# Patient Record
Sex: MALE | Race: BLACK OR AFRICAN AMERICAN | NOT HISPANIC OR LATINO | Employment: OTHER | URBAN - METROPOLITAN AREA
[De-identification: names, ages, dates, MRNs, and addresses within clinical notes are randomized per-mention and may not be internally consistent; named-entity substitution may affect disease eponyms.]

---

## 2017-01-03 ENCOUNTER — ALLSCRIPTS OFFICE VISIT (OUTPATIENT)
Dept: OTHER | Facility: OTHER | Age: 62
End: 2017-01-03

## 2017-01-13 ENCOUNTER — ANESTHESIA EVENT (OUTPATIENT)
Dept: PERIOP | Facility: HOSPITAL | Age: 62
End: 2017-01-13
Payer: COMMERCIAL

## 2017-01-18 RX ORDER — LISINOPRIL 20 MG/1
20 TABLET ORAL DAILY
Status: ON HOLD | COMMUNITY
End: 2017-02-22

## 2017-01-18 RX ORDER — GABAPENTIN 300 MG/1
300 CAPSULE ORAL DAILY
COMMUNITY
End: 2020-07-30 | Stop reason: ALTCHOICE

## 2017-01-18 RX ORDER — AMLODIPINE BESYLATE 2.5 MG/1
TABLET ORAL DAILY
COMMUNITY
End: 2018-10-04 | Stop reason: SDUPTHER

## 2017-01-18 RX ORDER — ACETAMINOPHEN 325 MG/1
650 TABLET ORAL EVERY 6 HOURS PRN
COMMUNITY
End: 2019-07-29

## 2017-01-18 RX ORDER — HYDROCHLOROTHIAZIDE 25 MG/1
25 TABLET ORAL DAILY
COMMUNITY
End: 2017-02-22 | Stop reason: HOSPADM

## 2017-01-20 ENCOUNTER — GENERIC CONVERSION - ENCOUNTER (OUTPATIENT)
Dept: OTHER | Facility: OTHER | Age: 62
End: 2017-01-20

## 2017-01-23 ENCOUNTER — HOSPITAL ENCOUNTER (OUTPATIENT)
Facility: HOSPITAL | Age: 62
Setting detail: OUTPATIENT SURGERY
Discharge: HOME | End: 2017-01-23
Attending: NEUROLOGICAL SURGERY | Admitting: NEUROLOGICAL SURGERY
Payer: COMMERCIAL

## 2017-01-23 ENCOUNTER — ANESTHESIA (OUTPATIENT)
Dept: PERIOP | Facility: HOSPITAL | Age: 62
End: 2017-01-23
Payer: COMMERCIAL

## 2017-01-23 ENCOUNTER — APPOINTMENT (OUTPATIENT)
Dept: RADIOLOGY | Facility: HOSPITAL | Age: 62
End: 2017-01-23
Payer: COMMERCIAL

## 2017-01-23 VITALS
OXYGEN SATURATION: 99 % | DIASTOLIC BLOOD PRESSURE: 80 MMHG | HEART RATE: 64 BPM | SYSTOLIC BLOOD PRESSURE: 154 MMHG | HEIGHT: 66 IN | TEMPERATURE: 97.5 F | WEIGHT: 136 LBS | BODY MASS INDEX: 21.86 KG/M2 | RESPIRATION RATE: 16 BRPM

## 2017-01-23 PROBLEM — M48.062 LUMBAR STENOSIS WITH NEUROGENIC CLAUDICATION: Chronic | Status: ACTIVE | Noted: 2017-01-23

## 2017-01-23 LAB
ABO GROUP BLD: NORMAL
BLD GP AB SCN SERPL QL: NEGATIVE
RH BLD: POSITIVE

## 2017-01-23 PROCEDURE — 86901 BLOOD TYPING SEROLOGIC RH(D): CPT | Performed by: NEUROLOGICAL SURGERY

## 2017-01-23 PROCEDURE — 86850 RBC ANTIBODY SCREEN: CPT | Performed by: NEUROLOGICAL SURGERY

## 2017-01-23 PROCEDURE — 86900 BLOOD TYPING SEROLOGIC ABO: CPT | Performed by: NEUROLOGICAL SURGERY

## 2017-01-23 PROCEDURE — 72020 X-RAY EXAM OF SPINE 1 VIEW: CPT

## 2017-01-23 PROCEDURE — 51798 US URINE CAPACITY MEASURE: CPT | Performed by: NEUROLOGICAL SURGERY

## 2017-01-23 RX ORDER — SODIUM CHLORIDE 9 MG/ML
INJECTION, SOLUTION INTRAVENOUS CONTINUOUS PRN
Status: DISCONTINUED | OUTPATIENT
Start: 2017-01-23 | End: 2017-01-23 | Stop reason: SURG

## 2017-01-23 RX ORDER — SODIUM CHLORIDE 9 MG/ML
50 INJECTION, SOLUTION INTRAVENOUS CONTINUOUS
Status: DISCONTINUED | OUTPATIENT
Start: 2017-01-23 | End: 2017-01-23 | Stop reason: HOSPADM

## 2017-01-23 RX ORDER — ONDANSETRON 2 MG/ML
4 INJECTION INTRAMUSCULAR; INTRAVENOUS EVERY 6 HOURS PRN
Status: DISCONTINUED | OUTPATIENT
Start: 2017-01-23 | End: 2017-01-23 | Stop reason: HOSPADM

## 2017-01-23 RX ORDER — LABETALOL HYDROCHLORIDE 5 MG/ML
5 INJECTION, SOLUTION INTRAVENOUS
Status: DISCONTINUED | OUTPATIENT
Start: 2017-01-23 | End: 2017-01-23 | Stop reason: HOSPADM

## 2017-01-23 RX ORDER — OXYCODONE HYDROCHLORIDE 5 MG/1
5 TABLET ORAL EVERY 6 HOURS PRN
Qty: 21 TABLET | Refills: 0 | Status: SHIPPED | OUTPATIENT
Start: 2017-01-23 | End: 2017-01-30

## 2017-01-23 RX ORDER — GLYCOPYRROLATE 0.2 MG/ML
INJECTION INTRAMUSCULAR; INTRAVENOUS AS NEEDED
Status: DISCONTINUED | OUTPATIENT
Start: 2017-01-23 | End: 2017-01-23 | Stop reason: SURG

## 2017-01-23 RX ORDER — FENTANYL CITRATE 50 UG/ML
INJECTION, SOLUTION INTRAMUSCULAR; INTRAVENOUS AS NEEDED
Status: DISCONTINUED | OUTPATIENT
Start: 2017-01-23 | End: 2017-01-23 | Stop reason: SURG

## 2017-01-23 RX ORDER — MORPHINE SULFATE 2 MG/ML
1 INJECTION, SOLUTION INTRAMUSCULAR; INTRAVENOUS
Status: DISCONTINUED | OUTPATIENT
Start: 2017-01-23 | End: 2017-01-23 | Stop reason: HOSPADM

## 2017-01-23 RX ORDER — MIDAZOLAM HYDROCHLORIDE 1 MG/ML
INJECTION INTRAMUSCULAR; INTRAVENOUS AS NEEDED
Status: DISCONTINUED | OUTPATIENT
Start: 2017-01-23 | End: 2017-01-23 | Stop reason: SURG

## 2017-01-23 RX ORDER — ONDANSETRON 2 MG/ML
INJECTION INTRAMUSCULAR; INTRAVENOUS AS NEEDED
Status: DISCONTINUED | OUTPATIENT
Start: 2017-01-23 | End: 2017-01-23 | Stop reason: SURG

## 2017-01-23 RX ORDER — LABETALOL HYDROCHLORIDE 5 MG/ML
INJECTION, SOLUTION INTRAVENOUS AS NEEDED
Status: DISCONTINUED | OUTPATIENT
Start: 2017-01-23 | End: 2017-01-23 | Stop reason: SURG

## 2017-01-23 RX ORDER — ESMOLOL HYDROCHLORIDE 10 MG/ML
INJECTION INTRAVENOUS AS NEEDED
Status: DISCONTINUED | OUTPATIENT
Start: 2017-01-23 | End: 2017-01-23 | Stop reason: SURG

## 2017-01-23 RX ORDER — ROCURONIUM BROMIDE 10 MG/ML
INJECTION, SOLUTION INTRAVENOUS AS NEEDED
Status: DISCONTINUED | OUTPATIENT
Start: 2017-01-23 | End: 2017-01-23 | Stop reason: SURG

## 2017-01-23 RX ORDER — LIDOCAINE HYDROCHLORIDE 10 MG/ML
INJECTION, SOLUTION EPIDURAL; INFILTRATION; INTRACAUDAL; PERINEURAL AS NEEDED
Status: DISCONTINUED | OUTPATIENT
Start: 2017-01-23 | End: 2017-01-23 | Stop reason: SURG

## 2017-01-23 RX ORDER — SODIUM CHLORIDE 9 MG/ML
100 INJECTION, SOLUTION INTRAVENOUS CONTINUOUS
Status: DISCONTINUED | OUTPATIENT
Start: 2017-01-23 | End: 2017-01-23 | Stop reason: HOSPADM

## 2017-01-23 RX ORDER — BUPIVACAINE HYDROCHLORIDE AND EPINEPHRINE 5; 5 MG/ML; UG/ML
INJECTION, SOLUTION EPIDURAL; INTRACAUDAL; PERINEURAL AS NEEDED
Status: DISCONTINUED | OUTPATIENT
Start: 2017-01-23 | End: 2017-01-23 | Stop reason: HOSPADM

## 2017-01-23 RX ORDER — METHYLPREDNISOLONE ACETATE 40 MG/ML
INJECTION, SUSPENSION INTRA-ARTICULAR; INTRALESIONAL; INTRAMUSCULAR; SOFT TISSUE AS NEEDED
Status: DISCONTINUED | OUTPATIENT
Start: 2017-01-23 | End: 2017-01-23 | Stop reason: HOSPADM

## 2017-01-23 RX ORDER — LIDOCAINE HYDROCHLORIDE AND EPINEPHRINE 10; 10 MG/ML; UG/ML
INJECTION, SOLUTION INFILTRATION; PERINEURAL AS NEEDED
Status: DISCONTINUED | OUTPATIENT
Start: 2017-01-23 | End: 2017-01-23 | Stop reason: HOSPADM

## 2017-01-23 RX ORDER — FENTANYL CITRATE/PF 50 MCG/ML
25 SYRINGE (ML) INJECTION
Status: DISCONTINUED | OUTPATIENT
Start: 2017-01-23 | End: 2017-01-23 | Stop reason: HOSPADM

## 2017-01-23 RX ORDER — PROPOFOL 10 MG/ML
INJECTION, EMULSION INTRAVENOUS AS NEEDED
Status: DISCONTINUED | OUTPATIENT
Start: 2017-01-23 | End: 2017-01-23 | Stop reason: SURG

## 2017-01-23 RX ORDER — OXYCODONE HYDROCHLORIDE AND ACETAMINOPHEN 5; 325 MG/1; MG/1
1 TABLET ORAL EVERY 4 HOURS PRN
Status: DISCONTINUED | OUTPATIENT
Start: 2017-01-23 | End: 2017-01-23 | Stop reason: HOSPADM

## 2017-01-23 RX ADMIN — ESMOLOL HYDROCHLORIDE 20 MG: 100 INJECTION, SOLUTION INTRAVENOUS at 08:11

## 2017-01-23 RX ADMIN — LABETALOL HYDROCHLORIDE 10 MG: 5 INJECTION, SOLUTION INTRAVENOUS at 10:17

## 2017-01-23 RX ADMIN — DEXAMETHASONE SODIUM PHOSPHATE 10 MG: 10 INJECTION INTRAMUSCULAR; INTRAVENOUS at 08:17

## 2017-01-23 RX ADMIN — PROPOFOL 70 MG: 10 INJECTION, EMULSION INTRAVENOUS at 07:53

## 2017-01-23 RX ADMIN — LIDOCAINE HYDROCHLORIDE 70 MG: 10 INJECTION, SOLUTION EPIDURAL; INFILTRATION; INTRACAUDAL; PERINEURAL at 07:51

## 2017-01-23 RX ADMIN — CEFAZOLIN SODIUM 2000 MG: 2 SOLUTION INTRAVENOUS at 08:00

## 2017-01-23 RX ADMIN — GLYCOPYRROLATE 0.4 MG: 0.2 INJECTION INTRAMUSCULAR; INTRAVENOUS at 09:51

## 2017-01-23 RX ADMIN — ROCURONIUM BROMIDE 10 MG: 10 INJECTION, SOLUTION INTRAVENOUS at 08:15

## 2017-01-23 RX ADMIN — GLYCOPYRROLATE 0.1 MG: 0.2 INJECTION INTRAMUSCULAR; INTRAVENOUS at 08:40

## 2017-01-23 RX ADMIN — SODIUM CHLORIDE: 0.9 INJECTION, SOLUTION INTRAVENOUS at 07:15

## 2017-01-23 RX ADMIN — OXYCODONE HYDROCHLORIDE AND ACETAMINOPHEN 1 TABLET: 5; 325 TABLET ORAL at 12:25

## 2017-01-23 RX ADMIN — ONDANSETRON 4 MG: 2 INJECTION INTRAMUSCULAR; INTRAVENOUS at 08:17

## 2017-01-23 RX ADMIN — MIDAZOLAM HYDROCHLORIDE 2 MG: 1 INJECTION, SOLUTION INTRAMUSCULAR; INTRAVENOUS at 07:44

## 2017-01-23 RX ADMIN — LABETALOL HYDROCHLORIDE 10 MG: 5 INJECTION, SOLUTION INTRAVENOUS at 10:05

## 2017-01-23 RX ADMIN — FENTANYL CITRATE 50 MCG: 50 INJECTION, SOLUTION INTRAMUSCULAR; INTRAVENOUS at 07:52

## 2017-01-23 RX ADMIN — NEOSTIGMINE METHYLSULFATE 3 MG: 1 INJECTION INTRAMUSCULAR; INTRAVENOUS; SUBCUTANEOUS at 09:51

## 2017-01-23 RX ADMIN — PROPOFOL 50 MG: 10 INJECTION, EMULSION INTRAVENOUS at 08:10

## 2017-01-23 RX ADMIN — PROPOFOL 130 MG: 10 INJECTION, EMULSION INTRAVENOUS at 07:51

## 2017-01-23 RX ADMIN — FENTANYL CITRATE 50 MCG: 50 INJECTION, SOLUTION INTRAMUSCULAR; INTRAVENOUS at 08:15

## 2017-01-23 RX ADMIN — ROCURONIUM BROMIDE 40 MG: 10 INJECTION, SOLUTION INTRAVENOUS at 07:52

## 2017-01-23 RX ADMIN — ESMOLOL HYDROCHLORIDE 10 MG: 100 INJECTION, SOLUTION INTRAVENOUS at 07:55

## 2017-01-23 RX ADMIN — HYDROMORPHONE HYDROCHLORIDE 0.5 MG: 1 INJECTION, SOLUTION INTRAMUSCULAR; INTRAVENOUS; SUBCUTANEOUS at 08:13

## 2017-01-25 ENCOUNTER — GENERIC CONVERSION - ENCOUNTER (OUTPATIENT)
Dept: OTHER | Facility: OTHER | Age: 62
End: 2017-01-25

## 2017-01-27 ENCOUNTER — GENERIC CONVERSION - ENCOUNTER (OUTPATIENT)
Dept: OTHER | Facility: OTHER | Age: 62
End: 2017-01-27

## 2017-02-06 ENCOUNTER — ALLSCRIPTS OFFICE VISIT (OUTPATIENT)
Dept: OTHER | Facility: OTHER | Age: 62
End: 2017-02-06

## 2017-02-19 ENCOUNTER — APPOINTMENT (EMERGENCY)
Dept: RADIOLOGY | Facility: HOSPITAL | Age: 62
DRG: 463 | End: 2017-02-19
Payer: COMMERCIAL

## 2017-02-19 ENCOUNTER — APPOINTMENT (INPATIENT)
Dept: RADIOLOGY | Facility: HOSPITAL | Age: 62
DRG: 463 | End: 2017-02-19
Payer: COMMERCIAL

## 2017-02-19 ENCOUNTER — GENERIC CONVERSION - ENCOUNTER (OUTPATIENT)
Dept: OTHER | Facility: OTHER | Age: 62
End: 2017-02-19

## 2017-02-19 ENCOUNTER — HOSPITAL ENCOUNTER (INPATIENT)
Facility: HOSPITAL | Age: 62
LOS: 3 days | Discharge: HOME HEALTH | DRG: 463 | End: 2017-02-22
Attending: EMERGENCY MEDICINE | Admitting: INTERNAL MEDICINE
Payer: COMMERCIAL

## 2017-02-19 DIAGNOSIS — M48.062 LUMBAR STENOSIS WITH NEUROGENIC CLAUDICATION: Chronic | ICD-10-CM

## 2017-02-19 DIAGNOSIS — N39.0 UTI (URINARY TRACT INFECTION): Primary | ICD-10-CM

## 2017-02-19 DIAGNOSIS — R29.6 MULTIPLE FALLS: ICD-10-CM

## 2017-02-19 DIAGNOSIS — N31.9 NEUROGENIC BLADDER: ICD-10-CM

## 2017-02-19 DIAGNOSIS — N17.9 AKI (ACUTE KIDNEY INJURY) (HCC): ICD-10-CM

## 2017-02-19 DIAGNOSIS — R27.0 ATAXIA: ICD-10-CM

## 2017-02-19 PROBLEM — I10 HYPERTENSION: Status: ACTIVE | Noted: 2017-02-19

## 2017-02-19 LAB
ANION GAP SERPL CALCULATED.3IONS-SCNC: 12 MMOL/L (ref 4–13)
ATRIAL RATE: 82 BPM
BACTERIA UR QL AUTO: ABNORMAL /HPF
BASOPHILS # BLD AUTO: 0.03 THOUSANDS/ΜL (ref 0–0.1)
BASOPHILS NFR BLD AUTO: 0 % (ref 0–1)
BILIRUB UR QL STRIP: NEGATIVE
BUN SERPL-MCNC: 32 MG/DL (ref 5–25)
CALCIUM SERPL-MCNC: 9 MG/DL (ref 8.3–10.1)
CHLORIDE SERPL-SCNC: 104 MMOL/L (ref 100–108)
CLARITY UR: ABNORMAL
CO2 SERPL-SCNC: 20 MMOL/L (ref 21–32)
COLOR UR: YELLOW
COLOR, POC: YELLOW
CREAT SERPL-MCNC: 1.74 MG/DL (ref 0.6–1.3)
EOSINOPHIL # BLD AUTO: 0.11 THOUSAND/ΜL (ref 0–0.61)
EOSINOPHIL NFR BLD AUTO: 2 % (ref 0–6)
ERYTHROCYTE [DISTWIDTH] IN BLOOD BY AUTOMATED COUNT: 13.6 % (ref 11.6–15.1)
GFR SERPL CREATININE-BSD FRML MDRD: 48.3 ML/MIN/1.73SQ M
GLUCOSE SERPL-MCNC: 154 MG/DL (ref 65–140)
GLUCOSE UR STRIP-MCNC: NEGATIVE MG/DL
HCT VFR BLD AUTO: 35.3 % (ref 36.5–49.3)
HGB BLD-MCNC: 12.1 G/DL (ref 12–17)
HGB UR QL STRIP.AUTO: ABNORMAL
HYALINE CASTS #/AREA URNS LPF: ABNORMAL /LPF
KETONES UR STRIP-MCNC: NEGATIVE MG/DL
LEUKOCYTE ESTERASE UR QL STRIP: ABNORMAL
LYMPHOCYTES # BLD AUTO: 2.92 THOUSANDS/ΜL (ref 0.6–4.47)
LYMPHOCYTES NFR BLD AUTO: 40 % (ref 14–44)
MCH RBC QN AUTO: 32.7 PG (ref 26.8–34.3)
MCHC RBC AUTO-ENTMCNC: 34.3 G/DL (ref 31.4–37.4)
MCV RBC AUTO: 95 FL (ref 82–98)
MONOCYTES # BLD AUTO: 1.13 THOUSAND/ΜL (ref 0.17–1.22)
MONOCYTES NFR BLD AUTO: 15 % (ref 4–12)
NEUTROPHILS # BLD AUTO: 3.16 THOUSANDS/ΜL (ref 1.85–7.62)
NEUTS SEG NFR BLD AUTO: 43 % (ref 43–75)
NITRITE UR QL STRIP: POSITIVE
NON-SQ EPI CELLS URNS QL MICRO: ABNORMAL /HPF
NRBC BLD AUTO-RTO: 0 /100 WBCS
P AXIS: 67 DEGREES
PH UR STRIP.AUTO: 5.5 [PH] (ref 4.5–8)
PLATELET # BLD AUTO: 280 THOUSANDS/UL (ref 149–390)
PMV BLD AUTO: 9.5 FL (ref 8.9–12.7)
POTASSIUM SERPL-SCNC: 3.6 MMOL/L (ref 3.5–5.3)
PR INTERVAL: 130 MS
PROT UR STRIP-MCNC: ABNORMAL MG/DL
QRS AXIS: 51 DEGREES
QRSD INTERVAL: 78 MS
QT INTERVAL: 372 MS
QTC INTERVAL: 434 MS
RBC # BLD AUTO: 3.7 MILLION/UL (ref 3.88–5.62)
RBC #/AREA URNS AUTO: ABNORMAL /HPF
SODIUM SERPL-SCNC: 136 MMOL/L (ref 136–145)
SP GR UR STRIP.AUTO: 1.01 (ref 1–1.03)
T WAVE AXIS: 75 DEGREES
TROPONIN I SERPL-MCNC: <0.02 NG/ML
TSH SERPL DL<=0.05 MIU/L-ACNC: 0.69 UIU/ML (ref 0.36–3.74)
UROBILINOGEN UR QL STRIP.AUTO: 0.2 E.U./DL
VENTRICULAR RATE: 82 BPM
WBC # BLD AUTO: 7.36 THOUSAND/UL (ref 4.31–10.16)
WBC #/AREA URNS AUTO: ABNORMAL /HPF

## 2017-02-19 PROCEDURE — 87077 CULTURE AEROBIC IDENTIFY: CPT

## 2017-02-19 PROCEDURE — 70450 CT HEAD/BRAIN W/O DYE: CPT

## 2017-02-19 PROCEDURE — 84443 ASSAY THYROID STIM HORMONE: CPT | Performed by: INTERNAL MEDICINE

## 2017-02-19 PROCEDURE — 81001 URINALYSIS AUTO W/SCOPE: CPT

## 2017-02-19 PROCEDURE — 93005 ELECTROCARDIOGRAM TRACING: CPT

## 2017-02-19 PROCEDURE — 71020 HB CHEST X-RAY 2VW FRONTAL&LATL: CPT

## 2017-02-19 PROCEDURE — 85025 COMPLETE CBC W/AUTO DIFF WBC: CPT

## 2017-02-19 PROCEDURE — 87086 URINE CULTURE/COLONY COUNT: CPT

## 2017-02-19 PROCEDURE — 81002 URINALYSIS NONAUTO W/O SCOPE: CPT | Performed by: EMERGENCY MEDICINE

## 2017-02-19 PROCEDURE — 87186 SC STD MICRODIL/AGAR DIL: CPT

## 2017-02-19 PROCEDURE — 0T9B70Z DRAINAGE OF BLADDER WITH DRAINAGE DEVICE, VIA NATURAL OR ARTIFICIAL OPENING: ICD-10-PCS | Performed by: INTERNAL MEDICINE

## 2017-02-19 PROCEDURE — 36415 COLL VENOUS BLD VENIPUNCTURE: CPT

## 2017-02-19 PROCEDURE — 70551 MRI BRAIN STEM W/O DYE: CPT

## 2017-02-19 PROCEDURE — 84484 ASSAY OF TROPONIN QUANT: CPT

## 2017-02-19 PROCEDURE — 80048 BASIC METABOLIC PNL TOTAL CA: CPT

## 2017-02-19 PROCEDURE — 99285 EMERGENCY DEPT VISIT HI MDM: CPT

## 2017-02-19 RX ORDER — GABAPENTIN 300 MG/1
300 CAPSULE ORAL 2 TIMES DAILY
Status: DISCONTINUED | OUTPATIENT
Start: 2017-02-19 | End: 2017-02-22 | Stop reason: HOSPADM

## 2017-02-19 RX ORDER — AMLODIPINE BESYLATE 5 MG/1
5 TABLET ORAL DAILY
Status: DISCONTINUED | OUTPATIENT
Start: 2017-02-19 | End: 2017-02-22 | Stop reason: HOSPADM

## 2017-02-19 RX ORDER — ONDANSETRON 2 MG/ML
4 INJECTION INTRAMUSCULAR; INTRAVENOUS EVERY 6 HOURS PRN
Status: DISCONTINUED | OUTPATIENT
Start: 2017-02-19 | End: 2017-02-22 | Stop reason: HOSPADM

## 2017-02-19 RX ORDER — TRAMADOL HYDROCHLORIDE 50 MG/1
50 TABLET ORAL EVERY 4 HOURS PRN
Status: DISCONTINUED | OUTPATIENT
Start: 2017-02-19 | End: 2017-02-22 | Stop reason: HOSPADM

## 2017-02-19 RX ORDER — HEPARIN SODIUM 5000 [USP'U]/ML
5000 INJECTION, SOLUTION INTRAVENOUS; SUBCUTANEOUS EVERY 8 HOURS SCHEDULED
Status: DISCONTINUED | OUTPATIENT
Start: 2017-02-19 | End: 2017-02-22 | Stop reason: HOSPADM

## 2017-02-19 RX ORDER — TRAMADOL HYDROCHLORIDE 50 MG/1
TABLET ORAL
COMMUNITY
End: 2018-10-04

## 2017-02-19 RX ORDER — ACETAMINOPHEN 325 MG/1
650 TABLET ORAL EVERY 6 HOURS PRN
Status: DISCONTINUED | OUTPATIENT
Start: 2017-02-19 | End: 2017-02-22 | Stop reason: HOSPADM

## 2017-02-19 RX ADMIN — GABAPENTIN 300 MG: 300 CAPSULE ORAL at 18:18

## 2017-02-19 RX ADMIN — CEFAZOLIN SODIUM 1000 MG: 1 SOLUTION INTRAVENOUS at 23:47

## 2017-02-19 RX ADMIN — AMLODIPINE BESYLATE 5 MG: 5 TABLET ORAL at 16:21

## 2017-02-19 RX ADMIN — CEFEPIME 2000 MG: 2 INJECTION, POWDER, FOR SOLUTION INTRAMUSCULAR; INTRAVENOUS at 12:05

## 2017-02-20 LAB
ALBUMIN SERPL BCP-MCNC: 3.2 G/DL (ref 3.5–5)
ALP SERPL-CCNC: 68 U/L (ref 46–116)
ALT SERPL W P-5'-P-CCNC: 84 U/L (ref 12–78)
ANION GAP SERPL CALCULATED.3IONS-SCNC: 11 MMOL/L (ref 4–13)
AST SERPL W P-5'-P-CCNC: 68 U/L (ref 5–45)
BILIRUB SERPL-MCNC: 0.43 MG/DL (ref 0.2–1)
BUN SERPL-MCNC: 25 MG/DL (ref 5–25)
CALCIUM SERPL-MCNC: 9.2 MG/DL (ref 8.3–10.1)
CHLORIDE SERPL-SCNC: 104 MMOL/L (ref 100–108)
CO2 SERPL-SCNC: 23 MMOL/L (ref 21–32)
CREAT SERPL-MCNC: 1.22 MG/DL (ref 0.6–1.3)
ERYTHROCYTE [DISTWIDTH] IN BLOOD BY AUTOMATED COUNT: 13.7 % (ref 11.6–15.1)
GFR SERPL CREATININE-BSD FRML MDRD: >60 ML/MIN/1.73SQ M
GLUCOSE SERPL-MCNC: 105 MG/DL (ref 65–140)
HCT VFR BLD AUTO: 37.6 % (ref 36.5–49.3)
HGB BLD-MCNC: 12.7 G/DL (ref 12–17)
MCH RBC QN AUTO: 32.5 PG (ref 26.8–34.3)
MCHC RBC AUTO-ENTMCNC: 33.8 G/DL (ref 31.4–37.4)
MCV RBC AUTO: 96 FL (ref 82–98)
PLATELET # BLD AUTO: 315 THOUSANDS/UL (ref 149–390)
PMV BLD AUTO: 10 FL (ref 8.9–12.7)
POTASSIUM SERPL-SCNC: 3.9 MMOL/L (ref 3.5–5.3)
PROT SERPL-MCNC: 8.9 G/DL (ref 6.4–8.2)
RBC # BLD AUTO: 3.91 MILLION/UL (ref 3.88–5.62)
SODIUM SERPL-SCNC: 138 MMOL/L (ref 136–145)
WBC # BLD AUTO: 5.72 THOUSAND/UL (ref 4.31–10.16)

## 2017-02-20 PROCEDURE — 85027 COMPLETE CBC AUTOMATED: CPT | Performed by: INTERNAL MEDICINE

## 2017-02-20 PROCEDURE — 80053 COMPREHEN METABOLIC PANEL: CPT | Performed by: INTERNAL MEDICINE

## 2017-02-20 RX ADMIN — TRAMADOL HYDROCHLORIDE 50 MG: 50 TABLET, COATED ORAL at 00:02

## 2017-02-20 RX ADMIN — CEFAZOLIN SODIUM 1000 MG: 1 SOLUTION INTRAVENOUS at 23:23

## 2017-02-20 RX ADMIN — TRAMADOL HYDROCHLORIDE 50 MG: 50 TABLET, COATED ORAL at 23:23

## 2017-02-20 RX ADMIN — TRAMADOL HYDROCHLORIDE 50 MG: 50 TABLET, COATED ORAL at 11:29

## 2017-02-20 RX ADMIN — GABAPENTIN 300 MG: 300 CAPSULE ORAL at 18:05

## 2017-02-20 RX ADMIN — GABAPENTIN 300 MG: 300 CAPSULE ORAL at 08:53

## 2017-02-20 RX ADMIN — AMLODIPINE BESYLATE 5 MG: 5 TABLET ORAL at 08:53

## 2017-02-20 RX ADMIN — CEFAZOLIN SODIUM 1000 MG: 1 SOLUTION INTRAVENOUS at 12:43

## 2017-02-21 PROBLEM — E78.5 HLD (HYPERLIPIDEMIA): Status: ACTIVE | Noted: 2017-02-21

## 2017-02-21 PROBLEM — I95.1 ORTHOSTATIC HYPOTENSION: Status: ACTIVE | Noted: 2017-02-21

## 2017-02-21 PROBLEM — A49.8 PSEUDOMONAS AERUGINOSA INFECTION: Status: ACTIVE | Noted: 2017-02-19

## 2017-02-21 PROBLEM — R55 SYNCOPE AND COLLAPSE: Status: ACTIVE | Noted: 2017-02-21

## 2017-02-21 LAB
ANION GAP SERPL CALCULATED.3IONS-SCNC: 10 MMOL/L (ref 4–13)
BACTERIA UR CULT: NORMAL
BUN SERPL-MCNC: 24 MG/DL (ref 5–25)
CALCIUM SERPL-MCNC: 9.3 MG/DL (ref 8.3–10.1)
CHLORIDE SERPL-SCNC: 102 MMOL/L (ref 100–108)
CO2 SERPL-SCNC: 22 MMOL/L (ref 21–32)
CREAT SERPL-MCNC: 1.08 MG/DL (ref 0.6–1.3)
GFR SERPL CREATININE-BSD FRML MDRD: >60 ML/MIN/1.73SQ M
GLUCOSE SERPL-MCNC: 102 MG/DL (ref 65–140)
POTASSIUM SERPL-SCNC: 4.2 MMOL/L (ref 3.5–5.3)
SODIUM SERPL-SCNC: 134 MMOL/L (ref 136–145)

## 2017-02-21 PROCEDURE — 97163 PT EVAL HIGH COMPLEX 45 MIN: CPT

## 2017-02-21 PROCEDURE — 97166 OT EVAL MOD COMPLEX 45 MIN: CPT

## 2017-02-21 PROCEDURE — G8980 MOBILITY D/C STATUS: HCPCS

## 2017-02-21 PROCEDURE — G8979 MOBILITY GOAL STATUS: HCPCS

## 2017-02-21 PROCEDURE — G8987 SELF CARE CURRENT STATUS: HCPCS

## 2017-02-21 PROCEDURE — 80048 BASIC METABOLIC PNL TOTAL CA: CPT | Performed by: INTERNAL MEDICINE

## 2017-02-21 PROCEDURE — G8989 SELF CARE D/C STATUS: HCPCS

## 2017-02-21 PROCEDURE — G8988 SELF CARE GOAL STATUS: HCPCS

## 2017-02-21 PROCEDURE — G8978 MOBILITY CURRENT STATUS: HCPCS

## 2017-02-21 RX ORDER — CEPHALEXIN 500 MG/1
500 CAPSULE ORAL EVERY 12 HOURS SCHEDULED
Status: DISCONTINUED | OUTPATIENT
Start: 2017-02-21 | End: 2017-02-22 | Stop reason: HOSPADM

## 2017-02-21 RX ORDER — ATORVASTATIN CALCIUM 10 MG/1
10 TABLET, FILM COATED ORAL
Status: DISCONTINUED | OUTPATIENT
Start: 2017-02-21 | End: 2017-02-22 | Stop reason: HOSPADM

## 2017-02-21 RX ORDER — CEPHALEXIN 500 MG/1
CAPSULE ORAL
Status: COMPLETED
Start: 2017-02-21 | End: 2017-02-21

## 2017-02-21 RX ORDER — GINSENG 100 MG
1 CAPSULE ORAL 2 TIMES DAILY
Status: DISCONTINUED | OUTPATIENT
Start: 2017-02-21 | End: 2017-02-22 | Stop reason: HOSPADM

## 2017-02-21 RX ORDER — TRAMADOL HYDROCHLORIDE 50 MG/1
TABLET ORAL
Status: COMPLETED
Start: 2017-02-21 | End: 2017-02-21

## 2017-02-21 RX ORDER — SODIUM CHLORIDE 9 MG/ML
100 INJECTION, SOLUTION INTRAVENOUS CONTINUOUS
Status: DISCONTINUED | OUTPATIENT
Start: 2017-02-21 | End: 2017-02-22 | Stop reason: HOSPADM

## 2017-02-21 RX ADMIN — CEPHALEXIN 500 MG: 500 CAPSULE ORAL at 21:07

## 2017-02-21 RX ADMIN — GABAPENTIN 300 MG: 300 CAPSULE ORAL at 17:39

## 2017-02-21 RX ADMIN — TRAMADOL HYDROCHLORIDE 50 MG: 50 TABLET ORAL at 21:07

## 2017-02-21 RX ADMIN — TRAMADOL HYDROCHLORIDE 50 MG: 50 TABLET, COATED ORAL at 21:07

## 2017-02-21 RX ADMIN — ATORVASTATIN CALCIUM 10 MG: 10 TABLET, FILM COATED ORAL at 17:39

## 2017-02-21 RX ADMIN — GABAPENTIN 300 MG: 300 CAPSULE ORAL at 09:01

## 2017-02-21 RX ADMIN — AMLODIPINE BESYLATE 5 MG: 5 TABLET ORAL at 09:01

## 2017-02-21 RX ADMIN — SODIUM CHLORIDE 100 ML/HR: 0.9 INJECTION, SOLUTION INTRAVENOUS at 11:11

## 2017-02-21 RX ADMIN — CEPHALEXIN 500 MG: 500 CAPSULE ORAL at 11:42

## 2017-02-22 VITALS
WEIGHT: 142.86 LBS | DIASTOLIC BLOOD PRESSURE: 78 MMHG | TEMPERATURE: 98.4 F | SYSTOLIC BLOOD PRESSURE: 144 MMHG | RESPIRATION RATE: 20 BRPM | HEART RATE: 75 BPM | HEIGHT: 66 IN | BODY MASS INDEX: 22.96 KG/M2 | OXYGEN SATURATION: 100 %

## 2017-02-22 PROBLEM — I95.1 ORTHOSTATIC HYPOTENSION: Status: RESOLVED | Noted: 2017-02-21 | Resolved: 2017-02-22

## 2017-02-22 PROBLEM — R29.6 MULTIPLE FALLS: Status: RESOLVED | Noted: 2017-02-19 | Resolved: 2017-02-22

## 2017-02-22 LAB
ANION GAP SERPL CALCULATED.3IONS-SCNC: 6 MMOL/L (ref 4–13)
BUN SERPL-MCNC: 22 MG/DL (ref 5–25)
CALCIUM SERPL-MCNC: 9 MG/DL (ref 8.3–10.1)
CHLORIDE SERPL-SCNC: 105 MMOL/L (ref 100–108)
CO2 SERPL-SCNC: 25 MMOL/L (ref 21–32)
CREAT SERPL-MCNC: 1.2 MG/DL (ref 0.6–1.3)
ERYTHROCYTE [DISTWIDTH] IN BLOOD BY AUTOMATED COUNT: 13.4 % (ref 11.6–15.1)
GFR SERPL CREATININE-BSD FRML MDRD: >60 ML/MIN/1.73SQ M
GLUCOSE SERPL-MCNC: 106 MG/DL (ref 65–140)
HCT VFR BLD AUTO: 36.4 % (ref 36.5–49.3)
HGB BLD-MCNC: 12.1 G/DL (ref 12–17)
MAGNESIUM SERPL-MCNC: 1.9 MG/DL (ref 1.6–2.6)
MCH RBC QN AUTO: 32.2 PG (ref 26.8–34.3)
MCHC RBC AUTO-ENTMCNC: 33.2 G/DL (ref 31.4–37.4)
MCV RBC AUTO: 97 FL (ref 82–98)
PLATELET # BLD AUTO: 345 THOUSANDS/UL (ref 149–390)
PMV BLD AUTO: 9.6 FL (ref 8.9–12.7)
POTASSIUM SERPL-SCNC: 4.1 MMOL/L (ref 3.5–5.3)
RBC # BLD AUTO: 3.76 MILLION/UL (ref 3.88–5.62)
SODIUM SERPL-SCNC: 136 MMOL/L (ref 136–145)
WBC # BLD AUTO: 7.72 THOUSAND/UL (ref 4.31–10.16)

## 2017-02-22 PROCEDURE — 85027 COMPLETE CBC AUTOMATED: CPT | Performed by: PHYSICIAN ASSISTANT

## 2017-02-22 PROCEDURE — 83735 ASSAY OF MAGNESIUM: CPT | Performed by: PHYSICIAN ASSISTANT

## 2017-02-22 PROCEDURE — 80048 BASIC METABOLIC PNL TOTAL CA: CPT | Performed by: PHYSICIAN ASSISTANT

## 2017-02-22 RX ORDER — CEPHALEXIN 500 MG/1
500 CAPSULE ORAL 3 TIMES DAILY
Qty: 24 CAPSULE | Refills: 0 | Status: SHIPPED | OUTPATIENT
Start: 2017-02-22 | End: 2017-03-02

## 2017-02-22 RX ORDER — LISINOPRIL 20 MG/1
10 TABLET ORAL DAILY
Qty: 15 TABLET | Refills: 0 | Status: SHIPPED | OUTPATIENT
Start: 2017-02-22 | End: 2018-07-05

## 2017-02-22 RX ADMIN — SODIUM CHLORIDE 100 ML/HR: 0.9 INJECTION, SOLUTION INTRAVENOUS at 06:40

## 2017-02-22 RX ADMIN — BACITRACIN ZINC 1 SMALL APPLICATION: 500 OINTMENT TOPICAL at 03:55

## 2017-02-22 RX ADMIN — CEPHALEXIN 500 MG: 500 CAPSULE ORAL at 11:23

## 2017-02-22 RX ADMIN — BACITRACIN ZINC 1 SMALL APPLICATION: 500 OINTMENT TOPICAL at 11:24

## 2017-02-22 RX ADMIN — AMLODIPINE BESYLATE 5 MG: 5 TABLET ORAL at 11:24

## 2017-02-22 RX ADMIN — GABAPENTIN 300 MG: 300 CAPSULE ORAL at 11:24

## 2017-02-23 DIAGNOSIS — M48.061 SPINAL STENOSIS OF LUMBAR REGION: ICD-10-CM

## 2017-02-23 DIAGNOSIS — M54.50 LOW BACK PAIN: ICD-10-CM

## 2017-03-07 ENCOUNTER — ALLSCRIPTS OFFICE VISIT (OUTPATIENT)
Dept: OTHER | Facility: OTHER | Age: 62
End: 2017-03-07

## 2017-03-07 ENCOUNTER — TRANSCRIBE ORDERS (OUTPATIENT)
Dept: ADMINISTRATIVE | Facility: HOSPITAL | Age: 62
End: 2017-03-07

## 2017-03-07 DIAGNOSIS — I71.2 THORACIC ANEURYSM WITHOUT MENTION OF RUPTURE: Primary | ICD-10-CM

## 2017-03-08 ENCOUNTER — APPOINTMENT (OUTPATIENT)
Dept: LAB | Facility: HOSPITAL | Age: 62
End: 2017-03-08
Payer: COMMERCIAL

## 2017-03-08 ENCOUNTER — TRANSCRIBE ORDERS (OUTPATIENT)
Dept: LAB | Facility: HOSPITAL | Age: 62
End: 2017-03-08

## 2017-03-08 DIAGNOSIS — N31.9 NEUROGENIC DYSFUNCTION OF THE URINARY BLADDER: Primary | ICD-10-CM

## 2017-03-08 LAB
BACTERIA UR QL AUTO: ABNORMAL /HPF
BILIRUB UR QL STRIP: NEGATIVE
CLARITY UR: ABNORMAL
COLOR UR: YELLOW
GLUCOSE UR STRIP-MCNC: NEGATIVE MG/DL
HGB UR QL STRIP.AUTO: NEGATIVE
HYALINE CASTS #/AREA URNS LPF: ABNORMAL /LPF
KETONES UR STRIP-MCNC: NEGATIVE MG/DL
LEUKOCYTE ESTERASE UR QL STRIP: ABNORMAL
NITRITE UR QL STRIP: NEGATIVE
NON-SQ EPI CELLS URNS QL MICRO: ABNORMAL /HPF
PH UR STRIP.AUTO: 6 [PH] (ref 4.5–8)
PROT UR STRIP-MCNC: NEGATIVE MG/DL
RBC #/AREA URNS AUTO: ABNORMAL /HPF
SP GR UR STRIP.AUTO: 1.01 (ref 1–1.03)
UROBILINOGEN UR QL STRIP.AUTO: 0.2 E.U./DL
WBC #/AREA URNS AUTO: ABNORMAL /HPF

## 2017-03-08 PROCEDURE — 87186 SC STD MICRODIL/AGAR DIL: CPT | Performed by: NURSE PRACTITIONER

## 2017-03-08 PROCEDURE — 81001 URINALYSIS AUTO W/SCOPE: CPT | Performed by: NURSE PRACTITIONER

## 2017-03-08 PROCEDURE — 87077 CULTURE AEROBIC IDENTIFY: CPT | Performed by: NURSE PRACTITIONER

## 2017-03-08 PROCEDURE — 87086 URINE CULTURE/COLONY COUNT: CPT | Performed by: NURSE PRACTITIONER

## 2017-03-10 LAB — BACTERIA UR CULT: NORMAL

## 2017-03-18 ENCOUNTER — HOSPITAL ENCOUNTER (EMERGENCY)
Facility: HOSPITAL | Age: 62
Discharge: HOME | End: 2017-03-18
Attending: EMERGENCY MEDICINE
Payer: COMMERCIAL

## 2017-03-18 ENCOUNTER — APPOINTMENT (EMERGENCY)
Dept: RADIOLOGY | Facility: HOSPITAL | Age: 62
End: 2017-03-18
Payer: COMMERCIAL

## 2017-03-18 VITALS
DIASTOLIC BLOOD PRESSURE: 77 MMHG | OXYGEN SATURATION: 97 % | SYSTOLIC BLOOD PRESSURE: 154 MMHG | WEIGHT: 139 LBS | RESPIRATION RATE: 16 BRPM | BODY MASS INDEX: 22.44 KG/M2 | TEMPERATURE: 98.2 F | HEART RATE: 78 BPM
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
## 2017-03-18 DIAGNOSIS — S80.02XA CONTUSION OF LEFT KNEE, INITIAL ENCOUNTER: ICD-10-CM

## 2017-03-18 DIAGNOSIS — S39.012A LOW BACK STRAIN, INITIAL ENCOUNTER: Primary | ICD-10-CM

## 2017-03-18 PROCEDURE — 99283 EMERGENCY DEPT VISIT LOW MDM: CPT

## 2017-03-18 PROCEDURE — 72100 X-RAY EXAM L-S SPINE 2/3 VWS: CPT

## 2017-03-18 PROCEDURE — 73564 X-RAY EXAM KNEE 4 OR MORE: CPT

## 2017-03-18 RX ORDER — IBUPROFEN 600 MG/1
600 TABLET ORAL EVERY 6 HOURS PRN
Qty: 40 TABLET | Refills: 0 | Status: SHIPPED | OUTPATIENT
Start: 2017-03-18 | End: 2018-07-05

## 2017-03-18 RX ORDER — IBUPROFEN 600 MG/1
600 TABLET ORAL ONCE
Status: COMPLETED | OUTPATIENT
Start: 2017-03-18 | End: 2017-03-18

## 2017-03-18 RX ADMIN — IBUPROFEN 600 MG: 600 TABLET, FILM COATED ORAL at 11:56

## 2017-03-20 ENCOUNTER — GENERIC CONVERSION - ENCOUNTER (OUTPATIENT)
Dept: OTHER | Facility: OTHER | Age: 62
End: 2017-03-20

## 2017-03-24 ENCOUNTER — APPOINTMENT (OUTPATIENT)
Dept: PHYSICAL THERAPY | Facility: CLINIC | Age: 62
End: 2017-03-24
Payer: COMMERCIAL

## 2017-03-24 ENCOUNTER — GENERIC CONVERSION - ENCOUNTER (OUTPATIENT)
Dept: OTHER | Facility: OTHER | Age: 62
End: 2017-03-24

## 2017-03-24 DIAGNOSIS — M48.061 SPINAL STENOSIS OF LUMBAR REGION: ICD-10-CM

## 2017-03-24 DIAGNOSIS — M54.50 LOW BACK PAIN: ICD-10-CM

## 2017-03-24 PROCEDURE — 97162 PT EVAL MOD COMPLEX 30 MIN: CPT

## 2017-03-27 ENCOUNTER — APPOINTMENT (OUTPATIENT)
Dept: PHYSICAL THERAPY | Facility: CLINIC | Age: 62
End: 2017-03-27
Payer: COMMERCIAL

## 2017-03-27 PROCEDURE — 97010 HOT OR COLD PACKS THERAPY: CPT

## 2017-03-27 PROCEDURE — 97140 MANUAL THERAPY 1/> REGIONS: CPT

## 2017-03-27 PROCEDURE — 97110 THERAPEUTIC EXERCISES: CPT

## 2017-03-31 ENCOUNTER — APPOINTMENT (OUTPATIENT)
Dept: PHYSICAL THERAPY | Facility: CLINIC | Age: 62
End: 2017-03-31
Payer: COMMERCIAL

## 2017-03-31 PROCEDURE — 97140 MANUAL THERAPY 1/> REGIONS: CPT

## 2017-03-31 PROCEDURE — 97110 THERAPEUTIC EXERCISES: CPT

## 2017-03-31 PROCEDURE — 97010 HOT OR COLD PACKS THERAPY: CPT

## 2017-04-04 ENCOUNTER — APPOINTMENT (OUTPATIENT)
Dept: PHYSICAL THERAPY | Facility: CLINIC | Age: 62
End: 2017-04-04
Payer: COMMERCIAL

## 2017-04-04 ENCOUNTER — ALLSCRIPTS OFFICE VISIT (OUTPATIENT)
Dept: OTHER | Facility: OTHER | Age: 62
End: 2017-04-04

## 2017-04-04 PROCEDURE — 97110 THERAPEUTIC EXERCISES: CPT

## 2017-04-04 PROCEDURE — 97010 HOT OR COLD PACKS THERAPY: CPT

## 2017-04-06 ENCOUNTER — APPOINTMENT (OUTPATIENT)
Dept: PHYSICAL THERAPY | Facility: CLINIC | Age: 62
End: 2017-04-06
Payer: COMMERCIAL

## 2017-04-06 PROCEDURE — 97010 HOT OR COLD PACKS THERAPY: CPT

## 2017-04-06 PROCEDURE — 97110 THERAPEUTIC EXERCISES: CPT

## 2017-04-11 ENCOUNTER — APPOINTMENT (OUTPATIENT)
Dept: PHYSICAL THERAPY | Facility: CLINIC | Age: 62
End: 2017-04-11
Payer: COMMERCIAL

## 2017-04-11 PROCEDURE — 97010 HOT OR COLD PACKS THERAPY: CPT

## 2017-04-11 PROCEDURE — 97110 THERAPEUTIC EXERCISES: CPT

## 2017-04-13 ENCOUNTER — APPOINTMENT (OUTPATIENT)
Dept: PHYSICAL THERAPY | Facility: CLINIC | Age: 62
End: 2017-04-13
Payer: COMMERCIAL

## 2017-04-14 DIAGNOSIS — I12.9 HYPERTENSIVE CHRONIC KIDNEY DISEASE WITH STAGE 1 THROUGH STAGE 4 CHRONIC KIDNEY DISEASE, OR UNSPECIFIED CHRONIC KIDNEY DISEASE: ICD-10-CM

## 2017-04-17 ENCOUNTER — TRANSCRIBE ORDERS (OUTPATIENT)
Dept: LAB | Facility: HOSPITAL | Age: 62
End: 2017-04-17

## 2017-04-17 ENCOUNTER — APPOINTMENT (OUTPATIENT)
Dept: LAB | Facility: HOSPITAL | Age: 62
End: 2017-04-17
Attending: INTERNAL MEDICINE
Payer: COMMERCIAL

## 2017-04-17 DIAGNOSIS — I12.9 HYPERTENSIVE CHRONIC KIDNEY DISEASE WITH STAGE 1 THROUGH STAGE 4 CHRONIC KIDNEY DISEASE, OR UNSPECIFIED CHRONIC KIDNEY DISEASE: ICD-10-CM

## 2017-04-17 DIAGNOSIS — I12.0 PARENCHYMAL RENAL HYPERTENSION, STAGE 5 CHRONIC KIDNEY DISEASE OR END STAGE RENAL DISEASE (HCC): Primary | ICD-10-CM

## 2017-04-17 LAB
ALBUMIN SERPL BCP-MCNC: 3.5 G/DL (ref 3.5–5)
ANION GAP SERPL CALCULATED.3IONS-SCNC: 8 MMOL/L (ref 4–13)
BUN SERPL-MCNC: 26 MG/DL (ref 5–25)
CALCIUM SERPL-MCNC: 9.2 MG/DL (ref 8.3–10.1)
CHLORIDE SERPL-SCNC: 108 MMOL/L (ref 100–108)
CO2 SERPL-SCNC: 23 MMOL/L (ref 21–32)
CREAT SERPL-MCNC: 1.42 MG/DL (ref 0.6–1.3)
ERYTHROCYTE [DISTWIDTH] IN BLOOD BY AUTOMATED COUNT: 14 % (ref 11.6–15.1)
GFR SERPL CREATININE-BSD FRML MDRD: >60 ML/MIN/1.73SQ M
GLUCOSE P FAST SERPL-MCNC: 114 MG/DL (ref 65–99)
HCT VFR BLD AUTO: 36.2 % (ref 36.5–49.3)
HGB BLD-MCNC: 12.4 G/DL (ref 12–17)
MCH RBC QN AUTO: 32.3 PG (ref 26.8–34.3)
MCHC RBC AUTO-ENTMCNC: 34.3 G/DL (ref 31.4–37.4)
MCV RBC AUTO: 94 FL (ref 82–98)
PHOSPHATE SERPL-MCNC: 2.7 MG/DL (ref 2.3–4.1)
PLATELET # BLD AUTO: 304 THOUSANDS/UL (ref 149–390)
PMV BLD AUTO: 9.8 FL (ref 8.9–12.7)
POTASSIUM SERPL-SCNC: 3.6 MMOL/L (ref 3.5–5.3)
PTH-INTACT SERPL-MCNC: 20.3 PG/ML (ref 14–72)
RBC # BLD AUTO: 3.84 MILLION/UL (ref 3.88–5.62)
SODIUM SERPL-SCNC: 139 MMOL/L (ref 136–145)
WBC # BLD AUTO: 6.66 THOUSAND/UL (ref 4.31–10.16)

## 2017-04-17 PROCEDURE — 36415 COLL VENOUS BLD VENIPUNCTURE: CPT

## 2017-04-17 PROCEDURE — 85027 COMPLETE CBC AUTOMATED: CPT

## 2017-04-17 PROCEDURE — 80069 RENAL FUNCTION PANEL: CPT

## 2017-04-17 PROCEDURE — 83970 ASSAY OF PARATHORMONE: CPT

## 2017-04-18 ENCOUNTER — APPOINTMENT (OUTPATIENT)
Dept: PHYSICAL THERAPY | Facility: CLINIC | Age: 62
End: 2017-04-18
Payer: COMMERCIAL

## 2017-04-18 ENCOUNTER — APPOINTMENT (OUTPATIENT)
Dept: LAB | Facility: HOSPITAL | Age: 62
End: 2017-04-18
Attending: INTERNAL MEDICINE
Payer: COMMERCIAL

## 2017-04-18 LAB
CREAT UR-MCNC: 165 MG/DL
PROT UR-MCNC: 40 MG/DL
PROT/CREAT UR: 0.24 MG/G{CREAT} (ref 0–0.1)

## 2017-04-18 PROCEDURE — 97140 MANUAL THERAPY 1/> REGIONS: CPT

## 2017-04-18 PROCEDURE — 97110 THERAPEUTIC EXERCISES: CPT

## 2017-04-18 PROCEDURE — 82570 ASSAY OF URINE CREATININE: CPT | Performed by: INTERNAL MEDICINE

## 2017-04-18 PROCEDURE — 84156 ASSAY OF PROTEIN URINE: CPT | Performed by: INTERNAL MEDICINE

## 2017-04-20 ENCOUNTER — APPOINTMENT (OUTPATIENT)
Dept: PHYSICAL THERAPY | Facility: CLINIC | Age: 62
End: 2017-04-20
Payer: COMMERCIAL

## 2017-04-20 ENCOUNTER — ALLSCRIPTS OFFICE VISIT (OUTPATIENT)
Dept: OTHER | Facility: OTHER | Age: 62
End: 2017-04-20

## 2017-04-20 PROCEDURE — 97140 MANUAL THERAPY 1/> REGIONS: CPT

## 2017-04-20 PROCEDURE — 97010 HOT OR COLD PACKS THERAPY: CPT

## 2017-04-20 PROCEDURE — 97110 THERAPEUTIC EXERCISES: CPT

## 2017-04-25 ENCOUNTER — APPOINTMENT (OUTPATIENT)
Dept: PHYSICAL THERAPY | Facility: CLINIC | Age: 62
End: 2017-04-25
Payer: COMMERCIAL

## 2017-04-25 PROCEDURE — 97010 HOT OR COLD PACKS THERAPY: CPT

## 2017-04-25 PROCEDURE — 97110 THERAPEUTIC EXERCISES: CPT

## 2017-04-25 PROCEDURE — 97140 MANUAL THERAPY 1/> REGIONS: CPT

## 2017-04-27 ENCOUNTER — APPOINTMENT (OUTPATIENT)
Dept: PHYSICAL THERAPY | Facility: CLINIC | Age: 62
End: 2017-04-27
Payer: COMMERCIAL

## 2017-04-27 PROCEDURE — 97010 HOT OR COLD PACKS THERAPY: CPT

## 2017-04-27 PROCEDURE — 97110 THERAPEUTIC EXERCISES: CPT

## 2017-04-27 PROCEDURE — 97140 MANUAL THERAPY 1/> REGIONS: CPT

## 2017-05-02 ENCOUNTER — APPOINTMENT (OUTPATIENT)
Dept: PHYSICAL THERAPY | Facility: CLINIC | Age: 62
End: 2017-05-02
Payer: COMMERCIAL

## 2017-05-02 PROCEDURE — 97110 THERAPEUTIC EXERCISES: CPT

## 2017-05-02 PROCEDURE — 97010 HOT OR COLD PACKS THERAPY: CPT

## 2017-05-02 PROCEDURE — 97140 MANUAL THERAPY 1/> REGIONS: CPT

## 2017-05-04 ENCOUNTER — APPOINTMENT (OUTPATIENT)
Dept: PHYSICAL THERAPY | Facility: CLINIC | Age: 62
End: 2017-05-04
Payer: COMMERCIAL

## 2017-05-04 PROCEDURE — 97140 MANUAL THERAPY 1/> REGIONS: CPT

## 2017-05-04 PROCEDURE — 97110 THERAPEUTIC EXERCISES: CPT

## 2017-05-04 PROCEDURE — 97010 HOT OR COLD PACKS THERAPY: CPT

## 2017-05-09 ENCOUNTER — APPOINTMENT (OUTPATIENT)
Dept: PHYSICAL THERAPY | Facility: CLINIC | Age: 62
End: 2017-05-09
Payer: COMMERCIAL

## 2017-05-09 PROCEDURE — 97010 HOT OR COLD PACKS THERAPY: CPT

## 2017-05-09 PROCEDURE — 97110 THERAPEUTIC EXERCISES: CPT

## 2017-05-09 PROCEDURE — 97140 MANUAL THERAPY 1/> REGIONS: CPT

## 2017-05-11 ENCOUNTER — APPOINTMENT (OUTPATIENT)
Dept: PHYSICAL THERAPY | Facility: CLINIC | Age: 62
End: 2017-05-11
Payer: COMMERCIAL

## 2017-05-11 PROCEDURE — 97110 THERAPEUTIC EXERCISES: CPT

## 2017-05-11 PROCEDURE — 97010 HOT OR COLD PACKS THERAPY: CPT

## 2017-05-11 PROCEDURE — 97140 MANUAL THERAPY 1/> REGIONS: CPT

## 2017-05-16 ENCOUNTER — APPOINTMENT (OUTPATIENT)
Dept: PHYSICAL THERAPY | Facility: CLINIC | Age: 62
End: 2017-05-16
Payer: COMMERCIAL

## 2017-05-16 PROCEDURE — 97110 THERAPEUTIC EXERCISES: CPT

## 2017-05-16 PROCEDURE — 97140 MANUAL THERAPY 1/> REGIONS: CPT

## 2017-05-16 PROCEDURE — 97010 HOT OR COLD PACKS THERAPY: CPT

## 2017-05-18 ENCOUNTER — APPOINTMENT (OUTPATIENT)
Dept: PHYSICAL THERAPY | Facility: CLINIC | Age: 62
End: 2017-05-18
Payer: COMMERCIAL

## 2017-05-18 PROCEDURE — 97140 MANUAL THERAPY 1/> REGIONS: CPT

## 2017-05-18 PROCEDURE — 97010 HOT OR COLD PACKS THERAPY: CPT

## 2017-05-18 PROCEDURE — 97110 THERAPEUTIC EXERCISES: CPT

## 2017-05-23 ENCOUNTER — APPOINTMENT (OUTPATIENT)
Dept: PHYSICAL THERAPY | Facility: CLINIC | Age: 62
End: 2017-05-23
Payer: COMMERCIAL

## 2017-05-23 PROCEDURE — 97010 HOT OR COLD PACKS THERAPY: CPT

## 2017-05-23 PROCEDURE — 97140 MANUAL THERAPY 1/> REGIONS: CPT

## 2017-05-23 PROCEDURE — 97110 THERAPEUTIC EXERCISES: CPT

## 2017-05-25 ENCOUNTER — APPOINTMENT (OUTPATIENT)
Dept: PHYSICAL THERAPY | Facility: CLINIC | Age: 62
End: 2017-05-25
Payer: COMMERCIAL

## 2017-05-25 PROCEDURE — 97140 MANUAL THERAPY 1/> REGIONS: CPT

## 2017-05-25 PROCEDURE — 97110 THERAPEUTIC EXERCISES: CPT

## 2017-05-25 PROCEDURE — 97010 HOT OR COLD PACKS THERAPY: CPT

## 2017-05-30 ENCOUNTER — APPOINTMENT (OUTPATIENT)
Dept: PHYSICAL THERAPY | Facility: CLINIC | Age: 62
End: 2017-05-30
Payer: COMMERCIAL

## 2017-05-30 PROCEDURE — 97010 HOT OR COLD PACKS THERAPY: CPT

## 2017-05-30 PROCEDURE — 97140 MANUAL THERAPY 1/> REGIONS: CPT

## 2017-05-30 PROCEDURE — 97110 THERAPEUTIC EXERCISES: CPT

## 2017-06-01 ENCOUNTER — APPOINTMENT (OUTPATIENT)
Dept: PHYSICAL THERAPY | Facility: CLINIC | Age: 62
End: 2017-06-01
Payer: COMMERCIAL

## 2017-06-01 PROCEDURE — 97010 HOT OR COLD PACKS THERAPY: CPT

## 2017-06-01 PROCEDURE — 97110 THERAPEUTIC EXERCISES: CPT

## 2017-06-01 PROCEDURE — 97140 MANUAL THERAPY 1/> REGIONS: CPT

## 2017-06-06 ENCOUNTER — APPOINTMENT (OUTPATIENT)
Dept: PHYSICAL THERAPY | Facility: CLINIC | Age: 62
End: 2017-06-06
Payer: COMMERCIAL

## 2017-06-06 PROCEDURE — 97010 HOT OR COLD PACKS THERAPY: CPT

## 2017-06-06 PROCEDURE — 97110 THERAPEUTIC EXERCISES: CPT

## 2017-06-06 PROCEDURE — 97140 MANUAL THERAPY 1/> REGIONS: CPT

## 2017-06-08 ENCOUNTER — APPOINTMENT (OUTPATIENT)
Dept: PHYSICAL THERAPY | Facility: CLINIC | Age: 62
End: 2017-06-08
Payer: COMMERCIAL

## 2017-06-08 PROCEDURE — 97110 THERAPEUTIC EXERCISES: CPT

## 2017-06-08 PROCEDURE — 97140 MANUAL THERAPY 1/> REGIONS: CPT

## 2017-06-13 ENCOUNTER — APPOINTMENT (OUTPATIENT)
Dept: PHYSICAL THERAPY | Facility: CLINIC | Age: 62
End: 2017-06-13
Payer: COMMERCIAL

## 2017-06-13 PROCEDURE — 97110 THERAPEUTIC EXERCISES: CPT

## 2017-06-15 ENCOUNTER — APPOINTMENT (OUTPATIENT)
Dept: PHYSICAL THERAPY | Facility: CLINIC | Age: 62
End: 2017-06-15
Payer: COMMERCIAL

## 2017-06-15 PROCEDURE — 97110 THERAPEUTIC EXERCISES: CPT

## 2017-06-19 ENCOUNTER — APPOINTMENT (OUTPATIENT)
Dept: PHYSICAL THERAPY | Facility: CLINIC | Age: 62
End: 2017-06-19
Payer: COMMERCIAL

## 2017-06-19 PROCEDURE — 97010 HOT OR COLD PACKS THERAPY: CPT

## 2017-06-19 PROCEDURE — 97110 THERAPEUTIC EXERCISES: CPT

## 2017-06-22 ENCOUNTER — APPOINTMENT (OUTPATIENT)
Dept: PHYSICAL THERAPY | Facility: CLINIC | Age: 62
End: 2017-06-22
Payer: COMMERCIAL

## 2017-06-22 PROCEDURE — 97110 THERAPEUTIC EXERCISES: CPT

## 2017-06-22 PROCEDURE — 97010 HOT OR COLD PACKS THERAPY: CPT

## 2017-06-27 ENCOUNTER — APPOINTMENT (OUTPATIENT)
Dept: PHYSICAL THERAPY | Facility: CLINIC | Age: 62
End: 2017-06-27
Payer: COMMERCIAL

## 2017-06-27 PROCEDURE — 97110 THERAPEUTIC EXERCISES: CPT

## 2017-06-27 PROCEDURE — 97010 HOT OR COLD PACKS THERAPY: CPT

## 2017-06-29 ENCOUNTER — APPOINTMENT (OUTPATIENT)
Dept: PHYSICAL THERAPY | Facility: CLINIC | Age: 62
End: 2017-06-29
Payer: COMMERCIAL

## 2017-08-31 ENCOUNTER — APPOINTMENT (OUTPATIENT)
Dept: LAB | Facility: HOSPITAL | Age: 62
End: 2017-08-31
Payer: COMMERCIAL

## 2017-08-31 ENCOUNTER — TRANSCRIBE ORDERS (OUTPATIENT)
Dept: LAB | Facility: HOSPITAL | Age: 62
End: 2017-08-31

## 2017-08-31 DIAGNOSIS — Z13.1 SCREENING FOR DIABETES MELLITUS: ICD-10-CM

## 2017-08-31 DIAGNOSIS — N18.30 CHRONIC KIDNEY DISEASE, STAGE III (MODERATE) (HCC): ICD-10-CM

## 2017-08-31 DIAGNOSIS — N18.30 CHRONIC KIDNEY DISEASE, STAGE III (MODERATE) (HCC): Primary | ICD-10-CM

## 2017-08-31 DIAGNOSIS — I10 ESSENTIAL HYPERTENSION, BENIGN: ICD-10-CM

## 2017-08-31 LAB
ALBUMIN SERPL BCP-MCNC: 3.6 G/DL (ref 3.5–5)
ALP SERPL-CCNC: 96 U/L (ref 46–116)
ALT SERPL W P-5'-P-CCNC: 37 U/L (ref 12–78)
ANION GAP SERPL CALCULATED.3IONS-SCNC: 10 MMOL/L (ref 4–13)
AST SERPL W P-5'-P-CCNC: 37 U/L (ref 5–45)
BACTERIA UR QL AUTO: ABNORMAL /HPF
BASOPHILS # BLD AUTO: 0.05 THOUSANDS/ΜL (ref 0–0.1)
BASOPHILS NFR BLD AUTO: 1 % (ref 0–1)
BILIRUB SERPL-MCNC: 0.38 MG/DL (ref 0.2–1)
BILIRUB UR QL STRIP: ABNORMAL
BUN SERPL-MCNC: 20 MG/DL (ref 5–25)
CALCIUM SERPL-MCNC: 9.5 MG/DL (ref 8.3–10.1)
CHLORIDE SERPL-SCNC: 106 MMOL/L (ref 100–108)
CHOLEST SERPL-MCNC: 168 MG/DL (ref 50–200)
CLARITY UR: ABNORMAL
CO2 SERPL-SCNC: 21 MMOL/L (ref 21–32)
COLOR UR: ABNORMAL
CREAT SERPL-MCNC: 1.36 MG/DL (ref 0.6–1.3)
EOSINOPHIL # BLD AUTO: 0.08 THOUSAND/ΜL (ref 0–0.61)
EOSINOPHIL NFR BLD AUTO: 1 % (ref 0–6)
ERYTHROCYTE [DISTWIDTH] IN BLOOD BY AUTOMATED COUNT: 14.2 % (ref 11.6–15.1)
EST. AVERAGE GLUCOSE BLD GHB EST-MCNC: 140 MG/DL
GFR SERPL CREATININE-BSD FRML MDRD: 64 ML/MIN/1.73SQ M
GLUCOSE P FAST SERPL-MCNC: 72 MG/DL (ref 65–99)
GLUCOSE UR STRIP-MCNC: NEGATIVE MG/DL
HBA1C MFR BLD: 6.5 % (ref 4.2–6.3)
HCT VFR BLD AUTO: 39.5 % (ref 36.5–49.3)
HDLC SERPL-MCNC: 64 MG/DL (ref 40–60)
HGB BLD-MCNC: 13 G/DL (ref 12–17)
HGB UR QL STRIP.AUTO: NEGATIVE
HYALINE CASTS #/AREA URNS LPF: ABNORMAL /LPF
KETONES UR STRIP-MCNC: ABNORMAL MG/DL
LDLC SERPL CALC-MCNC: 95 MG/DL (ref 0–100)
LEUKOCYTE ESTERASE UR QL STRIP: ABNORMAL
LYMPHOCYTES # BLD AUTO: 1.77 THOUSANDS/ΜL (ref 0.6–4.47)
LYMPHOCYTES NFR BLD AUTO: 29 % (ref 14–44)
MCH RBC QN AUTO: 29.7 PG (ref 26.8–34.3)
MCHC RBC AUTO-ENTMCNC: 32.9 G/DL (ref 31.4–37.4)
MCV RBC AUTO: 90 FL (ref 82–98)
MONOCYTES # BLD AUTO: 0.84 THOUSAND/ΜL (ref 0.17–1.22)
MONOCYTES NFR BLD AUTO: 14 % (ref 4–12)
NEUTROPHILS # BLD AUTO: 3.35 THOUSANDS/ΜL (ref 1.85–7.62)
NEUTS SEG NFR BLD AUTO: 55 % (ref 43–75)
NITRITE UR QL STRIP: NEGATIVE
NON-SQ EPI CELLS URNS QL MICRO: ABNORMAL /HPF
NRBC BLD AUTO-RTO: 0 /100 WBCS
PH UR STRIP.AUTO: 5.5 [PH] (ref 4.5–8)
PLATELET # BLD AUTO: 289 THOUSANDS/UL (ref 149–390)
PMV BLD AUTO: 10.2 FL (ref 8.9–12.7)
POTASSIUM SERPL-SCNC: 3.3 MMOL/L (ref 3.5–5.3)
PROT SERPL-MCNC: 9.4 G/DL (ref 6.4–8.2)
PROT UR STRIP-MCNC: ABNORMAL MG/DL
RBC # BLD AUTO: 4.38 MILLION/UL (ref 3.88–5.62)
RBC #/AREA URNS AUTO: ABNORMAL /HPF
SODIUM SERPL-SCNC: 137 MMOL/L (ref 136–145)
SP GR UR STRIP.AUTO: 1.02 (ref 1–1.03)
TRIGL SERPL-MCNC: 45 MG/DL
UROBILINOGEN UR QL STRIP.AUTO: 0.2 E.U./DL
WBC # BLD AUTO: 6.11 THOUSAND/UL (ref 4.31–10.16)
WBC #/AREA URNS AUTO: ABNORMAL /HPF

## 2017-08-31 PROCEDURE — 80053 COMPREHEN METABOLIC PANEL: CPT

## 2017-08-31 PROCEDURE — 85025 COMPLETE CBC W/AUTO DIFF WBC: CPT

## 2017-08-31 PROCEDURE — 36415 COLL VENOUS BLD VENIPUNCTURE: CPT

## 2017-08-31 PROCEDURE — 80061 LIPID PANEL: CPT

## 2017-08-31 PROCEDURE — 81001 URINALYSIS AUTO W/SCOPE: CPT | Performed by: NURSE PRACTITIONER

## 2017-08-31 PROCEDURE — 83036 HEMOGLOBIN GLYCOSYLATED A1C: CPT

## 2018-01-10 NOTE — MISCELLANEOUS
Message  The patient was contacted and preop instructions were given  His allergies and medications were reviewed  He stated he had no questions or concerns about the surgery  He was encouraged to contact the office with any future questions or concerns  Active Problems    1  Arthritis (716 90) (M19 90)   2  Atypical chest pain (786 59) (R07 89)   3  Cellulitis of leg (682 6) (L03 119)   4  Chest pain (786 50) (R07 9)   5  Elevated ALT measurement (790 4) (R74 0)   6  Elevated AST (SGOT) (790 4) (R74 0)   7  Hypertension (401 9) (I10)   8  Hypertensive kidney disease with CKD stage III (403 90,585 3) (I12 9,N18 3)   9  Leg pain, lateral, left (729 5) (M79 605)   10  Lower back pain (724 2) (M54 5)   11  Microscopic hematuria (599 72) (R31 29)   12  Mixed hyperlipidemia (272 2) (E78 2)   13  Neurogenic claudication due to lumbar spinal stenosis (724 03) (M48 06)   14  Preoperative testing (V72 84) (Z01 818)   15  Thoracic aortic aneurysm without rupture (441 2) (I71 2)   16  Urinary retention (788 20) (R33 9)    Current Meds   1  Acetaminophen 500 MG Oral Tablet; TAKE 1 TABLET EVERY 4 TO 6 HOURS AS   NEEDED; Therapy: 32PDS9135 to Recorded   2  AmLODIPine Besylate 5 MG Oral Tablet; TAKE 1 TABLET DAILY; Therapy: (Recorded:30Jun2016) to Recorded   3  Gabapentin 300 MG Oral Capsule; TAKE 1 CAPSULE AT BEDTIME; Therapy: (Recorded:30Jun2016) to Recorded   4  HydroCHLOROthiazide 25 MG Oral Tablet; TAKE 1 TABLET DAILY; Therapy: (Recorded:30Jun2016) to Recorded   5  Lisinopril 20 MG Oral Tablet; Take 1 tablet daily; Therapy: (Recorded:30Jun2016) to Recorded   6  Meloxicam 15 MG Oral Tablet; TAKE 1 TABLET DAILY; Therapy: 52BDF9185 to Recorded   7  Tamsulosin HCl - 0 4 MG Oral Capsule; TAKE 1 CAPSULE Bedtime; Therapy: 21XCZ5077 to (Evaluate:82Qiv4209)  Requested for: 97HMZ4903; Last   Rx:03Jan2017 Ordered    Allergies    1   No Known Drug Allergies    Signatures   Electronically signed by : Efraín Barton RN; Jan 20 2017  9:07AM EST                       (Author)

## 2018-01-10 NOTE — PROGRESS NOTES
Preliminary Nursing Report                Patient Information    Initial Encounter Entry Date:   2016 4:26 PM EST (Automated Transmission Automated Transmission)       Last Modified:   {Wojciech Sutton}              Legal Name: Tashi Barbosa Number:        YOB: 1955        Age (years): 59        Gender: M        Body Mass Index (BMI): 22 kg/m2        Height: 67 in  Weight: 143 lbs (65 kgs)           Address:   [address] [place] [address]              Phone: [phone]   (consent to leave messages)        Email:        Ethnicity: Decline to State        Advent:        Marital Status:        Preferred Language: English        Race: Other Race                    Patient Insurance Information        Primary Insurance Information Carrier Name: {Primary  CarrierName}           Carrier Address:   {Primary  CarrierAddress}              Carrier Phone: {Primary  CarrierPhone}          Group Number: {Primary  GroupNumber}          Policy Number: {Primary  PolicyNumber}          Insured Name: {Primary  InsuredName}          Insured : {Primary  InsuredDOB}          Relationship to Insured: {Primary  RelationshiptoInsured}           Secondary Insurance Information Carrier Name: {Secondary  CarrierName}           Carrier Address:   {Secondary  CarrierAddress}              Carrier Phone: {Secondary  CarrierPhone}          Group Number: {Secondary  GroupNumber}          Policy Number: {Secondary  PolicyNumber}          Insured Name: {Secondary  InsuredName}          Insured : {Secondary  InsuredDOB}          Relationship to Insured: {Secondary  RelationshiptoInsured}                       Health Profile   Booking #:   Deandre Newton #: [id number]               DOS: 2017    Surgery : LOW BACK DISK SURGERY    Add'l Procedures/Notes:     Surgery Risk: Intermediate          Precautions     Atypical chest pain                   Allergies    No Known Drug Allergies             Medications    Acetaminophen 500 MG Oral Tablet       AmLODIPine Besylate 5 MG Oral Tablet       Atorvastatin Calcium 10 MG Oral Tablet       Gabapentin 300 MG Oral Capsule       HydroCHLOROthiazide 25 MG Oral Tablet       Lisinopril 20 MG Oral Tablet       Meloxicam 15 MG Oral Tablet               Conditions    Arthritis       Atypical chest pain       Cellulitis of leg       Elevated ALT measurement       Elevated AST (SGOT)       Hypertension       Hypertensive kidney disease with CKD stage III       Leg pain, lateral, left       Lower back pain       Microscopic hematuria       Mixed hyperlipidemia       Neurogenic claudication due to lumbar spinal stenosis       Urinary retention               Family History    None             Surgical History    None             Social History    Being A Social Drinker       Denies Drug Use       Never A Smoker       One child       Single       Uses Safety Equipment - Seatbelts                               Patient Instructions       ? NPO Instructions   The day before surgery it is recommended to have a light dinner at your usual time and you are allowed a light snack early in the evening  Do not eat anything heavy or eat a big meal after 7pm  Do not eat or drink anything after midnight prior to your surgery  If you are supposed to take any of your medications, do so with a sip of water  Failure to follow these instructions can lead to an increased risk of lung complications and may result in a delay or cancellation of your procedure  If you have any questions, contact your institution for further instructions  No candy, no gum, no mints, no chewing tobacco   Triggered by: Medical Procedure Risk         ? Dialysis   If undergoing dialysis, please perform 24 to 48 before surgery and avoid interfering with dialysis schedule  Triggered by: Hypertensive kidney disease with CKD stage III         ?  Calcium Blocker (Blood Pressure Medication) 3, 4, 6, 5, 2  Please continue to take this medication on your normal schedule  If this is an oral medication and you take in the morning, you may do so with a sip of water  Triggered by: AmLODIPine Besylate 5 MG Oral Tablet         ? Neurological Medication 8  Please continue to take this medication on your normal schedule  If this is an oral medication and you take in the morning, you may do so with a sip of water  Triggered by: Gabapentin 300 MG Oral Capsule               Testing Considerations       ? Chest X-ray (CXR) t  Consider a Chest X-Ray (CXR) if patient is having respiratory symptoms  Triggered by: Atypical chest pain         ? Complete Blood Count (CBC) t, client, client  If test was completed and normal within last six months, repeat test is not necessary  Triggered by: Atypical chest pain, Cellulitis of leg, Age or Facility Rec         ? Comprehensive Metabolic Panel (CMP) t  If test was completed and normal within last six months, repeat test is not necessary  Triggered by: Atypical chest pain         ? Electrocardiogram (ECG) t  Patient does not need new test if normal ECG is present within the last six months and no change in clinical condition  Triggered by: Atypical chest pain, Hypertension, Age or Facility Rec         ? Serum Potassium (K) on the morning of surgery t  Triggered by: Hypertensive kidney disease with CKD stage III         ? Type and Screen client  Type and Screen - Blood: If there is anticipated or possible large blood loss with this procedure, then a Type and Screen for Blood should be ordered  Triggered by: Age or Facility Rec         ? Urinalysis t  Urinalysis may be appropriate if recent urinary symptoms or implants are being placed in surgical procedure  Triggered by: Urinary retention               Consultations       ? Cardiac Consult (Major MI) c  If the patient has had a Myocardial Infarction within 30 days then a cardiac consult is indicated   Also, if the patient is having increasing frequency or intensity of chest pain then a cardiac consult is indicated  Otherwise, optimization of medical therapy is recommended under the direction of the PCP or cardiologist   Triggered by: Atypical chest pain         ? Primary Care Physician Evaluation   Primary care physician may need to evaluate patient prior to surgery  This is likely NOT necessary if the listed conditions are chronic and stable  Triggered by: Cellulitis of leg, Microscopic hematuria               Miscellaneous Questions         Question: Are you able to walk up a flight of stairs, walk up a hill or do heavy housework WITHOUT having chest pain or shortness of breath? Answer: YES                   Allergies/Conditions/Medications Not Found        The following were not recognized by our system when generating the recommendations  Please consider if this would impact any preoperative protocols  ? Being A Social Drinker       ? Denies Drug Use       ? Mixed hyperlipidemia       ? Neurogenic claudication due to lumbar spinal stenosis       ? Never A Smoker       ? One child       ? Single       ? Uses Safety Equipment - Seatbelts       ? HydroCHLOROthiazide 25 MG Oral Tablet       ? Lisinopril 20 MG Oral Tablet       ? Meloxicam 15 MG Oral Tablet                  Appointment Information         Date:    01/23/2017        Location:    [place]        Address:           Directions:                      Footnotes revision 14      ?? Denotes a free-text entry  Legal Disclaimer: Any and all recommendations and services provided herein are designed to assist in the preoperative care of the patient  Nothing contained herein is designed to replace, eliminate or alleviate the responsibility of the attending physician to supervise and determine the patient?s preoperative care and course of treatment   Failure to provide complete, accurate information may negatively impact the system?s ability to recommend the proper preoperative protocol  THE ATTENDING PHYSICIAN IS RESPONSIBLE TO REVIEW THE SUGGESTED PREOPERATIVE PROTOCOLS/COURSE OF TREATMENT AND PRESCRIBE THE FINAL COURSE OF PREOPERATIVE TREATMENT IN CONSULTATION WITH THE PATIENT  THE ePREOP SYSTEM AND ITS MATERIALS ARE PROVIDED ? AS IS? WITHOUT WARRANTY OF ANY KIND, EXPRESS OR IMPLIED, INCLUDING, BUT NOT LIMITED TO, WARRANTIES OF PERFORMANCE OR MERCHANTABILITY OR FITNESS FOR A PARTICULAR PURPOSE  PATIENT AND PHYSICIANS HEREBY AGREE THAT THEIR USE OF THE MATERIALS AND RESOURCES ACT AS A CONSENT TO RELEASE AND WAIVE ePREOP FROM ANY AND ALL CLAIMS OF WARRANTY, TORT OR CONTRACT LAW OF ANY KIND  Electronically signed by:Silvano CORRIGAN    Dec  6 2016 11:29AM EST

## 2018-01-10 NOTE — MISCELLANEOUS
Message  Patient called in saying that he is having a new pain sensation that feels like "pins and needles" down his bilateral buttocks and into his thighs  He said that he "needs to hold onto something when he sits down"  Advised patient, per EM, this can be a normal side effect after the type of surgery that he had and should improve in time  Patient also states that he cannot tolerate the 5mg Oxycodone tablets because they "make him too dizzy"  Script for Tramadol 50mg 1 Q6prn #50 was called into patient's pharmacy ([facility]), per EM, and patient was also advised to increase his gabapentin dose from 300mg to 600mg for the meantime  Advised patient of this and he verbalized understanding and was appreciative        Signatures   Electronically signed by : Maria Guadalupe Gloria RN; Jan 27 2017  1:23PM EST                       (Author)

## 2018-01-11 NOTE — MISCELLANEOUS
Message  S/w pt on telephone regarding surgery scheduled 1/23/17  Pt reports that he is doing well overall  He is experiencing some LBP, but it's not as bad as it was prior to the procedure  He said his current pain medication is relieving his pain symptoms  He denies any incisional issues or fevers  Advised him on Thurs to take dressing down and cleanse area with mild soap and water and pat dry, not to apply any creams, lotions or ointments and not to submerge in any water  Verified date/time/location of his upcoming POVs, and advised him to call our office with any questions or concerns or if any incisional issues or fevers arise  he verbalized understanding        Signatures   Electronically signed by : Trish Loomis RN; Jan 25 2017  2:11PM EST                       (Author)

## 2018-01-12 NOTE — MISCELLANEOUS
Message   Recorded as Task   Date: 11/17/2016 08:07 PM, Created By: Srikanth Worthington   Task Name: Miscellaneous   Assigned To: Sylvia Riddle   Regarding Patient: Reggie Mccartney, Status: Active   CommentKarthik Parry - 17 Nov 2016 8:07 PM     TASK CREATED  Labs reviewed - creatinine stable since January - 1 39 now  [place]ytes [place], noted minimal proteinuria and stable  Urinalysis with some microscopic hematuria and increase WBC  Patient did not have ay urinary symtoms during last office visit  Please call patient and ask him for his BP readings for last 2 weeks as instructed last office visit and let me know readings, based on his BP readings at home will decide if any changes are needed on his BP meds  Lets repeat a BMP with urinalysis, micro and culture in one month  Thanks,    GC       I spoke to the patient and he is aware of above  I mailed the lab slips to the patient for 1 month  Patient said he is not taking his BP at home because he can't find his BP monitor  He has no readings  AG      Active Problems    1  Arthritis (716 90) (M19 90)   2  Atypical chest pain (786 59) (R07 89)   3  Cellulitis of leg (682 6) (L03 119)   4  Chest pain (786 50) (R07 9)   5  Elevated ALT measurement (790 4) (R74 0)   6  Elevated AST (SGOT) (790 4) (R74 0)   7  Hypertension (401 9) (I10)   8  Hypertensive kidney disease with CKD stage III (403 90,585 3) (I12 9,N18 3)   9  Lower back pain (724 2) (M54 5)   10  Microscopic hematuria (599 72) (R31 29)   11  Mixed hyperlipidemia (272 2) (E78 2)   12  Urinary retention (788 20) (R33 9)    Current Meds   1  Acetaminophen 500 MG Oral Tablet; TAKE 1 TABLET EVERY 4 TO 6 HOURS AS   NEEDED; Therapy: 43QDT5841 to Recorded   2  AmLODIPine Besylate 5 MG Oral Tablet; TAKE 1 TABLET DAILY; Therapy: (Recorded:30Jun2016) to Recorded   3  Atorvastatin Calcium 10 MG Oral Tablet; TAKE 1 TABLET DAILY; Therapy: (Recorded:30Jun2016) to Recorded   4   Gabapentin 300 MG Oral Capsule; TAKE 1 CAPSULE AT BEDTIME; Therapy: (Recorded:30Jun2016) to Recorded   5  HydroCHLOROthiazide 25 MG Oral Tablet; TAKE 1 TABLET DAILY; Therapy: (Recorded:30Jun2016) to Recorded   6  Lisinopril 20 MG Oral Tablet; Take 1 tablet daily; Therapy: (Recorded:30Jun2016) to Recorded   7  Meloxicam 15 MG Oral Tablet; TAKE 1 TABLET DAILY; Therapy: 19OWN2293 to Recorded    Allergies    1   No Known Drug Allergies    Signatures   Electronically signed by : CRUZ Ramírez ; Nov 18 2016 11:52AM EST

## 2018-01-13 VITALS
BODY MASS INDEX: 27.7 KG/M2 | SYSTOLIC BLOOD PRESSURE: 140 MMHG | HEIGHT: 60 IN | HEART RATE: 76 BPM | WEIGHT: 141.09 LBS | DIASTOLIC BLOOD PRESSURE: 78 MMHG | TEMPERATURE: 98.3 F

## 2018-01-13 VITALS
HEART RATE: 80 BPM | BODY MASS INDEX: 18.86 KG/M2 | RESPIRATION RATE: 16 BRPM | HEIGHT: 72 IN | WEIGHT: 139.25 LBS | SYSTOLIC BLOOD PRESSURE: 142 MMHG | DIASTOLIC BLOOD PRESSURE: 78 MMHG | TEMPERATURE: 97.9 F

## 2018-01-13 VITALS
DIASTOLIC BLOOD PRESSURE: 89 MMHG | RESPIRATION RATE: 18 BRPM | BODY MASS INDEX: 18.99 KG/M2 | TEMPERATURE: 97.8 F | SYSTOLIC BLOOD PRESSURE: 160 MMHG | WEIGHT: 140.2 LBS | HEIGHT: 72 IN

## 2018-01-13 VITALS
HEART RATE: 89 BPM | WEIGHT: 142 LBS | BODY MASS INDEX: 19.23 KG/M2 | HEIGHT: 72 IN | SYSTOLIC BLOOD PRESSURE: 132 MMHG | DIASTOLIC BLOOD PRESSURE: 70 MMHG

## 2018-01-13 NOTE — MISCELLANEOUS
Message   Recorded as Task   Date: 03/20/2017 11:38 AM, Created By: Sheela Arana   Task Name: Call Back   Assigned To: Idalmis Silva   Regarding Patient: DARLENE ALLISON, Status: Active   Comment:    Idalmis Silva - 20 Mar 2017 11:38 AM     TASK CREATED  Pt reports he recently fell and presented to the ED  He was instructed to notify this office as well as PCP that he was in the ED and xrays were WNL  He denies any severe cymoptoms just "achey" which he would expect  He did request a refill of pain medication but was instructed to contact his PCP  He stated an understanding          Signatures   Electronically signed by : Chaya Yang, ; Mar 20 2017 11:38AM EST                       (Author)

## 2018-01-13 NOTE — PROGRESS NOTES
Chief Complaint  Patient presents post L2-3 and L3-4 MIS b/l laminotomy and left-sided microdiscectomy for decompression with HALIMA  History of Present Illness  HPI: Patient presents for 2 week POV for incision check  He reports that his leg pain has resolved completely, however, he has been having low back pain which radiated into his b/l buttocks  He reported the same symptoms on the telephone after surgery  Patient continues to take Tramadol and Gabapentin for pain, patient was unable to tolerate Oxycodone  Per patient, he is voiding on his own at times, but he is still straight cathing approximately 2x per day  He denies any incisional issues or fevers other than some swelling underneath the incision site  Active Problems    1  Arthritis (716 90) (M19 90)   2  Atypical chest pain (786 59) (R07 89)   3  Cellulitis of leg (682 6) (L03 119)   4  Chest pain (786 50) (R07 9)   5  Elevated ALT measurement (790 4) (R74 0)   6  Elevated AST (SGOT) (790 4) (R74 0)   7  Hypertension (401 9) (I10)   8  Hypertensive kidney disease with CKD stage III (403 90,585 3) (I12 9,N18 3)   9  Leg pain, lateral, left (729 5) (M79 605)   10  Lower back pain (724 2) (M54 5)   11  Microscopic hematuria (599 72) (R31 29)   12  Mixed hyperlipidemia (272 2) (E78 2)   13  Neurogenic claudication due to lumbar spinal stenosis (724 03) (M48 06)   14  Post-op pain (338 18) (G89 18)   15  Preoperative testing (V72 84) (Z01 818)   16  Thoracic aortic aneurysm without rupture (441 2) (I71 2)   17  Urinary retention (788 20) (R33 9)    Current Meds   1  Acetaminophen 500 MG Oral Tablet; TAKE 1 TABLET EVERY 4 TO 6 HOURS AS   NEEDED; Therapy: 77WFZ3440 to Recorded   2  AmLODIPine Besylate 5 MG Oral Tablet; TAKE 1 TABLET DAILY; Therapy: (Recorded:23Rwj9764) to Recorded   3  Gabapentin 300 MG Oral Capsule; TAKE 2 CAPSULE Twice daily; Therapy: (GOSTMYNX:84GXU6896) to Recorded   4   HydroCHLOROthiazide 25 MG Oral Tablet; TAKE 1 TABLET DAILY; Therapy: (Recorded:30Jun2016) to Recorded   5  Lisinopril 20 MG Oral Tablet; Take 1 tablet daily; Therapy: (Recorded:30Jun2016) to Recorded   6  Tamsulosin HCl - 0 4 MG Oral Capsule; TAKE 1 CAPSULE Bedtime; Therapy: 81CGX3193 to )  Requested for: 73EKV6859; Last   Rx:30Jan2017; Status: ACTIVE - Retrospective By Protocol Authorization Ordered   7  TraMADol HCl - 50 MG Oral Tablet; TAKE 1 TABLET Every 6 hours PRN pain; Therapy: (Recorded:27Jan2017) to Recorded    Allergies    1  No Known Drug Allergies    Vitals  Signs    Temperature: 97 8 F  Respiration: 18  Systolic: 676  Diastolic: 89  Height: 6 ft   Weight: 140 lb 3 2 oz  BMI Calculated: 19 01  BSA Calculated: 1 83    Procedure  The wound was located on the left back  Wound Exam: well healed with no sign of infection, Incision well approximated  No drainage or erythema noted  Seroma noted under incision  Procedure Note:   Patient Status:  the patient tolerated the procedure well  Complications: Incision ANTHONY   there were no complications  Discussion/Summary    Reviewed incision care with patient including daily observation for s/s infection including: increased erythema, edema, drainage, dehiscence of incision or fever >101  Advised to continue cleansing area with mild soap and water and pat dry, but not to apply any creams, lotions or ointments and not to submerge in any water  Patient to maintain activity restrictions until cleared by the surgeon  The patient has a very good support system at home  Patient to call our office with any further questions or concerns or if any of the s/s infection that we discussed today would arise  Recommended the application of light pressure to incision area to speed up the body's reabsorption of the fluid underneath the incision  Verified date/time/location of his upcoming POV        Future Appointments    Date/Time Provider Specialty Site   03/07/2017 09:00 AM CRUZ Damian  Cardiology [facility]   03/07/2017 10:00 AM CRUZ Cuello   [facility]surgery [facility]   04/04/2017 08:15 AM Specialty Clinic, Urology  [address]     Signatures   Electronically signed by : Kassie Montelongo RN; Feb 6 2017 10:31AM EST                       (Author)    Electronically signed by : CRUZ Rock ; Feb 6 2017 10:46AM EST

## 2018-01-14 VITALS
DIASTOLIC BLOOD PRESSURE: 78 MMHG | SYSTOLIC BLOOD PRESSURE: 126 MMHG | HEART RATE: 68 BPM | HEIGHT: 66 IN | WEIGHT: 134.5 LBS | BODY MASS INDEX: 21.62 KG/M2

## 2018-01-14 VITALS
TEMPERATURE: 98.4 F | WEIGHT: 134.48 LBS | SYSTOLIC BLOOD PRESSURE: 110 MMHG | BODY MASS INDEX: 21.61 KG/M2 | HEIGHT: 66 IN | DIASTOLIC BLOOD PRESSURE: 58 MMHG | HEART RATE: 66 BPM

## 2018-02-14 ENCOUNTER — TELEPHONE (OUTPATIENT)
Dept: NEPHROLOGY | Facility: CLINIC | Age: 63
End: 2018-02-14

## 2018-02-14 DIAGNOSIS — N18.30 CKD (CHRONIC KIDNEY DISEASE) STAGE 3, GFR 30-59 ML/MIN (HCC): Primary | ICD-10-CM

## 2018-03-05 ENCOUNTER — HOSPITAL ENCOUNTER (OUTPATIENT)
Dept: NON INVASIVE DIAGNOSTICS | Facility: CLINIC | Age: 63
Discharge: HOME | End: 2018-03-05
Payer: COMMERCIAL

## 2018-03-05 DIAGNOSIS — I71.2 ANEURYSM OF THORACIC AORTA (HCC): ICD-10-CM

## 2018-03-05 PROCEDURE — 93306 TTE W/DOPPLER COMPLETE: CPT

## 2018-03-05 PROCEDURE — 93306 TTE W/DOPPLER COMPLETE: CPT | Performed by: INTERNAL MEDICINE

## 2018-04-01 DIAGNOSIS — N18.30 CHRONIC KIDNEY DISEASE, STAGE III (MODERATE) (HCC): ICD-10-CM

## 2018-04-13 PROBLEM — I71.2 ASCENDING AORTIC ANEURYSM (HCC): Status: ACTIVE | Noted: 2018-04-13

## 2018-05-01 ENCOUNTER — APPOINTMENT (OUTPATIENT)
Dept: LAB | Facility: HOSPITAL | Age: 63
End: 2018-05-01
Payer: COMMERCIAL

## 2018-05-01 ENCOUNTER — TRANSCRIBE ORDERS (OUTPATIENT)
Dept: LAB | Facility: HOSPITAL | Age: 63
End: 2018-05-01

## 2018-05-01 DIAGNOSIS — N18.6 TYPE 2 DIABETES MELLITUS WITH ESRD (END-STAGE RENAL DISEASE) (HCC): ICD-10-CM

## 2018-05-01 DIAGNOSIS — E11.22 TYPE 2 DIABETES MELLITUS WITH ESRD (END-STAGE RENAL DISEASE) (HCC): ICD-10-CM

## 2018-05-01 DIAGNOSIS — E11.21 DIABETIC GLOMERULOPATHY (HCC): Primary | ICD-10-CM

## 2018-05-01 DIAGNOSIS — I10 ESSENTIAL HYPERTENSION, MALIGNANT: ICD-10-CM

## 2018-05-01 DIAGNOSIS — N31.9 NEUROGENIC DYSFUNCTION OF THE URINARY BLADDER: ICD-10-CM

## 2018-05-01 LAB
ALBUMIN SERPL BCP-MCNC: 3.5 G/DL (ref 3.5–5)
ALP SERPL-CCNC: 99 U/L (ref 46–116)
ALT SERPL W P-5'-P-CCNC: 101 U/L (ref 12–78)
ANION GAP SERPL CALCULATED.3IONS-SCNC: 10 MMOL/L (ref 4–13)
AST SERPL W P-5'-P-CCNC: 116 U/L (ref 5–45)
BACTERIA UR QL AUTO: ABNORMAL /HPF
BILIRUB SERPL-MCNC: 0.59 MG/DL (ref 0.2–1)
BILIRUB UR QL STRIP: NEGATIVE
BUN SERPL-MCNC: 16 MG/DL (ref 5–25)
CALCIUM SERPL-MCNC: 8.7 MG/DL (ref 8.3–10.1)
CHLORIDE SERPL-SCNC: 108 MMOL/L (ref 100–108)
CHOLEST SERPL-MCNC: 108 MG/DL (ref 50–200)
CLARITY UR: ABNORMAL
CO2 SERPL-SCNC: 19 MMOL/L (ref 21–32)
COLOR UR: YELLOW
CREAT SERPL-MCNC: 1.55 MG/DL (ref 0.6–1.3)
ERYTHROCYTE [DISTWIDTH] IN BLOOD BY AUTOMATED COUNT: 15.8 % (ref 11.6–15.1)
EST. AVERAGE GLUCOSE BLD GHB EST-MCNC: 128 MG/DL
GFR SERPL CREATININE-BSD FRML MDRD: 54 ML/MIN/1.73SQ M
GLUCOSE P FAST SERPL-MCNC: 148 MG/DL (ref 65–99)
GLUCOSE UR STRIP-MCNC: NEGATIVE MG/DL
HBA1C MFR BLD: 6.1 % (ref 4.2–6.3)
HCT VFR BLD AUTO: 37.7 % (ref 36.5–49.3)
HDLC SERPL-MCNC: 70 MG/DL (ref 40–60)
HGB BLD-MCNC: 12.2 G/DL (ref 12–17)
HGB UR QL STRIP.AUTO: ABNORMAL
HYALINE CASTS #/AREA URNS LPF: ABNORMAL /LPF
KETONES UR STRIP-MCNC: NEGATIVE MG/DL
LDLC SERPL CALC-MCNC: 31 MG/DL (ref 0–100)
LEUKOCYTE ESTERASE UR QL STRIP: ABNORMAL
MCH RBC QN AUTO: 29.5 PG (ref 26.8–34.3)
MCHC RBC AUTO-ENTMCNC: 32.4 G/DL (ref 31.4–37.4)
MCV RBC AUTO: 91 FL (ref 82–98)
NITRITE UR QL STRIP: NEGATIVE
NON-SQ EPI CELLS URNS QL MICRO: ABNORMAL /HPF
NONHDLC SERPL-MCNC: 38 MG/DL
PH UR STRIP.AUTO: 6.5 [PH] (ref 4.5–8)
PLATELET # BLD AUTO: 285 THOUSANDS/UL (ref 149–390)
PMV BLD AUTO: 10.2 FL (ref 8.9–12.7)
POTASSIUM SERPL-SCNC: 3.6 MMOL/L (ref 3.5–5.3)
PROT SERPL-MCNC: 9.1 G/DL (ref 6.4–8.2)
PROT UR STRIP-MCNC: ABNORMAL MG/DL
RBC # BLD AUTO: 4.13 MILLION/UL (ref 3.88–5.62)
RBC #/AREA URNS AUTO: ABNORMAL /HPF
SODIUM SERPL-SCNC: 137 MMOL/L (ref 136–145)
SP GR UR STRIP.AUTO: 1.01 (ref 1–1.03)
TRIGL SERPL-MCNC: 35 MG/DL
UROBILINOGEN UR QL STRIP.AUTO: 0.2 E.U./DL
WBC # BLD AUTO: 3.78 THOUSAND/UL (ref 4.31–10.16)
WBC #/AREA URNS AUTO: ABNORMAL /HPF

## 2018-05-01 PROCEDURE — 81001 URINALYSIS AUTO W/SCOPE: CPT

## 2018-05-01 PROCEDURE — 83036 HEMOGLOBIN GLYCOSYLATED A1C: CPT

## 2018-05-01 PROCEDURE — 80053 COMPREHEN METABOLIC PANEL: CPT

## 2018-05-01 PROCEDURE — 36415 COLL VENOUS BLD VENIPUNCTURE: CPT

## 2018-05-01 PROCEDURE — 80061 LIPID PANEL: CPT

## 2018-05-01 PROCEDURE — 85027 COMPLETE CBC AUTOMATED: CPT

## 2018-07-05 ENCOUNTER — APPOINTMENT (EMERGENCY)
Dept: RADIOLOGY | Facility: HOSPITAL | Age: 63
End: 2018-07-05
Payer: COMMERCIAL

## 2018-07-05 ENCOUNTER — HOSPITAL ENCOUNTER (EMERGENCY)
Facility: HOSPITAL | Age: 63
Discharge: HOME | End: 2018-07-05
Attending: EMERGENCY MEDICINE
Payer: COMMERCIAL

## 2018-07-05 VITALS
HEART RATE: 65 BPM | RESPIRATION RATE: 16 BRPM | DIASTOLIC BLOOD PRESSURE: 90 MMHG | TEMPERATURE: 98.7 F | OXYGEN SATURATION: 100 % | BODY MASS INDEX: 23.57 KG/M2 | WEIGHT: 146 LBS | SYSTOLIC BLOOD PRESSURE: 184 MMHG

## 2018-07-05 DIAGNOSIS — N18.9 CKD (CHRONIC KIDNEY DISEASE): ICD-10-CM

## 2018-07-05 DIAGNOSIS — I10 ESSENTIAL HYPERTENSION: ICD-10-CM

## 2018-07-05 DIAGNOSIS — R79.89 ELEVATED SERUM CREATININE: Primary | ICD-10-CM

## 2018-07-05 DIAGNOSIS — E78.5 HLD (HYPERLIPIDEMIA): ICD-10-CM

## 2018-07-05 LAB
ALBUMIN SERPL BCP-MCNC: 3.5 G/DL (ref 3.5–5)
ALP SERPL-CCNC: 78 U/L (ref 46–116)
ALT SERPL W P-5'-P-CCNC: 73 U/L (ref 12–78)
ANION GAP SERPL CALCULATED.3IONS-SCNC: 9 MMOL/L (ref 4–13)
AST SERPL W P-5'-P-CCNC: 68 U/L (ref 5–45)
ATRIAL RATE: 83 BPM
BASOPHILS # BLD AUTO: 0.05 THOUSANDS/ΜL (ref 0–0.1)
BASOPHILS NFR BLD AUTO: 1 % (ref 0–1)
BILIRUB SERPL-MCNC: 0.46 MG/DL (ref 0.2–1)
BUN SERPL-MCNC: 36 MG/DL (ref 5–25)
CALCIUM SERPL-MCNC: 9.6 MG/DL (ref 8.3–10.1)
CHLORIDE SERPL-SCNC: 103 MMOL/L (ref 100–108)
CO2 SERPL-SCNC: 18 MMOL/L (ref 21–32)
CREAT SERPL-MCNC: 2.21 MG/DL (ref 0.6–1.3)
EOSINOPHIL # BLD AUTO: 0.12 THOUSAND/ΜL (ref 0–0.61)
EOSINOPHIL NFR BLD AUTO: 2 % (ref 0–6)
ERYTHROCYTE [DISTWIDTH] IN BLOOD BY AUTOMATED COUNT: 14.6 % (ref 11.6–15.1)
GFR SERPL CREATININE-BSD FRML MDRD: 35 ML/MIN/1.73SQ M
GLUCOSE SERPL-MCNC: 148 MG/DL (ref 65–140)
HCT VFR BLD AUTO: 39 % (ref 36.5–49.3)
HGB BLD-MCNC: 12.1 G/DL (ref 12–17)
IMM GRANULOCYTES # BLD AUTO: 0.02 THOUSAND/UL (ref 0–0.2)
IMM GRANULOCYTES NFR BLD AUTO: 0 % (ref 0–2)
LYMPHOCYTES # BLD AUTO: 1.4 THOUSANDS/ΜL (ref 0.6–4.47)
LYMPHOCYTES NFR BLD AUTO: 19 % (ref 14–44)
MCH RBC QN AUTO: 28.5 PG (ref 26.8–34.3)
MCHC RBC AUTO-ENTMCNC: 31 G/DL (ref 31.4–37.4)
MCV RBC AUTO: 92 FL (ref 82–98)
MONOCYTES # BLD AUTO: 1.22 THOUSAND/ΜL (ref 0.17–1.22)
MONOCYTES NFR BLD AUTO: 17 % (ref 4–12)
NEUTROPHILS # BLD AUTO: 4.58 THOUSANDS/ΜL (ref 1.85–7.62)
NEUTS SEG NFR BLD AUTO: 61 % (ref 43–75)
NRBC BLD AUTO-RTO: 0 /100 WBCS
P AXIS: 55 DEGREES
PLATELET # BLD AUTO: 348 THOUSANDS/UL (ref 149–390)
PMV BLD AUTO: 9.4 FL (ref 8.9–12.7)
POTASSIUM SERPL-SCNC: 4.2 MMOL/L (ref 3.5–5.3)
PR INTERVAL: 126 MS
PROT SERPL-MCNC: 9.8 G/DL (ref 6.4–8.2)
QRS AXIS: 38 DEGREES
QRSD INTERVAL: 80 MS
QT INTERVAL: 382 MS
QTC INTERVAL: 448 MS
RBC # BLD AUTO: 4.25 MILLION/UL (ref 3.88–5.62)
SODIUM SERPL-SCNC: 130 MMOL/L (ref 136–145)
T WAVE AXIS: 84 DEGREES
TROPONIN I SERPL-MCNC: <0.02 NG/ML
VENTRICULAR RATE: 83 BPM
WBC # BLD AUTO: 7.39 THOUSAND/UL (ref 4.31–10.16)

## 2018-07-05 PROCEDURE — 93005 ELECTROCARDIOGRAM TRACING: CPT

## 2018-07-05 PROCEDURE — 85025 COMPLETE CBC W/AUTO DIFF WBC: CPT | Performed by: EMERGENCY MEDICINE

## 2018-07-05 PROCEDURE — 71046 X-RAY EXAM CHEST 2 VIEWS: CPT

## 2018-07-05 PROCEDURE — 96361 HYDRATE IV INFUSION ADD-ON: CPT

## 2018-07-05 PROCEDURE — 84484 ASSAY OF TROPONIN QUANT: CPT | Performed by: EMERGENCY MEDICINE

## 2018-07-05 PROCEDURE — 80053 COMPREHEN METABOLIC PANEL: CPT | Performed by: EMERGENCY MEDICINE

## 2018-07-05 PROCEDURE — 99284 EMERGENCY DEPT VISIT MOD MDM: CPT

## 2018-07-05 PROCEDURE — 36415 COLL VENOUS BLD VENIPUNCTURE: CPT

## 2018-07-05 PROCEDURE — 93010 ELECTROCARDIOGRAM REPORT: CPT | Performed by: INTERNAL MEDICINE

## 2018-07-05 PROCEDURE — 96360 HYDRATION IV INFUSION INIT: CPT

## 2018-07-05 RX ADMIN — SODIUM CHLORIDE 2000 ML: 0.9 INJECTION, SOLUTION INTRAVENOUS at 16:16

## 2018-07-05 NOTE — ED PROVIDER NOTES
History  Chief Complaint   Patient presents with    Dizziness     Pt states that he was working today and that he got lightheaded from the heat  pt states that his boss had him sit down and drink some water and then felt better  Pt states that the same thing happened last week and now his boss wants a note stating that he was checked and can return to work     HPI     69-year-old with past medical history of CKD stage 3 hypertension hyperlipidemia presents with chief complaint of feeling lightheaded  Patient went fishing yesterday and was out in the ED  Patient was at work as he works at a hot kitchen and felt lightheaded  Patient sat down and felt better  Patient drank water and this made his symptoms feel better  Patient states he has been feeling lightheaded at work as he works not kitchen for the last couple months  Patient denies syncope left LOC or trauma  Patient denies symptoms at this current time  Patient follows up with Nephrology as CKD stage 3  Patient does not offer complaints at this time  During this episode he denied palpitations shortness of breath cough pleurisy chest pain  Patient does not have a history of VTE  Patient denies fever chills rigors headache dizziness chest pain palpitations shortness of breath cough pleurisy abdominal pain nausea vomiting diarrhea constipation diarrhea urinary symptoms motor weakness numbness and tingling  Prior to Admission Medications   Prescriptions Last Dose Informant Patient Reported?  Taking?   acetaminophen (TYLENOL) 325 mg tablet 7/5/2018 at Unknown time  Yes Yes   Sig: Take 650 mg by mouth every 6 (six) hours as needed for mild pain   amLODIPine (NORVASC) 5 mg tablet 7/5/2018 at Unknown time  Yes Yes   Sig: Take 5 mg by mouth daily   gabapentin (NEURONTIN) 300 mg capsule 7/5/2018 at Unknown time  Yes Yes   Sig: Take 300 mg by mouth 2 (two) times a day   traMADol (ULTRAM) 50 mg tablet 7/4/2018 at Unknown time  Yes Yes   Sig: Take by mouth Facility-Administered Medications: None       Past Medical History:   Diagnosis Date    Aortic aneurysm (HCC)     Arthritis     Atypical chest pain     Back pain     Chronic kidney disease     stg 3    Elevated ALT measurement     Elevated AST (SGOT)     Hyperlipidemia     Hypertension     Leg pain     Neurogenic claudication     Urinary retention     Urinary retention        Past Surgical History:   Procedure Laterality Date    EPIDURAL BLOCK INJECTION N/A 1/23/2017    Procedure: BLOCK / INJECTION EPIDURAL STEROID LUMBAR;  Surgeon: Grace Sanchez MD;  Location: [facility];  Service:    [facility] LAMNOTMY INCL W/DCMPRSN NRV ROOT 1 INTRSPC LUMBR Bilateral 1/23/2017    Procedure: MIS L2-3 & L3-4 LAMINAL FORAMINOTOMIES AND MICRODISCECTOMY W LEFT SIDED APPROACH ;  Surgeon: Grace Sanchez MD;  Location: [facility];  Service: Neurosurgery       Family History   Problem Relation Age of Onset    Heart disease Sister      I have reviewed and agree with the history as documented  Social History   Substance Use Topics    Smoking status: Never Smoker    Smokeless tobacco: Never Used    Alcohol use Yes      Comment: social        Review of Systems   Constitutional: Negative for activity change, appetite change, chills, diaphoresis, fatigue, fever and unexpected weight change  HENT: Negative for congestion, ear discharge, ear pain, facial swelling, hearing loss, nosebleeds, postnasal drip, rhinorrhea, sinus pressure, sneezing, sore throat, tinnitus and trouble swallowing  Eyes: Negative for photophobia, pain, redness, itching and visual disturbance  Respiratory: Negative for cough, chest tightness, shortness of breath, wheezing and stridor  Cardiovascular: Negative for chest pain, palpitations and leg swelling  Gastrointestinal: Negative for abdominal distention, abdominal pain, anal bleeding, blood in stool, constipation, diarrhea, nausea and vomiting     Endocrine: Negative for cold intolerance, heat intolerance, polydipsia, polyphagia and polyuria  Genitourinary: Negative for decreased urine volume, difficulty urinating, dysuria, enuresis, flank pain, frequency, hematuria and urgency  Musculoskeletal: Negative for arthralgias, back pain, gait problem, joint swelling, myalgias, neck pain and neck stiffness  Skin: Negative for rash and wound  Allergic/Immunologic: Negative for environmental allergies, food allergies and immunocompromised state  Neurological: Positive for light-headedness  Negative for dizziness, tremors, seizures, syncope, facial asymmetry, speech difficulty, weakness, numbness and headaches  Hematological: Negative for adenopathy  Psychiatric/Behavioral: Negative for agitation, behavioral problems, confusion, decreased concentration, dysphoric mood, hallucinations, self-injury, sleep disturbance and suicidal ideas  The patient is not nervous/anxious and is not hyperactive  Physical Exam  ED Triage Vitals   Temperature Pulse Respirations Blood Pressure SpO2   07/05/18 1434 07/05/18 1434 07/05/18 1434 07/05/18 1434 07/05/18 1434   98 7 °F (37 1 °C) 105 20 (!) 88/62 99 %      Temp Source Heart Rate Source Patient Position - Orthostatic VS BP Location FiO2 (%)   07/05/18 1434 07/05/18 1434 07/05/18 1434 07/05/18 1434 --   Tympanic Monitor Sitting Right arm       Pain Score       07/05/18 1435       No Pain           Orthostatic Vital Signs  Vitals:    07/05/18 1600 07/05/18 1615 07/05/18 1700 07/05/18 1718   BP: 141/72 136/74 160/83 (!) 184/90   Pulse: 72 64 66 65   Patient Position - Orthostatic VS: Sitting Sitting Sitting Lying       Physical Exam   Constitutional: He is oriented to person, place, and time  He appears well-developed and well-nourished  No distress  HENT:   Head: Normocephalic and atraumatic  Right Ear: External ear normal    Left Ear: External ear normal    Nose: Nose normal    Mouth/Throat: Oropharynx is clear and moist  No oropharyngeal exudate     Eyes: Conjunctivae and EOM are normal  Pupils are equal, round, and reactive to light  Right eye exhibits no discharge  Left eye exhibits no discharge  No scleral icterus  Neck: Normal range of motion  Neck supple  No JVD present  No tracheal deviation present  No thyromegaly present  Cardiovascular: Normal rate, regular rhythm, S1 normal, S2 normal, normal heart sounds and intact distal pulses  No murmur heard  Pulses:       Carotid pulses are 2+ on the right side, and 2+ on the left side  Radial pulses are 2+ on the right side, and 2+ on the left side  Dorsalis pedis pulses are 2+ on the right side, and 2+ on the left side  Posterior tibial pulses are 2+ on the right side, and 2+ on the left side  Pulmonary/Chest: Effort normal and breath sounds normal  No stridor  No respiratory distress  He has no wheezes  He has no rales  Abdominal: Soft  Bowel sounds are normal  He exhibits no distension and no mass  There is no tenderness  There is no rebound and no guarding  Musculoskeletal: Normal range of motion  He exhibits no edema, tenderness or deformity  Lymphadenopathy:     He has no cervical adenopathy  Neurological: He is alert and oriented to person, place, and time  No cranial nerve deficit  He exhibits normal muscle tone  Coordination normal  GCS eye subscore is 4  GCS verbal subscore is 5  GCS motor subscore is 6  Reflex Scores:       Tricep reflexes are 2+ on the right side and 2+ on the left side  Bicep reflexes are 2+ on the right side and 2+ on the left side  Patellar reflexes are 2+ on the right side and 2+ on the left side  Achilles reflexes are 2+ on the right side and 2+ on the left side    5/5 strength in upper lower extremities, sensation intact throughout, cerebellar testing including finger-to-nose heel-to-shin rapid alternating movements are intact, gait is normal tandem gait intact, is cranial nerves 2-12 intact speech is normal articulate, visual fields intact  Skin: Skin is warm and dry  No rash noted  He is not diaphoretic  No erythema  Psychiatric: He has a normal mood and affect  His behavior is normal  Judgment and thought content normal    Nursing note and vitals reviewed  ED Medications  Medications   sodium chloride 0 9 % bolus 2,000 mL (0 mL Intravenous Stopped 7/5/18 1812)       Diagnostic Studies  Results Reviewed     Procedure Component Value Units Date/Time    Comprehensive metabolic panel [[id number]]  (Abnormal) Collected:  07/05/18 1449    Lab Status:  Final result Specimen:  Blood from Arm, Left Updated:  07/05/18 1517     Sodium 130 (L) mmol/L      Potassium 4 2 mmol/L      Chloride 103 mmol/L      CO2 18 (L) mmol/L      Anion Gap 9 mmol/L      BUN 36 (H) mg/dL      Creatinine 2 21 (H) mg/dL      Glucose 148 (H) mg/dL      Calcium 9 6 mg/dL      AST 68 (H) U/L      ALT 73 U/L      Alkaline Phosphatase 78 U/L      Total Protein 9 8 (H) g/dL      Albumin 3 5 g/dL      Total Bilirubin 0 46 mg/dL      eGFR 35 ml/min/1 73sq m     Narrative:         National Kidney Disease Education Program recommendations are as follows:  GFR calculation is accurate only with a steady state creatinine  Chronic Kidney disease less than 60 ml/min/1 73 sq  meters  Kidney failure less than 15 ml/min/1 73 sq  meters      Troponin I [[id number]]  (Normal) Collected:  07/05/18 1449    Lab Status:  Final result Specimen:  Blood from Arm, Left Updated:  07/05/18 1514     Troponin I <0 02 ng/mL     CBC and differential [[id number]]  (Abnormal) Collected:  07/05/18 1449    Lab Status:  Final result Specimen:  Blood from Arm, Left Updated:  07/05/18 1457     WBC 7 39 Thousand/uL      RBC 4 25 Million/uL      Hemoglobin 12 1 g/dL      Hematocrit 39 0 %      MCV 92 fL      MCH 28 5 pg      MCHC 31 0 (L) g/dL      RDW 14 6 %      MPV 9 4 fL      Platelets 857 Thousands/uL      nRBC 0 /100 WBCs      Neutrophils Relative 61 %      Immat GRANS % 0 %      Lymphocytes Relative 19 %      Monocytes Relative 17 (H) %      Eosinophils Relative 2 %      Basophils Relative 1 %      Neutrophils Absolute 4 58 Thousands/µL      Immature Grans Absolute 0 02 Thousand/uL      Lymphocytes Absolute 1 40 Thousands/µL      Monocytes Absolute 1 22 Thousand/µL      Eosinophils Absolute 0 12 Thousand/µL      Basophils Absolute 0 05 Thousands/µL                  X-ray chest 2 views   Final Result by Ashley Cervantes MD (07/05 1651)      No acute cardiopulmonary disease  Workstation performed: [id number]               Procedures  ECG 12 Lead Documentation  Date/Time: 7/5/2018 4:58 PM  Performed by: Gardenia Berry  Authorized by: Gardenia Berry     Indications / Diagnosis:  Lightheadedness  Comments:       Ref Range & Units 7/5/18 1447  Ventricular Rate BPM 83   Atrial Rate BPM 83   IL Interval ms 126   QRSD Interval ms 80   QT Interval ms 382   QTC Interval ms 448   P Axis degrees 55   QRS Axis degrees 38   T Wave Axis degrees 84   Narrative       Normal sinus rhythm  Normal ECG  When compared with ECG of 19-FEB-2017 08:41,  No significant change was found                Phone Consults  ED Phone Contact    ED Course  ED Course as of Jul 06 0459   Thu Jul 05, 2018   1616 Total Protein: (!) 9 8   1617 AST: (!) 68   1617 Creatinine: (!) 2 21   1617 BUN: (!) 36   1617 CO2: (!) 18   1617 Fluids given Sodium: (!) 130   1622 Sodium: (!) 130   1622 CO2: (!) 18   1622 BUN: (!) 36   1622 Creatinine: (!) 2 21   1622 Patient chronically has chronic low bicarb, patient creatinine slightly elevated, will give fluids and treat accordingly  1809 Patient ambulating without difficulty                                MDM  Number of Diagnoses or Management Options  CKD (chronic kidney disease):   Elevated serum creatinine:   Essential hypertension:   HLD (hyperlipidemia):   Diagnosis management comments: 58-year-old male presenting with presyncope lightheaded  Patient states he feels like he is dehydrated    History supports this  On exam vital signs are normal  Patient has slightly dry oral mucosa  Laboratory evaluation showed creatinine trending up, baseline is elevated as patient has CKD stage 3  Patient bicarb chronically low  Patient given 2 L normal saline  Patient felt better  Patient agrees to follow-up strictly with Nephrology  Patient requesting discharge  Patient has chronic electrolyte abnormalities and creatinine is trending up from baseline, 2 L normal saline given for this  Patient discharged in care of self  Patient agrees to follow-up with Nephrology  Patient will call Nephrology tomorrow in follow-up in the next 2-3 days  ED return precautions discussed  CritCare Time    Disposition  Final diagnoses:   Elevated serum creatinine   CKD (chronic kidney disease)   HLD (hyperlipidemia)   Essential hypertension     Time reflects when diagnosis was documented in both MDM as applicable and the Disposition within this note     Time User Action Codes Description Comment    7/5/2018  6:11 PM Mary Cowart [R79 89] Elevated serum creatinine     7/5/2018  6:11 PM Kolton Ros Add [N18 9] CKD (chronic kidney disease)     7/5/2018  6:11 PM Thuy Paz Add [E78 5] HLD (hyperlipidemia)     7/5/2018  6:11 PM Thuy Paz Add [I10] Essential hypertension       ED Disposition     ED Disposition Condition Comment    Discharge  Onnie Anger discharge to home/self care  Condition at discharge: Good    Return precautions were discussed with patient  Patient understands when to return to  Emergency department  Patient agrees to discharge plan and follow up care             Follow-up Information     Follow up With Specialties Details Why Contact Info Additional Information    Maryln Simmonds, MD Nephrology Call in 1 day  [address] [facility] [address] [place] [address]       [address] [place] [address] Emergency Department Emergency Medicine  As needed, If symptoms worsen [address] [facility] [address]  [phone]  ED, [address] [facility] [address], [place], [place], [address]          Discharge Medication List as of 7/5/2018  6:12 PM      CONTINUE these medications which have NOT CHANGED    Details   acetaminophen (TYLENOL) 325 mg tablet Take 650 mg by mouth every 6 (six) hours as needed for mild pain, Until Discontinued, Historical Med      amLODIPine (NORVASC) 5 mg tablet Take 5 mg by mouth daily, Until Discontinued, Historical Med      gabapentin (NEURONTIN) 300 mg capsule Take 300 mg by mouth 2 (two) times a day, Until Discontinued, Historical Med      traMADol (ULTRAM) 50 mg tablet Take by mouth, Until Discontinued, Historical Med           No discharge procedures on file  ED Provider  Attending physically available and evaluated Dwayne Youssef I managed the patient along with the ED Attending      Electronically Signed by         Nohemi Anderson DO  07/06/18 7756

## 2018-07-05 NOTE — DISCHARGE INSTRUCTIONS
Chronic Hypertension, Ambulatory Care   GENERAL INFORMATION:   Chronic hypertension  is a long-term condition in which your blood pressure (BP) is higher than normal  Your BP is the force of your blood moving against the walls of your arteries  Hypertension is a BP of 140/90 or higher  Common symptoms include the following:   · Headache     · Blurred vision    · Chest pain     · Dizziness or weakness     · Trouble breathing     · Nosebleeds  Seek immediate care for the following symptoms:   · Severe headache or vision loss    · Weakness in an arm or leg    · Confusion or difficulty speaking    · Discomfort in your chest that feels like squeezing, pressure, fullness, or pain    · Suddenly feeling lightheaded or trouble breathing    · Pain or discomfort in your back, neck, jaw, stomach, or arm  Treatment for chronic hypertension  may include medicine to lower your BP  You may also need to make lifestyle changes  Take your medicine exactly as directed  Manage chronic hypertension:   · Take your BP at home  Sit and rest for 5 minutes before you take your BP  Extend your arm and support it on a flat surface  Your arm should be at the same level as your heart  Follow the directions that came with your BP monitor  If possible, take at least 2 BP readings each time  Take your BP at least twice a day at the same times each day, such as morning and evening  Keep a log of your BP readings and bring it to your follow-up visits  · Eat less sodium (salt)  Do not add sodium to your food  Limit foods that are high in sodium, such as canned foods, potato chips, and cold cuts  Your healthcare provider may suggest that you follow the [address] [place] [address] Street  The plan is low in sodium, unhealthy fats, and total fat  It is high in potassium, calcium, and fiber  · Exercise regularly  Exercise at least 30 minutes per day, on most days of the week  This will help decrease your BP   Ask your healthcare provider about the best exercise plan for you  · Limit alcohol  Women should limit alcohol to 1 drink a day  Men should limit alcohol to 2 drinks a day  A drink of alcohol is 12 ounces of beer, 5 ounces of wine, or 1½ ounces of liquor  · Do not smoke  If you smoke, it is never too late to quit  Smoking can increase your BP  Smoking also worsens other health conditions you may have that can increase your risk for hypertension  Ask your healthcare provider for information if you need help quitting  Follow up with your healthcare provider as directed: You will need to return to have your BP checked and to have other lab tests done  Write down your questions so you remember to ask them during your visits  CARE AGREEMENT:   You have the right to help plan your care  Learn about your health condition and how it may be treated  Discuss treatment options with your caregivers to decide what care you want to receive  You always have the right to refuse treatment  The above information is an  only  It is not intended as medical advice for individual conditions or treatments  Talk to your doctor, nurse or pharmacist before following any medical regimen to see if it is safe and effective for you  © 2014 [address] is for End User's use only and may not be sold, redistributed or otherwise used for commercial purposes  All illustrations and images included in CareNotes® are the copyrighted property of A D A M , Inc  or Ignacio Arnold

## 2018-07-07 NOTE — ED ATTENDING ATTESTATION
Kj Abbasi DO, saw and evaluated the patient  I have discussed the patient with the resident/non-physician practitioner and agree with the resident's/non-physician practitioner's findings, Plan of Care, and MDM as documented in the resident's/non-physician practitioner's note, except where noted  All available labs and Radiology studies were reviewed  At this point I agree with the current assessment done in the Emergency Department  I have conducted an independent evaluation of this patient a history and physical is as follows:    Patient is a 27-year-old male with past medical history of chronic kidney disease stage 3, hypertension, hyperlipidemia complaining of feeling lightheaded and had a near syncopal event while at work  Patient states that secondary to the hot weather and 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,  Patient felt lightheaded and had a near syncopal event  This happened multiple times in the past   Denies any complaints at this time  Vital signs within normal range  Patient is afebrile  Otherwise negative review of systems  No change in medications  Patient is not tachycardic, tachypneic nor hypoxic  Labs reviewed and has slightly elevated creatinine compared to his baseline  Will administer 2 L of normal saline solution and reassess  Patient does not wish to be admitted to the hospital   Patient feels better after IV fluids  Recommend follow up with his primary care physician for repeat renal function and return if worsens        Critical Care Time  CritCare Time    Procedures

## 2018-07-11 ENCOUNTER — HOSPITAL ENCOUNTER (EMERGENCY)
Facility: HOSPITAL | Age: 63
Discharge: HOME | End: 2018-07-11
Attending: EMERGENCY MEDICINE
Payer: COMMERCIAL

## 2018-07-11 ENCOUNTER — APPOINTMENT (EMERGENCY)
Dept: RADIOLOGY | Facility: HOSPITAL | Age: 63
End: 2018-07-11
Payer: COMMERCIAL

## 2018-07-11 VITALS
TEMPERATURE: 99.4 F | BODY MASS INDEX: 23.56 KG/M2 | WEIGHT: 145.94 LBS | DIASTOLIC BLOOD PRESSURE: 83 MMHG | RESPIRATION RATE: 18 BRPM | HEART RATE: 82 BPM | OXYGEN SATURATION: 99 % | SYSTOLIC BLOOD PRESSURE: 154 MMHG

## 2018-07-11 DIAGNOSIS — R55 NEAR SYNCOPE: Primary | ICD-10-CM

## 2018-07-11 DIAGNOSIS — R42 LIGHT HEADED: ICD-10-CM

## 2018-07-11 LAB
ALBUMIN SERPL BCP-MCNC: 3.2 G/DL (ref 3.5–5)
ALP SERPL-CCNC: 74 U/L (ref 46–116)
ALT SERPL W P-5'-P-CCNC: 63 U/L (ref 12–78)
ANION GAP SERPL CALCULATED.3IONS-SCNC: 8 MMOL/L (ref 4–13)
AST SERPL W P-5'-P-CCNC: 49 U/L (ref 5–45)
ATRIAL RATE: 81 BPM
BASOPHILS # BLD AUTO: 0.02 THOUSANDS/ΜL (ref 0–0.1)
BASOPHILS NFR BLD AUTO: 0 % (ref 0–1)
BILIRUB SERPL-MCNC: 0.41 MG/DL (ref 0.2–1)
BUN SERPL-MCNC: 27 MG/DL (ref 5–25)
CALCIUM SERPL-MCNC: 9.3 MG/DL (ref 8.3–10.1)
CHLORIDE SERPL-SCNC: 100 MMOL/L (ref 100–108)
CO2 SERPL-SCNC: 23 MMOL/L (ref 21–32)
CREAT SERPL-MCNC: 1.85 MG/DL (ref 0.6–1.3)
EOSINOPHIL # BLD AUTO: 0.12 THOUSAND/ΜL (ref 0–0.61)
EOSINOPHIL NFR BLD AUTO: 1 % (ref 0–6)
ERYTHROCYTE [DISTWIDTH] IN BLOOD BY AUTOMATED COUNT: 14.7 % (ref 11.6–15.1)
GFR SERPL CREATININE-BSD FRML MDRD: 44 ML/MIN/1.73SQ M
GLUCOSE SERPL-MCNC: 117 MG/DL (ref 65–140)
HCT VFR BLD AUTO: 35.2 % (ref 36.5–49.3)
HGB BLD-MCNC: 11.2 G/DL (ref 12–17)
IMM GRANULOCYTES # BLD AUTO: 0.05 THOUSAND/UL (ref 0–0.2)
IMM GRANULOCYTES NFR BLD AUTO: 1 % (ref 0–2)
LYMPHOCYTES # BLD AUTO: 1.32 THOUSANDS/ΜL (ref 0.6–4.47)
LYMPHOCYTES NFR BLD AUTO: 14 % (ref 14–44)
MCH RBC QN AUTO: 29 PG (ref 26.8–34.3)
MCHC RBC AUTO-ENTMCNC: 31.8 G/DL (ref 31.4–37.4)
MCV RBC AUTO: 91 FL (ref 82–98)
MONOCYTES # BLD AUTO: 1.15 THOUSAND/ΜL (ref 0.17–1.22)
MONOCYTES NFR BLD AUTO: 13 % (ref 4–12)
NEUTROPHILS # BLD AUTO: 6.53 THOUSANDS/ΜL (ref 1.85–7.62)
NEUTS SEG NFR BLD AUTO: 71 % (ref 43–75)
NRBC BLD AUTO-RTO: 0 /100 WBCS
P AXIS: 58 DEGREES
PLATELET # BLD AUTO: 331 THOUSANDS/UL (ref 149–390)
PMV BLD AUTO: 9.5 FL (ref 8.9–12.7)
POTASSIUM SERPL-SCNC: 5.2 MMOL/L (ref 3.5–5.3)
PR INTERVAL: 122 MS
PROT SERPL-MCNC: 8.8 G/DL (ref 6.4–8.2)
QRS AXIS: 48 DEGREES
QRSD INTERVAL: 74 MS
QT INTERVAL: 356 MS
QTC INTERVAL: 413 MS
RBC # BLD AUTO: 3.86 MILLION/UL (ref 3.88–5.62)
SODIUM SERPL-SCNC: 131 MMOL/L (ref 136–145)
T WAVE AXIS: 90 DEGREES
TROPONIN I SERPL-MCNC: <0.02 NG/ML
VENTRICULAR RATE: 81 BPM
WBC # BLD AUTO: 9.19 THOUSAND/UL (ref 4.31–10.16)

## 2018-07-11 PROCEDURE — 93010 ELECTROCARDIOGRAM REPORT: CPT | Performed by: INTERNAL MEDICINE

## 2018-07-11 PROCEDURE — 85025 COMPLETE CBC W/AUTO DIFF WBC: CPT | Performed by: EMERGENCY MEDICINE

## 2018-07-11 PROCEDURE — 36415 COLL VENOUS BLD VENIPUNCTURE: CPT

## 2018-07-11 PROCEDURE — 84484 ASSAY OF TROPONIN QUANT: CPT | Performed by: EMERGENCY MEDICINE

## 2018-07-11 PROCEDURE — 80053 COMPREHEN METABOLIC PANEL: CPT | Performed by: EMERGENCY MEDICINE

## 2018-07-11 PROCEDURE — 71046 X-RAY EXAM CHEST 2 VIEWS: CPT

## 2018-07-11 PROCEDURE — 99284 EMERGENCY DEPT VISIT MOD MDM: CPT

## 2018-07-11 PROCEDURE — 93005 ELECTROCARDIOGRAM TRACING: CPT

## 2018-07-11 NOTE — ED ATTENDING ATTESTATION
Gage Cantu MD, saw and evaluated the patient  I have discussed the patient with the resident/non-physician practitioner and agree with the resident's/non-physician practitioner's findings, Plan of Care, and MDM as documented in the resident's/non-physician practitioner's note, except where noted  All available labs and Radiology studies were reviewed  At this point I agree with the current assessment done in the Emergency Department  I have conducted an independent evaluation of this patient a history and physical is as follows:      Critical Care Time  CritCare Time    Procedures     56 yo male with multiple episodes of lightheadedness and near syncope over the last month  Pt has been seen in ed multiple times for same and has had echo done last month  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

## 2018-07-11 NOTE — DISCHARGE INSTRUCTIONS
Dizziness, Ambulatory Care   GENERAL INFORMATION:   Dizziness  is a term used to describe a feeling of being off balance or unsteady  Common causes of dizziness are an inner ear fluid imbalance or a lack of oxygen in your blood  Your dizziness may be acute (lasts 3 days or less) or chronic (lasts longer than 3 days)  You may have dizzy spells that last from seconds to a few hours  Common symptoms related to dizziness include the following:   · A feeling that your surroundings are moving even though you are standing still    · Ringing in your ears or hearing loss     · Feeling faint or lightheaded     · Weakness or unsteadiness     · Double vision or eye movements you cannot control    · Nausea or vomiting     · Confusion  Seek immediate care for the following symptoms:   · You have a headache that does not go away with medicine  · You have shaking chills and a fever  · You vomit over and over with no relief  · Your vomit or bowel movements are red or black  · You have pain in your chest, back, or abdomen  · You have numbness, especially in your face, arms, or legs  · You have trouble moving your arms or legs  Treatment for dizziness  depends on the cause of your dizziness  You may need medicines to decrease your dizziness or nausea  You may need to be admitted to the hospital for treatment  Manage your symptoms:  Get up slowly from sitting or lying down  Do not drive or operate machinery when you are dizzy  Follow up with your healthcare provider as directed:  Write down your questions so you remember to ask them during your visits  CARE AGREEMENT:   You have the right to help plan your care  Learn about your health condition and how it may be treated  Discuss treatment options with your caregivers to decide what care you want to receive  You always have the right to refuse treatment  The above information is an  only   It is not intended as medical advice for individual conditions or treatments  Talk to your doctor, nurse or pharmacist before following any medical regimen to see if it is safe and effective for you  © 2014 [address] is for End User's use only and may not be sold, redistributed or otherwise used for commercial purposes  All illustrations and images included in CareNotes® are the copyrighted property of [employer]  or Ignacio Arnold  Dizziness   AMBULATORY CARE:   Dizziness  is a feeling of being off balance or unsteady  Common causes of dizziness are an inner ear fluid imbalance or a lack of oxygen in your blood  Dizziness may be acute (lasts 3 days or less) or chronic (lasts longer than 3 days)  You may have dizzy spells that last from seconds to a few hours  Common symptoms that may happen with dizziness:   · A feeling that your surroundings are moving even though you are standing still    · Ringing in your ears or hearing loss     · Feeling faint or lightheaded     · Weakness or unsteadiness     · Double vision or eye movements you cannot control    · Nausea or vomiting     · Confusion  Seek care immediately if:   · You have a headache and a stiff neck  · You have shaking chills and a fever  · You vomit over and over with no relief  · Your vomit or bowel movements are red or black  · You have pain in your chest, back, or abdomen  · You have numbness, especially in your face, arms, or legs  · You have trouble moving your arms or legs  · You are confused  Contact your healthcare provider if:   · You have a fever  · Your symptoms do not get better with treatment  · You have questions or concerns about your condition or care  Treatment for dizziness  depends on the cause  Your healthcare provider may give you oxygen or medicines to decrease your dizziness and nausea  He may also refer you to a specialist  Sushant Em may need to be admitted to the hospital for treatment    Manage your symptoms:   · Do not drive  or operate heavy machinery when you are dizzy  · Get up slowly  from sitting or lying down  · Drink plenty of liquids  Liquids help prevent dehydration  Ask how much liquid to drink each day and which liquids are best for you  Follow up with your healthcare provider as directed:  Write down your questions so you remember to ask them during your visits  © 2017 [address] Information is for End User's use only and may not be sold, redistributed or otherwise used for commercial purposes  All illustrations and images included in CareNotes® are the copyrighted property of [employer]  or Ignacio Arnold  The above information is an  only  It is not intended as medical advice for individual conditions or treatments  Talk to your doctor, nurse or pharmacist before following any medical regimen to see if it is safe and effective for you

## 2018-07-11 NOTE — ED PROVIDER NOTES
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  He denies chest pain; shortness of breath; palpitations  1st time he had one of these episodes he had an echocardiogram done EF of 60%; no major wall abnormalities  He has seen cardiology has a history of orthostatic hypotension  He insists he has never had these episodes at home or while outside only in the kitchen  He states he has been trying to stay hydrated  Does have CKD at baseline  He straight caths secondary to neurogenic bladder from prior lumbar back disease  Vital signs unremarkable  GCS 15  Denies any numbness; tingling weakness  He states he feels lightheaded; his vision started to change then sits himself down on his bottom  He denies any loss of consciousness; or any true syncope  He does take antihypertensive medications they were just just changed he states a few days ago  Denies any head trauma at any point in time  No blood thinners  Prior to Admission Medications   Prescriptions Last Dose Informant Patient Reported?  Taking?   acetaminophen (TYLENOL) 325 mg tablet 7/11/2018 at Unknown time  Yes Yes   Sig: Take 650 mg by mouth every 6 (six) hours as needed for mild pain   amLODIPine (NORVASC) 5 mg tablet 7/11/2018 at Unknown time  Yes Yes   Sig: Take 5 mg by mouth daily   gabapentin (NEURONTIN) 300 mg capsule 7/11/2018 at Unknown time  Yes Yes   Sig: Take 300 mg by mouth 2 (two) times a day   traMADol (ULTRAM) 50 mg tablet 7/11/2018 at Unknown time  Yes Yes   Sig: Take by mouth Facility-Administered Medications: None       Past Medical History:   Diagnosis Date    Aortic aneurysm (HCC)     Arthritis     Atypical chest pain     Back pain     Chronic kidney disease     stg 3    Elevated ALT measurement     Elevated AST (SGOT)     Hyperlipidemia     Hypertension     Leg pain     Neurogenic claudication     Urinary retention     Urinary retention        Past Surgical History:   Procedure Laterality Date    EPIDURAL BLOCK INJECTION N/A 1/23/2017    Procedure: BLOCK / INJECTION EPIDURAL STEROID LUMBAR;  Surgeon: Colleen Davis MD;  Location: [facility];  Service:    Kennieth Pack LAMNOTMY INCL W/DCMPRSN NRV ROOT 1 INTRSPC LUMBR Bilateral 1/23/2017    Procedure: MIS L2-3 & L3-4 LAMINAL FORAMINOTOMIES AND MICRODISCECTOMY W LEFT SIDED APPROACH ;  Surgeon: Colleen Davis MD;  Location: [facility];  Service: Neurosurgery       Family History   Problem Relation Age of Onset    Heart disease Sister      I have reviewed and agree with the history as documented  Social History   Substance Use Topics    Smoking status: Never Smoker    Smokeless tobacco: Never Used    Alcohol use Yes      Comment: social        Review of Systems   Constitutional: Positive for activity change  Negative for appetite change, chills and fever  HENT: Negative for congestion, rhinorrhea and sore throat  Eyes: Negative for photophobia and pain  Respiratory: Negative for cough, chest tightness and wheezing  Cardiovascular: Negative for chest pain, palpitations and leg swelling  Gastrointestinal: Negative for abdominal distention, abdominal pain, constipation, diarrhea and nausea  Genitourinary: Negative for dysuria, flank pain, frequency and hematuria  Musculoskeletal: Negative for arthralgias, back pain, myalgias, neck pain and neck stiffness  Skin: Negative for color change and rash  Neurological: Positive for light-headedness   Negative for dizziness, seizures, syncope, speech difficulty, weakness and headaches  Hematological: Negative for adenopathy  Psychiatric/Behavioral: Negative for agitation, confusion, hallucinations and suicidal ideas  Physical Exam  ED Triage Vitals [07/11/18 1542]   Temperature Pulse Respirations Blood Pressure SpO2   99 4 °F (37 4 °C) 86 18 105/59 97 %      Temp Source Heart Rate Source Patient Position - Orthostatic VS BP Location FiO2 (%)   Tympanic Monitor Sitting Left arm --      Pain Score       No Pain           Orthostatic Vital Signs  Vitals:    07/11/18 1645 07/11/18 1700 07/11/18 1730 07/11/18 1830   BP: 161/94 156/83 165/85 154/83   Pulse: 93 82 83 82   Patient Position - Orthostatic VS: Sitting Lying Sitting Sitting       Physical Exam   Constitutional: He is oriented to person, place, and time  He appears well-developed and well-nourished  No distress  Appears well rested comfortable   HENT:   Head: Normocephalic and atraumatic  Mouth/Throat: Oropharynx is clear and moist  No oropharyngeal exudate  Moist oral mucosa   Eyes: EOM are normal  Pupils are equal, round, and reactive to light  Right eye exhibits no discharge  Left eye exhibits no discharge  Neck: Normal range of motion  Neck supple  No JVD present  No tracheal deviation present  Cardiovascular: Normal rate, normal heart sounds and intact distal pulses  No murmur heard  Pulmonary/Chest: Effort normal and breath sounds normal  No respiratory distress  He has no wheezes  He has no rales  No increased work of breathing   Abdominal: Soft  Bowel sounds are normal  He exhibits no distension  There is no tenderness  There is no rebound and no guarding  Nontender x4   Musculoskeletal: Normal range of motion  He exhibits no edema, tenderness or deformity  No swelling in the lower extremities   Lymphadenopathy:     He has no cervical adenopathy  Neurological: He is alert and oriented to person, place, and time  No cranial nerve deficit  He exhibits normal muscle tone  GCS 15    Normal ambulation  No dysarthria although that was documented the note I do not appreciate this  Motor 5/5 and symmetrical all extremities  Cranial 2 through 12 intact  Skin: Skin is warm and dry  No erythema  No pallor  Psychiatric: He has a normal mood and affect  His behavior is normal  Thought content normal        ED Medications  Medications - No data to display    Diagnostic Studies  Results Reviewed     Procedure Component Value Units Date/Time    Troponin I [[id number]]  (Normal) Collected:  07/11/18 1602    Lab Status:  Final result Specimen:  Blood from Arm, Right Updated:  07/11/18 1641     Troponin I <0 02 ng/mL     Comprehensive metabolic panel [[id number]]  (Abnormal) Collected:  07/11/18 1602    Lab Status:  Final result Specimen:  Blood from Arm, Right Updated:  07/11/18 1637     Sodium 131 (L) mmol/L      Potassium 5 2 mmol/L      Chloride 100 mmol/L      CO2 23 mmol/L      Anion Gap 8 mmol/L      BUN 27 (H) mg/dL      Creatinine 1 85 (H) mg/dL      Glucose 117 mg/dL      Calcium 9 3 mg/dL      AST 49 (H) U/L      ALT 63 U/L      Alkaline Phosphatase 74 U/L      Total Protein 8 8 (H) g/dL      Albumin 3 2 (L) g/dL      Total Bilirubin 0 41 mg/dL      eGFR 44 ml/min/1 73sq m     Narrative:         National Kidney Disease Education Program recommendations are as follows:  GFR calculation is accurate only with a steady state creatinine  Chronic Kidney disease less than 60 ml/min/1 73 sq  meters  Kidney failure less than 15 ml/min/1 73 sq  meters      CBC and differential [[id number]]  (Abnormal) Collected:  07/11/18 1602    Lab Status:  Final result Specimen:  Blood from Arm, Right Updated:  07/11/18 1617     WBC 9 19 Thousand/uL      RBC 3 86 (L) Million/uL      Hemoglobin 11 2 (L) g/dL      Hematocrit 35 2 (L) %      MCV 91 fL      MCH 29 0 pg      MCHC 31 8 g/dL      RDW 14 7 %      MPV 9 5 fL      Platelets 863 Thousands/uL      nRBC 0 /100 WBCs      Neutrophils Relative 71 %      Immat GRANS % 1 % Lymphocytes Relative 14 %      Monocytes Relative 13 (H) %      Eosinophils Relative 1 %      Basophils Relative 0 %      Neutrophils Absolute 6 53 Thousands/µL      Immature Grans Absolute 0 05 Thousand/uL      Lymphocytes Absolute 1 32 Thousands/µL      Monocytes Absolute 1 15 Thousand/µL      Eosinophils Absolute 0 12 Thousand/µL      Basophils Absolute 0 02 Thousands/µL                  X-ray chest 2 views   ED Interpretation by Basilia North DO (07/11 1800)   Wet read no acute findings      Final Result by Mustapha Araya MD (07/11 1807)      No acute cardiopulmonary disease  Workstation performed: [id number]               Procedures  Procedures      Phone Consults  ED Phone Contact    ED Course  ED Course as of Jul 13 0209 Wed Jul 11, 2018   1652 Most recent 2 21 Creatinine: (!) 1 85   1833  Witnessed ambulation without difficulty                                MDM  Number of Diagnoses or Management Options  Light headed:   Near syncope:   Diagnosis management comments: Lightheadedness with near syncope  Never actually has syncopized per patient  Has been seen by Cardiology for this and had echocardiogram   He claims have been diagnosed with orthostatic hypotension  Episodes only occur at work not outside of work he states  Vital signs unremarkable  He notes prodrome with these episodes and sits himself down  There is no palpitations or any neurologic deficits  No seizure-like activity  Will follow up with PCP/Cardiology  No acute findings on lab work of anemia or any cardiac ischemia/troponin leak  Discussed return precautions      CritCare Time    Disposition  Final diagnoses:   Near syncope   Light headed     Time reflects when diagnosis was documented in both MDM as applicable and the Disposition within this note     Time User Action Codes Description Comment    7/11/2018  6:28 PM Gaby Rucker Add [R55] Near syncope     7/11/2018  6:28 PM Gaby Rucker Add [R42] Light headed       ED Disposition     ED Disposition Condition Comment    Discharge  Merced Mauricio discharge to home/self care  Condition at discharge: Good        Follow-up Information     Follow up With Specialties Details Why Contact Info Additional [address] Nurse Practitioner Schedule an appointment as soon as possible for a visit in 2 days  follow-up Kenneth Snow [facility] Emergency Department Emergency Medicine Go in 1 day  any episodes of passing out or any new symptoms [id number] ED, [address], [address]          Discharge Medication List as of 7/11/2018  6:29 PM      CONTINUE these medications which have NOT CHANGED    Details   acetaminophen (TYLENOL) 325 mg tablet Take 650 mg by mouth every 6 (six) hours as needed for mild pain, Until Discontinued, Historical Med      amLODIPine (NORVASC) 5 mg tablet Take 5 mg by mouth daily, Until Discontinued, Historical Med      gabapentin (NEURONTIN) 300 mg capsule Take 300 mg by mouth 2 (two) times a day, Until Discontinued, Historical Med      traMADol (ULTRAM) 50 mg tablet Take by mouth, Until Discontinued, Historical Med           No discharge procedures on file  ED Provider  Attending physically available and evaluated Merced Mauricio I managed the patient along with the ED Attending      Electronically Signed by         Javy Higgins DO  07/13/18 8137

## 2018-07-11 NOTE — ED NOTES
Patient ambulated without difficulty at this time  MD made aware        Melanie Hennessy RN  07/11/18 8345

## 2018-07-31 ENCOUNTER — OFFICE VISIT (OUTPATIENT)
Dept: CARDIOLOGY CLINIC | Facility: CLINIC | Age: 63
End: 2018-07-31
Payer: COMMERCIAL

## 2018-07-31 VITALS
HEART RATE: 80 BPM | WEIGHT: 142 LBS | DIASTOLIC BLOOD PRESSURE: 86 MMHG | BODY MASS INDEX: 22.82 KG/M2 | SYSTOLIC BLOOD PRESSURE: 142 MMHG | HEIGHT: 66 IN

## 2018-07-31 DIAGNOSIS — I95.1 ORTHOSTATIC HYPOTENSION: Primary | ICD-10-CM

## 2018-07-31 DIAGNOSIS — I10 ESSENTIAL HYPERTENSION: ICD-10-CM

## 2018-07-31 DIAGNOSIS — I71.2 ASCENDING AORTIC ANEURYSM (HCC): ICD-10-CM

## 2018-07-31 PROCEDURE — 99214 OFFICE O/P EST MOD 30 MIN: CPT | Performed by: INTERNAL MEDICINE

## 2018-07-31 RX ORDER — TAMSULOSIN HYDROCHLORIDE 0.4 MG/1
0.4 CAPSULE ORAL
COMMUNITY
End: 2018-08-03

## 2018-07-31 RX ORDER — ATORVASTATIN CALCIUM 10 MG/1
10 TABLET, FILM COATED ORAL DAILY
COMMUNITY
End: 2019-06-29

## 2018-07-31 RX ORDER — LISINOPRIL 20 MG/1
20 TABLET ORAL DAILY
COMMUNITY
End: 2020-07-30 | Stop reason: ALTCHOICE

## 2018-07-31 NOTE — PROGRESS NOTES
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, he was having orthostatic hypotension  In response to this, his antihypertensives were held and his symptoms have improved  He does not have the symptoms outside of work     Echocardiogram recently done without any significant abnormalities other than a stable ascending aortic aneurysm  Chronic kidney disease stage 3 with a creatinine most recently at 1 8  Follows with our nephrologists  If he is to continue working in the [employer], I agree with holding his antihypertensives  He keeps a track of blood pressure at home  If this increases, he may need to resume 1 of the medications  Tells me that his blood pressure has been well controlled as he checks it at home  He has not had any symptoms recently since being off of work  From a cardiac standpoint, there are no contraindications for him returning to work with holding his blood pressure medication as noted  Follow up 1 year  History of Present Illness:     78-year-old man  History of chronic kidney disease stage 3, essential hypertension  I have followed him previously for a thoracic aortic aneurysm for which he had an echocardiogram performed in March  This showed 44 mm  Previous echo in 2016 showed about 42 mm, so essentially unchanged  We have not gotten CT scan because of his kidney disease        He returns to the office today at the request of his primary care physician  He  Has not been seen in greater than 1 year  He has had several episodes of passing out  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  I note a few emergency room visits  He has been advised to stay adequately hydrated, which he was doing  However, he was still on his antihypertensive medications  I reviewed a in office visit note scanned into the system  This indicates that his antihypertensives including lisinopril and amlodipine were stopped  He has not been back to work in a few weeks since the middle of the month  Since then, he has had absolutely no episodes  Recent echo showed preserved LV function  He denies any palpitations  No chest pain or dizziness or lightheadedness  He keeps his blood pressure regularly with a cuff that he has at home  He is due to see his nephrologist later in the month as well  Currently on workman's comp  Problem List     Lumbar stenosis with neurogenic claudication (Chronic)    Pseudomonas aeruginosa infection    Ataxia    Essential hypertension    Syncope and collapse    HLD (hyperlipidemia)    Ascending aortic aneurysm (HCC)        Past Medical History:   Diagnosis Date    Aortic aneurysm (HCC)     Arthritis     Atypical chest pain     Back pain     Chronic kidney disease     stg 3    Elevated ALT measurement     Elevated AST (SGOT)     Hyperlipidemia     Hypertension     Leg pain     Neurogenic claudication     Urinary retention     Urinary retention      Social History     Social History    Marital status: Single     Spouse name: N/A    Number of children: N/A    Years of education: N/A     Occupational History    Not on file       Social History Main Topics    Smoking status: Never Smoker    Smokeless tobacco: Never Used    Alcohol use Yes      Comment: social    Drug use: No    Sexual activity: Not on file     Other Topics Concern    Not on file     Social History Narrative    No narrative on file Family History   Problem Relation Age of Onset    Heart disease Sister      Past Surgical History:   Procedure Laterality Date    EPIDURAL BLOCK INJECTION N/A 1/23/2017    Procedure: BLOCK / INJECTION EPIDURAL STEROID LUMBAR;  Surgeon: Dorys Cobian MD;  [facility];  Service:    Gagan POLANCOFANY INCL W/DCMPRSN NRV ROOT 1 INTRSPC LUMBR Bilateral 1/23/2017    Procedure: MIS L2-3 & L3-4 LAMINAL FORAMINOTOMIES AND MICRODISCECTOMY W LEFT SIDED APPROACH ;  Surgeon: Dorys Cobian MD;  [facility];  Service: Neurosurgery       Current Outpatient Prescriptions:     acetaminophen (TYLENOL) 325 mg tablet, Take 650 mg by mouth every 6 (six) hours as needed for mild pain, Disp: , Rfl:     atorvastatin (LIPITOR) 10 mg tablet, Take 10 mg by mouth daily, Disp: , Rfl:     gabapentin (NEURONTIN) 300 mg capsule, Take 300 mg by mouth 2 (two) times a day, Disp: , Rfl:     tamsulosin (FLOMAX) 0 4 mg, Take 0 4 mg by mouth daily with dinner, Disp: , Rfl:     amLODIPine (NORVASC) 5 mg tablet, Take 5 mg by mouth daily, Disp: , Rfl:     lisinopril (ZESTRIL) 20 mg tablet, Take 20 mg by mouth, Disp: , Rfl:     traMADol (ULTRAM) 50 mg tablet, Take by mouth, Disp: , Rfl:   No Known Allergies    Vitals:    07/31/18 1023   BP: 142/86   BP Location: Right arm   Patient Position: Sitting   Cuff Size: Standard   Pulse: 80   Weight: 64 4 kg (142 lb)   Height: 5' 6" (1 676 m)     Vitals:    07/31/18 1023   Weight: 64 4 kg (142 lb)      Height: 5' 6" (167 6 cm)   Body mass index is 22 92 kg/m²      Physical Exam:  GENERAL: Alert, well appearing, and in no distress  HEENT:  PERRL, EOMI, no scleral icterus, no conjunctival pallor  NECK:  Supple, No elevated JVP, no thyromegaly, no carotid bruits  HEART:  Regular rate and rhythm, normal S1/S2, no S3/S4, no murmur or rub  LUNGS:  Clear to auscultation bilaterally  ABDOMEN:  Soft, non-tender, positive bowel sounds, no rebound or guarding  EXTREMITIES:  No edema  VASCULAR:  Normal pedal pulses   NEURO: No focal deficits,  SKIN: Normal without suspicious lesions on exposed skin      ROS:  Straight cath 4 times a day  Except as noted in HPI, is otherwise reviewed in detail and a 12 point review of systems is negative  Labs:  Lab Results   Component Value Date     (L) 07/11/2018    K 5 2 07/11/2018     07/11/2018    CREATININE 1 85 (H) 07/11/2018    BUN 27 (H) 07/11/2018    CO2 23 07/11/2018    ALT 63 07/11/2018    AST 49 (H) 07/11/2018    INR 1 03 12/15/2016    GLUF 148 (H) 05/01/2018    HGBA1C 6 1 05/01/2018    WBC 9 19 07/11/2018    HGB 11 2 (L) 07/11/2018    HCT 35 2 (L) 07/11/2018     07/11/2018       Lab Results   Component Value Date    CHOL 108 05/01/2018    CHOL 168 08/31/2017    CHOL 213 (H) 01/11/2016     Lab Results   Component Value Date    LDLCALC 31 05/01/2018    [facility] 95 08/31/2017    LDLCALC 109 (H) 01/11/2016     Lab Results   Component Value Date    HDL 70 (H) 05/01/2018    HDL 64 (H) 08/31/2017    HDL 93 (H) 01/11/2016     Lab Results   Component Value Date    TRIG 35 05/01/2018    TRIG 45 08/31/2017    TRIG 54 01/11/2016       Testing:  Echo 3/5/18:  LEFT VENTRICLE:  Size was normal   Systolic function was normal  Ejection fraction was estimated to be 60 %  There were no regional wall motion abnormalities  Wall thickness was normal      RIGHT VENTRICLE:  The size was normal   Systolic function was normal      MITRAL VALVE:  There was mild regurgitation      AORTIC VALVE:  There was mild regurgitation      TRICUSPID VALVE:  There was trace regurgitation      AORTA:  The root exhibited mild dilatation  There was mild dilatation of the ascending aorta  The aortic root diameter was 38 mm  The ascending aortic AP dimension was 44 mm    Stress 7/29/16:  MYOCARDIAL PERFUSION IMAGING:  The image quality was excellent   Left ventricular size was normal  The TID  ratio was 1      PERFUSION DEFECTS:  -  There were no perfusion defects      GATED SPECT:  The calculated left ventricular ejection fraction was 61 %  Left ventricular  ejection fraction was within normal limits by visual estimate  There was no  left ventricular regional abnormality      SUMMARY:  -  Stress results: Target heart rate was not achieved  There was no chest pain  during stress  -  ECG conclusions: The stress ECG was normal   -  Perfusion imaging: There were no perfusion defects   -  Gated SPECT: The calculated left ventricular ejection fraction was 61 %  Left ventricular ejection fraction was within normal limits by visual estimate  There was no left ventricular regional abnormality      IMPRESSIONS: Normal study after vasodilation and low level exercise without  reproduction of symptoms  Myocardial perfusion imaging was normal at rest and  with stress  Left ventricular systolic function was normal  Diagnostic  sensitivity was limited by submaximal stress      EKG:

## 2018-07-31 NOTE — LETTER
July 31, 2018     MD Cordell Lemus [address] [facility]    Patient: Derrell Graham   YOB: 1955   Date of Visit: 7/31/2018       Dear Dr Marielle Diaz: Thank you for referring Derrell Graham to me for evaluation  Below are my notes for this consultation  If you have questions, please do not hesitate to call me  I look forward to following your patient along with you  Sincerely,        Reinaldo Poe MD        CC: No Recipients  Reinaldo Poe MD  7/31/2018 10:44 AM  Sign at close encounter                                             Cardiology Follow Up    Derrell Graham  1955  [id number]  [address]  [address] [place] [address]  [phone]  [phone]    1  Orthostatic hypotension     2  Essential hypertension     3  Ascending aortic aneurysm [facility])         Discussion/Summary:      For 77-year-old man  I follow him for ascending aortic aneurysm which has been stable  He has essential hypertension, but 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, he was having orthostatic hypotension  In response to this, his antihypertensives were held and his symptoms have improved  He does not have the symptoms outside of work     Echocardiogram recently done without any significant abnormalities other than a stable ascending aortic aneurysm  Chronic kidney disease stage 3 with a creatinine most recently at 1 8  Follows with our nephrologists  If he is to continue working in the [employer], I agree with holding his antihypertensives  He keeps a track of blood pressure at home  If this increases, he may need to resume 1 of the medications  Tells me that his blood pressure has been well controlled as he checks it at home  He has not had any symptoms recently since being off of work      From a cardiac standpoint, there are no contraindications for him returning to work with holding his blood pressure medication as noted  Follow up 1 year  History of Present Illness:     42-year-old man  History of chronic kidney disease stage 3, essential hypertension  I have followed him previously for a thoracic aortic aneurysm for which he had an echocardiogram performed in March  This showed 44 mm  Previous echo in 2016 showed about 42 mm, so essentially unchanged  We have not gotten CT scan because of his kidney disease  He returns to the office today at the request of his primary care physician  He  Has not been seen in greater than 1 year  He has had several episodes of passing out  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  I note a few emergency room visits  He has been advised to stay adequately hydrated, which he was doing  However, he was still on his antihypertensive medications  I reviewed a in office visit note scanned into the system  This indicates that his antihypertensives including lisinopril and amlodipine were stopped  He has not been back to work in a few weeks since the middle of the month  Since then, he has had absolutely no episodes  Recent echo showed preserved LV function  He denies any palpitations  No chest pain or dizziness or lightheadedness  He keeps his blood pressure regularly with a cuff that he has at home  He is due to see his nephrologist later in the month as well  Currently on workman's comp      Problem List     Lumbar stenosis with neurogenic claudication (Chronic)    Pseudomonas aeruginosa infection    Ataxia    Essential hypertension    Syncope and collapse    HLD (hyperlipidemia)    Ascending aortic aneurysm [facility])        Past Medical History:   Diagnosis Date    Aortic aneurysm (HCC)     Arthritis     Atypical chest pain     Back pain     Chronic kidney disease     stg 3    Elevated ALT measurement     Elevated AST (SGOT)     Hyperlipidemia     Hypertension     Leg pain     Neurogenic claudication     Urinary retention     Urinary retention      Social History     Social History    Marital status: Single     Spouse name: N/A    Number of children: N/A    Years of education: N/A     Occupational History    Not on file       Social History Main Topics    Smoking status: Never Smoker    Smokeless tobacco: Never Used    Alcohol use Yes      Comment: social    Drug use: No    Sexual activity: Not on file     Other Topics Concern    Not on file     Social History Narrative    No narrative on file      Family History   Problem Relation Age of Onset    Heart disease Sister      Past Surgical History:   Procedure Laterality Date    EPIDURAL BLOCK INJECTION N/A 1/23/2017    Procedure: BLOCK / INJECTION EPIDURAL STEROID LUMBAR;  Surgeon: Kika Dorantes MD;  [facility];  Service:    Merle Butler LAMNOTMY INCL W/DCMPRSN NRV ROOT 1 INTRSPC LUMBR Bilateral 1/23/2017    Procedure: MIS L2-3 & L3-4 LAMINAL FORAMINOTOMIES AND MICRODISCECTOMY W LEFT SIDED APPROACH ;  Surgeon: Kika Dorantes MD;  [facility];  Service: Neurosurgery       Current Outpatient Prescriptions:     acetaminophen (TYLENOL) 325 mg tablet, Take 650 mg by mouth every 6 (six) hours as needed for mild pain, Disp: , Rfl:     atorvastatin (LIPITOR) 10 mg tablet, Take 10 mg by mouth daily, Disp: , Rfl:     gabapentin (NEURONTIN) 300 mg capsule, Take 300 mg by mouth 2 (two) times a day, Disp: , Rfl:     tamsulosin (FLOMAX) 0 4 mg, Take 0 4 mg by mouth daily with dinner, Disp: , Rfl:     amLODIPine (NORVASC) 5 mg tablet, Take 5 mg by mouth daily, Disp: , Rfl:     lisinopril (ZESTRIL) 20 mg tablet, Take 20 mg by mouth, Disp: , Rfl:     traMADol (ULTRAM) 50 mg tablet, Take by mouth, Disp: , Rfl:   No Known Allergies    Vitals:    07/31/18 1023   BP: 142/86   BP Location: Right arm   Patient Position: Sitting   Cuff Size: Standard   Pulse: 80   Weight: 64 4 kg (142 lb)   Height: 5' 6" (1 676 m)     Vitals:    07/31/18 1023 Weight: 64 4 kg (142 lb)      Height: 5' 6" (167 6 cm)   Body mass index is 22 92 kg/m²  Physical Exam:  GENERAL: Alert, well appearing, and in no distress  HEENT:  PERRL, EOMI, no scleral icterus, no conjunctival pallor  NECK:  Supple, No elevated JVP, no thyromegaly, no carotid bruits  HEART:  Regular rate and rhythm, normal S1/S2, no S3/S4, no murmur or rub  LUNGS:  Clear to auscultation bilaterally  ABDOMEN:  Soft, non-tender, positive bowel sounds, no rebound or guarding  EXTREMITIES:  No edema  VASCULAR:  Normal pedal pulses   NEURO: No focal deficits,  SKIN: Normal without suspicious lesions on exposed skin      ROS:  Straight cath 4 times a day  Except as noted in HPI, is otherwise reviewed in detail and a 12 point review of systems is negative  Labs:  Lab Results   Component Value Date     (L) 07/11/2018    K 5 2 07/11/2018     07/11/2018    CREATININE 1 85 (H) 07/11/2018    BUN 27 (H) 07/11/2018    CO2 23 07/11/2018    ALT 63 07/11/2018    AST 49 (H) 07/11/2018    INR 1 03 12/15/2016    GLUF 148 (H) 05/01/2018    HGBA1C 6 1 05/01/2018    WBC 9 19 07/11/2018    HGB 11 2 (L) 07/11/2018    HCT 35 2 (L) 07/11/2018     07/11/2018       Lab Results   Component Value Date    CHOL 108 05/01/2018    CHOL 168 08/31/2017    CHOL 213 (H) 01/11/2016     Lab Results   Component Value Date    LDLCALC 31 05/01/2018    [facility] 95 08/31/2017    LDLCALC 109 (H) 01/11/2016     Lab Results   Component Value Date    HDL 70 (H) 05/01/2018    HDL 64 (H) 08/31/2017    HDL 93 (H) 01/11/2016     Lab Results   Component Value Date    TRIG 35 05/01/2018    TRIG 45 08/31/2017    TRIG 54 01/11/2016       Testing:  Echo 3/5/18:  LEFT VENTRICLE:  Size was normal   Systolic function was normal  Ejection fraction was estimated to be 60 %  There were no regional wall motion abnormalities    Wall thickness was normal      RIGHT VENTRICLE:  The size was normal   Systolic function was normal      MITRAL VALVE:  There was mild regurgitation      AORTIC VALVE:  There was mild regurgitation      TRICUSPID VALVE:  There was trace regurgitation      AORTA:  The root exhibited mild dilatation  There was mild dilatation of the ascending aorta  The aortic root diameter was 38 mm  The ascending aortic AP dimension was 44 mm    Stress 7/29/16:  MYOCARDIAL PERFUSION IMAGING:  The image quality was excellent  Left ventricular size was normal  The TID  ratio was 1      PERFUSION DEFECTS:  -  There were no perfusion defects      GATED SPECT:  The calculated left ventricular ejection fraction was 61 %  Left ventricular  ejection fraction was within normal limits by visual estimate  There was no  left ventricular regional abnormality      SUMMARY:  -  Stress results: Target heart rate was not achieved  There was no chest pain  during stress  -  ECG conclusions: The stress ECG was normal   -  Perfusion imaging: There were no perfusion defects   -  Gated SPECT: The calculated left ventricular ejection fraction was 61 %  Left ventricular ejection fraction was within normal limits by visual estimate  There was no left ventricular regional abnormality      IMPRESSIONS: Normal study after vasodilation and low level exercise without  reproduction of symptoms  Myocardial perfusion imaging was normal at rest and  with stress  Left ventricular systolic function was normal  Diagnostic  sensitivity was limited by submaximal stress      EKG:

## 2018-08-03 ENCOUNTER — OFFICE VISIT (OUTPATIENT)
Dept: UROLOGY | Facility: CLINIC | Age: 63
End: 2018-08-03
Payer: COMMERCIAL

## 2018-08-03 ENCOUNTER — TELEPHONE (OUTPATIENT)
Dept: UROLOGY | Facility: CLINIC | Age: 63
End: 2018-08-03

## 2018-08-03 DIAGNOSIS — R33.9 URINARY RETENTION: Primary | ICD-10-CM

## 2018-08-03 PROCEDURE — 99214 OFFICE O/P EST MOD 30 MIN: CPT | Performed by: UROLOGY

## 2018-08-03 NOTE — TELEPHONE ENCOUNTER
Could you please help to schedule cysto/UDS review with Dr Donte Mujica at [facility] 2-4 weeks after 8/14/18? I am not able to find appointment  Thank you!

## 2018-08-03 NOTE — PROGRESS NOTES
Referring Physician: JOHNSON Ybarra  A copy of this note was sent to the referring physician  Diagnoses and all orders for this visit:    Urinary retention  -     Urodynamics; Future  -     Cystoscopy; Future            Assessment and plan:     Mr  fields a 58-year-old male with urinary retention he has been catheter dependent for the past 18 months  He is cathing 4 times a day and draining 600-900 cc in his bladder  He is not able to void volitionally whatsoever  At this point I have recommended further evaluation with urodynamics as well as cystoscopy  I recommended flexible cystoscopy for further evaluation  Discussed the risks benefits and alternatives to this diagnostic procedure  Risks include but are not limited to hematuria, pain, urinary tract infection, stricture formation, possible need for hospitalization  Patient verbalized understanding and has elected to proceed  Cystoscopy will be scheduled in the near future  In addition we will discontinue the tamsulosin due to his frequent falls  It is possible that this is contributing to orthostatic hypotension and is not making any clinical benefit at this time  Amanda Altman MD      Chief Complaint     Urinary retention      History of Present Illness     Ervin Allen is a 57 y o  male with a history of urinary retention, etiology unclear  He was seen previously in the Urology Clinic  At that time he was initiated on self catheterization 4 times a day and is doing well with this  He did have a history of lumbosacral disc disease and underwent a decompression in January of 2017  He has remained in urinary retention  He drained 600-900 cc from his bladder and is unable to void volitionally  He has been maintained on tamsulosin as well    He has no hematuria or UTIs or pain    Detailed Urologic History     - please refer to HPI    Review of Systems     Review of Systems   Constitutional: Negative for activity change and fatigue  HENT: Negative for congestion  Eyes: Negative for visual disturbance  Respiratory: Negative for shortness of breath and wheezing  Cardiovascular: Negative for chest pain and leg swelling  Gastrointestinal: Negative for abdominal pain  Endocrine: Negative for polyuria  Genitourinary: Negative for dysuria, flank pain, hematuria and urgency  Urinary retention   Musculoskeletal: Negative for back pain  Allergic/Immunologic: Negative for immunocompromised state  Neurological: Negative for dizziness and numbness  Psychiatric/Behavioral: Negative for dysphoric mood  All other systems reviewed and are negative  Allergies     No Known Allergies    Physical Exam     Physical Exam   Constitutional: He is oriented to person, place, and time  He appears well-developed and well-nourished  No distress  HENT:   Head: Normocephalic and atraumatic  Eyes: EOM are normal    Neck: Normal range of motion  Cardiovascular:   Negative lower extremity edema   Pulmonary/Chest: Effort normal and breath sounds normal    Abdominal: Soft  Genitourinary:   Genitourinary Comments: Negative suprapubic tenderness   Musculoskeletal: Normal range of motion  Neurological: He is alert and oriented to person, place, and time  Skin: Skin is warm  Psychiatric: He has a normal mood and affect  His behavior is normal            Vital Signs  There were no vitals filed for this visit        Current Medications       Current Outpatient Prescriptions:     acetaminophen (TYLENOL) 325 mg tablet, Take 650 mg by mouth every 6 (six) hours as needed for mild pain, Disp: , Rfl:     amLODIPine (NORVASC) 5 mg tablet, Take 5 mg by mouth daily, Disp: , Rfl:     atorvastatin (LIPITOR) 10 mg tablet, Take 10 mg by mouth daily, Disp: , Rfl:     gabapentin (NEURONTIN) 300 mg capsule, Take 300 mg by mouth 2 (two) times a day, Disp: , Rfl:     lisinopril (ZESTRIL) 20 mg tablet, Take 20 mg by mouth, Disp: , Rfl:     traMADol (ULTRAM) 50 mg tablet, Take by mouth, Disp: , Rfl:       Active Problems     Patient Active Problem List   Diagnosis    Lumbar stenosis with neurogenic claudication    Pseudomonas aeruginosa infection    Ataxia    Essential hypertension    Syncope and collapse    HLD (hyperlipidemia)    Ascending aortic aneurysm (HCC)    Urinary retention         Past Medical History     Past Medical History:   Diagnosis Date    Aortic aneurysm (HCC)     Arthritis     Atypical chest pain     Back pain     Chronic kidney disease     stg 3    Elevated ALT measurement     Elevated AST (SGOT)     Hyperlipidemia     Hypertension     Leg pain     Neurogenic claudication     Urinary retention     Urinary retention          Surgical History     Past Surgical History:   Procedure Laterality Date    EPIDURAL BLOCK INJECTION N/A 1/23/2017    Procedure: BLOCK / INJECTION EPIDURAL STEROID LUMBAR;  Surgeon: Ruthie Reyes MD;  [facility];  Service:    Kelsey Simon LAMNOTMY INCL W/DCMPRSN NRV ROOT 1 INTRSPC LUMBR Bilateral 1/23/2017    Procedure: MIS L2-3 & L3-4 LAMINAL FORAMINOTOMIES AND MICRODISCECTOMY W LEFT SIDED APPROACH ;  Surgeon: Ruthie Reyes MD;  [facility];  Service: Neurosurgery         Family History     Family History   Problem Relation Age of Onset    Heart disease Sister          Social History     Social History     History   Smoking Status    Never Smoker   Smokeless Tobacco    Never Used         Pertinent Lab Values     Lab Results   Component Value Date    CREATININE 1 85 (H) 07/11/2018       No results found for: PSA    @RESULTRCNT(1H])@      Pertinent Imaging      - n/a    Portions of the record may have been created with voice recognition software   Occasional wrong word or "sound a like" substitutions may have occurred due to the inherent limitations of voice recognition software   Read the chart carefully and recognize, using context, where substitutions have occurred

## 2018-08-03 NOTE — TELEPHONE ENCOUNTER
Spoke to patient, reviewed testing  Scheduled 8/14 in the [place] office  Address provided to the patient  Justin is closer since he lives in [address] [place] [address] Way  Please schedule follow for UDS and cystoscopy with Dr Donte Mujica should be 2-4 weeks after

## 2018-08-05 PROBLEM — N18.30 CKD (CHRONIC KIDNEY DISEASE), STAGE III (HCC): Status: ACTIVE | Noted: 2018-08-05

## 2018-08-05 PROBLEM — E87.1 HYPONATREMIA: Status: ACTIVE | Noted: 2018-08-05

## 2018-08-06 ENCOUNTER — OFFICE VISIT (OUTPATIENT)
Dept: NEPHROLOGY | Facility: CLINIC | Age: 63
End: 2018-08-06
Payer: COMMERCIAL

## 2018-08-06 VITALS
SYSTOLIC BLOOD PRESSURE: 188 MMHG | BODY MASS INDEX: 23.01 KG/M2 | WEIGHT: 143.2 LBS | DIASTOLIC BLOOD PRESSURE: 88 MMHG | HEIGHT: 66 IN

## 2018-08-06 DIAGNOSIS — I95.1 ORTHOSTATIC HYPOTENSION: ICD-10-CM

## 2018-08-06 DIAGNOSIS — R33.9 URINARY RETENTION: ICD-10-CM

## 2018-08-06 DIAGNOSIS — N18.30 CKD (CHRONIC KIDNEY DISEASE), STAGE III (HCC): Primary | ICD-10-CM

## 2018-08-06 DIAGNOSIS — I10 ESSENTIAL HYPERTENSION: ICD-10-CM

## 2018-08-06 DIAGNOSIS — I71.2 ASCENDING AORTIC ANEURYSM (HCC): ICD-10-CM

## 2018-08-06 DIAGNOSIS — E87.1 HYPONATREMIA: ICD-10-CM

## 2018-08-06 PROCEDURE — 99213 OFFICE O/P EST LOW 20 MIN: CPT | Performed by: NURSE PRACTITIONER

## 2018-08-06 NOTE — PROGRESS NOTES
OFFICE FOLLOW UP - Nephrology   Dang Rocha 57 y o  male MRN: [id number]       ASSESSMENT and PLAN:  Fallon Persaud was seen today for follow-up, hypertension and chronic kidney disease  Diagnoses and all orders for this visit:    CKD (chronic kidney disease), stage III  -Likely secondary to urinary retention and hypertension  - renal function appears to have progressed since February of 2017  - baseline creatinine 1 5-1 8  - will get repeat now to evaluate renal function along with electrolytes  - once reviewed further recommendations will be forthcoming  -     Basic metabolic panel; Future  -     Magnesium; Future  -     Phosphorus; Future  -     PTH, intact; Future  -     Protein / creatinine ratio, urine; Future    Essential hypertension  - BP elevated at 188/88  - patient reports restarting his blood pressure medications today to include lisinopril 20 mg b i d  And amlodipine 2 5 mg daily  - will have patient return to office in two weeks a blood pressure check  - patient will bring his home cuff in for correlation    Urinary retention  - patient continues to self-catheterize 4 times a day  - follows urology  - for further evaluation next Tuesday for further evaluation    Orthostatic hypotension  - no signs and symptoms today  - concern previous fall 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 and low sodium reading    Ascending aortic aneurysm ([address] )    Hyponatremia  - suspect volume depletion  - last sodium level reported at 131 on 7/11/2018  - patient has not worked since the 07/11/2018,  No further falls or passing out  - will get BMP now to evaluate sodium level  - once reviewed further recommendations will be forthcoming  -     Basic metabolic panel; Future        Patient Instructions   All questions asked and answered  The patient has been instructed to call office at [phone] with any questions or concerns    The patient has been instructed to obtain prescribed blood work and urine studies prior to next appointment    Return in 2 weeks for BP check  Bring in home BP cuff to compare with office cuff  Get Blood work as requested  Education material " Kidney Disease: Eating Less Sodium" given to patient today        HPI: Alexandr Mcbride is a 57 y o  male who is here for Follow-up; Hypertension; and Chronic Kidney Disease  The patient returns to office for CKD and HTN follow up  He was last seen on 4/20/2017 and was sable from renal standpoint  Since last visit, he has had multiple falls concerning for orthostatic hypotension especially working in a hot kitchen  Per Cardiology note, he is now off HTN medications and per Urology note, Flomax has been discontinued  Most recent BMP on 7/11/2018  has been reviewed and reveals creatinine of 1 85 (2 21), however sodium noted to be decreased at 131  On discussion,  He is feeling okay  No further falls or passing out since he stopped working on 07/11/2018  He has for further urological workup with his known urinary retention  Denies chest pain, shortness of breath, dizziness, lightheadedness, falls, fevers, or chills  He restarted his home blood pressure medications to include lisinopril 20 mg b i d  And amlodipine 2 5 mg daily starting today after taking his blood pressure at home and realized it was rather high  ROS:   All the systems were reviewed and were negative except as documented on the HPI  Allergies: Patient has no known allergies      Medications:   Current Outpatient Prescriptions:     acetaminophen (TYLENOL) 325 mg tablet, Take 650 mg by mouth every 6 (six) hours as needed for mild pain, Disp: , Rfl:     amLODIPine (NORVASC) 5 mg tablet, Take 2 5 mg by mouth daily  , Disp: , Rfl:     atorvastatin (LIPITOR) 10 mg tablet, Take 10 mg by mouth daily, Disp: , Rfl:     gabapentin (NEURONTIN) 300 mg capsule, Take 300 mg by mouth 2 (two) times a day, Disp: , Rfl:     lisinopril (ZESTRIL) 20 mg tablet, Take 20 mg by mouth 2 (two) times a day , Disp: , Rfl:     traMADol (ULTRAM) 50 mg tablet, Take by mouth, Disp: , Rfl:     Past Medical History:   Diagnosis Date    Aortic aneurysm (HCC)     Arthritis     Atypical chest pain     Back pain     Chronic kidney disease     stg 3    Elevated ALT measurement     Elevated AST (SGOT)     Hyperlipidemia     Hypertension     Leg pain     Neurogenic claudication     Urinary retention     Urinary retention      Past Surgical History:   Procedure Laterality Date    EPIDURAL BLOCK INJECTION N/A 1/23/2017    Procedure: BLOCK / INJECTION EPIDURAL STEROID LUMBAR;  Surgeon: Elisha Valadez MD;  [facility];  Service:    David Riley LAMNOTMY INCL W/DCMPRSN NRV ROOT 1 INTRSPC LUMBR Bilateral 1/23/2017    Procedure: MIS L2-3 & L3-4 LAMINAL FORAMINOTOMIES AND MICRODISCECTOMY W LEFT SIDED APPROACH ;  Surgeon: Elisha Valadez MD;  [facility];  Service: Neurosurgery     Family History   Problem Relation Age of Onset    Heart disease Sister       reports that he has never smoked  He has never used smokeless tobacco  He reports that he drinks alcohol  He reports that he does not use drugs  Physical Exam:   Vitals:    08/06/18 1622 08/06/18 1646   BP:  (!) 188/88   BP Location:  Left arm   Patient Position:  Sitting   Cuff Size:  Standard   Weight: 65 kg (143 lb 3 2 oz)    Height: 5' 6" (1 676 m)      Body mass index is 23 11 kg/m²      General: cooperative, in not acute distress  Eyes: conjunctivae pink, anicteric sclerae  ENT: lips and mucous membranes moist  Neck: supple, no JVD  Chest: clear breath sounds bilateral, no crackles, ronchus or wheezings  CVS:  normal rate, regular rhythm  Abdomen: soft, non-tender, non-distended, normoactive bowel sounds  Extremities: no edema of both legs  Skin: no rash  Neuro: awake, alert, oriented      Lab Results:  Results for orders placed or performed during the hospital encounter of 07/11/18   Comprehensive metabolic panel   Result Value Ref Range Sodium 131 (L) 136 - 145 mmol/L    Potassium 5 2 3 5 - 5 3 mmol/L    Chloride 100 100 - 108 mmol/L    CO2 23 21 - 32 mmol/L    Anion Gap 8 4 - 13 mmol/L    BUN 27 (H) 5 - 25 mg/dL    Creatinine 1 85 (H) 0 60 - 1 30 mg/dL    Glucose 117 65 - 140 mg/dL    Calcium 9 3 8 3 - 10 1 mg/dL    AST 49 (H) 5 - 45 U/L    ALT 63 12 - 78 U/L    Alkaline Phosphatase 74 46 - 116 U/L    Total Protein 8 8 (H) 6 4 - 8 2 g/dL    Albumin 3 2 (L) 3 5 - 5 0 g/dL    Total Bilirubin 0 41 0 20 - 1 00 mg/dL    eGFR 44 ml/min/1 73sq m   CBC and differential   Result Value Ref Range    WBC 9 19 4 31 - 10 16 Thousand/uL    RBC 3 86 (L) 3 88 - 5 62 Million/uL    Hemoglobin 11 2 (L) 12 0 - 17 0 g/dL    Hematocrit 35 2 (L) 36 5 - 49 3 %    MCV 91 82 - 98 fL    MCH 29 0 26 8 - 34 3 pg    MCHC 31 8 31 4 - 37 4 g/dL    RDW 14 7 11 6 - 15 1 %    MPV 9 5 8 9 - 12 7 fL    Platelets 614 227 - 803 Thousands/uL    nRBC 0 /100 WBCs    Neutrophils Relative 71 43 - 75 %    Immat GRANS % 1 0 - 2 %    Lymphocytes Relative 14 14 - 44 %    Monocytes Relative 13 (H) 4 - 12 %    Eosinophils Relative 1 0 - 6 %    Basophils Relative 0 0 - 1 %    Neutrophils Absolute 6 53 1 85 - 7 62 Thousands/µL    Immature Grans Absolute 0 05 0 00 - 0 20 Thousand/uL    Lymphocytes Absolute 1 32 0 60 - 4 47 Thousands/µL    Monocytes Absolute 1 15 0 17 - 1 22 Thousand/µL    Eosinophils Absolute 0 12 0 00 - 0 61 Thousand/µL    Basophils Absolute 0 02 0 00 - 0 10 Thousands/µL   Troponin I   Result Value Ref Range    Troponin I <0 02 <=0 04 ng/mL   ECG 12 lead   Result Value Ref Range    Ventricular Rate 81 BPM    Atrial Rate 81 BPM    MD Interval 122 ms    QRSD Interval 74 ms    QT Interval 356 ms    QTC Interval 413 ms    P Axis 58 degrees    QRS Axis 48 degrees    T Wave Axis 90 degrees               Portions of the record may have been created with voice recognition software   Occasional wrong word or "sound a like" substitutions may have occurred due to the inherent limitations of voice recognition software  Read the chart carefully and recognize, using context, where substitutions have occurred  If you have any questions, please contact the dictating provider

## 2018-08-06 NOTE — PATIENT INSTRUCTIONS
All questions asked and answered  The patient has been instructed to call office at [phone] with any questions or concerns    The patient has been instructed to obtain prescribed blood work and urine studies prior to next appointment    Return in 2 weeks for BP check  Bring in home BP cuff to compare with office cuff  Get Blood work as requested  Education material " Kidney Disease: Eating Less Sodium" given to patient today

## 2018-08-08 ENCOUNTER — APPOINTMENT (OUTPATIENT)
Dept: LAB | Facility: HOSPITAL | Age: 63
End: 2018-08-08
Attending: INTERNAL MEDICINE
Payer: COMMERCIAL

## 2018-08-08 DIAGNOSIS — N18.30 CKD (CHRONIC KIDNEY DISEASE), STAGE III (HCC): ICD-10-CM

## 2018-08-08 DIAGNOSIS — N18.30 CHRONIC KIDNEY DISEASE, STAGE III (MODERATE) (HCC): ICD-10-CM

## 2018-08-08 DIAGNOSIS — N18.30 CKD (CHRONIC KIDNEY DISEASE) STAGE 3, GFR 30-59 ML/MIN (HCC): ICD-10-CM

## 2018-08-08 DIAGNOSIS — E87.1 HYPONATREMIA: ICD-10-CM

## 2018-08-08 LAB
ALBUMIN SERPL BCP-MCNC: 3.2 G/DL (ref 3.5–5)
ANION GAP SERPL CALCULATED.3IONS-SCNC: 12 MMOL/L (ref 4–13)
BASOPHILS # BLD AUTO: 0.07 THOUSANDS/ΜL (ref 0–0.1)
BASOPHILS NFR BLD AUTO: 1 % (ref 0–1)
BUN SERPL-MCNC: 27 MG/DL (ref 5–25)
CALCIUM SERPL-MCNC: 9 MG/DL (ref 8.3–10.1)
CHLORIDE SERPL-SCNC: 106 MMOL/L (ref 100–108)
CO2 SERPL-SCNC: 19 MMOL/L (ref 21–32)
CREAT SERPL-MCNC: 1.48 MG/DL (ref 0.6–1.3)
CREAT UR-MCNC: 149 MG/DL
EOSINOPHIL # BLD AUTO: 0.14 THOUSAND/ΜL (ref 0–0.61)
EOSINOPHIL NFR BLD AUTO: 3 % (ref 0–6)
ERYTHROCYTE [DISTWIDTH] IN BLOOD BY AUTOMATED COUNT: 14.9 % (ref 11.6–15.1)
GFR SERPL CREATININE-BSD FRML MDRD: 57 ML/MIN/1.73SQ M
GLUCOSE SERPL-MCNC: 139 MG/DL (ref 65–140)
HCT VFR BLD AUTO: 36.3 % (ref 36.5–49.3)
HGB BLD-MCNC: 11.3 G/DL (ref 12–17)
IMM GRANULOCYTES # BLD AUTO: 0.01 THOUSAND/UL (ref 0–0.2)
IMM GRANULOCYTES NFR BLD AUTO: 0 % (ref 0–2)
LYMPHOCYTES # BLD AUTO: 2.72 THOUSANDS/ΜL (ref 0.6–4.47)
LYMPHOCYTES NFR BLD AUTO: 47 % (ref 14–44)
MAGNESIUM SERPL-MCNC: 2.1 MG/DL (ref 1.6–2.6)
MCH RBC QN AUTO: 28.3 PG (ref 26.8–34.3)
MCHC RBC AUTO-ENTMCNC: 31.1 G/DL (ref 31.4–37.4)
MCV RBC AUTO: 91 FL (ref 82–98)
MONOCYTES # BLD AUTO: 0.77 THOUSAND/ΜL (ref 0.17–1.22)
MONOCYTES NFR BLD AUTO: 14 % (ref 4–12)
NEUTROPHILS # BLD AUTO: 1.96 THOUSANDS/ΜL (ref 1.85–7.62)
NEUTS SEG NFR BLD AUTO: 35 % (ref 43–75)
NRBC BLD AUTO-RTO: 0 /100 WBCS
PHOSPHATE SERPL-MCNC: 3.4 MG/DL (ref 2.3–4.1)
PLATELET # BLD AUTO: 312 THOUSANDS/UL (ref 149–390)
PMV BLD AUTO: 9.9 FL (ref 8.9–12.7)
POTASSIUM SERPL-SCNC: 3.7 MMOL/L (ref 3.5–5.3)
PROT UR-MCNC: 47 MG/DL
PROT/CREAT UR: 0.32 MG/G{CREAT} (ref 0–0.1)
PTH-INTACT SERPL-MCNC: 30.5 PG/ML (ref 18.4–80.1)
RBC # BLD AUTO: 3.99 MILLION/UL (ref 3.88–5.62)
SODIUM SERPL-SCNC: 137 MMOL/L (ref 136–145)
WBC # BLD AUTO: 5.67 THOUSAND/UL (ref 4.31–10.16)

## 2018-08-08 PROCEDURE — 83735 ASSAY OF MAGNESIUM: CPT

## 2018-08-08 PROCEDURE — 80069 RENAL FUNCTION PANEL: CPT

## 2018-08-08 PROCEDURE — 36415 COLL VENOUS BLD VENIPUNCTURE: CPT

## 2018-08-08 PROCEDURE — 84156 ASSAY OF PROTEIN URINE: CPT

## 2018-08-08 PROCEDURE — 85025 COMPLETE CBC W/AUTO DIFF WBC: CPT

## 2018-08-08 PROCEDURE — 82570 ASSAY OF URINE CREATININE: CPT

## 2018-08-08 PROCEDURE — 83970 ASSAY OF PARATHORMONE: CPT

## 2018-08-14 ENCOUNTER — PROCEDURE VISIT (OUTPATIENT)
Dept: UROLOGY | Facility: AMBULATORY SURGERY CENTER | Age: 63
End: 2018-08-14
Payer: COMMERCIAL

## 2018-08-14 VITALS
HEART RATE: 72 BPM | BODY MASS INDEX: 23.64 KG/M2 | HEIGHT: 67 IN | DIASTOLIC BLOOD PRESSURE: 88 MMHG | SYSTOLIC BLOOD PRESSURE: 160 MMHG | WEIGHT: 150.6 LBS

## 2018-08-14 DIAGNOSIS — R33.9 URINARY RETENTION: Primary | ICD-10-CM

## 2018-08-14 LAB
SL AMB  POCT GLUCOSE, UA: NORMAL
SL AMB LEUKOCYTE ESTERASE,UA: NORMAL
SL AMB POCT BLOOD,UA: NORMAL
SL AMB POCT CLARITY,UA: CLEAR
SL AMB POCT COLOR,UA: NORMAL
SL AMB POCT KETONES,UA: NORMAL
SL AMB POCT NITRITE,UA: NORMAL
SL AMB POCT PH,UA: 6.5
SL AMB POCT SPECIFIC GRAVITY,UA: 1
SL AMB POCT URINE PROTEIN: NORMAL

## 2018-08-14 PROCEDURE — 51784 ANAL/URINARY MUSCLE STUDY: CPT | Performed by: UROLOGY

## 2018-08-14 PROCEDURE — 81002 URINALYSIS NONAUTO W/O SCOPE: CPT | Performed by: UROLOGY

## 2018-08-14 PROCEDURE — 51797 INTRAABDOMINAL PRESSURE TEST: CPT

## 2018-08-14 PROCEDURE — 51728 CYSTOMETROGRAM W/VP: CPT

## 2018-08-14 NOTE — PATIENT INSTRUCTIONS
You had urodynamic testing completed today  · Drink plenty of fluids for the next 24 hours  · Mild burning with urination and small amount of urinary bleeding is normal  · Call the office with any fever/chills  · Follow up as scheduled to review the results of today's testing

## 2018-08-14 NOTE — PROGRESS NOTES
8/14/2018  Samia Chu is a 57 y o  male  [id number]    Diagnosis:  Chief Complaint     Urinary Retention        Patient presents for urodynamic testing h/o urinary retention and CIC managed By Dr Raisa Wolff  Follow up as scheduled to review the results of today's testing  Assessment:  Vitals:    08/14/18 0926   BP: 160/88   Pulse: 72   Weight: 68 3 kg (150 lb 9 6 oz)   Height: 5' 7" (1 702 m)     Recent Results (from the past 1 hour(s))   POCT urine dip    Collection Time: 08/14/18  9:50 AM   Result Value Ref Range    LEUKOCYTE ESTERASE,UA -      NITRITE,UA -     SL AMB POCT URINE PROTEIN -      PH,UA 6 5      BLOOD,UA -      SPECIFIC GRAVITY,UA 1 005      KETONES,UA -     GLUCOSE, UA -      COLOR,UA yelow      CLARITY,UA clear      Urinary Incontinence Screening      Most Recent Value   Urinary Incontinence   Urinary Incontinence? No   Incomplete emptying? Yes   Urinary frequency? No   Urinary urgency? No   Urinary hesitancy? No   Dysuria (painful difficult urination)? No   Nocturia (waking up to use the bathroom)? No   Straining (having to push to go)? No   Weak stream?  Yes   Intermittent stream?  No   Post void dribbling? No        Patient reports he self catheterizes usually 4 x daily  Almost always he is able to void volitionally first, and residuals tend to be between 300 and 600 mL when he catheterizes  He uses 16 fr pre-lubricated catheters  Patient rarely has an urge to void he normally voids/caths by the clock  Procedure:  Patient unfortunately unable to void for initial uroflow today, despite "feeling full"      Bladder catheterization  Date/Time: 8/14/2018 10:05 AM  Performed by: Joy Couch  Authorized by: Sylvan Mcardle     Consent:     Consent obtained:  Verbal    Consent given by:  Patient  Universal protocol:     Patient identity confirmed:  Verbally with patient  Pre-procedure details:     Procedure purpose:  Diagnostic    Preparation: Patient was prepped and draped in usual sterile fashion    Procedure details:     Catheter insertion:  Non-indwelling    Approach: natural orifice      Catheter type:  Coude and latex    Catheter size:  16 Fr    Number of attempts:  1    Successful placement: yes      Urine characteristics:  Clear and yellow  Post-procedure details:     Patient tolerance of procedure: Tolerated well, no immediate complications  Comments:      Residual: 1000 mL    Urodynamics  Date/Time: 8/14/2018 10:46 AM  Performed by: Joy Couch  Authorized by: Joy Couch   Procedure - Urodynamics:  Procedure details: CMG      Voiding Pressure Study: Yes    Intra-abdominal Voiding Pressure Study: Yes    Post-procedure:     Patient tolerance: Patient tolerated procedure well with no immediate complications          Procedure:  Patient prepped and draped in a sterile fashion  Air charged Microtransducer vesical filling catheter placed through patient's urethra into his bladder  A second air charged Microtransducer abdominal sensor catheter placed rectally  Two active Electrodes were then placed at the 11 o'clock and 1 o'clock positions perianally, grounding electrode placed on patient's right iliac crest  All catheters were zeroed and charged  Patient's bladder was filled with room temperature water at a rate of 50 ml/minute which was increased throughout testing to 70mL/min  Findings:  · Patient had no sensation at all throughout testing  · Stress test completed at 241 mL with cough and Valsalva  No leak produced  · Stress test completed at 501 mL with cough and Valsalva  No leak produced  · Pump shut off at 750 mL for safety, Per Dr Eulogio Black no need to fill further  · Stress test at 750mL with cough and Valsalva produced no leak  · Max filling detrusor pressure: 9 cmH2o (during ambulation to [facility])     Pt was then helped over to the [facility] with catheters in place and given permission to void        Voided volume: 83 mL  Uroflow peak pressure: 90 cmH2o  Peak flow: 2 mL/s    Patient unable to void further, consistent with his history  All pressure catheter and electrodes were removed  He was then allowed to catheterize himself for a total of 800 mL (filled with 750 mL, voided 83 mL)  Patient tolerated testing well      EULOGIO Stinson BSN

## 2018-08-24 ENCOUNTER — TRANSCRIBE ORDERS (OUTPATIENT)
Dept: ADMINISTRATIVE | Facility: HOSPITAL | Age: 63
End: 2018-08-24

## 2018-08-24 DIAGNOSIS — N31.9 NEUROGENIC DYSFUNCTION OF THE URINARY BLADDER: Primary | ICD-10-CM

## 2018-08-24 DIAGNOSIS — M54.40 LOW BACK PAIN WITH SCIATICA, SCIATICA LATERALITY UNSPECIFIED, UNSPECIFIED BACK PAIN LATERALITY, UNSPECIFIED CHRONICITY: ICD-10-CM

## 2018-08-29 ENCOUNTER — PROCEDURE VISIT (OUTPATIENT)
Dept: UROLOGY | Facility: CLINIC | Age: 63
End: 2018-08-29
Payer: COMMERCIAL

## 2018-08-29 VITALS
SYSTOLIC BLOOD PRESSURE: 140 MMHG | HEART RATE: 90 BPM | DIASTOLIC BLOOD PRESSURE: 62 MMHG | HEIGHT: 67 IN | BODY MASS INDEX: 22.76 KG/M2 | WEIGHT: 145 LBS

## 2018-08-29 DIAGNOSIS — R33.9 RETENTION, URINE: Primary | ICD-10-CM

## 2018-08-29 LAB
SL AMB  POCT GLUCOSE, UA: NORMAL
SL AMB LEUKOCYTE ESTERASE,UA: NORMAL
SL AMB POCT BLOOD,UA: NORMAL
SL AMB POCT CLARITY,UA: CLEAR
SL AMB POCT COLOR,UA: YELLOW
SL AMB POCT KETONES,UA: NORMAL
SL AMB POCT NITRITE,UA: NORMAL
SL AMB POCT PH,UA: 6.5
SL AMB POCT SPECIFIC GRAVITY,UA: 1
SL AMB POCT URINE PROTEIN: NORMAL

## 2018-08-29 PROCEDURE — 99214 OFFICE O/P EST MOD 30 MIN: CPT | Performed by: UROLOGY

## 2018-08-29 PROCEDURE — 52000 CYSTOURETHROSCOPY: CPT | Performed by: UROLOGY

## 2018-08-29 PROCEDURE — 81002 URINALYSIS NONAUTO W/O SCOPE: CPT | Performed by: UROLOGY

## 2018-08-29 NOTE — PROGRESS NOTES
Referring Physician: JOHNSON Leary  A copy of this note was sent to the referring physician  {[facility]:[id number]}        Assessment and plan:       1   -  -    2   -  -     3   -  -     Karla Brantley MD      Chief Complaint     ***      History of Present Illness     Lillie Troncoso is a 57 y o       Detailed Urologic History     - please refer to HPI    Review of Systems     Review of Systems          Allergies     No Known Allergies    Physical Exam     Physical Exam        Vital Signs  Vitals:    08/29/18 1257   BP: 140/62   Pulse: 90   Weight: 65 8 kg (145 lb)   Height: 5' 7" (1 702 m)         Current Medications       Current Outpatient Prescriptions:     acetaminophen (TYLENOL) 325 mg tablet, Take 650 mg by mouth every 6 (six) hours as needed for mild pain, Disp: , Rfl:     amLODIPine (NORVASC) 5 mg tablet, Take 2 5 mg by mouth daily  , Disp: , Rfl:     atorvastatin (LIPITOR) 10 mg tablet, Take 10 mg by mouth daily, Disp: , Rfl:     gabapentin (NEURONTIN) 300 mg capsule, Take 300 mg by mouth 2 (two) times a day, Disp: , Rfl:     lisinopril (ZESTRIL) 20 mg tablet, Take 20 mg by mouth 2 (two) times a day , Disp: , Rfl:     traMADol (ULTRAM) 50 mg tablet, Take by mouth, Disp: , Rfl:       Active Problems     Patient Active Problem List   Diagnosis    Lumbar stenosis with neurogenic claudication    Pseudomonas aeruginosa infection    Ataxia    Essential hypertension    Syncope and collapse    Orthostatic hypotension    HLD (hyperlipidemia)    Ascending aortic aneurysm (HCC)    Urinary retention    CKD (chronic kidney disease), stage III    Hyponatremia         Past Medical History     Past Medical History:   Diagnosis Date    Aortic aneurysm (HCC)     Arthritis     Atypical chest pain     Back pain     Chronic kidney disease     stg 3    Elevated ALT measurement     Elevated AST (SGOT)     Hyperlipidemia     Hypertension     Leg pain     Neurogenic claudication     Urinary retention     Urinary retention          Surgical History     Past Surgical History:   Procedure Laterality Date    EPIDURAL BLOCK INJECTION N/A 1/23/2017    Procedure: BLOCK / INJECTION EPIDURAL STEROID LUMBAR;  Surgeon: Arizona Dubin, MD;  [facility];  Service:    Nahomi Alicia LAMNOTMY INCL W/DCMPRSN NRV ROOT 1 INTRSPC LUMBR Bilateral 1/23/2017    Procedure: MIS L2-3 & L3-4 LAMINAL FORAMINOTOMIES AND MICRODISCECTOMY W LEFT SIDED APPROACH ;  Surgeon: Arizona Dubin, MD;  [facility];  Service: Neurosurgery         Family History     Family History   Problem Relation Age of Onset    Heart disease Sister          Social History     Social History     History   Smoking Status    Never Smoker   Smokeless Tobacco    Never Used         Pertinent Lab Values     Lab Results   Component Value Date    CREATININE 1 48 (H) 08/08/2018       No results found for: PSA    @RESULTRCNT(1H])@      Pertinent Imaging      - n/a    Portions of the record may have been created with voice recognition software   Occasional wrong word or "sound a like" substitutions may have occurred due to the inherent limitations of voice recognition software   Read the chart carefully and recognize, using context, where substitutions have occurred

## 2018-08-29 NOTE — PROGRESS NOTES
Referring Physician: JOHNSON Ramos  A copy of this note was sent to the referring physician  Diagnoses and all orders for this visit:    Retention, urine  -     POCT urine dip            Assessment and plan:     1  Urinary retention  1  CIC dependent times 18 months  Urodynamics demonstrates preserved detrusor function with AP did of greater than 80  Cystoscopy reveals moderate bilobar hyperplasia, enlarged bladder capacity     Given the patient's ongoing urinary retention having failed medical management with preserved detrusor function and cystoscopic evidence of bladder outlet obstruction discuss surgical intervention in the form of a TURP or possibly a Uro lift  Risks benefits alternatives to each modality were discussed  At the present time Helene Dc is happy with his lower urinary tract symptom management with self catheterization 4 times a day  He does not wish to pursue any surgical intervention at this time  This is certainly reasonable since he is doing well with his CIC regimen  He will follow up in 1 year for routine re-evaluation  Zacarias Hernandez MD      Chief Complaint     Urinary retention      History of Present Illness     Quin Rainey is a 57 y o  male with a history of urinary retention  He presents for further evaluation having recently completed urodynamic assessment that did reveal preserved detrusor function with AP day at Q max of 80  He continues to be catheter dependent 4 times daily  He does void volitionally the very small volumes  He presents for cystoscopic evaluation and further assessment  He has had no interval UTIs or hematuria or reporting difficulty self catheterization  The      Detailed Urologic History     - please refer to HPI    Review of Systems     Review of Systems   Constitutional: Negative for activity change and fatigue  HENT: Negative for congestion  Eyes: Negative for visual disturbance     Respiratory: Negative for shortness of breath and wheezing  Cardiovascular: Negative for chest pain and leg swelling  Gastrointestinal: Negative for abdominal pain  Endocrine: Negative for polyuria  Genitourinary: Negative for dysuria, flank pain, hematuria and urgency  Urinary retention   Musculoskeletal: Negative for back pain  Allergic/Immunologic: Negative for immunocompromised state  Neurological: Negative for dizziness and numbness  Psychiatric/Behavioral: Negative for dysphoric mood  All other systems reviewed and are negative  Allergies     No Known Allergies    Physical Exam     Physical Exam   Constitutional: He is oriented to person, place, and time  He appears well-developed and well-nourished  No distress  HENT:   Head: Normocephalic and atraumatic  Eyes: EOM are normal    Neck: Normal range of motion  Cardiovascular:   Negative lower extremity edema   Pulmonary/Chest: Effort normal and breath sounds normal    Abdominal: Soft  Genitourinary:   Genitourinary Comments: Negative suprapubic tenderness   Musculoskeletal: Normal range of motion  Neurological: He is alert and oriented to person, place, and time  Skin: Skin is warm  Psychiatric: He has a normal mood and affect   His behavior is normal            Vital Signs  Vitals:    08/29/18 1257   BP: 140/62   Pulse: 90   Weight: 65 8 kg (145 lb)   Height: 5' 7" (1 702 m)         Current Medications       Current Outpatient Prescriptions:     acetaminophen (TYLENOL) 325 mg tablet, Take 650 mg by mouth every 6 (six) hours as needed for mild pain, Disp: , Rfl:     amLODIPine (NORVASC) 5 mg tablet, Take 2 5 mg by mouth daily  , Disp: , Rfl:     atorvastatin (LIPITOR) 10 mg tablet, Take 10 mg by mouth daily, Disp: , Rfl:     gabapentin (NEURONTIN) 300 mg capsule, Take 300 mg by mouth 2 (two) times a day, Disp: , Rfl:     lisinopril (ZESTRIL) 20 mg tablet, Take 20 mg by mouth 2 (two) times a day , Disp: , Rfl:     traMADol (ULTRAM) 50 mg tablet, Take by mouth, Disp: , Rfl:       Active Problems     Patient Active Problem List   Diagnosis    Lumbar stenosis with neurogenic claudication    Pseudomonas aeruginosa infection    Ataxia    Essential hypertension    Syncope and collapse    Orthostatic hypotension    HLD (hyperlipidemia)    Ascending aortic aneurysm (HCC)    Urinary retention    CKD (chronic kidney disease), stage III    Hyponatremia         Past Medical History     Past Medical History:   Diagnosis Date    Aortic aneurysm (HCC)     Arthritis     Atypical chest pain     Back pain     Chronic kidney disease     stg 3    Elevated ALT measurement     Elevated AST (SGOT)     Hyperlipidemia     Hypertension     Leg pain     Neurogenic claudication     Urinary retention     Urinary retention          Surgical History     Past Surgical History:   Procedure Laterality Date    EPIDURAL BLOCK INJECTION N/A 1/23/2017    Procedure: BLOCK / INJECTION EPIDURAL STEROID LUMBAR;  Surgeon: Colleen Davis MD;  [facility];  Service:    Todd Carolina LAMNOTMY INCL W/DCMPRSN NRV ROOT 1 INTRSPC LUMBR Bilateral 1/23/2017    Procedure: MIS L2-3 & L3-4 LAMINAL FORAMINOTOMIES AND MICRODISCECTOMY W LEFT SIDED APPROACH ;  Surgeon: Colleen Davis MD;  [facility];  Service: Neurosurgery         Family History     Family History   Problem Relation Age of Onset    Heart disease Sister          Social History     Social History     History   Smoking Status    Never Smoker   Smokeless Tobacco    Never Used         Pertinent Lab Values     Lab Results   Component Value Date    CREATININE 1 48 (H) 08/08/2018       No results found for: PSA    @RESULTRCNT(1H])@      Pertinent Imaging      - n/a    Portions of the record may have been created with voice recognition software   Occasional wrong word or "sound a like" substitutions may have occurred due to the inherent limitations of voice recognition software   Read the chart carefully and recognize, using context, where substitutions have occurred

## 2018-08-29 NOTE — LETTER
August 29, 2018     Vicky [address] [facility]    Patient: Tio Banks   YOB: 1955   Date of Visit: 8/29/2018       Dear Dr Lomeli Must: Thank you for referring Tio Banks to me for evaluation  Below are my notes for this consultation  If you have questions, please do not hesitate to call me  I look forward to following your patient along with you  Sincerely,        Ghislaine Cruz MD        CC: MD Ghislaine Gilbert MD  8/29/2018  1:43 PM  Sign at close encounter  Referring Physician: JOHNSON David  A copy of this note was sent to the referring physician  Diagnoses and all orders for this visit:    Retention, urine  -     POCT urine dip            Assessment and plan:     1  Urinary retention  1  CIC dependent times 18 months  Urodynamics demonstrates preserved detrusor function with AP did of greater than 80  Cystoscopy reveals moderate bilobar hyperplasia, enlarged bladder capacity     Given the patient's ongoing urinary retention having failed medical management with preserved detrusor function and cystoscopic evidence of bladder outlet obstruction discuss surgical intervention in the form of a TURP or possibly a Uro lift  Risks benefits alternatives to each modality were discussed  At the present time Carlos Ham is happy with his lower urinary tract symptom management with self catheterization 4 times a day  He does not wish to pursue any surgical intervention at this time  This is certainly reasonable since he is doing well with his CIC regimen  He will follow up in 1 year for routine re-evaluation  Ghislaine Cruz MD      Chief Complaint     Urinary retention      History of Present Illness     Tio Banks is a 57 y o  male with a history of urinary retention    He presents for further evaluation having recently completed urodynamic assessment that did reveal preserved detrusor function with AP day at Q max of 80  He continues to be catheter dependent 4 times daily  He does void volitionally the very small volumes  He presents for cystoscopic evaluation and further assessment  He has had no interval UTIs or hematuria or reporting difficulty self catheterization  The      Detailed Urologic History     - please refer to HPI    Review of Systems     Review of Systems   Constitutional: Negative for activity change and fatigue  HENT: Negative for congestion  Eyes: Negative for visual disturbance  Respiratory: Negative for shortness of breath and wheezing  Cardiovascular: Negative for chest pain and leg swelling  Gastrointestinal: Negative for abdominal pain  Endocrine: Negative for polyuria  Genitourinary: Negative for dysuria, flank pain, hematuria and urgency  Urinary retention   Musculoskeletal: Negative for back pain  Allergic/Immunologic: Negative for immunocompromised state  Neurological: Negative for dizziness and numbness  Psychiatric/Behavioral: Negative for dysphoric mood  All other systems reviewed and are negative  Allergies     No Known Allergies    Physical Exam     Physical Exam   Constitutional: He is oriented to person, place, and time  He appears well-developed and well-nourished  No distress  HENT:   Head: Normocephalic and atraumatic  Eyes: EOM are normal    Neck: Normal range of motion  Cardiovascular:   Negative lower extremity edema   Pulmonary/Chest: Effort normal and breath sounds normal    Abdominal: Soft  Genitourinary:   Genitourinary Comments: Negative suprapubic tenderness   Musculoskeletal: Normal range of motion  Neurological: He is alert and oriented to person, place, and time  Skin: Skin is warm  Psychiatric: He has a normal mood and affect   His behavior is normal            Vital Signs  Vitals:    08/29/18 1257   BP: 140/62   Pulse: 90   Weight: 65 8 kg (145 lb)   Height: 5' 7" (1 702 m)         Current Medications       Current Outpatient Prescriptions:     acetaminophen (TYLENOL) 325 mg tablet, Take 650 mg by mouth every 6 (six) hours as needed for mild pain, Disp: , Rfl:     amLODIPine (NORVASC) 5 mg tablet, Take 2 5 mg by mouth daily  , Disp: , Rfl:     atorvastatin (LIPITOR) 10 mg tablet, Take 10 mg by mouth daily, Disp: , Rfl:     gabapentin (NEURONTIN) 300 mg capsule, Take 300 mg by mouth 2 (two) times a day, Disp: , Rfl:     lisinopril (ZESTRIL) 20 mg tablet, Take 20 mg by mouth 2 (two) times a day , Disp: , Rfl:     traMADol (ULTRAM) 50 mg tablet, Take by mouth, Disp: , Rfl:       Active Problems     Patient Active Problem List   Diagnosis    Lumbar stenosis with neurogenic claudication    Pseudomonas aeruginosa infection    Ataxia    Essential hypertension    Syncope and collapse    Orthostatic hypotension    HLD (hyperlipidemia)    Ascending aortic aneurysm (HCC)    Urinary retention    CKD (chronic kidney disease), stage III    Hyponatremia         Past Medical History     Past Medical History:   Diagnosis Date    Aortic aneurysm (HCC)     Arthritis     Atypical chest pain     Back pain     Chronic kidney disease     stg 3    Elevated ALT measurement     Elevated AST (SGOT)     Hyperlipidemia     Hypertension     Leg pain     Neurogenic claudication     Urinary retention     Urinary retention          Surgical History     Past Surgical History:   Procedure Laterality Date    EPIDURAL BLOCK INJECTION N/A 1/23/2017    Procedure: BLOCK / INJECTION EPIDURAL STEROID LUMBAR;  Surgeon: Dede Martin MD;  [facility];  Service:    Caryn SMITH INCL W/DCMPRSN NRV ROOT 1 INTRSPC LUMBR Bilateral 1/23/2017    Procedure: MIS L2-3 & L3-4 LAMINAL FORAMINOTOMIES AND MICRODISCECTOMY W LEFT SIDED APPROACH ;  Surgeon: Dede Martin MD;  [facility];  Service: Neurosurgery         Family History     Family History   Problem Relation Age of Onset    Heart disease Sister Social History     Social History     History   Smoking Status    Never Smoker   Smokeless Tobacco    Never Used         Pertinent Lab Values     Lab Results   Component Value Date    CREATININE 1 48 (H) 08/08/2018       No results found for: PSA    @RESULTRCNT(1H])@      Pertinent Imaging      - n/a    Portions of the record may have been created with voice recognition software   Occasional wrong word or "sound a like" substitutions may have occurred due to the inherent limitations of voice recognition software   Read the chart carefully and recognize, using context, where substitutions have occurred  Shagufta Ness MD  8/29/2018  1:24 PM  Sign at close encounter  Office Cystoscopy Procedure Note    Indication:    Urinary retention    Informed consent   The risks, benefits, complications, treatment options, and expected outcomes were discussed with the patient  The patient concurred with the proposed plan and provided informed consent  Anesthesia  Lidocaine jelly 2%    Antibiotic prophylaxis   None    Procedure  The patient was placed in the supineposition, was prepped and draped in the usual manner using sterile technique, and 2% lidocaine jelly instilled into the urethra  A 17 F flexible cystoscope was then inserted into the urethra and the urethra and bladder carefully examined  The following findings were noted:    Findings:  Urethra:  Mild 18 Jordanian bulbar urethral strictures noted thin, nonobstructing  Prostate: Moderate lateral lobe hyperplasia with kissing of the lateral lobes  The prostatic fossa is short  The bladder neck is not high  There is no obstructing median lobe  Bladder:  Very large capacity  Ureteral orifices:  Normal  Other findings:  None     Specimens: None                 Complications:    None; patient tolerated the procedure well           Disposition: To home after 30 minute observation             Condition: Stable

## 2018-08-29 NOTE — PROGRESS NOTES
Office Cystoscopy Procedure Note    Indication:    Urinary retention    Informed consent   The risks, benefits, complications, treatment options, and expected outcomes were discussed with the patient  The patient concurred with the proposed plan and provided informed consent  Anesthesia  Lidocaine jelly 2%    Antibiotic prophylaxis   None    Procedure  The patient was placed in the supineposition, was prepped and draped in the usual manner using sterile technique, and 2% lidocaine jelly instilled into the urethra  A 17 F flexible cystoscope was then inserted into the urethra and the urethra and bladder carefully examined  The following findings were noted:    Findings:  Urethra:  Mild 18 Mexican bulbar urethral strictures noted thin, nonobstructing  Prostate: Moderate lateral lobe hyperplasia with kissing of the lateral lobes  The prostatic fossa is short  The bladder neck is not high  There is no obstructing median lobe  Bladder:  Very large capacity  Ureteral orifices:  Normal  Other findings:  None     Specimens: None                 Complications:    None; patient tolerated the procedure well           Disposition: To home after 30 minute observation             Condition: Stable

## 2018-09-02 ENCOUNTER — HOSPITAL ENCOUNTER (OUTPATIENT)
Dept: RADIOLOGY | Facility: HOSPITAL | Age: 63
Discharge: HOME | End: 2018-09-02
Attending: INTERNAL MEDICINE
Payer: MEDICARE

## 2018-09-02 DIAGNOSIS — N31.9 NEUROGENIC DYSFUNCTION OF THE URINARY BLADDER: ICD-10-CM

## 2018-09-02 DIAGNOSIS — M54.40 LOW BACK PAIN WITH SCIATICA, SCIATICA LATERALITY UNSPECIFIED, UNSPECIFIED BACK PAIN LATERALITY, UNSPECIFIED CHRONICITY: ICD-10-CM

## 2018-09-02 PROCEDURE — 72148 MRI LUMBAR SPINE W/O DYE: CPT

## 2018-09-17 NOTE — PROGRESS NOTES
OFFICE FOLLOW UP - Nephrology   Mei Haresh 57 y o  male MRN: [id number]       ASSESSMENT and PLAN:  Jhonathan Alvarez was seen today for follow-up and hypertension  Diagnoses and all orders for this visit:    Essential hypertension  -BP slightly above goal at today's office visit  -home wrist cuff is running higher than office manual cuff  -patient not interested in obtaining a new arm cuff and wants to keep using wrist cuff  -instructed on proper technique of taking blood pressures at home with wrist cuff education and information given  -patient to take home blood pressure sitting and standing every morning and every evening and bring in documentation with next office visit in 2-4 weeks  -once home blood pressure cuff readings reviewed, further recommendations for blood pressure medication management will be discussed  Will consider increasing Norvasc to 5 mg daily at next office visit  -encourage low-sodium diet and information given-concern for noncompliance in our discussion for the patient does eat frozen meals since he lives on his own      CKD (chronic kidney disease), stage III  -creatinine improved  -etiology likely secondary urinary retention and hypertension  -baseline creatinine 1 5-1 8  -     Basic metabolic panel; Future  -     CBC and differential; Future  -     Magnesium; Future  -     Phosphorus; Future  -     Protein / creatinine ratio, urine; Future  -     Ambulatory referral to Vascular Surgery;  Future    Hyponatremia  -resolved    Orthostatic hypotension  -no orthostasis noted in today's office appointment  -no symptoms of dizziness or lightheadedness noted  -patient denying any symptoms at home when standing    Urinary retention  -patient continues to self-catheterize 4 times a day without any issues  -follows Urology as an outpatient    Pain in both lower extremities  -patient complaining of leg pain with walking  -will refer to vascular for further evaluation-patient in agreement  - Ambulatory referral to Vascular Surgery; Future      Patient Instructions   All questions asked and answered  The patient has been instructed to call office at [phone] with any questions or concerns  The patient has been instructed to obtain prescribed blood work and urine studies prior to next appointmentAvoid NSAID products to include Motrin, Ibuprofen, Aleve, Naprosyn, or naproxen   Blood pressure log given to patient today to document home BP  Education material " Kidney Disease: Eating Less Sodium" given to patient today         HPI: Merced Mauricio is a 57 y o  male who is here for Follow-up and Hypertension  The patient returns to office for hypertension management  He was last seen on 8/6/2018 and at that time, BP was above goal   He reported being off BP medication and just restarted  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  Most recent blood work on 8/8/18revealed improved sodium and creatinine; back to baseline  Since our last visit, there has been no ER visits or hospitilizations  On discussion the patient is complaining of leg pain with walking  Denies chest pain, shortness of Breath, dizziness, lightheadedness, nausea, vomiting,, fevers or chills  He continues to follow Urology and self caths without any issues  He reports blood pressure lower when standing at home without dizziness or lightheadedness  He uses a wrist cuff  He reports eating frozen foods and canned vegetables for easier for him since he lives alone  ROS:   All the systems were reviewed and were negative except as documented on the HPI  Allergies: Patient has no known allergies      Medications:   Current Outpatient Prescriptions:     acetaminophen (TYLENOL) 325 mg tablet, Take 650 mg by mouth every 6 (six) hours as needed for mild pain, Disp: , Rfl:     amLODIPine (NORVASC) 2 5 mg tablet, Take by mouth daily  , Disp: , Rfl:     atorvastatin (LIPITOR) 10 mg tablet, Take 10 mg by mouth daily, Disp: , Rfl:     gabapentin (NEURONTIN) 300 mg capsule, Take 300 mg by mouth 2 (two) times a day, Disp: , Rfl:     lisinopril (ZESTRIL) 20 mg tablet, Take 20 mg by mouth 2 (two) times a day , Disp: , Rfl:     traMADol (ULTRAM) 50 mg tablet, Take by mouth, Disp: , Rfl:     Past Medical History:   Diagnosis Date    Aortic aneurysm (HCC)     Arthritis     Atypical chest pain     Back pain     Chronic kidney disease     stg 3    Elevated ALT measurement     Elevated AST (SGOT)     Hyperlipidemia     Hypertension     Leg pain     Neurogenic claudication     Urinary retention     Urinary retention      Past Surgical History:   Procedure Laterality Date    EPIDURAL BLOCK INJECTION N/A 1/23/2017    Procedure: BLOCK / INJECTION EPIDURAL STEROID LUMBAR;  Surgeon: Eufemia Beth MD;  [facility];  Service:    Durga SMITH INCL W/DCMPRSN NRV ROOT 1 INTRSPC LUMBR Bilateral 1/23/2017    Procedure: MIS L2-3 & L3-4 LAMINAL FORAMINOTOMIES AND MICRODISCECTOMY W LEFT SIDED APPROACH ;  Surgeon: Eufemia Beth MD;  [facility];  Service: Neurosurgery     Family History   Problem Relation Age of Onset    Heart disease Sister       reports that he has never smoked  He has never used smokeless tobacco  He reports that he drinks alcohol  He reports that he does not use drugs  Physical Exam:   Vitals:    09/18/18 0940 09/18/18 0951 09/18/18 0952 09/18/18 0955   BP:  152/82 162/84 168/88   Weight: 66 7 kg (147 lb)      Height: 5' 6" (1 676 m)        Body mass index is 23 73 kg/m²      General: cooperative, in not acute distress  Eyes: conjunctivae pink, anicteric sclerae  ENT: lips and mucous membranes moist  Neck: supple, no JVD  Chest: clear breath sounds bilateral, no crackles, ronchus or wheezings  CVS:  normal rate, regular rhythm  Abdomen: soft, non-tender, non-distended, normoactive bowel sounds  Extremities: no edema of both legs, support stockings in place  Skin: no rash  Neuro: awake, alert, oriented      Lab Results:              Portions of the record may have been created with voice recognition software  Occasional wrong word or "sound a like" substitutions may have occurred due to the inherent limitations of voice recognition software  Read the chart carefully and recognize, using context, where substitutions have occurred  If you have any questions, please contact the dictating provider

## 2018-09-18 ENCOUNTER — OFFICE VISIT (OUTPATIENT)
Dept: NEPHROLOGY | Facility: CLINIC | Age: 63
End: 2018-09-18
Payer: COMMERCIAL

## 2018-09-18 VITALS
HEIGHT: 66 IN | SYSTOLIC BLOOD PRESSURE: 168 MMHG | WEIGHT: 147 LBS | DIASTOLIC BLOOD PRESSURE: 88 MMHG | BODY MASS INDEX: 23.63 KG/M2

## 2018-09-18 DIAGNOSIS — M79.605 PAIN IN BOTH LOWER EXTREMITIES: ICD-10-CM

## 2018-09-18 DIAGNOSIS — I95.1 ORTHOSTATIC HYPOTENSION: ICD-10-CM

## 2018-09-18 DIAGNOSIS — N18.30 CKD (CHRONIC KIDNEY DISEASE), STAGE III (HCC): ICD-10-CM

## 2018-09-18 DIAGNOSIS — E87.1 HYPONATREMIA: ICD-10-CM

## 2018-09-18 DIAGNOSIS — M79.604 PAIN IN BOTH LOWER EXTREMITIES: ICD-10-CM

## 2018-09-18 DIAGNOSIS — I10 ESSENTIAL HYPERTENSION: Primary | ICD-10-CM

## 2018-09-18 PROCEDURE — 99213 OFFICE O/P EST LOW 20 MIN: CPT | Performed by: NURSE PRACTITIONER

## 2018-09-18 NOTE — PATIENT INSTRUCTIONS
All questions asked and answered  The patient has been instructed to call office at [phone] with any questions or concerns      The patient has been instructed to obtain prescribed blood work and urine studies prior to next appointmentAvoid NSAID products to include Motrin, Ibuprofen, Aleve, Naprosyn, or naproxen   Blood pressure log given to patient today to document home BP  Education material " Kidney Disease: Eating Less Sodium" given to patient today   Take BP AM and PM sitting and standing for 2 weeks and bring to next appointment

## 2018-10-03 NOTE — PROGRESS NOTES
OFFICE FOLLOW UP - Nephrology   Sara Mcallister 57 y o  male MRN: [id number]       ASSESSMENT and PLAN:  Betty Lopez was seen today for follow-up and hypertension  Diagnoses and all orders for this visit:    Essential hypertension  -BP in office still above goal at 162/86 sitting and 162/84 standing  -he is without orthostatic hypotension  -will increase amlodipine to 5 mg daily  -he will continue to monitor his blood pressure at home and will call if blood pressure is persistently greater than 160 or becomes symptomatic with standing  -again encouraged low-sodium diet, I suspect there is some noncompliance with meals at home secondary to eating frozen meals  -     amLODIPine (NORVASC) 2 5 mg tablet; Take 2 tablets (5 mg total) by mouth daily    Orthostatic hypotension  -no orthostatic symptoms at today's visit    CKD (chronic kidney disease), stage III (HCC)  - baseline creatinine 1 5-1 8  -etiology likely secondary urinary retention and hypertension  -he is for repeat blood work in the beginning of January with return visit with Dr Abbi Ivan    Pain in both lower extremities  -has vascular appointment end of October for evaluation          Patient Instructions   All questions asked and answered  The patient has been instructed to call office at [phone] with any questions or concerns  The patient has been instructed to obtain prescribed blood work and urine studies prior to next appointment  Continue with low sodium diet  Stay hydrated  Return january with Dr Abbi Ivan with updated lab work as previously instructed  Call office if BP stays above 160         HPI: Sara Mcallister is a 57 y o  male who is here for Follow-up and Hypertension  The patient returns to office for hypertension management with 2 weeks of BP readings  He was last seen on 9/18/2018 and at that time, BP was slightly above goal   Also, his wrist cuff read higher than office cuff and he was not interested in obtaining a new arm cuff   Also, he was referred to vascular surgery secondary to pain in both legs with walking  Since our last visit, there has been no ER visits or hospitilizations  Patient currently has no complaints at this time and is feeling well  Patient denies any chest pain, shortness of breath and swelling  He reports he has an appointment at the end of October with vascular surgery  He still complains of bilateral leg pain with walking left greater than right  Blood pressure logs reviewed and he only wrote down top number which varies anywhere between 120s to 160s sitting, and one teens standing, however he denies any orthostatic symptoms of dizziness or lightheadedness  Currently he denies chest pain, shortness of Breath, dizziness, lightheadedness, nausea, vomiting, or new urinary issues, he does follow Urology and we had a recent appointment for his known urinary retention and continues to straight catheterize himself 4 times a day without issues  ROS:   All the systems were reviewed and were negative except as documented on the HPI  Allergies: Patient has no known allergies      Medications:   Current Outpatient Prescriptions:     acetaminophen (TYLENOL) 325 mg tablet, Take 650 mg by mouth every 6 (six) hours as needed for mild pain, Disp: , Rfl:     amLODIPine (NORVASC) 2 5 mg tablet, Take 2 tablets (5 mg total) by mouth daily, Disp: , Rfl: 0    atorvastatin (LIPITOR) 10 mg tablet, Take 10 mg by mouth daily, Disp: , Rfl:     gabapentin (NEURONTIN) 300 mg capsule, Take 300 mg by mouth 2 (two) times a day, Disp: , Rfl:     lisinopril (ZESTRIL) 20 mg tablet, Take 20 mg by mouth 2 (two) times a day , Disp: , Rfl:     Past Medical History:   Diagnosis Date    Aortic aneurysm (HCC)     Arthritis     Atypical chest pain     Back pain     Chronic kidney disease     stg 3    Elevated ALT measurement     Elevated AST (SGOT)     Hyperlipidemia     Hypertension     Leg pain     Neurogenic claudication     Urinary retention     Urinary retention      Past Surgical History:   Procedure Laterality Date    EPIDURAL BLOCK INJECTION N/A 1/23/2017    Procedure: BLOCK / INJECTION EPIDURAL STEROID LUMBAR;  Surgeon: Agapito Raman MD;  [facility];  Service:    Edouard SMITH INCL W/DCMPRSN NRV ROOT 1 INTRSPC LUMBR Bilateral 1/23/2017    Procedure: MIS L2-3 & L3-4 LAMINAL FORAMINOTOMIES AND MICRODISCECTOMY W LEFT SIDED APPROACH ;  Surgeon: Agapito Raman MD;  [facility];  Service: Neurosurgery     Family History   Problem Relation Age of Onset    Heart disease Sister       reports that he has never smoked  He has never used smokeless tobacco  He reports that he drinks alcohol  He reports that he does not use drugs  Physical Exam:   Vitals:    10/04/18 0734 10/04/18 0741 10/04/18 0742   BP:  162/86 162/84   Weight: 68 9 kg (152 lb)     Height: 5' 6" (1 676 m)       Body mass index is 24 53 kg/m²  General: cooperative, in not acute distress  ENT: lips and mucous membranes moist  Neck: supple, no JVD  Chest: clear breath sounds bilateral, no crackles, ronchus or wheezings  CVS: distinct S1 & S2, normal rate, regular rhythm  Abdomen: soft, non-tender, non-distended, normoactive bowel sounds  Extremities: no edema of both legs  Skin: no rash  Neuro: awake, alert, oriented      Lab Results:  Results for orders placed or performed in visit on 08/29/18   POCT urine dip   Result Value Ref Range    LEUKOCYTE ESTERASE,UA -     NITRITE,UA +     SL AMB POCT URINE PROTEIN -      PH,UA 6 5      BLOOD,UA -     SPECIFIC GRAVITY,UA 1 005     KETONES,UA -     GLUCOSE, UA -      COLOR,UA yellow      CLARITY,UA clear                Portions of the record may have been created with voice recognition software  Occasional wrong word or "sound a like" substitutions may have occurred due to the inherent limitations of voice recognition software   Read the chart carefully and recognize, using context, where substitutions have occurred  If you have any questions, please contact the dictating provider

## 2018-10-04 ENCOUNTER — OFFICE VISIT (OUTPATIENT)
Dept: NEPHROLOGY | Facility: CLINIC | Age: 63
End: 2018-10-04
Payer: COMMERCIAL

## 2018-10-04 VITALS
HEIGHT: 66 IN | BODY MASS INDEX: 24.43 KG/M2 | DIASTOLIC BLOOD PRESSURE: 84 MMHG | WEIGHT: 152 LBS | SYSTOLIC BLOOD PRESSURE: 162 MMHG

## 2018-10-04 DIAGNOSIS — M79.604 PAIN IN BOTH LOWER EXTREMITIES: ICD-10-CM

## 2018-10-04 DIAGNOSIS — I95.1 ORTHOSTATIC HYPOTENSION: ICD-10-CM

## 2018-10-04 DIAGNOSIS — M79.605 PAIN IN BOTH LOWER EXTREMITIES: ICD-10-CM

## 2018-10-04 DIAGNOSIS — I10 ESSENTIAL HYPERTENSION: Primary | ICD-10-CM

## 2018-10-04 DIAGNOSIS — N18.30 CKD (CHRONIC KIDNEY DISEASE), STAGE III (HCC): ICD-10-CM

## 2018-10-04 PROCEDURE — 99213 OFFICE O/P EST LOW 20 MIN: CPT | Performed by: NURSE PRACTITIONER

## 2018-10-04 RX ORDER — AMLODIPINE BESYLATE 2.5 MG/1
5 TABLET ORAL DAILY
Refills: 0
Start: 2018-10-04 | End: 2019-02-05 | Stop reason: ALTCHOICE

## 2018-10-04 NOTE — PATIENT INSTRUCTIONS
All questions asked and answered  The patient has been instructed to call office at [phone] with any questions or concerns      The patient has been instructed to obtain prescribed blood work and urine studies prior to next appointment  Continue with low sodium diet  Stay hydrated  Return january with Dr Luis Miller with updated lab work as previously instructed  Call office if BP stays above 160

## 2018-10-19 RX ORDER — CIPROFLOXACIN 500 MG/1
TABLET, FILM COATED ORAL
Refills: 0 | COMMUNITY
Start: 2018-07-17 | End: 2018-12-18 | Stop reason: ALTCHOICE

## 2018-10-19 NOTE — PROGRESS NOTES
Assessment/Plan:    Pt is a 58 yo M w/ HTN, HLD< ascending aortic aneurysm, CKD, back pain w/ neuropathy s/p L hemilaminotomy L3-4, spinal stenosis, presents to discuss LLE pain    PAD (peripheral artery disease) ([facility])  -     VAS lower limb arterial duplex, complete bilateral; Future  -evidence of PAD on exam; exam is symmetric but symptoms in back and LLE only  -very unlikely that his PAD is the cause of his symptoms  -will get LEADs for baseline exam but would not pursue intervention given CKD unless he developed wounds; will call if severe/unexpected disease, otherwise will send letter with results and followup schedule    Essential hypertension  -     Ambulatory referral to Vascular Surgery  -HTN, controlled on 2 antihypertensive medications  -this is being monitored by nephrology    CKD (chronic kidney disease), stage III ([facility] [address] )  -     Ambulatory referral to Vascular Surgery  -last Cr: 1 48; being monitored by nephrology  -please re-consult when/if HD access is needed    Diffuse pain in left lower extremity  Lumbar stenosis with neurogenic claudication  -spinal stenosis with recurrent symptoms 1 yr after surgery; this is the most likely cuase of his back and L leg pain and needs to be evaluated by his spine surgeon  -encouraged patient to make an appt to see them  Patient ID: Pao Park is a 57 y o  male  Chief complaint: Pt is new to our practice referred by Nephrology SHIRIN Talbot  Pt is here for left leg pain when walking and when resting  Pt states he can only walk 50 feet before stopping  Pt denies numbness and tingling to feet  No open wounds or sores  Pt is wearing compression stockings daily  Pt is on statin  HPI:    Patient presents to discuss leg pain  Patient has hx of severe back pain which used to limit his walking  He underwent lumbar spine surgery about 1 year ago and reports that he never followed up since then, even for initial postop "because no one called me"    He says this was a [facility] surgeon but I see no op notes or office notes in our system  He doesn't remember surgeons name  He initially had good relief of symptoms, until several months ago, when he again began having pain in his back radiating down his left leg  He had a recent MRI, appears to be ordered by Magaly cummings MD but I don't see any related office notes and he is not aware of any planned followup  Patient complains of pain in the entire left leg and back  This is worst when he wakes up in the morning  Wearing compression relieves his pain  He is taking neurontin which he doesn't think is helping  He also has pain with walking that limits him but not at a certain distance  Denies RLE symptoms  Denies wounds  Never smoker  The following portions of the patient's history were reviewed and updated as appropriate: allergies, current medications, past family history, past medical history, past social history, past surgical history and problem list     Review of Systems   Constitutional: Positive for activity change (activity limited by back/leg pain)  HENT: Negative  Eyes: Negative  Respiratory: Negative  Negative for shortness of breath  Cardiovascular: Negative  Negative for chest pain and leg swelling  Gastrointestinal: Negative  Endocrine: Negative  Genitourinary: Negative  Musculoskeletal: Positive for back pain  Left leg pain   Skin: Negative  Negative for wound  Allergic/Immunologic: Negative  Neurological: Negative  Hematological: Bruises/bleeds easily  Psychiatric/Behavioral: Negative  Objective:      /78 (BP Location: Right arm, Patient Position: Sitting, Cuff Size: Adult)   Temp 97 9 °F (36 6 °C) (Tympanic)   Ht 5' 6" (1 676 m)   Wt 66 2 kg (146 lb) Comment: per pt  BMI 23 57 kg/m²          Physical Exam   Constitutional: He is oriented to person, place, and time  He appears well-developed and well-nourished     HENT:   Head: Normocephalic and atraumatic  Eyes: Conjunctivae are normal    Neck: Normal range of motion  Neck supple  Cardiovascular: Normal rate, regular rhythm and normal heart sounds  No murmur heard  Pulses:       Radial pulses are 2+ on the right side, and 2+ on the left side  Femoral pulses are 2+ on the right side, and 2+ on the left side  Popliteal pulses are 0 on the right side, and 0 on the left side  Dorsalis pedis pulses are 0 on the right side, and 0 on the left side  Posterior tibial pulses are 0 on the right side, and 0 on the left side  No carotid bruits B   Pulmonary/Chest: Effort normal and breath sounds normal  No respiratory distress  He has no wheezes  Abdominal: Soft  He exhibits no distension  There is no tenderness  There is no rebound  Musculoskeletal: Normal range of motion  He exhibits no edema  Neurological: He is alert and oriented to person, place, and time  Skin: Skin is warm and dry  Psychiatric: He has a normal mood and affect  His behavior is normal    Nursing note and vitals reviewed          Vitals:    10/22/18 1315   BP: 142/78   BP Location: Right arm   Patient Position: Sitting   Cuff Size: Adult   Temp: 97 9 °F (36 6 °C)   TempSrc: Tympanic   Weight: 66 2 kg (146 lb)   Height: 5' 6" (1 676 m)       Patient Active Problem List   Diagnosis    Lumbar stenosis with neurogenic claudication    Pseudomonas aeruginosa infection    Ataxia    Essential hypertension    Syncope and collapse    Orthostatic hypotension    HLD (hyperlipidemia)    Ascending aortic aneurysm (HCC)    Urinary retention    CKD (chronic kidney disease), stage III (HCC)    Hyponatremia    Diffuse pain in left lower extremity    [facility]per[facility] disease) ([facility])       Past Surgical History:   Procedure Laterality Date    EPIDURAL BLOCK INJECTION N/A 1/23/2017    Procedure: BLOCK / INJECTION EPIDURAL STEROID LUMBAR;  Surgeon: Cira Pagan MD;  [facility];  Service:    [facility] W/DCMPRSN NRV ROOT 1 INTRSPC LUMBR Bilateral 1/23/2017    Procedure: MIS L2-3 & L3-4 LAMINAL FORAMINOTOMIES AND MICRODISCECTOMY W LEFT SIDED APPROACH ;  Surgeon: Ileana Bella MD;  [facility];  Service: Neurosurgery       Family History   Problem Relation Age of Onset    Heart disease Sister        Social History     Social History    Marital status: Single     Spouse name: N/A    Number of children: N/A    Years of education: N/A     Occupational History    Not on file       Social History Main Topics    Smoking status: Never Smoker    Smokeless tobacco: Never Used    Alcohol use Yes      Comment: social    Drug use: No    Sexual activity: Not on file     Other Topics Concern    Not on file     Social History Narrative    No narrative on file       No Known Allergies      Current Outpatient Prescriptions:     acetaminophen (TYLENOL) 325 mg tablet, Take 650 mg by mouth every 6 (six) hours as needed for mild pain, Disp: , Rfl:     amLODIPine (NORVASC) 2 5 mg tablet, Take 2 tablets (5 mg total) by mouth daily, Disp: , Rfl: 0    atorvastatin (LIPITOR) 10 mg tablet, Take 10 mg by mouth daily, Disp: , Rfl:     ciprofloxacin (CIPRO) 500 mg tablet, TAKE 1 TABLET BY MOUTH EVERY 12 HOURS FOR 7 DAYS, Disp: , Rfl: 0    gabapentin (NEURONTIN) 300 mg capsule, Take 300 mg by mouth 2 (two) times a day, Disp: , Rfl:     lisinopril (ZESTRIL) 20 mg tablet, Take 20 mg by mouth 2 (two) times a day , Disp: , Rfl:     tamsulosin (FLOMAX) 0 4 mg, , Disp: , Rfl:

## 2018-10-22 ENCOUNTER — OFFICE VISIT (OUTPATIENT)
Dept: VASCULAR SURGERY | Facility: CLINIC | Age: 63
End: 2018-10-22
Payer: COMMERCIAL

## 2018-10-22 VITALS
DIASTOLIC BLOOD PRESSURE: 78 MMHG | HEIGHT: 66 IN | TEMPERATURE: 97.9 F | WEIGHT: 146 LBS | SYSTOLIC BLOOD PRESSURE: 142 MMHG | BODY MASS INDEX: 23.46 KG/M2

## 2018-10-22 DIAGNOSIS — N18.30 CKD (CHRONIC KIDNEY DISEASE), STAGE III (HCC): ICD-10-CM

## 2018-10-22 DIAGNOSIS — M79.605 DIFFUSE PAIN IN LEFT LOWER EXTREMITY: ICD-10-CM

## 2018-10-22 DIAGNOSIS — I73.9 PAD (PERIPHERAL ARTERY DISEASE) (HCC): Primary | ICD-10-CM

## 2018-10-22 DIAGNOSIS — I10 ESSENTIAL HYPERTENSION: ICD-10-CM

## 2018-10-22 DIAGNOSIS — M48.062 LUMBAR STENOSIS WITH NEUROGENIC CLAUDICATION: Chronic | ICD-10-CM

## 2018-10-22 PROCEDURE — 99205 OFFICE O/P NEW HI 60 MIN: CPT | Performed by: SURGERY

## 2018-10-22 RX ORDER — TAMSULOSIN HYDROCHLORIDE 0.4 MG/1
0.4 CAPSULE ORAL
COMMUNITY
Start: 2018-10-16

## 2018-10-22 NOTE — PATIENT INSTRUCTIONS
1) PAD  -based on my exam, I suspect you have blockages in the arteries in the legs  -we will get an ultrasound to evaluate this  -I do not think these blockages are the cause of your pain    2) chronic back pain and left leg pain  -please call your spine doctor to make an appointment to discuss recurrence of your symptoms    3) CKD/HTN  -continue to followup with your kidney doctor for your kidney disease and high blood pressure

## 2018-11-01 ENCOUNTER — HOSPITAL ENCOUNTER (OUTPATIENT)
Dept: NON INVASIVE DIAGNOSTICS | Facility: CLINIC | Age: 63
Discharge: HOME | End: 2018-11-01
Payer: COMMERCIAL

## 2018-11-01 DIAGNOSIS — I73.9 PAD (PERIPHERAL ARTERY DISEASE) (HCC): ICD-10-CM

## 2018-11-01 PROCEDURE — 93922 UPR/L XTREMITY ART 2 LEVELS: CPT | Performed by: SURGERY

## 2018-11-01 PROCEDURE — 93925 LOWER EXTREMITY STUDY: CPT | Performed by: SURGERY

## 2018-11-01 PROCEDURE — 93923 UPR/LXTR ART STDY 3+ LVLS: CPT

## 2018-11-01 PROCEDURE — 93925 LOWER EXTREMITY STUDY: CPT

## 2018-11-19 ENCOUNTER — TELEPHONE (OUTPATIENT)
Dept: NEPHROLOGY | Facility: CLINIC | Age: 63
End: 2018-11-19

## 2018-11-20 ENCOUNTER — TELEPHONE (OUTPATIENT)
Dept: NEUROSURGERY | Facility: CLINIC | Age: 63
End: 2018-11-20

## 2018-11-20 ENCOUNTER — TELEPHONE (OUTPATIENT)
Dept: NEPHROLOGY | Facility: CLINIC | Age: 63
End: 2018-11-20

## 2018-11-20 NOTE — TELEPHONE ENCOUNTER
Patient s/p surgery by Dr Brodie Fletcher in Jan 2017, called stating that he has been experiencing some pain in his low back which radiates into his bilateral legs  States that this started a little while ago  He takes Gabapentin which does provide him with a little relief  Patient had recent venous duplex and L-spine MRI completed  Scheduled patient an appt with Dr Brodie Fletcher (ok per Dr Brodie Fletchre) on 12/18/18  He was appreciative

## 2018-11-20 NOTE — TELEPHONE ENCOUNTER
I spoke to the patient, he was referring to the doppler on his legs  I let him know he has to contact Vascular to discuss the results  I gave him the phone number

## 2018-11-20 NOTE — TELEPHONE ENCOUNTER
----- Message from [address] sent at 11/19/2018  7:17 PM EST -----  Hi! He has not had any recent renal U/S to review  He has had recent dopplers on his lower extremities ordered by vascular  He would need to call Dr Anand Fox Doctor for review    Thanks,  Zenia Mercer

## 2018-11-26 PROBLEM — Z12.11 SCREENING FOR COLON CANCER: Status: ACTIVE | Noted: 2018-11-26

## 2018-12-18 ENCOUNTER — OFFICE VISIT (OUTPATIENT)
Dept: NEUROSURGERY | Facility: CLINIC | Age: 63
End: 2018-12-18
Payer: COMMERCIAL

## 2018-12-18 VITALS
HEIGHT: 66 IN | HEART RATE: 90 BPM | BODY MASS INDEX: 24.33 KG/M2 | WEIGHT: 151.4 LBS | RESPIRATION RATE: 14 BRPM | DIASTOLIC BLOOD PRESSURE: 77 MMHG | TEMPERATURE: 98.3 F | SYSTOLIC BLOOD PRESSURE: 127 MMHG

## 2018-12-18 DIAGNOSIS — Z98.890 STATUS POST LUMBAR DISCECTOMY: ICD-10-CM

## 2018-12-18 DIAGNOSIS — M54.16 LUMBAR RADICULOPATHY: Primary | ICD-10-CM

## 2018-12-18 PROCEDURE — 99213 OFFICE O/P EST LOW 20 MIN: CPT | Performed by: NEUROLOGICAL SURGERY

## 2018-12-18 NOTE — PROGRESS NOTES
Office Note - Neurosurgery   Kitty Chavis 70 y o  male MRN: [id number]      Assessment:    Patient is gradually improving  59-year-old gentleman nearly 2 years post minimally invasive lumbar decompression  He has left lumbar radiculopathy which seems to be improving now that he is more active  He is not interested any further surgical intervention  He will follow up through this office on a p r n  basis  History, physical examination and diagnostic tests were reviewed and questions answered  Diagnosis, care plan and treatment options were discussed  The patient understand instructions and will follow up as directed  Plan:    Follow-up: prn    Problem List Items Addressed This Visit        Nervous and Auditory    Lumbar radiculopathy - Primary       Other    Status post lumbar discectomy          Subjective/Objective     Chief Complaint    Left leg pain  HPI    Pleasant 59-year-old gentleman who underwent minimally invasive left L2-3 and L3-4 decompression approximately 2 years ago  He has sustained improvement in his standing and walking tolerance and pain in his legs  Over the summer, he had a number of syncopal episodes at work likely secondary to the heat  As such he stopped working and then began to notice left-sided leg pain mostly in the morning  This is improved over the past 2-3 weeks with his new job which is much lighter duty  He is quite comfortable if he takes gabapentin regularly in addition to Tylenol  He denies any pain, weakness or numbness in his right leg  He denies any weakness or numbness in the left leg or difficulties with bowel bladder function or changing perineal sensation  The pain really occurs from his left knee into his left anterior shin  He is pleased with his progress and not interested in any surgical intervention  ROS  ROS personally reviewed  Review of Systems   Constitutional: Negative  HENT: Negative      Eyes: Positive for visual disturbance (right eye )  Respiratory: Negative  Cardiovascular: Negative  Gastrointestinal: Negative  Endocrine: Negative  Genitourinary: Negative  Musculoskeletal: Positive for back pain (left side lower back radiates into left buttock, left hip, and down left leg )  Skin: Negative  Allergic/Immunologic: Negative  Neurological: Negative  Hematological: Negative  Psychiatric/Behavioral: Negative  Family History    Family History   Problem Relation Age of Onset    Heart disease Sister        Social History    Social History     Social History    Marital status: Single     Spouse name: N/A    Number of children: N/A    Years of education: N/A     Occupational History    Not on file       Social History Main Topics    Smoking status: Never Smoker    Smokeless tobacco: Never Used    Alcohol use Yes      Comment: social    Drug use: No    Sexual activity: Not on file     Other Topics Concern    Not on file     Social History Narrative    No narrative on file       Past Medical History    Past Medical History:   Diagnosis Date    Aortic aneurysm ([address] )     Arthritis     Atypical chest pain     Back pain     Chronic kidney disease     stg 3    Elevated ALT measurement     Elevated AST (SGOT)     Hyperlipidemia     Hypertension     Leg pain     Neurogenic claudication     Urinary retention     Urinary retention        Surgical History    Past Surgical History:   Procedure Laterality Date    EPIDURAL BLOCK INJECTION N/A 1/23/2017    Procedure: BLOCK / INJECTION EPIDURAL STEROID LUMBAR;  Surgeon: Ileana Bella MD;  [facility];  Service:    [facility] SMITH INCL W/DCMPRSN NRV ROOT 1 INTRSPC LUMBR Bilateral 1/23/2017    Procedure: MIS L2-3 & L3-4 LAMINAL FORAMINOTOMIES AND MICRODISCECTOMY W LEFT SIDED APPROACH ;  Surgeon: Ileana Bella MD;  [facility];  Service: Neurosurgery       Medications      Current Outpatient Prescriptions:    acetaminophen (TYLENOL) 325 mg tablet, Take 650 mg by mouth every 6 (six) hours as needed for mild pain, Disp: , Rfl:     amLODIPine (NORVASC) 2 5 mg tablet, Take 2 tablets (5 mg total) by mouth daily, Disp: , Rfl: 0    atorvastatin (LIPITOR) 10 mg tablet, Take 10 mg by mouth daily, Disp: , Rfl:     gabapentin (NEURONTIN) 300 mg capsule, Take 300 mg by mouth 2 (two) times a day, Disp: , Rfl:     lisinopril (ZESTRIL) 20 mg tablet, Take 20 mg by mouth 2 (two) times a day , Disp: , Rfl:     tamsulosin (FLOMAX) 0 4 mg, Take 0 4 mg by mouth daily with dinner  , Disp: , Rfl:     Allergies    No Known Allergies    The following portions of the patient's history were reviewed and updated as appropriate: allergies, current medications, past family history, past medical history, past social history, past surgical history and problem list     Investigations    I personally reviewed the MRI results with the patient:    MRI of the lumbar spine without contrast dated September 2nd, 2018  Normal lumbar lordosis  Multiple levels of degenerative disc disease and facet arthropathy  Previous left-sided laminotomy at L2-3 and L3-4  Moderate central canal stenosis at L2-3 and L3-4 more so on the right than on the left  Bilateral lateral recess stenosis  Milder stenosis at L4-5  Intrathecal contents unremarkable  Physical Exam    Vitals:  Blood pressure 127/77, pulse 90, temperature 98 3 °F (36 8 °C), resp  rate 14, height 5' 6" (1 676 m), weight 68 7 kg (151 lb 6 4 oz)  ,Body mass index is 24 44 kg/m²  Physical Exam   Constitutional: He appears well-developed and well-nourished  No distress  Musculoskeletal:   Full range of motion on flexion-extension lumbar spine without pain  Neurological:   Full power in lower extremities  Walks with normal gait  Reports normal light touch sensation lower extremities  Straight leg raise negative bilaterally  Skin: Skin is warm and dry     Psychiatric: He has a normal mood and affect  His behavior is normal    Vitals reviewed      Neurologic Exam

## 2018-12-18 NOTE — LETTER
December 18, 2018     Dannie Melgar [address] [facility]    Patient: Otf Marcus   YOB: 1955   Date of Visit: 12/18/2018       Dear Dr Castro Webster: Thank you for referring Otf Marcus to me for evaluation  Below are my notes for this consultation  If you have questions, please do not hesitate to call me  I look forward to following your patient along with you  Sincerely,        Mihir Haywood MD        CC: No Recipients  Mihir Haywood MD  12/18/2018  2:15 PM  Sign at close encounter  Office Note - Neurosurgery   Otf Marcus 70 y o  male MRN: [id number]      Assessment:    Patient is gradually improving  69-year-old gentleman nearly 2 years post minimally invasive lumbar decompression  He has left lumbar radiculopathy which seems to be improving now that he is more active  He is not interested any further surgical intervention  He will follow up through this office on a p r n  basis  History, physical examination and diagnostic tests were reviewed and questions answered  Diagnosis, care plan and treatment options were discussed  The patient understand instructions and will follow up as directed  Plan:    Follow-up: prn    Problem List Items Addressed This Visit        Nervous and Auditory    Lumbar radiculopathy - Primary       Other    Status post lumbar discectomy          Subjective/Objective     Chief Complaint    Left leg pain  HPI    Pleasant 69-year-old gentleman who underwent minimally invasive left L2-3 and L3-4 decompression approximately 2 years ago  He has sustained improvement in his standing and walking tolerance and pain in his legs  Over the summer, he had a number of syncopal episodes at work likely secondary to the heat  As such he stopped working and then began to notice left-sided leg pain mostly in the morning  This is improved over the past 2-3 weeks with his new job which is much lighter duty    He is quite comfortable if he takes gabapentin regularly in addition to Tylenol  He denies any pain, weakness or numbness in his right leg  He denies any weakness or numbness in the left leg or difficulties with bowel bladder function or changing perineal sensation  The pain really occurs from his left knee into his left anterior shin  He is pleased with his progress and not interested in any surgical intervention  DOLORES BERNAL personally reviewed  Review of Systems   Constitutional: Negative  HENT: Negative  Eyes: Positive for visual disturbance (right eye )  Respiratory: Negative  Cardiovascular: Negative  Gastrointestinal: Negative  Endocrine: Negative  Genitourinary: Negative  Musculoskeletal: Positive for back pain (left side lower back radiates into left buttock, left hip, and down left leg )  Skin: Negative  Allergic/Immunologic: Negative  Neurological: Negative  Hematological: Negative  Psychiatric/Behavioral: Negative  Family History    Family History   Problem Relation Age of Onset    Heart disease Sister        Social History    Social History     Social History    Marital status: Single     Spouse name: N/A    Number of children: N/A    Years of education: N/A     Occupational History    Not on file       Social History Main Topics    Smoking status: Never Smoker    Smokeless tobacco: Never Used    Alcohol use Yes      Comment: social    Drug use: No    Sexual activity: Not on file     Other Topics Concern    Not on file     Social History Narrative    No narrative on file       Past Medical History    Past Medical History:   Diagnosis Date    Aortic aneurysm ([address] )     Arthritis     Atypical chest pain     Back pain     Chronic kidney disease     stg 3    Elevated ALT measurement     Elevated AST (SGOT)     Hyperlipidemia     Hypertension     Leg pain     Neurogenic claudication     Urinary retention     Urinary retention Surgical History    Past Surgical History:   Procedure Laterality Date    EPIDURAL BLOCK INJECTION N/A 1/23/2017    Procedure: BLOCK / INJECTION EPIDURAL STEROID LUMBAR;  Surgeon: Camilo Duane, MD;  [facility];  Service:    [facility]per SMITH INCL W/DCMPRSN NRV ROOT 1 INTRSPC LUMBR Bilateral 1/23/2017    Procedure: MIS L2-3 & L3-4 LAMINAL FORAMINOTOMIES AND MICRODISCECTOMY W LEFT SIDED APPROACH ;  Surgeon: Camilo Duane, MD;  [facility];  Service: Neurosurgery       Medications      Current Outpatient Prescriptions:     acetaminophen (TYLENOL) 325 mg tablet, Take 650 mg by mouth every 6 (six) hours as needed for mild pain, Disp: , Rfl:     amLODIPine (NORVASC) 2 5 mg tablet, Take 2 tablets (5 mg total) by mouth daily, Disp: , Rfl: 0    atorvastatin (LIPITOR) 10 mg tablet, Take 10 mg by mouth daily, Disp: , Rfl:     gabapentin (NEURONTIN) 300 mg capsule, Take 300 mg by mouth 2 (two) times a day, Disp: , Rfl:     lisinopril (ZESTRIL) 20 mg tablet, Take 20 mg by mouth 2 (two) times a day , Disp: , Rfl:     tamsulosin (FLOMAX) 0 4 mg, Take 0 4 mg by mouth daily with dinner  , Disp: , Rfl:     Allergies    No Known Allergies    The following portions of the patient's history were reviewed and updated as appropriate: allergies, current medications, past family history, past medical history, past social history, past surgical history and problem list     Investigations    I personally reviewed the MRI results with the patient:    MRI of the lumbar spine without contrast dated September 2nd, 2018  Normal lumbar lordosis  Multiple levels of degenerative disc disease and facet arthropathy  Previous left-sided laminotomy at L2-3 and L3-4  Moderate central canal stenosis at L2-3 and L3-4 more so on the right than on the left  Bilateral lateral recess stenosis  Milder stenosis at L4-5  Intrathecal contents unremarkable      Physical Exam    Vitals:  Blood pressure 127/77, pulse 90, temperature 98 3 °F (36 8 °C), resp  rate 14, height 5' 6" (1 676 m), weight 68 7 kg (151 lb 6 4 oz)  ,Body mass index is 24 44 kg/m²  Physical Exam   Constitutional: He appears well-developed and well-nourished  No distress  Musculoskeletal:   Full range of motion on flexion-extension lumbar spine without pain  Neurological:   Full power in lower extremities  Walks with normal gait  Reports normal light touch sensation lower extremities  Straight leg raise negative bilaterally  Skin: Skin is warm and dry  Psychiatric: He has a normal mood and affect  His behavior is normal    Vitals reviewed      Neurologic Exam

## 2018-12-20 ENCOUNTER — APPOINTMENT (EMERGENCY)
Dept: RADIOLOGY | Facility: HOSPITAL | Age: 63
End: 2018-12-20
Payer: COMMERCIAL

## 2018-12-20 ENCOUNTER — HOSPITAL ENCOUNTER (EMERGENCY)
Facility: HOSPITAL | Age: 63
Discharge: HOME | End: 2018-12-20
Attending: EMERGENCY MEDICINE | Admitting: EMERGENCY MEDICINE
Payer: COMMERCIAL

## 2018-12-20 VITALS
HEART RATE: 87 BPM | OXYGEN SATURATION: 98 % | WEIGHT: 146 LBS | SYSTOLIC BLOOD PRESSURE: 117 MMHG | HEIGHT: 66 IN | DIASTOLIC BLOOD PRESSURE: 65 MMHG | BODY MASS INDEX: 23.46 KG/M2 | TEMPERATURE: 98.7 F | RESPIRATION RATE: 20 BRPM

## 2018-12-20 DIAGNOSIS — R55 SYNCOPE: Primary | ICD-10-CM

## 2018-12-20 DIAGNOSIS — I95.1 ORTHOSTATIC HYPOTENSION: ICD-10-CM

## 2018-12-20 LAB
ANION GAP SERPL CALCULATED.3IONS-SCNC: 11 MMOL/L (ref 4–13)
ATRIAL RATE: 76 BPM
BASOPHILS # BLD AUTO: 0.05 THOUSANDS/ΜL (ref 0–0.1)
BASOPHILS NFR BLD AUTO: 1 % (ref 0–1)
BUN SERPL-MCNC: 16 MG/DL (ref 5–25)
CALCIUM SERPL-MCNC: 9 MG/DL (ref 8.3–10.1)
CHLORIDE SERPL-SCNC: 109 MMOL/L (ref 100–108)
CO2 SERPL-SCNC: 20 MMOL/L (ref 21–32)
CREAT SERPL-MCNC: 1.74 MG/DL (ref 0.6–1.3)
EOSINOPHIL # BLD AUTO: 0.16 THOUSAND/ΜL (ref 0–0.61)
EOSINOPHIL NFR BLD AUTO: 4 % (ref 0–6)
ERYTHROCYTE [DISTWIDTH] IN BLOOD BY AUTOMATED COUNT: 15.3 % (ref 11.6–15.1)
GFR SERPL CREATININE-BSD FRML MDRD: 47 ML/MIN/1.73SQ M
GLUCOSE SERPL-MCNC: 120 MG/DL (ref 65–140)
HCT VFR BLD AUTO: 36.6 % (ref 36.5–49.3)
HGB BLD-MCNC: 11.1 G/DL (ref 12–17)
IMM GRANULOCYTES # BLD AUTO: 0 THOUSAND/UL (ref 0–0.2)
IMM GRANULOCYTES NFR BLD AUTO: 0 % (ref 0–2)
LYMPHOCYTES # BLD AUTO: 1.93 THOUSANDS/ΜL (ref 0.6–4.47)
LYMPHOCYTES NFR BLD AUTO: 46 % (ref 14–44)
MCH RBC QN AUTO: 27.2 PG (ref 26.8–34.3)
MCHC RBC AUTO-ENTMCNC: 30.3 G/DL (ref 31.4–37.4)
MCV RBC AUTO: 90 FL (ref 82–98)
MONOCYTES # BLD AUTO: 0.64 THOUSAND/ΜL (ref 0.17–1.22)
MONOCYTES NFR BLD AUTO: 15 % (ref 4–12)
NEUTROPHILS # BLD AUTO: 1.4 THOUSANDS/ΜL (ref 1.85–7.62)
NEUTS SEG NFR BLD AUTO: 34 % (ref 43–75)
NRBC BLD AUTO-RTO: 0 /100 WBCS
P AXIS: 60 DEGREES
PLATELET # BLD AUTO: 307 THOUSANDS/UL (ref 149–390)
PMV BLD AUTO: 10.5 FL (ref 8.9–12.7)
POTASSIUM SERPL-SCNC: 3.5 MMOL/L (ref 3.5–5.3)
PR INTERVAL: 122 MS
QRS AXIS: 48 DEGREES
QRSD INTERVAL: 76 MS
QT INTERVAL: 370 MS
QTC INTERVAL: 416 MS
RBC # BLD AUTO: 4.08 MILLION/UL (ref 3.88–5.62)
SODIUM SERPL-SCNC: 140 MMOL/L (ref 136–145)
T WAVE AXIS: 80 DEGREES
TROPONIN I SERPL-MCNC: <0.02 NG/ML
VENTRICULAR RATE: 76 BPM
WBC # BLD AUTO: 4.18 THOUSAND/UL (ref 4.31–10.16)

## 2018-12-20 PROCEDURE — 36415 COLL VENOUS BLD VENIPUNCTURE: CPT | Performed by: EMERGENCY MEDICINE

## 2018-12-20 PROCEDURE — 93005 ELECTROCARDIOGRAM TRACING: CPT

## 2018-12-20 PROCEDURE — 93010 ELECTROCARDIOGRAM REPORT: CPT | Performed by: INTERNAL MEDICINE

## 2018-12-20 PROCEDURE — 84484 ASSAY OF TROPONIN QUANT: CPT | Performed by: EMERGENCY MEDICINE

## 2018-12-20 PROCEDURE — 96360 HYDRATION IV INFUSION INIT: CPT

## 2018-12-20 PROCEDURE — 85025 COMPLETE CBC W/AUTO DIFF WBC: CPT | Performed by: EMERGENCY MEDICINE

## 2018-12-20 PROCEDURE — 71046 X-RAY EXAM CHEST 2 VIEWS: CPT

## 2018-12-20 PROCEDURE — 80048 BASIC METABOLIC PNL TOTAL CA: CPT | Performed by: EMERGENCY MEDICINE

## 2018-12-20 PROCEDURE — 99284 EMERGENCY DEPT VISIT MOD MDM: CPT

## 2018-12-20 RX ORDER — 0.9 % SODIUM CHLORIDE 0.9 %
3 VIAL (ML) INJECTION AS NEEDED
Status: DISCONTINUED | OUTPATIENT
Start: 2018-12-20 | End: 2018-12-20 | Stop reason: HOSPADM

## 2018-12-20 RX ADMIN — SODIUM CHLORIDE 1000 ML: 0.9 INJECTION, SOLUTION INTRAVENOUS at 15:03

## 2018-12-20 NOTE — ED ATTENDING ATTESTATION
I, David Jarvis DO, saw and evaluated the patient  I have discussed the patient with the resident/non-physician practitioner and agree with the resident's/non-physician practitioner's findings, Plan of Care, and MDM as documented in the resident's/non-physician practitioner's note, except where noted  All available labs and Radiology studies were reviewed  At this point I agree with the current assessment done in the Emergency Department  I have conducted an independent evaluation of this patient a history and physical is as follows:      Critical Care Time  CritCare Time    Procedures     70 yr male to the ED for syncope at work  Pt with syncope hx, fainting 6-7 times and worked up  Conclusion that sx from orthostatic hypotension  Couple of months ago HTN meds stopped by cardiology, and recently restarted by nephrology  Did not eat this am, went to party at work and was standing and eating, became [address] and passed out  Prior episodes at work, in very hot conditions in the kitchen and after working for a while  Exm: pressure good  Heart: rrr  Lungs: cta  EKG: no acute changes  Ox good  Suspect orthostatic sync from lisinopril  Will do lab screen and dw nephro to change lisinopril

## 2018-12-20 NOTE — ED PROVIDER NOTES
History  Chief Complaint   Patient presents with    Syncope     Per EMS pt had an episode of syncope  Pt was at work when this happened  Pt reports negative thinners and negative head strike  Pt initially didn't want to come in  After syncope pt had one episode of vomiting  Pt was alert and oriented for EMS and at this time  69 y/o male, hx of HTN, HLD, CKD, and urinary retention- needs to straight cath 4x/day, presents to the ED for syncope  Patient states that he has a hx of syncope, 6-7x in the past  Has had extensive workup and states that it was from orthostatic hypotension  A few months ago HTN meds were stopped by cardiology but then restarted by nephrology recently  He states that he did not eat this morning, did errands, and went to work  He states that he was standing and eating but then became lightheaded and passed out  He denies any cp, sob, headache, abd pain, or n/t/w of his extremities  Patient states that the episodes in the past were all at work, in hot conditions, after standing for a while  History provided by:  Patient  Syncope   Episode history:  Single  Most recent episode: Today  Progression:  Resolved  Chronicity:  Recurrent  Context: standing up    Witnessed: yes    Ineffective treatments:  None tried  Associated symptoms: no chest pain, no confusion, no fever, no headaches, no nausea, no palpitations, no shortness of breath, no vomiting and no weakness          Prior to Admission Medications   Prescriptions Last Dose Informant Patient Reported?  Taking?   acetaminophen (TYLENOL) 325 mg tablet  Self Yes Yes   Sig: Take 650 mg by mouth every 6 (six) hours as needed for mild pain   amLODIPine (NORVASC) 2 5 mg tablet   No Yes   Sig: Take 2 tablets (5 mg total) by mouth daily   atorvastatin (LIPITOR) 10 mg tablet  Self Yes Yes   Sig: Take 10 mg by mouth daily   gabapentin (NEURONTIN) 300 mg capsule  Self Yes Yes   Sig: Take 300 mg by mouth 2 (two) times a day   lisinopril (ZESTRIL) 20 mg tablet  Self Yes Yes   Sig: Take 20 mg by mouth 2 (two) times a day    tamsulosin (FLOMAX) 0 4 mg   Yes Yes   Sig: Take 0 4 mg by mouth daily with dinner        Facility-Administered Medications: None       Past Medical History:   Diagnosis Date    Aortic aneurysm (HCC)     Arthritis     Atypical chest pain     Back pain     Chronic kidney disease     stg 3    Elevated ALT measurement     Elevated AST (SGOT)     Hyperlipidemia     Hypertension     Leg pain     Neurogenic claudication     Urinary retention     Urinary retention        Past Surgical History:   Procedure Laterality Date    EPIDURAL BLOCK INJECTION N/A 1/23/2017    Procedure: BLOCK / INJECTION EPIDURAL STEROID LUMBAR;  Surgeon: Honey Rice MD;  [facility];  Service:    Dorla Halo LAMNOTMY INCL W/DCMPRSN NRV ROOT 1 INTRSPC LUMBR Bilateral 1/23/2017    Procedure: MIS L2-3 & L3-4 LAMINAL FORAMINOTOMIES AND MICRODISCECTOMY W LEFT SIDED APPROACH ;  Surgeon: Honey Rice MD;  [facility];  Service: Neurosurgery       Family History   Problem Relation Age of Onset    Heart disease Sister      I have reviewed and agree with the history as documented  Social History   Substance Use Topics    Smoking status: Never Smoker    Smokeless tobacco: Never Used    Alcohol use Yes      Comment: social        Review of Systems   Constitutional: Negative for chills and fever  HENT: Negative for congestion, ear pain and sore throat  Eyes: Negative for pain and visual disturbance  Respiratory: Negative for cough, shortness of breath and wheezing  Cardiovascular: Positive for syncope  Negative for chest pain, palpitations and leg swelling  Gastrointestinal: Negative for abdominal pain, diarrhea, nausea and vomiting  Genitourinary: Negative for dysuria, frequency, hematuria and urgency  Musculoskeletal: Negative for neck pain and neck stiffness  Skin: Negative for rash and wound     Neurological: Positive for syncope  Negative for weakness, numbness and headaches  Psychiatric/Behavioral: Negative for agitation and confusion  All other systems reviewed and are negative  Physical Exam  ED Triage Vitals   Temperature Pulse Respirations Blood Pressure SpO2   12/20/18 1321 12/20/18 1316 12/20/18 1316 12/20/18 1319 12/20/18 1316   98 7 °F (37 1 °C) 80 18 131/67 100 %      Temp Source Heart Rate Source Patient Position - Orthostatic VS BP Location FiO2 (%)   12/20/18 1321 12/20/18 1430 12/20/18 1504 12/20/18 1504 --   Oral Monitor Lying - Orthostatic VS Right arm       Pain Score       12/20/18 1316       No Pain           Orthostatic Vital Signs  Vitals:    12/20/18 1430 12/20/18 1504 12/20/18 1506 12/20/18 1508   BP: 145/70 153/77 140/76 117/65   Pulse: 63 65 66 87   Patient Position - Orthostatic VS:  Lying - Orthostatic VS Sitting - Orthostatic VS Standing - Orthostatic VS       Physical Exam   Constitutional: He is oriented to person, place, and time  He appears well-developed and well-nourished  HENT:   Head: Normocephalic and atraumatic  Mouth/Throat: Oropharynx is clear and moist    Eyes: Pupils are equal, round, and reactive to light  EOM are normal    Neck: Normal range of motion  Neck supple  Cardiovascular: Normal rate and regular rhythm  Pulmonary/Chest: Effort normal and breath sounds normal  No respiratory distress  He has no wheezes  He has no rales  Abdominal: Soft  Bowel sounds are normal  He exhibits no distension  There is no tenderness  Musculoskeletal: Normal range of motion  He exhibits no edema  Neurological: He is alert and oriented to person, place, and time  No focal deficits   Skin: Skin is warm and dry  Nursing note and vitals reviewed        ED Medications  Medications   sodium chloride (PF) 0 9 % injection 3 mL (not administered)   sodium chloride 0 9 % bolus 1,000 mL (0 mL Intravenous Stopped 12/20/18 1549)       Diagnostic Studies  Results Reviewed     Procedure Component Value Units Date/Time    [place] draw [[id number]] Collected:  12/20/18 1350    Lab Status:  Final result Specimen:  Blood Updated:  12/20/18 3207    Narrative: The following orders were created for panel order [place] draw  Procedure                               Abnormality         Status                     ---------                               -----------         ------                     Margoth Boom Top on KUAP[[id number]]                           Final result               Gold top on DTLW[[id number]]                                 Final result               Lavender Top 7ml on OXPC[[id number]]                         Final result                 Please view results for these tests on the individual orders  Troponin I [[id number]]  (Normal) Collected:  12/20/18 1350    Lab Status:  Final result Specimen:  Blood from Hand, Left Updated:  12/20/18 1420     Troponin I <0 02 ng/mL     Basic metabolic panel [[id number]]  (Abnormal) Collected:  12/20/18 1350    Lab Status:  Final result Specimen:  Blood from Hand, Left Updated:  12/20/18 1418     Sodium 140 mmol/L      Potassium 3 5 mmol/L      Chloride 109 (H) mmol/L      CO2 20 (L) mmol/L      ANION GAP 11 mmol/L      BUN 16 mg/dL      Creatinine 1 74 (H) mg/dL      Glucose 120 mg/dL      Calcium 9 0 mg/dL      eGFR 47 ml/min/1 73sq m     Narrative:         National Kidney Disease Education Program recommendations are as follows:  GFR calculation is accurate only with a steady state creatinine  Chronic Kidney disease less than 60 ml/min/1 73 sq  meters  Kidney failure less than 15 ml/min/1 73 sq  meters      CBC and differential [[id number]]  (Abnormal) Collected:  12/20/18 1350    Lab Status:  Final result Specimen:  Blood from Hand, Left Updated:  12/20/18 1400     WBC 4 18 (L) Thousand/uL      RBC 4 08 Million/uL      Hemoglobin 11 1 (L) g/dL      Hematocrit 36 6 %      MCV 90 fL      MCH 27 2 pg      MCHC 30 3 (L) g/dL      RDW 15 3 (H) % MPV 10 5 fL      Platelets 271 Thousands/uL      nRBC 0 /100 WBCs      Neutrophils Relative 34 (L) %      Immat GRANS % 0 %      Lymphocytes Relative 46 (H) %      Monocytes Relative 15 (H) %      Eosinophils Relative 4 %      Basophils Relative 1 %      Neutrophils Absolute 1 40 (L) Thousands/µL      Immature Grans Absolute 0 00 Thousand/uL      Lymphocytes Absolute 1 93 Thousands/µL      Monocytes Absolute 0 64 Thousand/µL      Eosinophils Absolute 0 16 Thousand/µL      Basophils Absolute 0 05 Thousands/µL                  XR chest 2 views   ED Interpretation by DO Faby (12/20 1541)   NAP            Procedures  ECG 12 Lead Documentation  Date/Time: 12/20/2018 3:35 PM  Performed by: Jaydon Hernandez by: Rayray Miller     Indications / Diagnosis:  Syncope   Patient location:  ED  Previous ECG:     Previous ECG:  Compared to current    Comparison ECG info:  7/11/18    Similarity:  No change  Rate:     ECG rate:  76    ECG rate assessment: normal    Rhythm:     Rhythm: sinus rhythm    Ectopy:     Ectopy: none    QRS:     QRS axis:  Normal    QRS intervals:  Normal  ST segments:     ST segments:  Normal  T waves:     T waves: normal            Phone Consults  ED Phone Contact    ED Course  ED Course as of Dec 20 1610   Thu Dec 20, 2018   1418 Spoke with patient's nephrologist- recommends stopping lisinopril and taking amlodipine 5mg if BP is >140/90             Identification of Seniors at Risk      Most Recent Value   (ISAR) Identification of Seniors at Risk   Before the illness or injury that brought you to the Emergency, did you need someone to help you on a regular basis? 0 Filed at: 12/20/2018 1318   In the last 24 hours, have you needed more help than usual?  0 Filed at: 12/20/2018 1318   Have you been hospitalized for one or more nights during the past 6 months?   0 Filed at: 12/20/2018 1318   In general, do you see well?  0 Filed at: 12/20/2018 1318   In general, do you have serious problems with your memory? 0 Filed at: 12/20/2018 8007   Do you take more than three different medications every day? 1 Filed at: 12/20/2018 1317   ISAR Score  1 Filed at: 12/20/2018 1318                          [facility]  Number of Diagnoses or Management Options  Orthostatic hypotension: new and requires workup  Syncope: new and requires workup  Diagnosis management comments: Patient here after syncopal episode- will do cardiac workup, ekg, trop, orthostatics, and talk to patient's nephrology about medications  Patient reevaluated and feels improved  Patient updated on results of tests  Discharge instructions given including medication instructions, follow-up, and return precautions  Patient demonstrates verbal understanding and agrees with plan  Amount and/or Complexity of Data Reviewed  Clinical lab tests: ordered and reviewed  Tests in the radiology section of CPT®: ordered and reviewed  Tests in the medicine section of CPT®: ordered and reviewed  Discussion of test results with the performing providers: yes  Decide to obtain previous medical records or to obtain history from someone other than the patient: yes  Obtain history from someone other than the patient: yes  Review and summarize past medical records: yes  Discuss the patient with other providers: yes  Independent visualization of images, tracings, or specimens: yes    Patient Progress  Patient progress: improved    CritCare Time    Disposition  Final diagnoses:   Syncope   Orthostatic hypotension     Time reflects when diagnosis was documented in both MDM as applicable and the Disposition within this note     Time User Action Codes Description Comment    12/20/2018  3:41 PM [facility] A Add [R55] Syncope     12/20/2018  3:41 PM [facility] A Add [I95 1] Orthostatic hypotension       ED Disposition     ED Disposition Condition Comment    Discharge  Zev 57 discharge to home/self care      Condition at discharge: Good        Follow-up Information     Follow up With Specialties Details Why Contact Info Additional [address], [address] Nurse Practitioner Call in 1 day for follow up within 2-3 days  Kenneth Snow [facility] Jeannine       Penny Garciaa, [address] Nephrology, Nurse Practitioner Call in 1 day for follow up within 1 week  [address]  [place] [address] Emergency Department Emergency Medicine Go to immediately for any new or worsening symptoms  [address]  [phone]  ED, [address], [place], [address]          Discharge Medication List as of 12/20/2018  3:55 PM      CONTINUE these medications which have NOT CHANGED    Details   acetaminophen (TYLENOL) 325 mg tablet Take 650 mg by mouth every 6 (six) hours as needed for mild pain, Historical Med      amLODIPine (NORVASC) 2 5 mg tablet Take 2 tablets (5 mg total) by mouth daily, Starting Thu 10/4/2018, No Print      atorvastatin (LIPITOR) 10 mg tablet Take 10 mg by mouth daily, Historical Med      gabapentin (NEURONTIN) 300 mg capsule Take 300 mg by mouth 2 (two) times a day, Historical Med      lisinopril (ZESTRIL) 20 mg tablet Take 20 mg by mouth 2 (two) times a day , Historical Med      tamsulosin (FLOMAX) 0 4 mg Take 0 4 mg by mouth daily with dinner  , Starting Tue 10/16/2018, Historical Med           No discharge procedures on file  ED Provider  Attending physically available and evaluated Sara Mcallister I managed the patient along with the ED Attending      Electronically Signed by         Hola Medrano DO  12/20/18 6864

## 2019-01-02 ENCOUNTER — APPOINTMENT (OUTPATIENT)
Dept: LAB | Facility: HOSPITAL | Age: 64
End: 2019-01-02
Payer: COMMERCIAL

## 2019-01-02 DIAGNOSIS — N18.30 CKD (CHRONIC KIDNEY DISEASE), STAGE III (HCC): ICD-10-CM

## 2019-01-02 LAB
ANION GAP SERPL CALCULATED.3IONS-SCNC: 9 MMOL/L (ref 4–13)
BASOPHILS # BLD AUTO: 0.05 THOUSANDS/ΜL (ref 0–0.1)
BASOPHILS NFR BLD AUTO: 1 % (ref 0–1)
BUN SERPL-MCNC: 24 MG/DL (ref 5–25)
CALCIUM SERPL-MCNC: 9.1 MG/DL (ref 8.3–10.1)
CHLORIDE SERPL-SCNC: 111 MMOL/L (ref 100–108)
CO2 SERPL-SCNC: 20 MMOL/L (ref 21–32)
CREAT SERPL-MCNC: 1.49 MG/DL (ref 0.6–1.3)
CREAT UR-MCNC: 262 MG/DL
EOSINOPHIL # BLD AUTO: 0.08 THOUSAND/ΜL (ref 0–0.61)
EOSINOPHIL NFR BLD AUTO: 2 % (ref 0–6)
ERYTHROCYTE [DISTWIDTH] IN BLOOD BY AUTOMATED COUNT: 14.8 % (ref 11.6–15.1)
GFR SERPL CREATININE-BSD FRML MDRD: 56 ML/MIN/1.73SQ M
GLUCOSE P FAST SERPL-MCNC: 86 MG/DL (ref 65–99)
HCT VFR BLD AUTO: 31.3 % (ref 36.5–49.3)
HGB BLD-MCNC: 9.5 G/DL (ref 12–17)
IMM GRANULOCYTES # BLD AUTO: 0.01 THOUSAND/UL (ref 0–0.2)
IMM GRANULOCYTES NFR BLD AUTO: 0 % (ref 0–2)
LYMPHOCYTES # BLD AUTO: 1.73 THOUSANDS/ΜL (ref 0.6–4.47)
LYMPHOCYTES NFR BLD AUTO: 50 % (ref 14–44)
MAGNESIUM SERPL-MCNC: 2.1 MG/DL (ref 1.6–2.6)
MCH RBC QN AUTO: 27.2 PG (ref 26.8–34.3)
MCHC RBC AUTO-ENTMCNC: 30.4 G/DL (ref 31.4–37.4)
MCV RBC AUTO: 90 FL (ref 82–98)
MONOCYTES # BLD AUTO: 0.61 THOUSAND/ΜL (ref 0.17–1.22)
MONOCYTES NFR BLD AUTO: 18 % (ref 4–12)
NEUTROPHILS # BLD AUTO: 1.01 THOUSANDS/ΜL (ref 1.85–7.62)
NEUTS SEG NFR BLD AUTO: 29 % (ref 43–75)
NRBC BLD AUTO-RTO: 0 /100 WBCS
PHOSPHATE SERPL-MCNC: 3.5 MG/DL (ref 2.3–4.1)
PLATELET # BLD AUTO: 285 THOUSANDS/UL (ref 149–390)
PMV BLD AUTO: 10.4 FL (ref 8.9–12.7)
POTASSIUM SERPL-SCNC: 4 MMOL/L (ref 3.5–5.3)
PROT UR-MCNC: 37 MG/DL
PROT/CREAT UR: 0.14 MG/G{CREAT} (ref 0–0.1)
RBC # BLD AUTO: 3.49 MILLION/UL (ref 3.88–5.62)
SODIUM SERPL-SCNC: 140 MMOL/L (ref 136–145)
WBC # BLD AUTO: 3.49 THOUSAND/UL (ref 4.31–10.16)

## 2019-01-02 PROCEDURE — 84100 ASSAY OF PHOSPHORUS: CPT

## 2019-01-02 PROCEDURE — 82570 ASSAY OF URINE CREATININE: CPT

## 2019-01-02 PROCEDURE — 36415 COLL VENOUS BLD VENIPUNCTURE: CPT

## 2019-01-02 PROCEDURE — 83735 ASSAY OF MAGNESIUM: CPT

## 2019-01-02 PROCEDURE — 80048 BASIC METABOLIC PNL TOTAL CA: CPT

## 2019-01-02 PROCEDURE — 84156 ASSAY OF PROTEIN URINE: CPT

## 2019-01-02 PROCEDURE — 85025 COMPLETE CBC W/AUTO DIFF WBC: CPT

## 2019-01-03 ENCOUNTER — TELEPHONE (OUTPATIENT)
Dept: NEPHROLOGY | Facility: HOSPITAL | Age: 64
End: 2019-01-03

## 2019-01-03 NOTE — TELEPHONE ENCOUNTER
Tried calling patient, there was no answer and I was unable to leave a message  Will try again later

## 2019-01-03 NOTE — TELEPHONE ENCOUNTER
----- Message from Jah Duarte, [address] sent at 1/2/2019  4:01 PM EST -----  Please repeat cbc with platelets to ensure stability    For HGB decreased on recent lab work  Thanks  Joe Lane

## 2019-01-04 DIAGNOSIS — D64.9 LOW HEMOGLOBIN: Primary | ICD-10-CM

## 2019-01-07 ENCOUNTER — APPOINTMENT (OUTPATIENT)
Dept: LAB | Facility: HOSPITAL | Age: 64
End: 2019-01-07
Payer: COMMERCIAL

## 2019-01-07 DIAGNOSIS — D64.9 LOW HEMOGLOBIN: ICD-10-CM

## 2019-01-07 LAB
BASOPHILS # BLD AUTO: 0.04 THOUSANDS/ΜL (ref 0–0.1)
BASOPHILS NFR BLD AUTO: 1 % (ref 0–1)
EOSINOPHIL # BLD AUTO: 0.12 THOUSAND/ΜL (ref 0–0.61)
EOSINOPHIL NFR BLD AUTO: 4 % (ref 0–6)
ERYTHROCYTE [DISTWIDTH] IN BLOOD BY AUTOMATED COUNT: 14.6 % (ref 11.6–15.1)
HCT VFR BLD AUTO: 32.3 % (ref 36.5–49.3)
HGB BLD-MCNC: 9.8 G/DL (ref 12–17)
IMM GRANULOCYTES # BLD AUTO: 0 THOUSAND/UL (ref 0–0.2)
IMM GRANULOCYTES NFR BLD AUTO: 0 % (ref 0–2)
LYMPHOCYTES # BLD AUTO: 1.64 THOUSANDS/ΜL (ref 0.6–4.47)
LYMPHOCYTES NFR BLD AUTO: 47 % (ref 14–44)
MCH RBC QN AUTO: 26.9 PG (ref 26.8–34.3)
MCHC RBC AUTO-ENTMCNC: 30.3 G/DL (ref 31.4–37.4)
MCV RBC AUTO: 89 FL (ref 82–98)
MONOCYTES # BLD AUTO: 0.58 THOUSAND/ΜL (ref 0.17–1.22)
MONOCYTES NFR BLD AUTO: 17 % (ref 4–12)
NEUTROPHILS # BLD AUTO: 1.07 THOUSANDS/ΜL (ref 1.85–7.62)
NEUTS SEG NFR BLD AUTO: 31 % (ref 43–75)
NRBC BLD AUTO-RTO: 0 /100 WBCS
PLATELET # BLD AUTO: 289 THOUSANDS/UL (ref 149–390)
PMV BLD AUTO: 10.8 FL (ref 8.9–12.7)
RBC # BLD AUTO: 3.64 MILLION/UL (ref 3.88–5.62)
WBC # BLD AUTO: 3.45 THOUSAND/UL (ref 4.31–10.16)

## 2019-01-07 PROCEDURE — 36415 COLL VENOUS BLD VENIPUNCTURE: CPT

## 2019-01-07 PROCEDURE — 85025 COMPLETE CBC W/AUTO DIFF WBC: CPT

## 2019-01-08 ENCOUNTER — HOSPITAL ENCOUNTER (OUTPATIENT)
Facility: HOSPITAL | Age: 64
Setting detail: OUTPATIENT SURGERY
Discharge: HOME | End: 2019-01-08
Attending: COLON & RECTAL SURGERY | Admitting: COLON & RECTAL SURGERY
Payer: COMMERCIAL

## 2019-01-08 ENCOUNTER — ANESTHESIA (OUTPATIENT)
Dept: GASTROENTEROLOGY | Facility: HOSPITAL | Age: 64
End: 2019-01-08
Payer: COMMERCIAL

## 2019-01-08 ENCOUNTER — ANESTHESIA EVENT (OUTPATIENT)
Dept: GASTROENTEROLOGY | Facility: HOSPITAL | Age: 64
End: 2019-01-08
Payer: COMMERCIAL

## 2019-01-08 VITALS
TEMPERATURE: 98 F | DIASTOLIC BLOOD PRESSURE: 65 MMHG | HEART RATE: 90 BPM | SYSTOLIC BLOOD PRESSURE: 115 MMHG | OXYGEN SATURATION: 100 % | RESPIRATION RATE: 16 BRPM

## 2019-01-08 DIAGNOSIS — Z12.11 SCREENING FOR COLON CANCER: ICD-10-CM

## 2019-01-08 PROCEDURE — 45385 COLONOSCOPY W/LESION REMOVAL: CPT | Performed by: COLON & RECTAL SURGERY

## 2019-01-08 PROCEDURE — 88305 TISSUE EXAM BY PATHOLOGIST: CPT | Performed by: PATHOLOGY

## 2019-01-08 PROCEDURE — 45380 COLONOSCOPY AND BIOPSY: CPT | Performed by: COLON & RECTAL SURGERY

## 2019-01-08 PROCEDURE — 99203 OFFICE O/P NEW LOW 30 MIN: CPT | Performed by: COLON & RECTAL SURGERY

## 2019-01-08 RX ORDER — HYDRALAZINE HYDROCHLORIDE 20 MG/ML
10 INJECTION INTRAMUSCULAR; INTRAVENOUS ONCE
Status: DISCONTINUED | OUTPATIENT
Start: 2019-01-08 | End: 2019-01-08

## 2019-01-08 RX ORDER — SODIUM CHLORIDE 9 MG/ML
50 INJECTION, SOLUTION INTRAVENOUS CONTINUOUS
Status: DISCONTINUED | OUTPATIENT
Start: 2019-01-08 | End: 2019-01-08 | Stop reason: HOSPADM

## 2019-01-08 RX ORDER — LIDOCAINE HYDROCHLORIDE 10 MG/ML
INJECTION, SOLUTION INFILTRATION; PERINEURAL AS NEEDED
Status: DISCONTINUED | OUTPATIENT
Start: 2019-01-08 | End: 2019-01-08 | Stop reason: SURG

## 2019-01-08 RX ORDER — SODIUM CHLORIDE 9 MG/ML
125 INJECTION, SOLUTION INTRAVENOUS CONTINUOUS
Status: DISCONTINUED | OUTPATIENT
Start: 2019-01-08 | End: 2019-01-08 | Stop reason: HOSPADM

## 2019-01-08 RX ORDER — HYDRALAZINE HYDROCHLORIDE 20 MG/ML
20 INJECTION INTRAMUSCULAR; INTRAVENOUS ONCE
Status: COMPLETED | OUTPATIENT
Start: 2019-01-08 | End: 2019-01-08

## 2019-01-08 RX ORDER — PROPOFOL 10 MG/ML
INJECTION, EMULSION INTRAVENOUS CONTINUOUS PRN
Status: DISCONTINUED | OUTPATIENT
Start: 2019-01-08 | End: 2019-01-08 | Stop reason: SURG

## 2019-01-08 RX ORDER — PROPOFOL 10 MG/ML
INJECTION, EMULSION INTRAVENOUS AS NEEDED
Status: DISCONTINUED | OUTPATIENT
Start: 2019-01-08 | End: 2019-01-08 | Stop reason: SURG

## 2019-01-08 RX ADMIN — LIDOCAINE HYDROCHLORIDE 50 MG: 10 INJECTION, SOLUTION INFILTRATION; PERINEURAL at 08:34

## 2019-01-08 RX ADMIN — PROPOFOL 120 MG: 10 INJECTION, EMULSION INTRAVENOUS at 08:34

## 2019-01-08 RX ADMIN — SODIUM CHLORIDE 50 ML/HR: 0.9 INJECTION, SOLUTION INTRAVENOUS at 08:19

## 2019-01-08 RX ADMIN — HYDRALAZINE HYDROCHLORIDE 20 MG: 20 INJECTION INTRAMUSCULAR; INTRAVENOUS at 08:19

## 2019-01-08 RX ADMIN — SODIUM CHLORIDE: 0.9 INJECTION, SOLUTION INTRAVENOUS at 08:30

## 2019-01-08 RX ADMIN — PROPOFOL 120 MCG/KG/MIN: 10 INJECTION, EMULSION INTRAVENOUS at 08:34

## 2019-01-08 NOTE — ANESTHESIA PREPROCEDURE EVALUATION
Review of Systems/Medical History  Patient summary reviewed  Chart reviewed  No history of anesthetic complications     Cardiovascular  Hyperlipidemia, Hypertension , Aortic disease (aortic aneurysm), PVD,    Pulmonary       GI/Hepatic       Chronic kidney disease stage 3,   Comment: Urinary retention and self catheterization  Endo/Other     GYN       Hematology   Musculoskeletal  Back pain , lumbar pain,   Arthritis     Neurology      Comment: History of syncope   Psychology           Physical Exam    Airway    Mallampati score: II  TM Distance: >3 FB  Neck ROM: full     Dental   upper dentures and lower dentures,     Cardiovascular  Cardiovascular exam normal    Pulmonary  Pulmonary exam normal Breath sounds clear to auscultation,     Other Findings        Anesthesia Plan  ASA Score- 3     Anesthesia Type- IV sedation with anesthesia with ASA Monitors  Additional Monitors:   Airway Plan:         Plan Factors- Patient instructed to abstain from smoking on day of procedure       Induction- intravenous  Postoperative Plan-     Informed Consent- Anesthetic plan and risks discussed with patient  I personally reviewed this patient with the CRNA  Discussed and agreed on the Anesthesia Plan with the CRNA             Lab Results   Component Value Date    WBC 3 45 (L) 01/07/2019    HGB 9 8 (L) 01/07/2019    HCT 32 3 (L) 01/07/2019    MCV 89 01/07/2019     01/07/2019     Lab Results   Component Value Date    CALCIUM 9 1 01/02/2019    K 4 0 01/02/2019    CO2 20 (L) 01/02/2019     (H) 01/02/2019    BUN 24 01/02/2019    CREATININE 1 49 (H) 01/02/2019     Lab Results   Component Value Date    INR 1 03 12/15/2016    PROTIME 13 6 12/15/2016     Lab Results   Component Value Date    PTT 27 12/15/2016     Type and Screen:  A        I, Dr Mikayla Tristan, the attending physician, have personally seen and evaluated the patient prior to anesthetic care   I have reviewed the pre-anesthetic record, and other medical records if appropriate to the anesthetic care  If a CRNA is involved in the case, I have reviewed the CRNA assessment, if present, and agree  The patient is in a suitable condition to proceed with my formulated anesthetic plan

## 2019-01-08 NOTE — OP NOTE
Kelly Conteh 70 y o  male MRN: [id number]  QTX:[birth year]  Unit/Bed#: ENDO [facility] Encounter: [id number]  01/08/19   @NOW    PCP: JOHNSON Ortiz    COLONOSCOPY    PROCEDURE: Colonoscopy/ Polypectomy (Cold Snare)    INDICATIONS: Screening for Colon Cancer    POST-OP DIAGNOSIS: See the impression below    SEDATION: Monitored anesthesia care, check anesthesia records    PHYSICAL EXAM:    BP (!) 185/96   Pulse 88   Temp 98 °F (36 7 °C) (Tympanic)   Resp 14   SpO2 100%   There is no height or weight on file to calculate BMI  General: NAD  Heart: S1 & S2 normal, RRR  Lungs: CTA, No rales or rhonchi  Abdomen: Soft, nontender, nondistended, good bowel sounds    CONSENT:  Informed consent was obtained for the procedure, including sedation after explaining the risks and benefits of the procedure  Risks including but not limited to bleeding, perforation, infection, aspiration were discussed in detail  Also explained about less than 100%$ sensitivity with the exam and other alternatives  PREPARATION:   EKG tracing, pulse oximetry, blood pressure were monitored throughout the procedure  Patient was identified by myself both verbally and by visual inspection of ID band  DESCRIPTION:   Patient was placed in the left lateral decubitus position and was sedated with the above medication  Digital rectal examination was performed  The colonoscope was introduced in to the anal canal and advanced up to cecum, which was identified by the appendiceal orifice and IC valve  A careful inspection was made as the colonoscope was withdrawn, including a retroflexed view of the rectum; findings and interventions are described below  Appropriate photodocumentation was obtained  The quality of the colonic preparation was fair  FINDINGS:    Small polyp on the ileocecal valve  Removed by cold snare technique  Placed in jar 1  Two small polyps hepatic flexure  Removed by cold snare technique    Placed in jar 2   1 cm polyp in descending colon  Removed by cold technique  Placed in jar 3  IMPRESSIONS:      Multiple small polyps  RECOMMENDATIONS:    This to home  High-fiber diet  Follow up pathology in 1-2 weeks  Recall for colonoscopy in 5 years    COMPLICATIONS:  None; patient tolerated the procedure well      DISPOSITION: PACU           CONDITION: Stable

## 2019-01-08 NOTE — ANESTHESIA POSTPROCEDURE EVALUATION
Post-Op Assessment Note      CV Status:  Stable    Mental Status:  Alert and awake    Hydration Status:  Euvolemic    PONV Controlled:  Controlled    Airway Patency:  Patent    Post Op Vitals Reviewed: Yes          Staff: CRNA           BP  10/56   Temp      Pulse  70   Resp   18   SpO2   99

## 2019-01-08 NOTE — DISCHARGE INSTRUCTIONS
Kaushik Miguel 70 y o  male MRN: [id number]  BLJ:[birth year]  Unit/Bed#: ENDO [place] Encounter: [id number]  01/08/19   @NOW    PCP: JOHNSON Harper    COLONOSCOPY    PROCEDURE: Colonoscopy/ Polypectomy (Cold Snare)    INDICATIONS: Screening for Colon Cancer    POST-OP DIAGNOSIS: See the impression below    SEDATION: Monitored anesthesia care, check anesthesia records    PHYSICAL EXAM:    BP (!) 185/96   Pulse 88   Temp 98 °F (36 7 °C) (Tympanic)   Resp 14   SpO2 100%   There is no height or weight on file to calculate BMI  General: NAD  Heart: S1 & S2 normal, RRR  Lungs: CTA, No rales or rhonchi  Abdomen: Soft, nontender, nondistended, good bowel sounds    CONSENT:  Informed consent was obtained for the procedure, including sedation after explaining the risks and benefits of the procedure  Risks including but not limited to bleeding, perforation, infection, aspiration were discussed in detail  Also explained about less than 100%$ sensitivity with the exam and other alternatives  PREPARATION:   EKG tracing, pulse oximetry, blood pressure were monitored throughout the procedure  Patient was identified by myself both verbally and by visual inspection of ID band  DESCRIPTION:   Patient was placed in the left lateral decubitus position and was sedated with the above medication  Digital rectal examination was performed  The colonoscope was introduced in to the anal canal and advanced up to cecum, which was identified by the appendiceal orifice and IC valve  A careful inspection was made as the colonoscope was withdrawn, including a retroflexed view of the rectum; findings and interventions are described below  Appropriate photodocumentation was obtained  The quality of the colonic preparation was fair  FINDINGS:    Small polyp on the ileocecal valve  Removed by cold snare technique  Placed in jar 1  Two small polyps hepatic flexure  Removed by cold snare technique    Placed in jar 2   1 cm polyp in descending colon  Removed by cold technique  Placed in jar 3  IMPRESSIONS:      Multiple small polyps  RECOMMENDATIONS:    This to home  High-fiber diet  Follow up pathology in 1-2 weeks  Recall for colonoscopy in 5 years    COMPLICATIONS:  None; patient tolerated the procedure well      DISPOSITION: PACU           CONDITION: Stable

## 2019-01-08 NOTE — H&P
History and Physical   Colon and Rectal Surgery   Carissa Medina 70 y o  male MRN: [id number]  Unit/Bed#: [facility] Encounter: [id number]  01/08/19   @NOW    No chief complaint on file  History of Present Illness   HPI:  Carissa Medina is a 70 y o  male who presents for colorectal cancer screening  He denies any symptoms at present  The denies no prior exams  He denies family history        Historical Information   Past Medical History:   Diagnosis Date    Aortic aneurysm (HCC)     Arthritis     Atypical chest pain     Back pain     Chronic kidney disease     stg 3    Elevated ALT measurement     Elevated AST (SGOT)     Hyperlipidemia     Hypertension     Leg pain     Neurogenic claudication     Urinary retention     Urinary retention      Past Surgical History:   Procedure Laterality Date    EPIDURAL BLOCK INJECTION N/A 1/23/2017    Procedure: BLOCK / INJECTION EPIDURAL STEROID LUMBAR;  Surgeon: Wojciech Patel MD;  [facility];  Service:    Jose Mutton LAMNOTMY INCL W/DCMPRSN NRV ROOT 1 INTRSPC LUMBR Bilateral 1/23/2017    Procedure: MIS L2-3 & L3-4 LAMINAL FORAMINOTOMIES AND MICRODISCECTOMY W LEFT SIDED APPROACH ;  Surgeon: Wojciech Patel MD;  [facility];  Service: Neurosurgery       Meds/Allergies     Prescriptions Prior to Admission   Medication    acetaminophen (TYLENOL) 325 mg tablet    amLODIPine (NORVASC) 2 5 mg tablet    atorvastatin (LIPITOR) 10 mg tablet    gabapentin (NEURONTIN) 300 mg capsule    lisinopril (ZESTRIL) 20 mg tablet    tamsulosin (FLOMAX) 0 4 mg         Current Facility-Administered Medications:     sodium chloride 0 9 % infusion, 125 mL/hr, Intravenous, Continuous, Osmani Posada MD    No Known Allergies      Social History   History   Alcohol Use    Yes     Comment: social     History   Drug Use No     History   Smoking Status    Never Smoker   Smokeless Tobacco    Never Used         Family History:   Family History   Problem Relation Age of Onset  Heart disease Sister          Objective     Current Vitals:      No intake or output data in the 24 hours ending 01/08/19 0754    Physical Exam:  General:  Resting comfortably in bed   Eyes:Sclera anicteric  ENT: Trachea midline  Pulm:  Symmetric chest raise  No respiratory Distress  CV:  Regular on monitor  Abdomen:  Soft NT ND  Extremities:  No clubbing/ cyanosis/ edema    Lab Results: I have personally reviewed pertinent lab results  Imaging: I have personally reviewed pertinent reports  ASSESSMENT:  Mariam Sullivan is a 70 y o  male who presents for outpatient colonoscopy  PLAN:  For colonoscopy    Risks/ Benefits reviewed to include but not limited to anesthesia, bleeding, missed lesions, and colonoscopic perforation requiring surgery

## 2019-01-14 ENCOUNTER — APPOINTMENT (OUTPATIENT)
Dept: LAB | Facility: HOSPITAL | Age: 64
End: 2019-01-14
Attending: INTERNAL MEDICINE
Payer: COMMERCIAL

## 2019-01-14 ENCOUNTER — TELEPHONE (OUTPATIENT)
Dept: CARDIOLOGY CLINIC | Facility: CLINIC | Age: 64
End: 2019-01-14

## 2019-01-14 ENCOUNTER — TELEPHONE (OUTPATIENT)
Dept: NEPHROLOGY | Facility: CLINIC | Age: 64
End: 2019-01-14

## 2019-01-14 ENCOUNTER — TRANSCRIBE ORDERS (OUTPATIENT)
Dept: LAB | Facility: HOSPITAL | Age: 64
End: 2019-01-14

## 2019-01-14 DIAGNOSIS — D72.819 LEUKOPENIA, UNSPECIFIED TYPE: ICD-10-CM

## 2019-01-14 DIAGNOSIS — D64.9 ANEMIA, UNSPECIFIED TYPE: Primary | ICD-10-CM

## 2019-01-14 LAB
ACANTHOCYTES BLD QL SMEAR: PRESENT
ANISOCYTOSIS BLD QL SMEAR: PRESENT
BASOPHILS # BLD MANUAL: 0.04 THOUSAND/UL (ref 0–0.1)
BASOPHILS NFR MAR MANUAL: 1 % (ref 0–1)
EOSINOPHIL # BLD MANUAL: 0.17 THOUSAND/UL (ref 0–0.4)
EOSINOPHIL NFR BLD MANUAL: 4 % (ref 0–6)
ERYTHROCYTE [DISTWIDTH] IN BLOOD BY AUTOMATED COUNT: 15 % (ref 11.6–15.1)
FERRITIN SERPL-MCNC: 113 NG/ML (ref 8–388)
HCT VFR BLD AUTO: 34.7 % (ref 36.5–49.3)
HGB BLD-MCNC: 10.6 G/DL (ref 12–17)
LYMPHOCYTES # BLD AUTO: 2.33 THOUSAND/UL (ref 0.6–4.47)
LYMPHOCYTES # BLD AUTO: 55 % (ref 14–44)
MACROCYTES BLD QL AUTO: PRESENT
MCH RBC QN AUTO: 27 PG (ref 26.8–34.3)
MCHC RBC AUTO-ENTMCNC: 30.5 G/DL (ref 31.4–37.4)
MCV RBC AUTO: 89 FL (ref 82–98)
MONOCYTES # BLD AUTO: 0.34 THOUSAND/UL (ref 0–1.22)
MONOCYTES NFR BLD: 8 % (ref 4–12)
NEUTROPHILS # BLD MANUAL: 1.02 THOUSAND/UL (ref 1.85–7.62)
NEUTS SEG NFR BLD AUTO: 24 % (ref 43–75)
NRBC BLD AUTO-RTO: 0 /100 WBCS
OVALOCYTES BLD QL SMEAR: PRESENT
PLATELET # BLD AUTO: 299 THOUSANDS/UL (ref 149–390)
PLATELET BLD QL SMEAR: ADEQUATE
PMV BLD AUTO: 10.6 FL (ref 8.9–12.7)
POIKILOCYTOSIS BLD QL SMEAR: PRESENT
POLYCHROMASIA BLD QL SMEAR: PRESENT
RBC # BLD AUTO: 3.92 MILLION/UL (ref 3.88–5.62)
RBC MORPH BLD: PRESENT
RETICS # AUTO: NORMAL 10*3/UL (ref 14356–105094)
RETICS # CALC: 0.81 % (ref 0.37–1.87)
SCHISTOCYTES BLD QL SMEAR: PRESENT
TARGETS BLD QL SMEAR: PRESENT
TIBC SERPL-MCNC: 440 UG/DL (ref 250–450)
VARIANT LYMPHS # BLD AUTO: 8 %
WBC # BLD AUTO: 4.23 THOUSAND/UL (ref 4.31–10.16)

## 2019-01-14 PROCEDURE — 85045 AUTOMATED RETICULOCYTE COUNT: CPT

## 2019-01-14 PROCEDURE — 84166 PROTEIN E-PHORESIS/URINE/CSF: CPT

## 2019-01-14 PROCEDURE — 84165 PROTEIN E-PHORESIS SERUM: CPT | Performed by: PATHOLOGY

## 2019-01-14 PROCEDURE — 85007 BL SMEAR W/DIFF WBC COUNT: CPT

## 2019-01-14 PROCEDURE — 82728 ASSAY OF FERRITIN: CPT

## 2019-01-14 PROCEDURE — 84165 PROTEIN E-PHORESIS SERUM: CPT

## 2019-01-14 PROCEDURE — 84166 PROTEIN E-PHORESIS/URINE/CSF: CPT | Performed by: PATHOLOGY

## 2019-01-14 PROCEDURE — 85027 COMPLETE CBC AUTOMATED: CPT

## 2019-01-14 PROCEDURE — 83550 IRON BINDING TEST: CPT

## 2019-01-14 PROCEDURE — 36415 COLL VENOUS BLD VENIPUNCTURE: CPT

## 2019-01-14 NOTE — TELEPHONE ENCOUNTER
D/w Dr Dixon Stains  On and off symptoms  Often correlate w work as before  Recently w syncope at home  Continue lisinopril, hold amlodipine  Check Holter monitor  Follow up w me  Will defer hypertension mangement to PCP and Nephrology to avoid too many doctors making changes to same medications - we seem to be see-sawing

## 2019-01-14 NOTE — TELEPHONE ENCOUNTER
Dr Efren Ernandez called would like to discuss this pt with you in regards to HTN/ Orstatic hypo      Cell #[phone]    Office # [phone]      Thank you

## 2019-01-14 NOTE — TELEPHONE ENCOUNTER
Patients PCP gave the office a call today and spoke with [facility] VASILE Bhakta stated patient has orthostatic hypotension when on BP medication but when medication isnt taken he has Supine hypertension  Dr Krystina Bhakta said that all Blood Pressure Medication will go through her to avoid making to many changes to patients medication  She called Cardiology and also informed them   She will be holding amlodipine and continuing lisinopril if Blood Pressure is 140/90 or higher

## 2019-01-15 ENCOUNTER — TRANSCRIBE ORDERS (OUTPATIENT)
Dept: ADMINISTRATIVE | Facility: HOSPITAL | Age: 64
End: 2019-01-15

## 2019-01-15 DIAGNOSIS — R55 SYNCOPE AND COLLAPSE: Primary | ICD-10-CM

## 2019-01-15 DIAGNOSIS — I95.1 ORTHOSTATIC HYPOTENSION: ICD-10-CM

## 2019-01-17 ENCOUNTER — HOSPITAL ENCOUNTER (OUTPATIENT)
Dept: NON INVASIVE DIAGNOSTICS | Facility: HOSPITAL | Age: 64
Discharge: HOME | End: 2019-01-17
Attending: INTERNAL MEDICINE
Payer: COMMERCIAL

## 2019-01-17 DIAGNOSIS — R55 SYNCOPE AND COLLAPSE: ICD-10-CM

## 2019-01-17 DIAGNOSIS — I95.1 ORTHOSTATIC HYPOTENSION: ICD-10-CM

## 2019-01-17 LAB
ALBUMIN SERPL ELPH-MCNC: 3.96 G/DL (ref 3.5–5)
ALBUMIN SERPL ELPH-MCNC: 46.1 % (ref 52–65)
ALBUMIN UR ELPH-MCNC: 38.3 %
ALPHA1 GLOB MFR UR ELPH: 10.3 %
ALPHA1 GLOB SERPL ELPH-MCNC: 0.31 G/DL (ref 0.1–0.4)
ALPHA1 GLOB SERPL ELPH-MCNC: 3.6 % (ref 2.5–5)
ALPHA2 GLOB MFR UR ELPH: 6.9 %
ALPHA2 GLOB SERPL ELPH-MCNC: 0.71 G/DL (ref 0.4–1.2)
ALPHA2 GLOB SERPL ELPH-MCNC: 8.2 % (ref 7–13)
B-GLOBULIN MFR UR ELPH: 11.8 %
BETA GLOB ABNORMAL SERPL ELPH-MCNC: 0.66 G/DL (ref 0.4–0.8)
BETA1 GLOB SERPL ELPH-MCNC: 7.7 % (ref 5–13)
BETA2 GLOB SERPL ELPH-MCNC: 7.1 % (ref 2–8)
BETA2+GAMMA GLOB SERPL ELPH-MCNC: 0.61 G/DL (ref 0.2–0.5)
GAMMA GLOB ABNORMAL SERPL ELPH-MCNC: 2.35 G/DL (ref 0.5–1.6)
GAMMA GLOB MFR UR ELPH: 32.7 %
GAMMA GLOB SERPL ELPH-MCNC: 27.3 % (ref 12–22)
IGG/ALB SER: 0.86 {RATIO} (ref 1.1–1.8)
PROT PATTERN UR ELPH-IMP: ABNORMAL
PROT SERPL-MCNC: 8.6 G/DL (ref 6.4–8.2)
PROT UR-MCNC: 79 MG/DL

## 2019-01-17 PROCEDURE — 93225 XTRNL ECG REC<48 HRS REC: CPT

## 2019-01-17 PROCEDURE — 93226 XTRNL ECG REC<48 HR SCAN A/R: CPT

## 2019-01-21 PROCEDURE — 93227 XTRNL ECG REC<48 HR R&I: CPT | Performed by: INTERNAL MEDICINE

## 2019-01-23 ENCOUNTER — TELEPHONE (OUTPATIENT)
Dept: NEPHROLOGY | Facility: CLINIC | Age: 64
End: 2019-01-23

## 2019-01-23 NOTE — TELEPHONE ENCOUNTER
Called and spoke with Vernette Nyhan in regards to reminding patient of his follow up appointment for tomorrow January 24th at 8am with Dr Estuardo Saucedo  Patient is aware of our location

## 2019-01-24 ENCOUNTER — OFFICE VISIT (OUTPATIENT)
Dept: NEPHROLOGY | Facility: CLINIC | Age: 64
End: 2019-01-24
Payer: COMMERCIAL

## 2019-01-24 VITALS
HEIGHT: 66 IN | WEIGHT: 146 LBS | BODY MASS INDEX: 23.46 KG/M2 | HEART RATE: 78 BPM | SYSTOLIC BLOOD PRESSURE: 138 MMHG | DIASTOLIC BLOOD PRESSURE: 78 MMHG

## 2019-01-24 DIAGNOSIS — E78.5 HYPERLIPIDEMIA, UNSPECIFIED HYPERLIPIDEMIA TYPE: ICD-10-CM

## 2019-01-24 DIAGNOSIS — N18.30 CKD (CHRONIC KIDNEY DISEASE), STAGE III (HCC): Primary | ICD-10-CM

## 2019-01-24 DIAGNOSIS — I10 ESSENTIAL HYPERTENSION: ICD-10-CM

## 2019-01-24 DIAGNOSIS — I95.1 ORTHOSTATIC HYPOTENSION: ICD-10-CM

## 2019-01-24 DIAGNOSIS — R55 SYNCOPE AND COLLAPSE: ICD-10-CM

## 2019-01-24 DIAGNOSIS — R33.9 URINARY RETENTION: ICD-10-CM

## 2019-01-24 PROCEDURE — 99214 OFFICE O/P EST MOD 30 MIN: CPT | Performed by: INTERNAL MEDICINE

## 2019-01-24 RX ORDER — IBUPROFEN 400 MG/1
400 TABLET ORAL EVERY 4 HOURS PRN
COMMUNITY
End: 2019-02-05 | Stop reason: ALTCHOICE

## 2019-01-24 NOTE — PROGRESS NOTES
OFFICE FOLLOW UP - Nephrology   Nuria Olivera 70 y o  male MRN: [id number]       ASSESSMENT and PLAN:  Maurisio Cardona was seen today for chronic kidney disease, hypertension and follow-up  Diagnoses and all orders for this visit:    [facility]chronic [facility]), [facility] ([facility] )  -     CBC; Future  -     Protein / creatinine ratio, urine; Future  -     Renal function panel; Future  -     PTH, intact; Future    Essential hypertension    Syncope and collapse    Orthostatic hypotension    Hyperlipidemia, unspecified hyperlipidemia type    Urinary retention        This is a 49-year-old gentleman with known history of chronic kidney disease stage 3, hypertension, orthostatic hypotension, multiple syncopal episodes, who returns to the office for follow-up  1  Chronic kidney disease stage 3, previous creatinine around 1 3-1 4 back since 2016, though last year his creatinine was as high as 1 5-1 8  Most recent creatinine improved at 1 49  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, no nephrotic range proteinuria  Discussed importance of avoiding NSAIDs  Okay to take Tylenol or Gabapentin for pain  2  Hypertension with orthostatic hypotension  Pressure currently well controlled  Currently taking lisinopril 20 mg 1 tablet if systolic blood pressure is over 140s as per PCP instruction  Amlodipine was discontinued due to recent episode of hypostatic hypotension and syncope  Orthostatic hypotension suspected to be multifactorial, patient works in a hot environment, last episode happened after colonoscopy so probably he was intravascularly depleted after colon prep  Advised to stay well-hydrated specially at work  Advised to use always compression stocking  Noted that patient is also on Flomax for urinary retention that can exacerbate orthostatic hypotension  Had a recent Holter monitor in by Cardiology, follow results      3  Urinary retention, patient using a straight catheterization 4 times a day   Patient is also on Flomax  As above noted that Flomax can exacerbate orthostatic hypotension  Continue follow-up with Urology  4  Anemia, noted patient has persistent elevated serum protein with low serum albumin  Had a urine electrophoresis that did not show any monoclonal bands  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  Patient was referred to Hematology, has an appointment on February 5th  HPI: Guerda Martini is a 70 y o  male who is here for Chronic Kidney Disease; Hypertension; and Follow-up    Patient with known history of hypertension, chronic kidney disease stage 3, orthostatic hypotension, multiple episodes of syncope episodes at work who returns to our office for follow-up  Last time seen in our office was with our nurse practitioner done on you on October 2018, that time his blood pressure was elevated and she increased her amlodipine dose  Patient had an episode of syncopal on December 2018 and a 2nd syncopal episode on January 9 day after he had a colonoscopy  He works in [employer] in a hot environment  Patient currently has no complaints at this time and is feeling well  Patient denies any chest pain, shortness of breath and swelling  The last blood work was done on 01/02/2019, which we have reviewed together  Most recent serum creatinine 1 49 with an estimated GFR of 56  He also has problems of urinary retention he does self catheterization 4 times a day, he takes Flomax 1 tablet daily  He also had bilateral leg pain for what he takes Neurontin and Tylenol  Denies NSAID use the Motrin is listed on his medication list   Currently only taking lisinopril 20 mg 1 tablet daily if his blood pressure is over 140/90  Amlodipine was discontinued    This morning his blood pressure at home his systolic blood pressure was 160 and he took lisinopril 20 mg, blood pressure today in the office is 138/70      ROS:   All the systems were reviewed and were negative except as documented on the HPI  Allergies: Patient has no known allergies  Medications:   Current Outpatient Prescriptions:     acetaminophen (TYLENOL) 325 mg tablet, Take 650 mg by mouth every 6 (six) hours as needed for mild pain, Disp: , Rfl:     atorvastatin (LIPITOR) 10 mg tablet, Take 10 mg by mouth daily, Disp: , Rfl:     gabapentin (NEURONTIN) 300 mg capsule, Take 300 mg by mouth 2 (two) times a day, Disp: , Rfl:     lisinopril (ZESTRIL) 20 mg tablet, Take 20 mg by mouth 2 (two) times a day , Disp: , Rfl:     tamsulosin (FLOMAX) 0 4 mg, Take 0 4 mg by mouth daily with dinner  , Disp: , Rfl:     amLODIPine (NORVASC) 2 5 mg tablet, Take 2 tablets (5 mg total) by mouth daily (Patient not taking: Reported on 1/24/2019 ), Disp: , Rfl: 0    ibuprofen (MOTRIN) 400 mg tablet, Take 400 mg by mouth every 4 (four) hours as needed, Disp: , Rfl:     Past Medical History:   Diagnosis Date    Aortic aneurysm (HCC)     Arthritis     Atypical chest pain     Back pain     Chronic kidney disease     stg 3    Elevated ALT measurement     Elevated AST (SGOT)     Hyperlipidemia     Hypertension     Leg pain     Neurogenic claudication     Urinary retention     catherize self 4x/day    Urinary retention      Past Surgical History:   Procedure Laterality Date    EPIDURAL BLOCK INJECTION N/A 1/23/2017    Procedure: BLOCK / INJECTION EPIDURAL STEROID LUMBAR;  Surgeon: Annamarie Melara MD;  [facility];  Service:     DE COLONOSCOPY FLX DX W/COLLJ SPEC WHEN PFRMD N/A 1/8/2019    Procedure: COLONOSCOPY;  Surgeon: Enrrique Lopez MD;  [facility];   Service: Colorectal    DE LAMNOTMY INCL W/DCMPRSN NRV ROOT 1 INTRSPC LUMBR Bilateral 1/23/2017    Procedure: MIS L2-3 & L3-4 LAMINAL FORAMINOTOMIES AND MICRODISCECTOMY W LEFT SIDED APPROACH ;  Surgeon: Annamarie Melara MD;  [facility];  Service: Neurosurgery     Family History   Problem Relation Age of Onset    Heart disease Sister     Stroke Sister     Stroke Brother     Cancer Sister         Pancreatic cancer      reports that he has never smoked  He has never used smokeless tobacco  He reports that he does not drink alcohol or use drugs  Physical Exam:   Vitals:    01/24/19 0753   BP: 138/78   BP Location: Left arm   Patient Position: Sitting   Pulse: 78   Weight: 66 2 kg (146 lb)   Height: 5' 6" (1 676 m)     Body mass index is 23 57 kg/m²      General: cooperative, in not acute distress  Eyes: conjunctivae pink, anicteric sclerae  ENT: lips and mucous membranes moist  Neck: supple, no JVD  Chest: clear breath sounds bilateral, no crackles, ronchus or wheezings  CVS: distinct S1 & S2, normal rate, regular rhythm  Abdomen: soft, non-tender, non-distended, normoactive bowel sounds  Back: no CVA tenderness  Extremities: no edema of both legs  Skin: no rash  Neuro: awake, alert, oriented      Lab Results:  Results for orders placed or performed in visit on 01/14/19   CBC and differential   Result Value Ref Range    WBC 4 23 (L) 4 31 - 10 16 Thousand/uL    RBC 3 92 3 88 - 5 62 Million/uL    Hemoglobin 10 6 (L) 12 0 - 17 0 g/dL    Hematocrit 34 7 (L) 36 5 - 49 3 %    MCV 89 82 - 98 fL    MCH 27 0 26 8 - 34 3 pg    MCHC 30 5 (L) 31 4 - 37 4 g/dL    RDW 15 0 11 6 - 15 1 %    MPV 10 6 8 9 - 12 7 fL    Platelets 645 189 - 308 Thousands/uL    nRBC 0 /100 WBCs   Manual Differential(PHLEBS Do Not Order)   Result Value Ref Range    Segmented % 24 (L) 43 - 75 %    Lymphocytes % 55 (H) 14 - 44 %    Monocytes % 8 4 - 12 %    Eosinophils, % 4 0 - 6 %    Basophils % 1 0 - 1 %    Atypical Lymphocytes % 8 (H) <=0 %    Absolute Neutrophils 1 02 (L) 1 85 - 7 62 Thousand/uL    Lymphocytes Absolute 2 33 0 60 - 4 47 Thousand/uL    Monocytes Absolute 0 34 0 00 - 1 22 Thousand/uL    Eosinophils Absolute 0 17 0 00 - 0 40 Thousand/uL    Basophils Absolute 0 04 0 00 - 0 10 Thousand/uL    Total Counted      RBC Morphology Present Acanthocytes Present     Anisocytosis Present     Macrocytes Present     Ovalocytes Present     Poikilocytes Present     Polychromasia Present     Schistocytes Present     Target Cells Present     Platelet Estimate Adequate Adequate         Portions of the record may have been created with voice recognition software  Occasional wrong word or "sound a like" substitutions may have occurred due to the inherent limitations of voice recognition software  Read the chart carefully and recognize, using context, where substitutions have occurred  If you have any questions, please contact the dictating provider

## 2019-01-24 NOTE — LETTER
January 24, 2019     Ambika Crisostomo MD  [facility] [address] [facility]    Patient: Kelly Conteh   YOB: 1955   Date of Visit: 1/24/2019       Dear Dr Aviva Cerna: Thank you for referring Kelly Conteh to me for evaluation  Below are my notes for this consultation  If you have questions, please do not hesitate to call me  I look forward to following your patient along with you  Sincerely,        Tatum Love MD        CC: JOHNSON Ortiz MD  1/24/2019  8:26 AM  Sign at close encounter  OFFICE FOLLOW UP - Nephrology   Kelly Conteh 70 y o  male MRN: [id number]       ASSESSMENT and PLAN:  Dylon Caldera was seen today for chronic kidney disease, hypertension and follow-up  Diagnoses and all orders for this visit:    CKD (chronic kidney disease), stage III ([facility] )  -     CBC; Future  -     Protein / creatinine ratio, urine; Future  -     Renal function panel; Future  -     PTH, intact; Future    Essential hypertension    Syncope and collapse    Orthostatic hypotension    Hyperlipidemia, unspecified hyperlipidemia type    Urinary retention        This is a 53-year-old gentleman with known history of chronic kidney disease stage 3, hypertension, orthostatic hypotension, multiple syncopal episodes, who returns to the office for follow-up  1  Chronic kidney disease stage 3, previous creatinine around 1 3-1 4 back since 2016, though last year his creatinine was as high as 1 5-1 8  Most recent creatinine improved at 1 49  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, no nephrotic range proteinuria  Discussed importance of avoiding NSAIDs  Okay to take Tylenol or Gabapentin for pain  2  Hypertension with orthostatic hypotension  Pressure currently well controlled  Currently taking lisinopril 20 mg 1 tablet if systolic blood pressure is over 140s as per PCP instruction    Amlodipine was discontinued due to recent episode of hypostatic hypotension and syncope  Orthostatic hypotension suspected to be multifactorial, patient works in a hot environment, last episode happened after colonoscopy so probably he was intravascularly depleted after colon prep  Advised to stay well-hydrated specially at work  Advised to use always compression stocking  Noted that patient is also on Flomax for urinary retention that can exacerbate orthostatic hypotension  Had a recent Holter monitor in by Cardiology, follow results  3  Urinary retention, patient using a straight catheterization 4 times a day  Patient is also on Flomax  As above noted that Flomax can exacerbate orthostatic hypotension  Continue follow-up with Urology  4  Anemia, noted patient has persistent elevated serum protein with low serum albumin  Had a urine electrophoresis that did not show any monoclonal bands  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  Patient was referred to Hematology, has an appointment on February 5th  HPI: Kelly Conteh is a 70 y o  male who is here for Chronic Kidney Disease; Hypertension; and Follow-up    Patient with known history of hypertension, chronic kidney disease stage 3, orthostatic hypotension, multiple episodes of syncope episodes at work who returns to our office for follow-up  Last time seen in our office was with our nurse practitioner done on you on October 2018, that time his blood pressure was elevated and she increased her amlodipine dose  Patient had an episode of syncopal on December 2018 and a 2nd syncopal episode on January 9 day after he had a colonoscopy  He works in [employer] in a hot environment  Patient currently has no complaints at this time and is feeling well  Patient denies any chest pain, shortness of breath and swelling  The last blood work was done on 01/02/2019, which we have reviewed together  Most recent serum creatinine 1 49 with an estimated GFR of 56    He also has problems of urinary retention he does self catheterization 4 times a day, he takes Flomax 1 tablet daily  He also had bilateral leg pain for what he takes Neurontin and Tylenol  Denies NSAID use the Motrin is listed on his medication list   Currently only taking lisinopril 20 mg 1 tablet daily if his blood pressure is over 140/90  Amlodipine was discontinued  This morning his blood pressure at home his systolic blood pressure was 160 and he took lisinopril 20 mg, blood pressure today in the office is 138/70      ROS:   All the systems were reviewed and were negative except as documented on the HPI  Allergies: Patient has no known allergies      Medications:   Current Outpatient Prescriptions:     acetaminophen (TYLENOL) 325 mg tablet, Take 650 mg by mouth every 6 (six) hours as needed for mild pain, Disp: , Rfl:     atorvastatin (LIPITOR) 10 mg tablet, Take 10 mg by mouth daily, Disp: , Rfl:     gabapentin (NEURONTIN) 300 mg capsule, Take 300 mg by mouth 2 (two) times a day, Disp: , Rfl:     lisinopril (ZESTRIL) 20 mg tablet, Take 20 mg by mouth 2 (two) times a day , Disp: , Rfl:     tamsulosin (FLOMAX) 0 4 mg, Take 0 4 mg by mouth daily with dinner  , Disp: , Rfl:     amLODIPine (NORVASC) 2 5 mg tablet, Take 2 tablets (5 mg total) by mouth daily (Patient not taking: Reported on 1/24/2019 ), Disp: , Rfl: 0    ibuprofen (MOTRIN) 400 mg tablet, Take 400 mg by mouth every 4 (four) hours as needed, Disp: , Rfl:     Past Medical History:   Diagnosis Date    Aortic aneurysm (HCC)     Arthritis     Atypical chest pain     Back pain     Chronic kidney disease     stg 3    Elevated ALT measurement     Elevated AST (SGOT)     Hyperlipidemia     Hypertension     Leg pain     Neurogenic claudication     Urinary retention     catherize self 4x/day    Urinary retention      Past Surgical History:   Procedure Laterality Date    EPIDURAL BLOCK INJECTION N/A 1/23/2017    Procedure: BLOCK / INJECTION EPIDURAL STEROID LUMBAR;  Surgeon: Maya Gaitan MD;  [facility];  Service:     MN COLONOSCOPY FLX DX W/COLLJ SPEC WHEN PFRMD N/A 1/8/2019    Procedure: COLONOSCOPY;  Surgeon: Elsa Awad MD;  [facility]; Service: Colorectal    MN LAMNOTMY INCL W/DCMPRSN NRV ROOT 1 INTRSPC LUMBR Bilateral 1/23/2017    Procedure: MIS L2-3 & L3-4 LAMINAL FORAMINOTOMIES AND MICRODISCECTOMY W LEFT SIDED APPROACH ;  Surgeon: Maya Gaitan MD;  [facility];  Service: Neurosurgery     Family History   Problem Relation Age of Onset    Heart disease Sister     Stroke Sister     Stroke Brother     Cancer Sister         Pancreatic cancer      reports that he has never smoked  He has never used smokeless tobacco  He reports that he does not drink alcohol or use drugs  Physical Exam:   Vitals:    01/24/19 0753   BP: 138/78   BP Location: Left arm   Patient Position: Sitting   Pulse: 78   Weight: 66 2 kg (146 lb)   Height: 5' 6" (1 676 m)     Body mass index is 23 57 kg/m²      General: cooperative, in not acute distress  Eyes: conjunctivae pink, anicteric sclerae  ENT: lips and mucous membranes moist  Neck: supple, no JVD  Chest: clear breath sounds bilateral, no crackles, ronchus or wheezings  CVS: distinct S1 & S2, normal rate, regular rhythm  Abdomen: soft, non-tender, non-distended, normoactive bowel sounds  Back: no CVA tenderness  Extremities: no edema of both legs  Skin: no rash  Neuro: awake, alert, oriented      Lab Results:  Results for orders placed or performed in visit on 01/14/19   CBC and differential   Result Value Ref Range    WBC 4 23 (L) 4 31 - 10 16 Thousand/uL    RBC 3 92 3 88 - 5 62 Million/uL    Hemoglobin 10 6 (L) 12 0 - 17 0 g/dL    Hematocrit 34 7 (L) 36 5 - 49 3 %    MCV 89 82 - 98 fL    MCH 27 0 26 8 - 34 3 pg    MCHC 30 5 (L) 31 4 - 37 4 g/dL    RDW 15 0 11 6 - 15 1 %    MPV 10 6 8 9 - 12 7 fL    Platelets 511 539 - 489 Thousands/uL    nRBC 0 /100 WBCs   Manual Differential(PHLEBS Do Not Order)   Result Value Ref Range    Segmented % 24 (L) 43 - 75 %    Lymphocytes % 55 (H) 14 - 44 %    Monocytes % 8 4 - 12 %    Eosinophils, % 4 0 - 6 %    Basophils % 1 0 - 1 %    Atypical Lymphocytes % 8 (H) <=0 %    Absolute Neutrophils 1 02 (L) 1 85 - 7 62 Thousand/uL    Lymphocytes Absolute 2 33 0 60 - 4 47 Thousand/uL    Monocytes Absolute 0 34 0 00 - 1 22 Thousand/uL    Eosinophils Absolute 0 17 0 00 - 0 40 Thousand/uL    Basophils Absolute 0 04 0 00 - 0 10 Thousand/uL    Total Counted      RBC Morphology Present     Acanthocytes Present     Anisocytosis Present     Macrocytes Present     Ovalocytes Present     Poikilocytes Present     Polychromasia Present     Schistocytes Present     Target Cells Present     Platelet Estimate Adequate Adequate         Portions of the record may have been created with voice recognition software  Occasional wrong word or "sound a like" substitutions may have occurred due to the inherent limitations of voice recognition software  Read the chart carefully and recognize, using context, where substitutions have occurred  If you have any questions, please contact the dictating provider

## 2019-01-24 NOTE — PATIENT INSTRUCTIONS
Avoid NSAIDs (ibuprofen, Motrin, Advil, Aleve, Naprosyn)  Okay to take a pending for pain  Okay to take Tylenol or paracetamol or acetaminophen as needed for pain  Continue taking your blood pressure medication as instructed by your family doctor, lisinopril if systolic blood pressure is over 140  Continue follow-up with your urologist for urinary retention, continue with straight catheterization 4 times a day  Continue follow-up with cardiology  Do not forget to see the blood doctor hematologist on February 5th  Continue wearing her compression stockings all the time  Stay well-hydrated especially at work or you are in hot environment  I would like to see you back in the office in 6 months with repeat labs

## 2019-02-05 ENCOUNTER — CONSULT (OUTPATIENT)
Dept: HEMATOLOGY ONCOLOGY | Facility: CLINIC | Age: 64
End: 2019-02-05
Payer: COMMERCIAL

## 2019-02-05 VITALS
DIASTOLIC BLOOD PRESSURE: 74 MMHG | HEIGHT: 66 IN | TEMPERATURE: 98.9 F | SYSTOLIC BLOOD PRESSURE: 128 MMHG | WEIGHT: 145.8 LBS | HEART RATE: 105 BPM | RESPIRATION RATE: 17 BRPM | OXYGEN SATURATION: 98 % | BODY MASS INDEX: 23.43 KG/M2

## 2019-02-05 DIAGNOSIS — D64.9 ANEMIA, UNSPECIFIED TYPE: Primary | ICD-10-CM

## 2019-02-05 PROBLEM — Z12.11 SCREENING FOR COLON CANCER: Status: RESOLVED | Noted: 2018-11-26 | Resolved: 2019-02-05

## 2019-02-05 PROCEDURE — 99204 OFFICE O/P NEW MOD 45 MIN: CPT | Performed by: INTERNAL MEDICINE

## 2019-02-05 NOTE — PROGRESS NOTES
[facility]  1955  [address] [facility]  [address] [place] [id number] [phone]    Chief Complaint   Patient presents with    Consult          No history exists  History of Present Illness:  Patient had several syncopal episodes in mid and late 2018  Blood work at that time was essentially normal   From July 2018 through January 2019 hemoglobin has decreased from normal to 9 8  Normal MCV  WBC is 3 45, platelets 659   16% neutrophils, 47% lymphocytes, 17% monocytes  Relative monocytosis has been persistent over the past year or so  Retic count 0 8%, ferritin 113,     Review of Systems   Constitutional: Negative for chills and fever  HENT: Negative for nosebleeds  Eyes: Negative for discharge  Respiratory: Negative for cough and shortness of breath  Cardiovascular: Negative for chest pain  Gastrointestinal: Negative for abdominal pain, constipation and diarrhea  Endocrine: Negative for polydipsia  Genitourinary: Negative for hematuria  Musculoskeletal: Negative for arthralgias  Skin: Negative for color change  Allergic/Immunologic: Negative for immunocompromised state  Neurological: Negative for dizziness and headaches  Hematological: Negative for adenopathy  Psychiatric/Behavioral: Negative for agitation         Patient Active Problem List   Diagnosis    Lumbar stenosis with neurogenic claudication    Pseudomonas aeruginosa infection    Ataxia    Essential hypertension    Syncope and collapse    Orthostatic hypotension    HLD (hyperlipidemia)    Ascending aortic aneurysm (HCC)    Urinary retention    CKD (chronic kidney disease), stage III (HCC)    Hyponatremia    Diffuse pain in left lower extremity    PAD (peripheral artery disease) ([facility] [address] )    Screening for colon cancer    Lumbar radiculopathy    Status post lumbar discectomy     Past Medical History:   Diagnosis Date    Aortic aneurysm (HCC)     Arthritis     Atypical chest pain     Back pain     Chronic kidney disease     stg 3    Elevated ALT measurement     Elevated AST (SGOT)     Hyperlipidemia     Hypertension     Leg pain     Neurogenic claudication     Urinary retention     catherize self 4x/day    Urinary retention      Past Surgical History:   Procedure Laterality Date    EPIDURAL BLOCK INJECTION N/A 1/23/2017    Procedure: BLOCK / INJECTION EPIDURAL STEROID LUMBAR;  Surgeon: Ama Gregory MD;  [facility];  Service:     IN COLONOSCOPY FLX DX W/COLLJ SPEC WHEN PFRMD N/A 1/8/2019    Procedure: COLONOSCOPY;  Surgeon: Dave Stone MD;  [facility]; Service: Colorectal    IN LAMNOTMY INCL W/DCMPRSN NRV ROOT 1 INTRSPC LUMBR Bilateral 1/23/2017    Procedure: MIS L2-3 & L3-4 LAMINAL FORAMINOTOMIES AND MICRODISCECTOMY W LEFT SIDED APPROACH ;  Surgeon: Ama Gregory MD;  [facility];  Service: Neurosurgery     Family History   Problem Relation Age of Onset    Heart disease Sister     Stroke Sister     Stroke Brother     Cancer Sister         Pancreatic cancer     Social History     Social History    Marital status: Single     Spouse name: N/A    Number of children: N/A    Years of education: N/A     Occupational History    Not on file       Social History Main Topics    Smoking status: Never Smoker    Smokeless tobacco: Never Used    Alcohol use No    Drug use: No    Sexual activity: Not on file     Other Topics Concern    Not on file     Social History Narrative    No narrative on file       Current Outpatient Prescriptions:     acetaminophen (TYLENOL) 325 mg tablet, Take 650 mg by mouth every 6 (six) hours as needed for mild pain, Disp: , Rfl:     atorvastatin (LIPITOR) 10 mg tablet, Take 10 mg by mouth daily, Disp: , Rfl:     gabapentin (NEURONTIN) 300 mg capsule, Take 300 mg by mouth 2 (two) times a day, Disp: , Rfl:     lisinopril (ZESTRIL) 20 mg tablet, Take 20 mg by mouth 2 (two) times a day , Disp: , Rfl:     tamsulosin (FLOMAX) 0 4 mg, Take 0 4 mg by mouth daily with dinner  , Disp: , Rfl:   No Known Allergies  Vitals:    02/05/19 1301   BP: 128/74   Pulse: 105   Resp: 17   Temp: 98 9 °F (37 2 °C)   SpO2: 98%       Physical Exam   Constitutional: He is oriented to person, place, and time  He appears well-developed  HENT:   Head: Normocephalic  Eyes: Pupils are equal, round, and reactive to light  Neck: Neck supple  Cardiovascular: Normal rate and regular rhythm  No murmur heard  Pulmonary/Chest: Breath sounds normal  He has no wheezes  He has no rales  Abdominal: Soft  There is no tenderness  Musculoskeletal: Normal range of motion  He exhibits no edema or tenderness  Lymphadenopathy:     He has no cervical adenopathy  Neurological: He is alert and oriented to person, place, and time  He has normal reflexes  No cranial nerve deficit  Skin: No rash noted  No erythema  Psychiatric: He has a normal mood and affect  His behavior is normal          Labs:  CBC, Coags, BMP, Mg, Phos  Imaging  No results found  I reviewed the above laboratory and imaging data  Discussion/Summary:  In summary, this is a 75-year-old man who has evolved leukopenia and anemia over the past 6 months or so  He has relative monocytosis  This suggest the possibility of autoimmune/inflammatory process or primary bone marrow disorder such as myelodysplasia  We made arrangements for blood work to investigate some of these processes  If these are diagnostic treatment would follow accordingly  If not, bone marrow biopsy would be indicated  I reviewed the nature of the bone marrow biopsy procedure with the patient  I asked him to call us 2-3 days after has blood work done so that we could review the results  If nondiagnostic, bone marrow biopsy would be arranged  The patient voiced understanding agreement with the above plan

## 2019-02-07 ENCOUNTER — HOSPITAL ENCOUNTER (EMERGENCY)
Facility: HOSPITAL | Age: 64
Discharge: HOME | End: 2019-02-07
Attending: EMERGENCY MEDICINE
Payer: COMMERCIAL

## 2019-02-07 VITALS
SYSTOLIC BLOOD PRESSURE: 138 MMHG | RESPIRATION RATE: 18 BRPM | HEART RATE: 81 BPM | TEMPERATURE: 97.8 F | WEIGHT: 146 LBS | HEIGHT: 66 IN | OXYGEN SATURATION: 100 % | DIASTOLIC BLOOD PRESSURE: 70 MMHG | BODY MASS INDEX: 23.46 KG/M2

## 2019-02-07 DIAGNOSIS — G89.29 CHRONIC MIDLINE LOW BACK PAIN WITHOUT SCIATICA: Primary | ICD-10-CM

## 2019-02-07 DIAGNOSIS — M54.50 CHRONIC MIDLINE LOW BACK PAIN WITHOUT SCIATICA: Primary | ICD-10-CM

## 2019-02-07 PROCEDURE — 99283 EMERGENCY DEPT VISIT LOW MDM: CPT

## 2019-02-07 RX ORDER — LIDOCAINE 50 MG/G
1 PATCH TOPICAL ONCE
Status: DISCONTINUED | OUTPATIENT
Start: 2019-02-07 | End: 2019-02-07 | Stop reason: HOSPADM

## 2019-02-07 RX ADMIN — LIDOCAINE 1 PATCH: 50 PATCH CUTANEOUS at 13:10

## 2019-02-07 NOTE — ED NOTES
Post void residual, >466mL  Pt states he does wait until urine flow stops prior to removing straight cath       Mari Sood RN  02/07/19 1755

## 2019-02-07 NOTE — ED PROVIDER NOTES
History  Chief Complaint   Patient presents with    Back Pain     Paini in back since tuesday     70year-old male with history of chronic low back pain, anemia, chronic kidney disease stage III, urinary retention, hypertension, hyperlipidemia, presents for evaluation of low back pain for the past 2 days  Patient reports sharp, stabbing pain that radiates from the low back over his buttocks and his anterior thighs with movement  Patient reports that he has pain with movement from a lying to sitting and sitting to standing position  Patient also reports that he has pain with walking and has been using extra support to help him walk  Patient reports he does have a cane at home however does not use that  Patient reports that he takes Tylenol and gabapentin for his pain  States that he did not take anything today  States that he has been wearing a back brace for support  Reports that he had surgery done in January 2017 and was recently seen by Dr Solomon Acevedo with Neurosurgery in December and was advised to call to be seen as an as needed basis  Patient reports that he normally has urinary retention is able to void on his own a little, but catheterizes himself 4 times a day relieving about 300-600 mL each void  Patient denies saddle anesthesia, loss of bowel control, radiation of pain down towards his toes, fever, chills, nausea vomiting or abdominal pain  Prior to Admission Medications   Prescriptions Last Dose Informant Patient Reported?  Taking?   acetaminophen (TYLENOL) 325 mg tablet  Self Yes No   Sig: Take 650 mg by mouth every 6 (six) hours as needed for mild pain   atorvastatin (LIPITOR) 10 mg tablet  Self Yes No   Sig: Take 10 mg by mouth daily   gabapentin (NEURONTIN) 300 mg capsule  Self Yes No   Sig: Take 300 mg by mouth 2 (two) times a day   lisinopril (ZESTRIL) 20 mg tablet  Self Yes No   Sig: Take 20 mg by mouth 2 (two) times a day    tamsulosin (FLOMAX) 0 4 mg  Self Yes No   Sig: Take 0 4 mg by mouth daily with dinner        Facility-Administered Medications: None       Past Medical History:   Diagnosis Date    Aortic aneurysm (HCC)     Arthritis     Atypical chest pain     Back pain     Chronic kidney disease     stg 3    Elevated ALT measurement     Elevated AST (SGOT)     Hyperlipidemia     Hypertension     Leg pain     Neurogenic claudication     Urinary retention     catherize self 4x/day    Urinary retention        Past Surgical History:   Procedure Laterality Date    EPIDURAL BLOCK INJECTION N/A 1/23/2017    Procedure: BLOCK / INJECTION EPIDURAL STEROID LUMBAR;  Surgeon: Carleen Yao MD;  [facility];  Service:     MS COLONOSCOPY FLX DX W/COLLJ SPEC WHEN PFRMD N/A 1/8/2019    Procedure: COLONOSCOPY;  Surgeon: Pepito Fernandez MD;  [facility]; Service: Colorectal    MS LAMNOTMY INCL W/DCMPRSN NRV ROOT 1 INTRSPC LUMBR Bilateral 1/23/2017    Procedure: MIS L2-3 & L3-4 LAMINAL FORAMINOTOMIES AND MICRODISCECTOMY W LEFT SIDED APPROACH ;  Surgeon: Carleen Yao MD;  [facility];  Service: Neurosurgery       Family History   Problem Relation Age of Onset    Heart disease Sister     Stroke Sister     Stroke Brother     Cancer Sister         Pancreatic cancer     I have reviewed and agree with the history as documented  Social History     Tobacco Use    Smoking status: Never Smoker    Smokeless tobacco: Never Used   Substance Use Topics    Alcohol use: No    Drug use: No        Review of Systems   Constitutional: Negative for chills and fever  Respiratory: Negative for chest tightness and shortness of breath  Cardiovascular: Negative for chest pain  Gastrointestinal: Negative for nausea and vomiting  Genitourinary: Negative for decreased urine volume, difficulty urinating, dysuria, flank pain, frequency, hematuria and urgency  Musculoskeletal: Positive for back pain and myalgias   Negative for arthralgias, gait problem, joint swelling, neck pain and neck stiffness  Skin: Negative for color change and wound  Neurological: Negative for weakness and numbness  Physical Exam  Physical Exam   Constitutional: He is oriented to person, place, and time  Vital signs are normal  He appears well-developed and well-nourished  No distress  HENT:   Head: Normocephalic and atraumatic  Cardiovascular: Normal rate and normal heart sounds  Pulmonary/Chest: Effort normal and breath sounds normal  No respiratory distress  He has no wheezes  He exhibits no tenderness  Abdominal: Soft  Bowel sounds are normal  He exhibits no mass  There is no tenderness  Musculoskeletal: He exhibits tenderness  He exhibits no edema or deformity  Lumbar back: He exhibits decreased range of motion and tenderness  He exhibits no bony tenderness, no swelling, no edema, no deformity and no laceration  Full AROM and 5/5 strength in LE b/l  NVI  No rashes, swelling, bruising, step-offs, midline tenderness noted  Neurological: He is alert and oriented to person, place, and time  Skin: Skin is warm  Capillary refill takes less than 2 seconds  No rash noted  He is not diaphoretic  No erythema  Psychiatric: He has a normal mood and affect  Vitals reviewed        Vital Signs  ED Triage Vitals [02/07/19 0838]   Temperature Pulse Respirations Blood Pressure SpO2   97 8 °F (36 6 °C) 81 18 138/70 100 %      Temp Source Heart Rate Source Patient Position - Orthostatic VS BP Location FiO2 (%)   Oral Monitor Sitting Left arm --      Pain Score       Worst Possible Pain           Vitals:    02/07/19 0838   BP: 138/70   Pulse: 81   Patient Position - Orthostatic VS: Sitting       Visual Acuity      ED Medications  Medications - No data to display    Diagnostic Studies  Results Reviewed     None                 No orders to display              Procedures  Procedures       Phone Contacts  ED Phone Contact    ED Course  ED Course as of Feb 12 1005   Thu Feb 07, 2019 1000 Discussed case with the touch the, neurosurgeon PA, states that she will talk with Dr Elder Johnson  Will return call for plan  1045 Discussed with Heidi  Will get post void residual - waiting for Dr Elder Johnson response for further evaluation  1254 Pt reports he would like to leave at this time  I advised him to continue his current medications  Pt denies red flag symptoms  Bladder scan > 466 post voiding  Reports he has no urinary complaints and he is at baseline  Will advise him to call and follow-up  Identification of Seniors at Risk      Most Recent Value   (ISAR) Identification of Seniors at Risk   Before the illness or injury that brought you to the Emergency, did you need someone to help you on a regular basis? 0 Filed at: 02/07/2019 0840   In the last 24 hours, have you needed more help than usual?  0 Filed at: 02/07/2019 0840   Have you been hospitalized for one or more nights during the past 6 months? 0 Filed at: 02/07/2019 0840   In general, do you see well?  0 Filed at: 02/07/2019 0840   In general, do you have serious problems with your memory? 0 Filed at: 02/07/2019 0840   Do you take more than three different medications every day? 1 Filed at: 02/07/2019 0840   ISAR Score  1 Filed at: 02/07/2019 0840                          MDM    Disposition  Final diagnoses:   Chronic midline low back pain without sciatica     Time reflects when diagnosis was documented in both MDM as applicable and the Disposition within this note     Time User Action Codes Description Comment    2/7/2019 12:58 PM Brielle Meyers Add [M54 5,  G89 29] Chronic midline low back pain without sciatica       ED Disposition     ED Disposition Condition Date/Time Comment    Discharge  Thu Feb 7, 2019 12:59 PM Chang Vuong discharge to home/self care      Condition at discharge: Stable        Follow-up Information     Follow up With Specialties Details Why Kartik Hancock MD Neurosurgery Call today Follow up for further evaluation, Follow up for re-check of symptoms Lobo Tejeda  [phone]            Discharge Medication List as of 2/7/2019  1:00 PM      CONTINUE these medications which have NOT CHANGED    Details   acetaminophen (TYLENOL) 325 mg tablet Take 650 mg by mouth every 6 (six) hours as needed for mild pain, Historical Med      atorvastatin (LIPITOR) 10 mg tablet Take 10 mg by mouth daily, Historical Med      gabapentin (NEURONTIN) 300 mg capsule Take 300 mg by mouth 2 (two) times a day, Historical Med      lisinopril (ZESTRIL) 20 mg tablet Take 20 mg by mouth 2 (two) times a day , Historical Med      tamsulosin (FLOMAX) 0 4 mg Take 0 4 mg by mouth daily with dinner  , Starting Tue 10/16/2018, Historical Med           No discharge procedures on file      ED Provider  Electronically Signed by           Manjit Monroy PA-C  02/12/19 6024

## 2019-02-07 NOTE — DISCHARGE INSTRUCTIONS
- Continue medications as prescribed  Acute Low Back Pain   WHAT YOU NEED TO KNOW:   Acute low back pain is sudden discomfort in your lower back area that lasts for up to 6 weeks  The discomfort makes it difficult to tolerate activity  DISCHARGE INSTRUCTIONS:   Return to the emergency department if:   · You have severe pain  · You have sudden stiffness and heaviness on both buttocks down to both legs  · You have numbness or weakness in one leg, or pain in both legs  · You have numbness in your genital area or across your lower back  · You cannot control your urine or bowel movements  Contact your healthcare provider if:   · You have a fever  · You have pain at night or when you rest     · Your pain does not get better with treatment  · You have pain that worsens when you cough or sneeze  · You suddenly feel something pop or snap in your back  · You have questions or concerns about your condition or care  Medicines: The following medicines may be ordered by your healthcare provider:  · Acetaminophen  decreases pain  It is available without a doctor's order  Ask how much to take and how often to take it  Follow directions  Acetaminophen can cause liver damage if not taken correctly  · NSAIDs  help decrease swelling and pain  This medicine is available with or without a doctor's order  NSAIDs can cause stomach bleeding or kidney problems in certain people  If you take blood thinner medicine, always ask your healthcare provider if NSAIDs are safe for you  Always read the medicine label and follow directions  · Prescription pain medicine  may be given  Ask your healthcare provider how to take this medicine safely  · Muscle relaxers  decrease pain by relaxing the muscles in your lower spine  · Take your medicine as directed  Contact your healthcare provider if you think your medicine is not helping or if you have side effects   Tell him of her if you are allergic to any medicine  Keep a list of the medicines, vitamins, and herbs you take  Include the amounts, and when and why you take them  Bring the list or the pill bottles to follow-up visits  Carry your medicine list with you in case of an emergency  Self-care:   · Stay active  as much as you can without causing more pain  Bed rest could make your back pain worse  Start with some light exercises such as walking  Avoid heavy lifting until your pain is gone  Ask for more information about the activities or exercises that are right for you  · Ice  helps decrease swelling, pain, and muscle spams  Put crushed ice in a plastic bag  Cover it with a towel  Place it on your lower back for 20 to 30 minutes every 2 hours  Do this for about 2 to 3 days after your pain starts, or as directed  · Heat  helps decrease pain and muscle spasms  Start to use heat after treatment with ice has stopped  Use a small towel dampened with warm water or a heating pad, or sit in a warm bath  Apply heat on the area for 20 to 30 minutes every 2 hours for as many days as directed  Alternate heat and ice  Prevent acute low back pain:   · Use proper body mechanics  ¨ Bend at the hips and knees when you  objects  Do not bend from the waist  Use your leg muscles as you lift the load  Do not use your back  Keep the object close to your chest as you lift it  Try not to twist or lift anything above your waist     ¨ Change your position often when you stand for long periods of time  Rest one foot on a small box or footrest, and then switch to the other foot often  ¨ Try not to sit for long periods of time  When you do, sit in a straight-backed chair with your feet flat on the floor  Never reach, pull, or push while you are sitting  · Do exercises that strengthen your back muscles  Warm up before you exercise  Ask your healthcare provider the best exercises for you  · Maintain a healthy weight    Ask your healthcare provider how much you should weigh  Ask him to help you create a weight loss plan if you are overweight  Follow up with your healthcare provider as directed:  Return for a follow-up visit if you still have pain after 1 to 3 weeks of treatment  You may need to visit an orthopedist if your back pain lasts more than 12 weeks  Write down your questions so you remember to ask them during your visits  © 2017 [employer] Information is for End User's use only and may not be sold, redistributed or otherwise used for commercial purposes  All illustrations and images included in CareNotes® are the copyrighted property of [employer]  or [employer]  The above information is an  only  It is not intended as medical advice for individual conditions or treatments  Talk to your doctor, nurse or pharmacist before following any medical regimen to see if it is safe and effective for you

## 2019-02-11 NOTE — ED ATTENDING ATTESTATION
Jayme Contreras MD, saw and evaluated the patient  I have discussed the patient with the resident/non-physician practitioner and agree with the resident's/non-physician practitioner's findings, Plan of Care, and MDM as documented in the resident's/non-physician practitioner's note, except where noted  All available labs and Radiology studies were reviewed  At this point I agree with the current assessment done in the Emergency Department  I have conducted an independent evaluation of this patient a history and physical is as follows:  Chronic back pain, prior surgery   Agree with pa note    Critical Care Time  Procedures

## 2019-02-15 ENCOUNTER — TRANSCRIBE ORDERS (OUTPATIENT)
Dept: RADIOLOGY | Facility: HOSPITAL | Age: 64
End: 2019-02-15

## 2019-02-15 ENCOUNTER — HOSPITAL ENCOUNTER (OUTPATIENT)
Dept: RADIOLOGY | Facility: HOSPITAL | Age: 64
Discharge: HOME | End: 2019-02-15
Payer: COMMERCIAL

## 2019-02-15 DIAGNOSIS — M54.40 LOW BACK PAIN WITH SCIATICA, SCIATICA LATERALITY UNSPECIFIED, UNSPECIFIED BACK PAIN LATERALITY, UNSPECIFIED CHRONICITY: ICD-10-CM

## 2019-02-15 DIAGNOSIS — M54.40 LOW BACK PAIN WITH SCIATICA, SCIATICA LATERALITY UNSPECIFIED, UNSPECIFIED BACK PAIN LATERALITY, UNSPECIFIED CHRONICITY: Primary | ICD-10-CM

## 2019-02-15 PROCEDURE — 72110 X-RAY EXAM L-2 SPINE 4/>VWS: CPT

## 2019-02-25 ENCOUNTER — APPOINTMENT (OUTPATIENT)
Dept: LAB | Facility: HOSPITAL | Age: 64
End: 2019-02-25
Attending: INTERNAL MEDICINE
Payer: COMMERCIAL

## 2019-02-25 DIAGNOSIS — D64.9 ANEMIA, UNSPECIFIED TYPE: ICD-10-CM

## 2019-02-25 LAB
CRP SERPL QL: <3 MG/L
ERYTHROCYTE [SEDIMENTATION RATE] IN BLOOD: 44 MM/HOUR (ref 0–10)

## 2019-02-25 PROCEDURE — 86140 C-REACTIVE PROTEIN: CPT

## 2019-02-25 PROCEDURE — 86038 ANTINUCLEAR ANTIBODIES: CPT

## 2019-02-25 PROCEDURE — 36415 COLL VENOUS BLD VENIPUNCTURE: CPT

## 2019-02-25 PROCEDURE — 85652 RBC SED RATE AUTOMATED: CPT

## 2019-02-25 PROCEDURE — 86430 RHEUMATOID FACTOR TEST QUAL: CPT

## 2019-02-26 LAB — RHEUMATOID FACT SER QL LA: NEGATIVE

## 2019-02-27 ENCOUNTER — TELEPHONE (OUTPATIENT)
Dept: HEMATOLOGY ONCOLOGY | Facility: CLINIC | Age: 64
End: 2019-02-27

## 2019-02-27 LAB — RYE IGE QN: NEGATIVE

## 2019-02-27 NOTE — TELEPHONE ENCOUNTER
Called patient on 2/27/19 stated that there were some labs that needed to be collected before his upcoming appointment tomorrow  Patient stated he would go later today to collect the labs

## 2019-03-01 ENCOUNTER — APPOINTMENT (OUTPATIENT)
Dept: LAB | Facility: HOSPITAL | Age: 64
End: 2019-03-01
Attending: INTERNAL MEDICINE
Payer: COMMERCIAL

## 2019-03-01 ENCOUNTER — TELEPHONE (OUTPATIENT)
Dept: HEMATOLOGY ONCOLOGY | Facility: CLINIC | Age: 64
End: 2019-03-01

## 2019-03-01 DIAGNOSIS — D64.9 ANEMIA, UNSPECIFIED TYPE: ICD-10-CM

## 2019-03-01 LAB
FERRITIN SERPL-MCNC: 150 NG/ML (ref 8–388)
FOLATE SERPL-MCNC: >20 NG/ML (ref 3.1–17.5)
IRON SATN MFR SERPL: 10 %
IRON SERPL-MCNC: 48 UG/DL (ref 65–175)
T4 FREE SERPL-MCNC: 1.14 NG/DL (ref 0.76–1.46)
TIBC SERPL-MCNC: 476 UG/DL (ref 250–450)
TSH SERPL DL<=0.05 MIU/L-ACNC: 4.49 UIU/ML (ref 0.36–3.74)
VIT B12 SERPL-MCNC: 534 PG/ML (ref 100–900)

## 2019-03-01 PROCEDURE — 84443 ASSAY THYROID STIM HORMONE: CPT

## 2019-03-01 PROCEDURE — 82746 ASSAY OF FOLIC ACID SERUM: CPT

## 2019-03-01 PROCEDURE — 82728 ASSAY OF FERRITIN: CPT

## 2019-03-01 PROCEDURE — 83540 ASSAY OF IRON: CPT

## 2019-03-01 PROCEDURE — 84439 ASSAY OF FREE THYROXINE: CPT

## 2019-03-01 PROCEDURE — 83550 IRON BINDING TEST: CPT

## 2019-03-01 PROCEDURE — 36415 COLL VENOUS BLD VENIPUNCTURE: CPT

## 2019-03-01 PROCEDURE — 82668 ASSAY OF ERYTHROPOIETIN: CPT

## 2019-03-01 PROCEDURE — 82607 VITAMIN B-12: CPT

## 2019-03-01 NOTE — TELEPHONE ENCOUNTER
Spoke with patient and instructed him to have his labs completed prior to his appt on 03/04/19  He stated he is going on 03/02/19

## 2019-03-02 LAB — EPO SERPL-ACNC: 19.4 MIU/ML (ref 2.6–18.5)

## 2019-03-04 ENCOUNTER — OFFICE VISIT (OUTPATIENT)
Dept: HEMATOLOGY ONCOLOGY | Facility: CLINIC | Age: 64
End: 2019-03-04
Payer: COMMERCIAL

## 2019-03-04 VITALS
RESPIRATION RATE: 17 BRPM | BODY MASS INDEX: 23.75 KG/M2 | HEART RATE: 88 BPM | WEIGHT: 147.8 LBS | DIASTOLIC BLOOD PRESSURE: 74 MMHG | SYSTOLIC BLOOD PRESSURE: 132 MMHG | HEIGHT: 66 IN | TEMPERATURE: 98.1 F | OXYGEN SATURATION: 98 %

## 2019-03-04 DIAGNOSIS — D70.9 NEUTROPENIA, UNSPECIFIED TYPE (HCC): ICD-10-CM

## 2019-03-04 DIAGNOSIS — D50.8 OTHER IRON DEFICIENCY ANEMIA: Primary | ICD-10-CM

## 2019-03-04 PROCEDURE — 99214 OFFICE O/P EST MOD 30 MIN: CPT | Performed by: PHYSICIAN ASSISTANT

## 2019-03-04 NOTE — PROGRESS NOTES
[id number] [facility]  [facility] [address] [address] [phone]  Hematology Ambulatory Follow-Up  Stuart Graff, 1955, [id number]  3/4/2019    Assessment/Plan:  1  Other iron deficiency anemia  This is a 77-year-old male who was recently found to be iron deficient  Patient had a colonoscopy completed in January 2019 that was negative for cause of anemia  Patient denies additional blood loss from colon and from urine  Patient does have poor dietary iron intake not eating a lot of red meat  At this time, and I have recommended the patient undergo IV iron supplementation  This will allow us to see the corrected hemoglobin  We discussed side effects that include but are note limited to infusion site reaction, hypotension, headache and anaphylaxis  Regimen:  Feraheme 510 mg IV weekly x 2 doses    Patient will return in 2 months with CBC at that time  - CBC and differential; Future  - UA (URINE) with reflex to Microscopic; Future  - CBC and differential; Future    2  Neutropenia, unspecified type ([address] )  It is unlikely that the patient's neutropenic from iron deficiency  However it looks like the patient may have an underlying inflammatory condition that might be resulting in the neutropenia  I suggested to the patient that we reverse the cause of iron deficiency anemia  If at that time, the patient's counts do not reverse then bone marrow biopsy will be used  Mr Beau Redd  has not required any antibiotic for infection, does not have progressive fatigue and night sweats  Patient is asymptomatic of his neutropenia  Observation is advised at this time  We will follow up in 2 months  - CBC and differential; Future  - CBC and differential; Future    The patient is scheduled for follow-up in approximately 2 months  Patient voiced agreement and understanding to the above   Patient knows to call the Hematology/Oncology office with any questions and concerns regarding the above  Carefully review your medication list in verify the list is accurate and up-to-date  Please call the hematologic/oncology office if there medications missing from the less, medications on the list that your not currently taking or if there is a dosage or instruction that is different from higher taking medication  I have spent 30 minutes with Patient  today in which greater than 50% of this time was spent in counseling/coordination of care regarding Diagnostic results, Risks and benefits of tx options, Intructions for management and Impressions  Barrier(s) to care: None  The patient is able to self care   ------------------------------------------------------------------------------------------------------    Chief Complaint   Patient presents with    Follow-up     Leukopenia and Anemia     History of present illness: This is a 25-year-old male who was initially evaluated by Hematology in February 2019  The patient was noted to have normal blood work prior to July in 2018  However from the time  July 2018 through January 2019 hemoglobin decreased from normal to 9 8 within normal MCV, platelet count was within normal limits however total white blood cell count also dropped with an increase in monocytes and lymphocytes relatively compared to a total decrease in absolute neutrophil count  Patient was recommended to undergo blood work that demonstrated an iron saturation of 10% with a high TIBC a ferritin of 150, erythropoietin level slightly elevated at 19  B12 and folate levels acceptable  Patient now returns for follow-up  Of note, patient had colonoscopy completed in January 2019  There is no etiology for anemia found during that exam     Interval history:  Patient overall he is feeling okay  Patient is still having episodes of intermittent syncope  Patient notes that these happened at random  Typically they Pap well and standing    After patient falls down he is able to regain consciousness within 1 min  Patient is following up with his family  Review of Systems   Constitutional: Negative for activity change, appetite change, fatigue and fever  HENT: Negative for nosebleeds  Respiratory: Negative for cough, choking and shortness of breath  Cardiovascular: Negative for chest pain, palpitations and leg swelling  Gastrointestinal: Negative for abdominal distention, abdominal pain, anal bleeding, blood in stool, constipation, diarrhea, nausea and vomiting  Endocrine: Negative for cold intolerance  Genitourinary: Negative for hematuria  Musculoskeletal: Negative for myalgias  Skin: Negative for color change, pallor and rash  Allergic/Immunologic: Negative for immunocompromised state  Neurological: Negative for headaches  Hematological: Negative for adenopathy  Does not bruise/bleed easily  All other systems reviewed and are negative      Patient Active Problem List   Diagnosis    Lumbar stenosis with neurogenic claudication    Pseudomonas aeruginosa infection    Ataxia    Essential hypertension    Syncope and collapse    Orthostatic hypotension    HLD (hyperlipidemia)    Ascending aortic aneurysm (HCC)    Urinary retention    CKD (chronic kidney disease), stage III (HCC)    Hyponatremia    Diffuse pain in left lower extremity    PAD (peripheral artery disease) (HCC)    Lumbar radiculopathy    Status post lumbar discectomy    Anemia, unspecified     Past Medical History:   Diagnosis Date    Aortic aneurysm (HCC)     Arthritis     Atypical chest pain     Back pain     Chronic kidney disease     stg 3    Elevated ALT measurement     Elevated AST (SGOT)     Hyperlipidemia     Hypertension     Leg pain     Neurogenic claudication     Urinary retention     catherize self 4x/day    Urinary retention      Past Surgical History:   Procedure Laterality Date    EPIDURAL BLOCK INJECTION N/A 1/23/2017 Procedure: BLOCK / INJECTION EPIDURAL STEROID LUMBAR;  Surgeon: Jen Bhakta MD;  [facility];  Service:     MS COLONOSCOPY FLX DX W/COLLJ SPEC WHEN PFRMD N/A 1/8/2019    Procedure: COLONOSCOPY;  Surgeon: Pablito Neumann MD;  Location: [facility];   Service: Colorectal    MS LAMNOTMY INCL W/DCMPRSN NRV ROOT 1 INTRSPC LUMBR Bilateral 1/23/2017    Procedure: MIS L2-3 & L3-4 LAMINAL FORAMINOTOMIES AND MICRODISCECTOMY W LEFT SIDED APPROACH ;  Surgeon: Jen Bhakta MD;  [facility];  Service: Neurosurgery     Family History   Problem Relation Age of Onset    Heart disease Sister     Stroke Sister     Stroke Brother     Cancer Sister         Pancreatic cancer     Social History     Socioeconomic History    Marital status: Single     Spouse name: Not on file    Number of children: Not on file    Years of education: Not on file    Highest education level: Not on file   Occupational History    Not on file   Social Needs    Financial resource strain: Not on file    Food insecurity:     Worry: Not on file     Inability: Not on file    Transportation needs:     Medical: Not on file     Non-medical: Not on file   Tobacco Use    Smoking status: Never Smoker    Smokeless tobacco: Never Used   Substance and Sexual Activity    Alcohol use: No    Drug use: No    Sexual activity: Not on file   Lifestyle    Physical activity:     Days per week: Not on file     Minutes per session: Not on file    Stress: Not on file   Relationships    Social connections:     Talks on phone: Not on file     Gets together: Not on file     Attends Tenri[employer] service: Not on file     Active member of club or organization: Not on file     Attends meetings of clubs or organizations: Not on file     Relationship status: Not on file    Intimate partner violence:     Fear of current or ex partner: Not on file     Emotionally abused: Not on file     Physically abused: Not on file     Forced sexual activity: Not on file Other Topics Concern    Not on file   Social History Narrative    Not on file       Current Outpatient Medications:     acetaminophen (TYLENOL) 325 mg tablet, Take 650 mg by mouth every 6 (six) hours as needed for mild pain, Disp: , Rfl:     atorvastatin (LIPITOR) 10 mg tablet, Take 10 mg by mouth daily, Disp: , Rfl:     gabapentin (NEURONTIN) 300 mg capsule, Take 300 mg by mouth as needed , Disp: , Rfl:     lisinopril (ZESTRIL) 20 mg tablet, Take 20 mg by mouth 2 (two) times a day , Disp: , Rfl:     tamsulosin (FLOMAX) 0 4 mg, Take 0 4 mg by mouth daily with dinner  , Disp: , Rfl:     No Known Allergies    Objective:  /74 (BP Location: Left arm, Patient Position: Sitting)   Pulse 88   Temp 98 1 °F (36 7 °C) (Tympanic)   Resp 17   Ht 5' 6" (1 676 m)   Wt 67 kg (147 lb 12 8 oz)   SpO2 98%   BMI 23 86 kg/m²    Physical Exam   Constitutional: He is oriented to person, place, and time  He appears well-developed and well-nourished  No distress  HENT:   Head: Normocephalic and atraumatic  Right Ear: External ear normal    Left Ear: External ear normal    Nose: Nose normal    Eyes: Conjunctivae are normal  No scleral icterus  Cardiovascular: Normal rate  Pulmonary/Chest: No respiratory distress  Abdominal: Soft  He exhibits no distension  Musculoskeletal: He exhibits no edema  Neurological: He is alert and oriented to person, place, and time  Skin: No rash (on exposed skin ) noted  Psychiatric: Thought content normal        Result Review  Labs:  Appointment on 03/01/2019   Component Date Value Ref Range Status    Vitamin B-12 03/01/2019 534  100 - 900 pg/mL Final    TSH 3RD GENERATON 03/01/2019 4 490* 0 358 - 3 740 uIU/mL Final      Using supplements with high doses of biotin 20 to more than 300 times greater than the adequate daily intake for adults of 30 mcg/day as established by the [employer], can cause falsely depress results      Folate 03/01/2019 >20 0* 3 1 - 17 5 ng/mL Final    Erythropoietin 03/01/2019 19 4* 2 6 - 18 5 mIU/mL Final    [employer] UniCel DxI 800 Immunoassay System  Values obtained with different assay methods or kits cannot be used  interchangeably  Results cannot be interpreted as absolute evidence  of the presence or absence of malignant disease   Iron Saturation 03/01/2019 10  % Final    TIBC 03/01/2019 476* 250 - 450 ug/dL Final    Iron 03/01/2019 48* 65 - 175 ug/dL Final      Patients treated with metal-binding drugs (ie  Deferoxamine) may have depressed iron values   Ferritin 03/01/2019 150  8 - 388 ng/mL Final    Free T4 03/01/2019 1 14  0 76 - 1 46 ng/dL Final      Specimen collection should occur prior to Sulfasalazine administration due to the potential for falsely elevated results  Appointment on 02/25/2019   Component Date Value Ref Range Status    CRP 02/25/2019 <3 0  <3 0 mg/L Final    Rheumatoid Factor 02/25/2019 Negative  Negative Final    Sed Rate 02/25/2019 44* 0 - 10 mm/hour Final    ZULEMA 02/25/2019 Negative  Negative Final       Please note: This report has been generated by a voice recognition software system  Therefore there may be syntax, spelling, and/or grammatical errors  Please call if you have any questions

## 2019-03-04 NOTE — PATIENT INSTRUCTIONS
Iron Rich Diet   WHAT YOU NEED TO KNOW:   An iron-rich diet includes foods that are good sources of iron  People need extra iron during childhood, adolescence (teenage years), and pregnancy  Iron is a mineral that your body needs to make hemoglobin  Hemoglobin is part of your blood and helps carry oxygen from your lungs to the rest of your body  You may need to follow this diet to treat or prevent a low blood iron level or iron deficiency anemia  DISCHARGE INSTRUCTIONS:   Daily iron needs:   · Males:      ¨ 3to 1years old: 7 mg    ¨ 3to 6years old: 10 mg    ¨ 5to 15years old: 8 mg    ¨ 15to 25years old: 11 mg    ¨ 19 years and older: 8 mg    · Females:      ¨ 3to 1years old: 7 mg    ¨ 3to 6years old: 10 mg    ¨ 5to 15years old: 8 mg    ¨ 15to 25years old: 15 mg    ¨ 19 to 50 years: 18 mg    ¨ Over 46years old: 8 mg    ¨ Pregnant women:  27 mg  Foods that contain iron:   · Meat, fish, and poultry are good sources of iron  They contain heme iron, a form of iron that your body absorbs very well  Fruit, vegetables, eggs, and grains such as pasta, rice, and cereal also contain iron  They contain nonheme iron, a form of iron that is not absorbed as well as heme iron  You can absorb more iron from these foods by eating a food that is high in vitamin C at the same time  You can also absorb more nonheme iron by eating a food from the meat, fish, and poultry group at the same time  · Fish and shellfish contain some mercury, a metal that can be harmful to the body  Children and unborn babies are at higher risk for harm caused by mercury  Children and pregnant women should avoid eating fish high in mercury, such as shark and swordfish  They should also eat only fish that are lower in mercury, such as salmon, canned light tuna, and catfish  Limit the amount of low-mercury fish and shellfish you eat to less than 12 ounces per week    Iron-rich foods:   · Foods that contain 2 mg or more per serving:      ¨ 3 ounces of cooked beef (monica, eye of round) or cooked turkey (dark meat)    ¨ ½ cup of beans (black, kidney, or lentil, or soybeans)    ¨ ½ cup of tofu    ¨ 1 medium baked potato    ¨ 1 cup of cooked artichoke or cooked spinach    ¨ ¾ cup of instant oatmeal    ¨ 1 cup of corn flakes    · Foods that contain 1 to 2 mg per serving:      ¨ 3 ounces of chicken    ¨ 3 ounces of pork    ¨ 3 ounces of turkey (light meat)    ¨ 3 ounces of light tuna    ¨ ½ cup of seedless, packed raisins    ¨ 1 slice of whole-wheat or white bread  Good sources of vitamin C:  Eat a serving of vitamin C with any iron-rich food to help your body absorb more iron  The following fruits and vegetables are good sources of vitamin C:  · 1 cup of fresh orange juice (124 mg) or pink grapefruit juice (83 mg)    · 1 cup of strawberries (106 mg)    · 1 cup of diced cantaloupe (68 mg)     · 1 cup of sweet yellow pepper (283 mg)    · 1 cup of fresh, boiled broccoli (116 mg) or cooked brussels sprouts (97 mg)    · 1 cup of kale (53 mg)    · 1 cup of tomato juice (45 mg)  Other guidelines to follow:   · Tea and coffee can decrease the amount of iron that your body absorbs from iron-rich foods  Drink coffee and tea separately from meals that contain iron-rich foods  · Children over the age of 1 year only need about 24 ounces of cow's milk each day  When children drink too much milk, they may eat fewer iron-rich foods  This may cause them to have a low level of iron in their blood  Risks: If you do not eat iron-rich foods and vitamin C every day, you may have low blood iron levels  This may lead to iron deficiency anemia, especially during periods when your body needs extra iron  Iron deficiency anemia may cause problems with your child's growth and development  If you have iron deficiency anemia, you may develop other health problems     © 2017 [address] [employer] Information is for End User's use only and may not be sold, redistributed or otherwise used for commercial purposes  All illustrations and images included in CareNotes® are the copyrighted property of A D A M , Inc  or Ignacio Arnold  The above information is an  only  It is not intended as medical advice for individual conditions or treatments  Talk to your doctor, nurse or pharmacist before following any medical regimen to see if it is safe and effective for you  Ferumoxytol (By injection)   Ferumoxytol (cez-my-YAW-i-collin)    Treats iron deficiency anemia  Brand Name(s): Feraheme   There may be other brand names for this medicine  When This Medicine Should Not Be Used: This medicine is not right for everyone  You should not receive it if you had an allergic reaction to ferumoxytol or to any product that contains iron  How to Use This Medicine:   Injectable  · Your doctor will prescribe your dose and schedule  This medicine is given through a needle placed in a vein  · A nurse or other health provider will give you this medicine  · Read and follow the patient instructions that come with this medicine  Talk to your doctor or pharmacist if you have any questions  Drugs and Foods to Avoid:      Ask your doctor or pharmacist before using any other medicine, including over-the-counter medicines, vitamins, and herbal products  Warnings While Using This Medicine:   · Tell your doctor if you are pregnant or breastfeeding, or if you have kidney disease, liver disease, low blood pressure, or have a history of drug allergies  Tell your doctor if you are on dialysis  · This medicine may cause the following problems:   ¨ Serious allergic reaction  ¨ Low blood pressure  · Tell any doctor or dentist who treats you that you are using this medicine  This medicine may affect certain medical test results  Your doctor will do lab tests at regular visits to check on the effects of this medicine  Keep all appointments      Possible Side Effects While Using This Medicine:   Call your doctor right away if you notice any of these side effects:  · Allergic reaction: Itching or hives, swelling in your face or hands, swelling or tingling in your mouth or throat, chest tightness, trouble breathing  · Lightheadedness, dizziness, fainting    If you notice these less serious side effects, talk with your doctor:   · Pain, itching, burning, swelling, or a lump under your skin where the needle is placed    If you notice other side effects that you think are caused by this medicine, tell your doctor  Call your doctor for medical advice about side effects  You may report side effects to FDA at 5-521-FDA-2435  © 2017 [employer] Information is for End User's use only and may not be sold, redistributed or otherwise used for commercial purposes  The above information is an  only  It is not intended as medical advice for individual conditions or treatments  Talk to your doctor, nurse or pharmacist before following any medical regimen to see if it is safe and effective for you

## 2019-03-05 ENCOUNTER — EVALUATION (OUTPATIENT)
Dept: PHYSICAL THERAPY | Facility: CLINIC | Age: 64
End: 2019-03-05
Payer: COMMERCIAL

## 2019-03-05 DIAGNOSIS — M54.50 LUMBAR PAIN: Primary | ICD-10-CM

## 2019-03-05 PROCEDURE — 97161 PT EVAL LOW COMPLEX 20 MIN: CPT | Performed by: PHYSICAL THERAPIST

## 2019-03-05 NOTE — PROGRESS NOTES
PT Evaluation     Today's date: 3/5/2019  Patient name: Prudence Bower  : 1955  MRN: [id number]  Referring provider: Sharon Mccabe PA-C  Dx:   Encounter Diagnosis     ICD-10-CM    1  Lumbar pain M54 5                   Assessment  Assessment details: Patient is a 70 y o  male who presents to outpatient PT with pain, decreased rom, decreased strength and decreased functional mobility  He will benefit from skilled PT to address these deficits in order to achieve his goals and maximize his functional mobility  Goals  Short Term Goals: Independent performance of initial hep  Decrease pain 2 points on VAS      Long Term Goals: Independent performance of comprehensive hep  Work performance is returned to max level of function  Performance of IADL's is returned to max level of function  Performance in recreational activities is improved to max level of function      Plan  Planned therapy interventions: abdominal trunk stabilization, ADL retraining, IADL retraining, joint mobilization, manual therapy, massage, neuromuscular re-education, postural training, strengthening, stretching, therapeutic activities, therapeutic exercise and home exercise program  Frequency: 2x week  Duration in weeks: 6        Subjective Evaluation    History of Present Illness  Mechanism of injury: Pt reports that he has been experiencing an exacerbation of his chronic lumbar pain for "several months", pt is unable to to identify a trigger for this  Reports that the pain will radiate into his L leg to his mid-calf  Reports that his pain is reduced by taking gabapentin or Tylenol  Reports that he has pain when walking for extended periods of time  Reports that he is unable to work currently secondary back pain  Denies any bowel/bladder dysfunction      Pain  Current pain ratin  At best pain rating: 3  At worst pain ratin    Patient Goals  Patient goals for therapy: decreased pain, increased motion, increased strength, independence with ADLs/IADLs, return to sport/leisure activities and return to work          Objective       ROM:   Flexion: mod limited   Extension: max limited   Right Rotation: mod limited   Left Rotation: mod limited   Right Lateral Flexion: mod limited   Left Lateral Flexion:   Mod limited        Poor control of abdominals noted with TaA  Hypomobility noted with spring testing  Straight Leg Raise: neg  Cross SLR:  neg  Slump Test:  neg  Prone Knee Bend:  neg  Directional testing: no change in symptoms  Decreased flexibility:  B/L HS and quads    Shelly's sign: neg  Prone instability test: neg  Hip IR rom:  limited      Myotome testing: equal B/L    Dermatome testing: equal B/L              Precautions: acute on chronic lumbar pain    Daily Treatment Diary       Manuals             Lumbar PA's                                                    Exercise Diary              bike             Repeated ext standing             ltr             Hs stretch             Prone quad stretch             DKTC             TaA             TaA bridge             TaA ball squats             TaA step out with tb press             Low rows                                                                                                                                                            Modalities

## 2019-03-07 RX ORDER — SODIUM CHLORIDE 9 MG/ML
20 INJECTION, SOLUTION INTRAVENOUS ONCE
Status: COMPLETED | OUTPATIENT
Start: 2019-03-08 | End: 2019-03-08

## 2019-03-08 ENCOUNTER — HOSPITAL ENCOUNTER (OUTPATIENT)
Dept: INFUSION CENTER | Facility: HOSPITAL | Age: 64
Discharge: HOME | End: 2019-03-08
Payer: COMMERCIAL

## 2019-03-08 ENCOUNTER — APPOINTMENT (OUTPATIENT)
Dept: PHYSICAL THERAPY | Facility: CLINIC | Age: 64
End: 2019-03-08
Payer: COMMERCIAL

## 2019-03-08 VITALS
HEART RATE: 82 BPM | RESPIRATION RATE: 18 BRPM | DIASTOLIC BLOOD PRESSURE: 78 MMHG | SYSTOLIC BLOOD PRESSURE: 152 MMHG | TEMPERATURE: 98 F

## 2019-03-08 PROCEDURE — 96365 THER/PROPH/DIAG IV INF INIT: CPT

## 2019-03-08 RX ADMIN — SODIUM CHLORIDE 20 ML/HR: 0.9 INJECTION, SOLUTION INTRAVENOUS at 10:35

## 2019-03-08 RX ADMIN — FERUMOXYTOL 510 MG: 510 INJECTION INTRAVENOUS at 10:35

## 2019-03-08 NOTE — PROGRESS NOTES
Patient tolerated infusion well  No complaints  Stood up and ambulating and denies dizziness  Discharged home

## 2019-03-08 NOTE — PLAN OF CARE
Problem: Potential for Falls  Goal: Patient will remain free of falls  Description  INTERVENTIONS:  - Assess patient frequently for physical needs  -  Identify cognitive and physical deficits and behaviors that affect risk of falls  -  [place] fall precautions as indicated by assessment   - Educate patient/family on patient safety including physical limitations  - Instruct patient to call for assistance with activity based on assessment  - Modify environment to reduce risk of injury  Outcome: Progressing     Problem: Knowledge Deficit  Goal: Patient/family/caregiver demonstrates understanding of disease process, treatment plan, medications, and discharge instructions  Description  Complete learning assessment and assess knowledge base    Interventions:  - Provide teaching at level of understanding  - Provide teaching via preferred learning methods  Outcome: Progressing

## 2019-03-12 ENCOUNTER — OFFICE VISIT (OUTPATIENT)
Dept: PHYSICAL THERAPY | Facility: CLINIC | Age: 64
End: 2019-03-12
Payer: COMMERCIAL

## 2019-03-12 DIAGNOSIS — M54.50 LUMBAR PAIN: Primary | ICD-10-CM

## 2019-03-12 PROCEDURE — 97530 THERAPEUTIC ACTIVITIES: CPT

## 2019-03-12 PROCEDURE — 97110 THERAPEUTIC EXERCISES: CPT

## 2019-03-12 NOTE — PROGRESS NOTES
Daily Note     Today's date: 3/12/2019  Patient name: Kelly Conteh  : 1955  MRN: [id number]  Referring provider: Vanessa Kilpatrick PA-C  Dx:   Encounter Diagnosis     ICD-10-CM    1  Lumbar pain M54 5                 Subjective: Patient arrives to today's treatment complaining his legs ache - patient states, "My back is alright "  Patient reports he took Gabapentin this morning  Objective: See treatment diary below  Precautions: acute on chronic lumbar pain    Daily Treatment Diary     Manuals 3/12            Lumbar PA's 4' KL                                                   Exercise Diary              bike 10 min            Repeated ext standing             ltr 5"x20            Hs stretch manual  30"x3            Prone quad stretch manual 30"x3            DKTC 30"x3            TaA 5"x20            TaA bridge 5"x20            TaA pball squats 2x10            TaA step out with tb press             Low rows 20#   2x10                                                                                                                                                           Modalities                                         Assessment: Therapeutic exercise program is tolerated well  Noted decreased pain and stiffness in bilateral lower extremities post treatment  Plan: Continue treatment as per PT plan of care

## 2019-03-14 ENCOUNTER — OFFICE VISIT (OUTPATIENT)
Dept: PHYSICAL THERAPY | Facility: CLINIC | Age: 64
End: 2019-03-14
Payer: COMMERCIAL

## 2019-03-14 ENCOUNTER — TRANSCRIBE ORDERS (OUTPATIENT)
Dept: ADMINISTRATIVE | Facility: HOSPITAL | Age: 64
End: 2019-03-14

## 2019-03-14 DIAGNOSIS — M54.50 LUMBAR PAIN: Primary | ICD-10-CM

## 2019-03-14 DIAGNOSIS — R51.9 ACUTE INTRACTABLE HEADACHE, UNSPECIFIED HEADACHE TYPE: Primary | ICD-10-CM

## 2019-03-14 PROCEDURE — 97530 THERAPEUTIC ACTIVITIES: CPT | Performed by: PHYSICAL THERAPIST

## 2019-03-14 PROCEDURE — 97140 MANUAL THERAPY 1/> REGIONS: CPT | Performed by: PHYSICAL THERAPIST

## 2019-03-14 PROCEDURE — 97110 THERAPEUTIC EXERCISES: CPT | Performed by: PHYSICAL THERAPIST

## 2019-03-14 RX ORDER — SODIUM CHLORIDE 9 MG/ML
20 INJECTION, SOLUTION INTRAVENOUS ONCE
Status: COMPLETED | OUTPATIENT
Start: 2019-03-15 | End: 2019-03-15

## 2019-03-14 NOTE — PROGRESS NOTES
Daily Note     Today's date: 3/14/2019  Patient name: Prudence Bower  : 1955  MRN: [id number]  Referring provider: Sharon Mccabe PA-C  Dx:   Encounter Diagnosis     ICD-10-CM    1  Lumbar pain M54 5                 Subjective: Pt reports and overall reduction in his pain but that it is still present    Objective: See treatment diary below  Precautions: acute on chronic lumbar pain    Daily Treatment Diary     Manuals 3/12 3/14           Lumbar PA's 4' KL kl                                                  Exercise Diary              bike 10 min 10'           Repeated ext standing  5"x10           ltr 5"x20 5"x20           Hs stretch manual  30"x3 30"x30           Prone quad stretch manual 30"x3 30"x30           DKTC 30"x3 30"x30           TaA 5"x20 5"x20           TaA bridge 5"x20 5"x20           TaA pball squats 2x10 20           TaA step out with tb press  Red tb x20           Low rows 20#   2x10 20#x20           Bridge with ball squeeze  5"x20                                                                                                                                             Modalities                                         Assessment: Pt reports decreased pain post-manual tx    Plan: Continue treatment as per PT plan of care

## 2019-03-15 ENCOUNTER — HOSPITAL ENCOUNTER (OUTPATIENT)
Dept: INFUSION CENTER | Facility: HOSPITAL | Age: 64
Discharge: HOME | End: 2019-03-15
Payer: COMMERCIAL

## 2019-03-15 PROCEDURE — 96365 THER/PROPH/DIAG IV INF INIT: CPT

## 2019-03-15 RX ADMIN — SODIUM CHLORIDE 20 ML/HR: 0.9 INJECTION, SOLUTION INTRAVENOUS at 11:34

## 2019-03-15 RX ADMIN — FERUMOXYTOL 510 MG: 510 INJECTION INTRAVENOUS at 11:48

## 2019-03-15 NOTE — PLAN OF CARE
Problem: Potential for Falls  Goal: Patient will remain free of falls  Description  INTERVENTIONS:  - Assess patient frequently for physical needs  -  Identify cognitive and physical deficits and behaviors that affect risk of falls    -  [place] fall precautions as indicated by assessment   - Educate patient/family on patient safety including physical limitations  - Instruct patient to call for assistance with activity based on assessment  - Modify environment to reduce risk of injury  - Consider OT/PT consult to assist with strengthening/mobility  Outcome: Progressing

## 2019-03-19 ENCOUNTER — OFFICE VISIT (OUTPATIENT)
Dept: PHYSICAL THERAPY | Facility: CLINIC | Age: 64
End: 2019-03-19
Payer: COMMERCIAL

## 2019-03-19 DIAGNOSIS — M54.50 LUMBAR PAIN: Primary | ICD-10-CM

## 2019-03-19 PROCEDURE — 97140 MANUAL THERAPY 1/> REGIONS: CPT

## 2019-03-19 PROCEDURE — 97530 THERAPEUTIC ACTIVITIES: CPT

## 2019-03-19 PROCEDURE — 97110 THERAPEUTIC EXERCISES: CPT

## 2019-03-19 NOTE — PROGRESS NOTES
Daily Note     Today's date: 3/19/2019  Patient name: Otf Marcus  : 1955  MRN: [id number]  Referring provider: Cherie Menjivar PA-C  Dx:   Encounter Diagnosis     ICD-10-CM    1  Lumbar pain M54 5                 Subjective: Patient reports he is feeling consistently better after attending PT - further describes that his legs feel "looser "  Patient reports compliance with the home exercise program and denies difficulty with this  Objective: See treatment diary below  Precautions: acute on chronic lumbar pain    Daily Treatment Diary     Manuals 3/12 3/14 3/19          Lumbar PA's 4' KL kl NP                                                 Exercise Diary              bike 10 min 10' 10'          Repeated ext standing  5"x10 5"x20          ltr 5"x20 5"x20 5"x20          Hs stretch manual  30"x3 30"x30 manual 30"x4          Prone quad stretch manual 30"x3 30"x30 manual  30"x4          DKTC 30"x3 30"x30 30"x3          TaA 5"x20 5"x20 5"x20          TaA bridge 5"x20 5"x20 NP          TaA pball squats 2x10 20 1x20          TaA step out with tb press  Red tb x20 red tb  1x20          Low rows 20#   2x10 20#  x20 20#  1x20          bridge with ball squeeze  5"x20 5"x20          prone hip ext   1x20                                                                                                                               Modalities                                         Assessment: No complaints reported when performing additional prone hip extension  Noted intermittent cramping in bilateral lower extremities during manual prone quad stretching - this is relieved with rest     Plan: Continue treatment as per PT plan of care

## 2019-03-21 ENCOUNTER — OFFICE VISIT (OUTPATIENT)
Dept: PHYSICAL THERAPY | Facility: CLINIC | Age: 64
End: 2019-03-21
Payer: COMMERCIAL

## 2019-03-21 DIAGNOSIS — M54.50 LUMBAR PAIN: Primary | ICD-10-CM

## 2019-03-21 PROCEDURE — 97110 THERAPEUTIC EXERCISES: CPT | Performed by: PHYSICAL THERAPIST

## 2019-03-21 PROCEDURE — 97530 THERAPEUTIC ACTIVITIES: CPT | Performed by: PHYSICAL THERAPIST

## 2019-03-21 PROCEDURE — 97140 MANUAL THERAPY 1/> REGIONS: CPT | Performed by: PHYSICAL THERAPIST

## 2019-03-21 NOTE — PROGRESS NOTES
Daily Note     Today's date: 3/21/2019  Patient name: Lakia Washburn  : 1955  MRN: [id number]  Referring provider: Osbaldo Varela PA-C  Dx:   Encounter Diagnosis     ICD-10-CM    1  Lumbar pain M54 5                 Subjective: Patient reports that overall he is feeling better but that he was "a little sore after stretching" during his LV  Reports this subsided the next day  Objective: See treatment diary below  Precautions: acute on chronic lumbar pain    Daily Treatment Diary     Manuals 3/12 3/14 3/19 3/21         Lumbar PA's 4' KL kl NP nv                                                Exercise Diary              bike 10 min 10' 10' 10'         Repeated ext standing  5"x10 5"x20 5"x20         ltr 5"x20 5"x20 5"x20 5"x20         Hs stretch manual  30"x3 30"x30 manual 30"x4 30"x4         Prone quad stretch manual 30"x3 30"x30 manual  30"x4 maual 30"x4         DKTC 30"x3 30"x30 30"x3 30"x3         TaA 5"x20 5"x20 5"x20 5"x20         TaA bridge 5"x20 5"x20 NP          TaA pball squats 2x10 20 1x20 30         TaA step out with tb press  Red tb x20 red tb  1x20 green tb x20         Low rows 20#   2x10 20#  x20 20#  1x20 30#x30         bridge with ball squeeze  5"x20 5"x20 5"x20         prone hip ext   1x20 20                                                                                                                              Modalities                                         Assessment: Progressed reps and resistance with therex as listed, pt again reports cramping in his HS during prone quad stretch, this is relieved with rest    Plan: Continue treatment as per PT plan of care

## 2019-03-26 ENCOUNTER — APPOINTMENT (OUTPATIENT)
Dept: PHYSICAL THERAPY | Facility: CLINIC | Age: 64
End: 2019-03-26
Payer: COMMERCIAL

## 2019-03-28 ENCOUNTER — OFFICE VISIT (OUTPATIENT)
Dept: PHYSICAL THERAPY | Facility: CLINIC | Age: 64
End: 2019-03-28
Payer: COMMERCIAL

## 2019-03-28 DIAGNOSIS — M54.50 LUMBAR PAIN: Primary | ICD-10-CM

## 2019-03-28 PROCEDURE — 97110 THERAPEUTIC EXERCISES: CPT

## 2019-03-28 PROCEDURE — 97530 THERAPEUTIC ACTIVITIES: CPT

## 2019-03-28 NOTE — PROGRESS NOTES
Daily Note     Today's date: 3/28/2019  Patient name: Pao Park  : 1955  MRN: [id number]  Referring provider: Nicole Saldaña PA-C  Dx:   Encounter Diagnosis     ICD-10-CM    1  Lumbar pain M54 5               Subjective: Patient reports he experienced back and bilateral lower extremity pain when first waking up this morning  Patient reports he took 2 Gabapentin this morning "like I usually do "  Patient states, "If I have any more pain later I'm allowed to take more "  Prior to today's PT treatment, patient reports he is "pretty much not having pain "    Objective: See treatment diary below  Joint mobilizations performed by PT, MARCIN  Precautions: acute on chronic lumbar pain    Daily Treatment Diary     Manuals 3/12 3/14 3/19 3/21 3/28        Lumbar PA's 4' KL kl NP nv 4'KL                                               Exercise Diary              bike 10 min 10' 10' 10' 10'        Repeated ext standing  5"x10 5"x20 5"x20 5"20        ltr 5"x20 5"x20 5"x20 5"x20 5"x30        Hs stretch manual  30"x3 30"x30 manual 30"x4 30"x4 30"x4  strap        Prone quad stretch manual 30"x3 30"x30 manual  30"x4 maual 30"x4 30"x4  strap        DKTC 30"x3 30"x30 30"x3 30"x3 30"4        TaA 5"x20 5"x20 5"x20 5"x20 5"x20  Legs on wedge        TaA bridge 5"x20 5"x20 NP  NP        TaA pball squats 2x10 20 1x20 30 30        TaA step out with tb press  Red tb x20 red tb  1x20 green tb x20 GTB x20        Low rows 20#   2x10 20#  x20 20#  1x20 30#x30 30# 10x3        bridge with ball squeeze  5"x20 5"x20 5"x20 5" x20        prone hip ext   1x20 20 3x10ea                                                                                                                             Modalities                                             Assessment: Patient progressed to self-stretching with visible improvement in hamstring and quadricep flexibility - no right calf cramping present    Patient reported cramping in right calf when performing TaA - legs supported on wedge helped to alleviate cramping slightly  Plan: Continue treatment as per PT plan of care      Jameson Tamayo, SPTA  Santos Finn, PTA

## 2019-04-02 ENCOUNTER — OFFICE VISIT (OUTPATIENT)
Dept: PHYSICAL THERAPY | Facility: CLINIC | Age: 64
End: 2019-04-02
Payer: COMMERCIAL

## 2019-04-02 DIAGNOSIS — M54.50 LUMBAR PAIN: Primary | ICD-10-CM

## 2019-04-02 PROCEDURE — 97110 THERAPEUTIC EXERCISES: CPT | Performed by: PHYSICAL THERAPIST

## 2019-04-02 PROCEDURE — 97530 THERAPEUTIC ACTIVITIES: CPT | Performed by: PHYSICAL THERAPIST

## 2019-04-04 ENCOUNTER — OFFICE VISIT (OUTPATIENT)
Dept: PHYSICAL THERAPY | Facility: CLINIC | Age: 64
End: 2019-04-04
Payer: COMMERCIAL

## 2019-04-04 ENCOUNTER — HOSPITAL ENCOUNTER (OUTPATIENT)
Dept: RADIOLOGY | Facility: HOSPITAL | Age: 64
Discharge: HOME | End: 2019-04-04
Attending: INTERNAL MEDICINE
Payer: COMMERCIAL

## 2019-04-04 DIAGNOSIS — R51.9 ACUTE INTRACTABLE HEADACHE, UNSPECIFIED HEADACHE TYPE: ICD-10-CM

## 2019-04-04 DIAGNOSIS — M54.50 LUMBAR PAIN: Primary | ICD-10-CM

## 2019-04-04 PROCEDURE — 97110 THERAPEUTIC EXERCISES: CPT

## 2019-04-04 PROCEDURE — 97530 THERAPEUTIC ACTIVITIES: CPT

## 2019-04-04 PROCEDURE — 70450 CT HEAD/BRAIN W/O DYE: CPT

## 2019-04-09 ENCOUNTER — OFFICE VISIT (OUTPATIENT)
Dept: PHYSICAL THERAPY | Facility: CLINIC | Age: 64
End: 2019-04-09
Payer: COMMERCIAL

## 2019-04-09 DIAGNOSIS — M54.50 LUMBAR PAIN: Primary | ICD-10-CM

## 2019-04-09 PROCEDURE — 97530 THERAPEUTIC ACTIVITIES: CPT

## 2019-04-09 PROCEDURE — 97110 THERAPEUTIC EXERCISES: CPT

## 2019-04-12 ENCOUNTER — OFFICE VISIT (OUTPATIENT)
Dept: PHYSICAL THERAPY | Facility: CLINIC | Age: 64
End: 2019-04-12
Payer: COMMERCIAL

## 2019-04-12 DIAGNOSIS — M54.50 LUMBAR PAIN: Primary | ICD-10-CM

## 2019-04-12 PROCEDURE — 97110 THERAPEUTIC EXERCISES: CPT

## 2019-04-12 PROCEDURE — 97530 THERAPEUTIC ACTIVITIES: CPT

## 2019-04-16 ENCOUNTER — APPOINTMENT (OUTPATIENT)
Dept: PHYSICAL THERAPY | Facility: CLINIC | Age: 64
End: 2019-04-16
Payer: COMMERCIAL

## 2019-04-18 ENCOUNTER — OFFICE VISIT (OUTPATIENT)
Dept: PHYSICAL THERAPY | Facility: CLINIC | Age: 64
End: 2019-04-18
Payer: COMMERCIAL

## 2019-04-18 DIAGNOSIS — M54.50 LUMBAR PAIN: Primary | ICD-10-CM

## 2019-04-18 PROCEDURE — 97530 THERAPEUTIC ACTIVITIES: CPT

## 2019-04-18 PROCEDURE — 97110 THERAPEUTIC EXERCISES: CPT

## 2019-04-23 ENCOUNTER — TRANSCRIBE ORDERS (OUTPATIENT)
Dept: RADIOLOGY | Facility: HOSPITAL | Age: 64
End: 2019-04-23

## 2019-04-23 ENCOUNTER — HOSPITAL ENCOUNTER (OUTPATIENT)
Dept: RADIOLOGY | Facility: HOSPITAL | Age: 64
Discharge: HOME | End: 2019-04-23
Attending: INTERNAL MEDICINE
Payer: COMMERCIAL

## 2019-04-23 ENCOUNTER — EVALUATION (OUTPATIENT)
Dept: PHYSICAL THERAPY | Facility: CLINIC | Age: 64
End: 2019-04-23
Payer: COMMERCIAL

## 2019-04-23 DIAGNOSIS — M79.672 LEFT FOOT PAIN: Primary | ICD-10-CM

## 2019-04-23 DIAGNOSIS — M79.672 LEFT FOOT PAIN: ICD-10-CM

## 2019-04-23 DIAGNOSIS — M54.50 LUMBAR PAIN: Primary | ICD-10-CM

## 2019-04-23 PROCEDURE — 97530 THERAPEUTIC ACTIVITIES: CPT | Performed by: PHYSICAL THERAPIST

## 2019-04-23 PROCEDURE — 97110 THERAPEUTIC EXERCISES: CPT | Performed by: PHYSICAL THERAPIST

## 2019-04-23 PROCEDURE — 73630 X-RAY EXAM OF FOOT: CPT

## 2019-04-23 PROCEDURE — 97140 MANUAL THERAPY 1/> REGIONS: CPT | Performed by: PHYSICAL THERAPIST

## 2019-04-25 ENCOUNTER — OFFICE VISIT (OUTPATIENT)
Dept: PHYSICAL THERAPY | Facility: CLINIC | Age: 64
End: 2019-04-25
Payer: COMMERCIAL

## 2019-04-25 ENCOUNTER — HOSPITAL ENCOUNTER (EMERGENCY)
Facility: HOSPITAL | Age: 64
Discharge: HOME | End: 2019-04-25
Admitting: EMERGENCY MEDICINE
Payer: COMMERCIAL

## 2019-04-25 VITALS
SYSTOLIC BLOOD PRESSURE: 158 MMHG | WEIGHT: 146 LBS | BODY MASS INDEX: 23.46 KG/M2 | HEIGHT: 66 IN | RESPIRATION RATE: 18 BRPM | HEART RATE: 118 BPM | DIASTOLIC BLOOD PRESSURE: 84 MMHG | OXYGEN SATURATION: 100 % | TEMPERATURE: 97.7 F

## 2019-04-25 DIAGNOSIS — M54.50 LUMBAR PAIN: Primary | ICD-10-CM

## 2019-04-25 DIAGNOSIS — S92.912A TOE FRACTURE, LEFT: Primary | ICD-10-CM

## 2019-04-25 PROCEDURE — 97110 THERAPEUTIC EXERCISES: CPT

## 2019-04-25 PROCEDURE — 97530 THERAPEUTIC ACTIVITIES: CPT

## 2019-04-25 PROCEDURE — 99282 EMERGENCY DEPT VISIT SF MDM: CPT | Performed by: PHYSICIAN ASSISTANT

## 2019-04-25 PROCEDURE — 99283 EMERGENCY DEPT VISIT LOW MDM: CPT

## 2019-04-30 ENCOUNTER — OFFICE VISIT (OUTPATIENT)
Dept: PHYSICAL THERAPY | Facility: CLINIC | Age: 64
End: 2019-04-30
Payer: COMMERCIAL

## 2019-04-30 DIAGNOSIS — M54.50 LUMBAR PAIN: Primary | ICD-10-CM

## 2019-04-30 PROCEDURE — 97110 THERAPEUTIC EXERCISES: CPT

## 2019-04-30 PROCEDURE — 97530 THERAPEUTIC ACTIVITIES: CPT

## 2019-05-02 ENCOUNTER — OFFICE VISIT (OUTPATIENT)
Dept: PHYSICAL THERAPY | Facility: CLINIC | Age: 64
End: 2019-05-02
Payer: COMMERCIAL

## 2019-05-02 DIAGNOSIS — M54.50 LUMBAR PAIN: Primary | ICD-10-CM

## 2019-05-02 PROCEDURE — 97140 MANUAL THERAPY 1/> REGIONS: CPT

## 2019-05-02 PROCEDURE — 97530 THERAPEUTIC ACTIVITIES: CPT

## 2019-05-02 PROCEDURE — 97110 THERAPEUTIC EXERCISES: CPT

## 2019-05-03 ENCOUNTER — APPOINTMENT (OUTPATIENT)
Dept: LAB | Facility: HOSPITAL | Age: 64
End: 2019-05-03
Payer: COMMERCIAL

## 2019-05-03 DIAGNOSIS — D50.8 OTHER IRON DEFICIENCY ANEMIA: ICD-10-CM

## 2019-05-03 DIAGNOSIS — D70.9 NEUTROPENIA, UNSPECIFIED TYPE (HCC): ICD-10-CM

## 2019-05-03 LAB
BACTERIA UR QL AUTO: ABNORMAL /HPF
BASOPHILS # BLD AUTO: 0.08 THOUSANDS/ΜL (ref 0–0.1)
BASOPHILS NFR BLD AUTO: 1 % (ref 0–1)
BILIRUB UR QL STRIP: NEGATIVE
CLARITY UR: ABNORMAL
COLOR UR: YELLOW
EOSINOPHIL # BLD AUTO: 0.15 THOUSAND/ΜL (ref 0–0.61)
EOSINOPHIL NFR BLD AUTO: 2 % (ref 0–6)
ERYTHROCYTE [DISTWIDTH] IN BLOOD BY AUTOMATED COUNT: 18.8 % (ref 11.6–15.1)
GLUCOSE UR STRIP-MCNC: NEGATIVE MG/DL
HCT VFR BLD AUTO: 35.7 % (ref 36.5–49.3)
HGB BLD-MCNC: 11.1 G/DL (ref 12–17)
HGB UR QL STRIP.AUTO: ABNORMAL
HYALINE CASTS #/AREA URNS LPF: ABNORMAL /LPF
IMM GRANULOCYTES # BLD AUTO: 0.03 THOUSAND/UL (ref 0–0.2)
IMM GRANULOCYTES NFR BLD AUTO: 0 % (ref 0–2)
KETONES UR STRIP-MCNC: NEGATIVE MG/DL
LEUKOCYTE ESTERASE UR QL STRIP: ABNORMAL
LYMPHOCYTES # BLD AUTO: 2.98 THOUSANDS/ΜL (ref 0.6–4.47)
LYMPHOCYTES NFR BLD AUTO: 43 % (ref 14–44)
MCH RBC QN AUTO: 28.6 PG (ref 26.8–34.3)
MCHC RBC AUTO-ENTMCNC: 31.1 G/DL (ref 31.4–37.4)
MCV RBC AUTO: 92 FL (ref 82–98)
MONOCYTES # BLD AUTO: 1.1 THOUSAND/ΜL (ref 0.17–1.22)
MONOCYTES NFR BLD AUTO: 16 % (ref 4–12)
NEUTROPHILS # BLD AUTO: 2.62 THOUSANDS/ΜL (ref 1.85–7.62)
NEUTS SEG NFR BLD AUTO: 38 % (ref 43–75)
NITRITE UR QL STRIP: NEGATIVE
NON-SQ EPI CELLS URNS QL MICRO: ABNORMAL /HPF
NRBC BLD AUTO-RTO: 0 /100 WBCS
PH UR STRIP.AUTO: 7 [PH]
PLATELET # BLD AUTO: 252 THOUSANDS/UL (ref 149–390)
PMV BLD AUTO: 10.5 FL (ref 8.9–12.7)
PROT UR STRIP-MCNC: ABNORMAL MG/DL
RBC # BLD AUTO: 3.88 MILLION/UL (ref 3.88–5.62)
RBC #/AREA URNS AUTO: ABNORMAL /HPF
SP GR UR STRIP.AUTO: 1.01 (ref 1–1.03)
UROBILINOGEN UR QL STRIP.AUTO: 0.2 E.U./DL
WBC # BLD AUTO: 6.96 THOUSAND/UL (ref 4.31–10.16)
WBC #/AREA URNS AUTO: ABNORMAL /HPF

## 2019-05-03 PROCEDURE — 81001 URINALYSIS AUTO W/SCOPE: CPT

## 2019-05-03 PROCEDURE — 85025 COMPLETE CBC W/AUTO DIFF WBC: CPT

## 2019-05-03 PROCEDURE — 36415 COLL VENOUS BLD VENIPUNCTURE: CPT

## 2019-05-06 ENCOUNTER — OFFICE VISIT (OUTPATIENT)
Dept: HEMATOLOGY ONCOLOGY | Facility: CLINIC | Age: 64
End: 2019-05-06
Payer: COMMERCIAL

## 2019-05-06 VITALS
DIASTOLIC BLOOD PRESSURE: 52 MMHG | TEMPERATURE: 96.7 F | BODY MASS INDEX: 23.37 KG/M2 | RESPIRATION RATE: 18 BRPM | WEIGHT: 145.4 LBS | HEIGHT: 66 IN | OXYGEN SATURATION: 99 % | HEART RATE: 71 BPM | SYSTOLIC BLOOD PRESSURE: 112 MMHG

## 2019-05-06 DIAGNOSIS — D50.8 OTHER IRON DEFICIENCY ANEMIA: ICD-10-CM

## 2019-05-06 DIAGNOSIS — D70.9 NEUTROPENIA, UNSPECIFIED TYPE (HCC): Primary | ICD-10-CM

## 2019-05-06 DIAGNOSIS — R31.21 ASYMPTOMATIC MICROSCOPIC HEMATURIA: ICD-10-CM

## 2019-05-06 PROCEDURE — 99215 OFFICE O/P EST HI 40 MIN: CPT | Performed by: PHYSICIAN ASSISTANT

## 2019-05-07 ENCOUNTER — OFFICE VISIT (OUTPATIENT)
Dept: PHYSICAL THERAPY | Facility: CLINIC | Age: 64
End: 2019-05-07
Payer: COMMERCIAL

## 2019-05-07 DIAGNOSIS — M54.50 LUMBAR PAIN: Primary | ICD-10-CM

## 2019-05-07 PROCEDURE — 97530 THERAPEUTIC ACTIVITIES: CPT

## 2019-05-07 PROCEDURE — 97110 THERAPEUTIC EXERCISES: CPT

## 2019-05-09 ENCOUNTER — OFFICE VISIT (OUTPATIENT)
Dept: PHYSICAL THERAPY | Facility: CLINIC | Age: 64
End: 2019-05-09
Payer: COMMERCIAL

## 2019-05-09 DIAGNOSIS — M54.50 LUMBAR PAIN: Primary | ICD-10-CM

## 2019-05-09 PROCEDURE — 97530 THERAPEUTIC ACTIVITIES: CPT | Performed by: PHYSICAL THERAPIST

## 2019-05-09 PROCEDURE — 97110 THERAPEUTIC EXERCISES: CPT | Performed by: PHYSICAL THERAPIST

## 2019-05-14 ENCOUNTER — OFFICE VISIT (OUTPATIENT)
Dept: PHYSICAL THERAPY | Facility: CLINIC | Age: 64
End: 2019-05-14
Payer: COMMERCIAL

## 2019-05-14 DIAGNOSIS — M54.50 LUMBAR PAIN: Primary | ICD-10-CM

## 2019-05-14 PROCEDURE — 97530 THERAPEUTIC ACTIVITIES: CPT | Performed by: PHYSICAL THERAPIST

## 2019-05-14 PROCEDURE — 97110 THERAPEUTIC EXERCISES: CPT | Performed by: PHYSICAL THERAPIST

## 2019-05-15 ENCOUNTER — TELEPHONE (OUTPATIENT)
Dept: NEPHROLOGY | Facility: CLINIC | Age: 64
End: 2019-05-15

## 2019-05-16 ENCOUNTER — OFFICE VISIT (OUTPATIENT)
Dept: PHYSICAL THERAPY | Facility: CLINIC | Age: 64
End: 2019-05-16
Payer: COMMERCIAL

## 2019-05-16 DIAGNOSIS — M54.50 LUMBAR PAIN: Primary | ICD-10-CM

## 2019-05-16 PROCEDURE — 97110 THERAPEUTIC EXERCISES: CPT | Performed by: PHYSICAL THERAPIST

## 2019-05-16 PROCEDURE — 97140 MANUAL THERAPY 1/> REGIONS: CPT | Performed by: PHYSICAL THERAPIST

## 2019-05-16 PROCEDURE — 97530 THERAPEUTIC ACTIVITIES: CPT | Performed by: PHYSICAL THERAPIST

## 2019-05-21 ENCOUNTER — OFFICE VISIT (OUTPATIENT)
Dept: PODIATRY | Facility: CLINIC | Age: 64
End: 2019-05-21
Payer: COMMERCIAL

## 2019-05-21 ENCOUNTER — OFFICE VISIT (OUTPATIENT)
Dept: PHYSICAL THERAPY | Facility: CLINIC | Age: 64
End: 2019-05-21
Payer: COMMERCIAL

## 2019-05-21 VITALS
HEIGHT: 66 IN | DIASTOLIC BLOOD PRESSURE: 89 MMHG | BODY MASS INDEX: 23.46 KG/M2 | SYSTOLIC BLOOD PRESSURE: 146 MMHG | WEIGHT: 146 LBS

## 2019-05-21 DIAGNOSIS — I73.9 PAD (PERIPHERAL ARTERY DISEASE) (HCC): ICD-10-CM

## 2019-05-21 DIAGNOSIS — S92.415A NONDISPLACED FRACTURE OF PROXIMAL PHALANX OF LEFT GREAT TOE, INITIAL ENCOUNTER FOR CLOSED FRACTURE: Primary | ICD-10-CM

## 2019-05-21 DIAGNOSIS — M54.50 LUMBAR PAIN: Primary | ICD-10-CM

## 2019-05-21 PROCEDURE — 97110 THERAPEUTIC EXERCISES: CPT | Performed by: PHYSICAL THERAPIST

## 2019-05-21 PROCEDURE — 97530 THERAPEUTIC ACTIVITIES: CPT | Performed by: PHYSICAL THERAPIST

## 2019-05-21 PROCEDURE — 97140 MANUAL THERAPY 1/> REGIONS: CPT | Performed by: PHYSICAL THERAPIST

## 2019-05-21 PROCEDURE — 99203 OFFICE O/P NEW LOW 30 MIN: CPT | Performed by: PODIATRIST

## 2019-05-21 RX ORDER — AMLODIPINE BESYLATE 5 MG/1
2.5 TABLET ORAL DAILY
COMMUNITY
End: 2019-06-29

## 2019-05-23 ENCOUNTER — OFFICE VISIT (OUTPATIENT)
Dept: PHYSICAL THERAPY | Facility: CLINIC | Age: 64
End: 2019-05-23
Payer: COMMERCIAL

## 2019-05-23 DIAGNOSIS — M54.50 LUMBAR PAIN: Primary | ICD-10-CM

## 2019-05-23 PROCEDURE — 97530 THERAPEUTIC ACTIVITIES: CPT | Performed by: PHYSICAL THERAPIST

## 2019-05-23 PROCEDURE — 97140 MANUAL THERAPY 1/> REGIONS: CPT | Performed by: PHYSICAL THERAPIST

## 2019-05-23 PROCEDURE — 97110 THERAPEUTIC EXERCISES: CPT | Performed by: PHYSICAL THERAPIST

## 2019-05-28 ENCOUNTER — OFFICE VISIT (OUTPATIENT)
Dept: PHYSICAL THERAPY | Facility: CLINIC | Age: 64
End: 2019-05-28
Payer: COMMERCIAL

## 2019-05-28 DIAGNOSIS — M54.50 LUMBAR PAIN: Primary | ICD-10-CM

## 2019-05-28 PROCEDURE — 97530 THERAPEUTIC ACTIVITIES: CPT | Performed by: PHYSICAL THERAPIST

## 2019-05-28 PROCEDURE — 97140 MANUAL THERAPY 1/> REGIONS: CPT | Performed by: PHYSICAL THERAPIST

## 2019-05-28 PROCEDURE — 97110 THERAPEUTIC EXERCISES: CPT | Performed by: PHYSICAL THERAPIST

## 2019-05-30 ENCOUNTER — EVALUATION (OUTPATIENT)
Dept: PHYSICAL THERAPY | Facility: CLINIC | Age: 64
End: 2019-05-30
Payer: COMMERCIAL

## 2019-05-30 DIAGNOSIS — M54.50 LUMBAR PAIN: Primary | ICD-10-CM

## 2019-05-30 PROCEDURE — 97530 THERAPEUTIC ACTIVITIES: CPT | Performed by: PHYSICAL THERAPIST

## 2019-05-30 PROCEDURE — 97140 MANUAL THERAPY 1/> REGIONS: CPT | Performed by: PHYSICAL THERAPIST

## 2019-05-30 PROCEDURE — 97110 THERAPEUTIC EXERCISES: CPT | Performed by: PHYSICAL THERAPIST

## 2019-06-04 ENCOUNTER — OFFICE VISIT (OUTPATIENT)
Dept: PHYSICAL THERAPY | Facility: CLINIC | Age: 64
End: 2019-06-04
Payer: COMMERCIAL

## 2019-06-04 DIAGNOSIS — M54.50 LUMBAR PAIN: Primary | ICD-10-CM

## 2019-06-04 PROCEDURE — 97110 THERAPEUTIC EXERCISES: CPT

## 2019-06-04 PROCEDURE — 97530 THERAPEUTIC ACTIVITIES: CPT

## 2019-06-07 ENCOUNTER — APPOINTMENT (OUTPATIENT)
Dept: PHYSICAL THERAPY | Facility: CLINIC | Age: 64
End: 2019-06-07
Payer: COMMERCIAL

## 2019-06-11 ENCOUNTER — OFFICE VISIT (OUTPATIENT)
Dept: PHYSICAL THERAPY | Facility: CLINIC | Age: 64
End: 2019-06-11
Payer: COMMERCIAL

## 2019-06-11 DIAGNOSIS — M54.50 LUMBAR PAIN: Primary | ICD-10-CM

## 2019-06-11 PROCEDURE — 97110 THERAPEUTIC EXERCISES: CPT

## 2019-06-13 ENCOUNTER — OFFICE VISIT (OUTPATIENT)
Dept: PHYSICAL THERAPY | Facility: CLINIC | Age: 64
End: 2019-06-13
Payer: COMMERCIAL

## 2019-06-13 DIAGNOSIS — M54.50 LUMBAR PAIN: Primary | ICD-10-CM

## 2019-06-13 PROCEDURE — 97140 MANUAL THERAPY 1/> REGIONS: CPT | Performed by: PHYSICAL THERAPIST

## 2019-06-13 PROCEDURE — 97110 THERAPEUTIC EXERCISES: CPT | Performed by: PHYSICAL THERAPIST

## 2019-06-13 PROCEDURE — 97112 NEUROMUSCULAR REEDUCATION: CPT | Performed by: PHYSICAL THERAPIST

## 2019-06-13 PROCEDURE — 97530 THERAPEUTIC ACTIVITIES: CPT | Performed by: PHYSICAL THERAPIST

## 2019-06-18 ENCOUNTER — OFFICE VISIT (OUTPATIENT)
Dept: PHYSICAL THERAPY | Facility: CLINIC | Age: 64
End: 2019-06-18
Payer: COMMERCIAL

## 2019-06-18 DIAGNOSIS — M54.50 LUMBAR PAIN: Primary | ICD-10-CM

## 2019-06-18 PROCEDURE — 97112 NEUROMUSCULAR REEDUCATION: CPT

## 2019-06-18 PROCEDURE — 97110 THERAPEUTIC EXERCISES: CPT

## 2019-06-18 PROCEDURE — 97530 THERAPEUTIC ACTIVITIES: CPT

## 2019-06-19 ENCOUNTER — TELEPHONE (OUTPATIENT)
Dept: NEPHROLOGY | Facility: CLINIC | Age: 64
End: 2019-06-19

## 2019-06-20 ENCOUNTER — OFFICE VISIT (OUTPATIENT)
Dept: PHYSICAL THERAPY | Facility: CLINIC | Age: 64
End: 2019-06-20
Payer: COMMERCIAL

## 2019-06-20 ENCOUNTER — APPOINTMENT (OUTPATIENT)
Dept: LAB | Facility: HOSPITAL | Age: 64
End: 2019-06-20
Attending: INTERNAL MEDICINE
Payer: COMMERCIAL

## 2019-06-20 DIAGNOSIS — M54.50 LUMBAR PAIN: Primary | ICD-10-CM

## 2019-06-20 DIAGNOSIS — N18.30 CKD (CHRONIC KIDNEY DISEASE), STAGE III (HCC): ICD-10-CM

## 2019-06-20 LAB
ALBUMIN SERPL BCP-MCNC: 3.7 G/DL (ref 3.5–5)
ANION GAP SERPL CALCULATED.3IONS-SCNC: 8 MMOL/L (ref 4–13)
BUN SERPL-MCNC: 26 MG/DL (ref 5–25)
CALCIUM SERPL-MCNC: 9.2 MG/DL (ref 8.3–10.1)
CHLORIDE SERPL-SCNC: 108 MMOL/L (ref 100–108)
CO2 SERPL-SCNC: 18 MMOL/L (ref 21–32)
CREAT SERPL-MCNC: 1.89 MG/DL (ref 0.6–1.3)
ERYTHROCYTE [DISTWIDTH] IN BLOOD BY AUTOMATED COUNT: 16.1 % (ref 11.6–15.1)
GFR SERPL CREATININE-BSD FRML MDRD: 42 ML/MIN/1.73SQ M
GLUCOSE P FAST SERPL-MCNC: 202 MG/DL (ref 65–99)
HCT VFR BLD AUTO: 39 % (ref 36.5–49.3)
HGB BLD-MCNC: 12.5 G/DL (ref 12–17)
MCH RBC QN AUTO: 30.1 PG (ref 26.8–34.3)
MCHC RBC AUTO-ENTMCNC: 32.1 G/DL (ref 31.4–37.4)
MCV RBC AUTO: 94 FL (ref 82–98)
PHOSPHATE SERPL-MCNC: 3.3 MG/DL (ref 2.3–4.1)
PLATELET # BLD AUTO: 275 THOUSANDS/UL (ref 149–390)
PMV BLD AUTO: 11.2 FL (ref 8.9–12.7)
POTASSIUM SERPL-SCNC: 3.7 MMOL/L (ref 3.5–5.3)
PTH-INTACT SERPL-MCNC: 22.1 PG/ML (ref 18.4–80.1)
RBC # BLD AUTO: 4.15 MILLION/UL (ref 3.88–5.62)
SODIUM SERPL-SCNC: 134 MMOL/L (ref 136–145)
WBC # BLD AUTO: 9.01 THOUSAND/UL (ref 4.31–10.16)

## 2019-06-20 PROCEDURE — 83970 ASSAY OF PARATHORMONE: CPT

## 2019-06-20 PROCEDURE — 97110 THERAPEUTIC EXERCISES: CPT | Performed by: PHYSICAL THERAPIST

## 2019-06-20 PROCEDURE — 97112 NEUROMUSCULAR REEDUCATION: CPT | Performed by: PHYSICAL THERAPIST

## 2019-06-20 PROCEDURE — 36415 COLL VENOUS BLD VENIPUNCTURE: CPT

## 2019-06-20 PROCEDURE — 80069 RENAL FUNCTION PANEL: CPT

## 2019-06-20 PROCEDURE — 97530 THERAPEUTIC ACTIVITIES: CPT | Performed by: PHYSICAL THERAPIST

## 2019-06-20 PROCEDURE — 85027 COMPLETE CBC AUTOMATED: CPT

## 2019-06-21 ENCOUNTER — OFFICE VISIT (OUTPATIENT)
Dept: NEPHROLOGY | Facility: CLINIC | Age: 64
End: 2019-06-21
Payer: COMMERCIAL

## 2019-06-21 VITALS
DIASTOLIC BLOOD PRESSURE: 62 MMHG | HEIGHT: 66 IN | WEIGHT: 152 LBS | RESPIRATION RATE: 16 BRPM | SYSTOLIC BLOOD PRESSURE: 110 MMHG | HEART RATE: 74 BPM | BODY MASS INDEX: 24.43 KG/M2

## 2019-06-21 DIAGNOSIS — R33.9 URINARY RETENTION: ICD-10-CM

## 2019-06-21 DIAGNOSIS — I73.9 PAD (PERIPHERAL ARTERY DISEASE) (HCC): ICD-10-CM

## 2019-06-21 DIAGNOSIS — D64.9 ANEMIA, UNSPECIFIED TYPE: ICD-10-CM

## 2019-06-21 DIAGNOSIS — I95.1 ORTHOSTATIC HYPOTENSION: ICD-10-CM

## 2019-06-21 DIAGNOSIS — N18.30 CKD (CHRONIC KIDNEY DISEASE), STAGE III (HCC): Primary | ICD-10-CM

## 2019-06-21 DIAGNOSIS — I10 ESSENTIAL HYPERTENSION: ICD-10-CM

## 2019-06-21 PROCEDURE — 99214 OFFICE O/P EST MOD 30 MIN: CPT | Performed by: INTERNAL MEDICINE

## 2019-06-24 ENCOUNTER — TRANSCRIBE ORDERS (OUTPATIENT)
Dept: LAB | Facility: HOSPITAL | Age: 64
End: 2019-06-24

## 2019-06-24 ENCOUNTER — APPOINTMENT (OUTPATIENT)
Dept: LAB | Facility: HOSPITAL | Age: 64
End: 2019-06-24
Attending: INTERNAL MEDICINE
Payer: COMMERCIAL

## 2019-06-24 DIAGNOSIS — D50.8 OTHER IRON DEFICIENCY ANEMIA: Primary | ICD-10-CM

## 2019-06-24 LAB
BACTERIA UR QL AUTO: ABNORMAL /HPF
BILIRUB UR QL STRIP: NEGATIVE
CLARITY UR: ABNORMAL
COLOR UR: YELLOW
CREAT UR-MCNC: 123 MG/DL
GLUCOSE UR STRIP-MCNC: NEGATIVE MG/DL
HGB UR QL STRIP.AUTO: ABNORMAL
KETONES UR STRIP-MCNC: NEGATIVE MG/DL
LEUKOCYTE ESTERASE UR QL STRIP: ABNORMAL
NITRITE UR QL STRIP: POSITIVE
NON-SQ EPI CELLS URNS QL MICRO: ABNORMAL /HPF
PH UR STRIP.AUTO: 6.5 [PH]
PROT UR STRIP-MCNC: ABNORMAL MG/DL
PROT UR-MCNC: 50 MG/DL
PROT/CREAT UR: 0.41 MG/G{CREAT} (ref 0–0.1)
RBC #/AREA URNS AUTO: ABNORMAL /HPF
SP GR UR STRIP.AUTO: 1.01 (ref 1–1.03)
UROBILINOGEN UR QL STRIP.AUTO: 0.2 E.U./DL
WBC #/AREA URNS AUTO: ABNORMAL /HPF

## 2019-06-24 PROCEDURE — 82570 ASSAY OF URINE CREATININE: CPT

## 2019-06-24 PROCEDURE — 84156 ASSAY OF PROTEIN URINE: CPT

## 2019-06-24 PROCEDURE — 84166 PROTEIN E-PHORESIS/URINE/CSF: CPT

## 2019-06-24 PROCEDURE — 81001 URINALYSIS AUTO W/SCOPE: CPT | Performed by: PHYSICIAN ASSISTANT

## 2019-06-24 PROCEDURE — 84166 PROTEIN E-PHORESIS/URINE/CSF: CPT | Performed by: PATHOLOGY

## 2019-06-25 ENCOUNTER — OFFICE VISIT (OUTPATIENT)
Dept: PHYSICAL THERAPY | Facility: CLINIC | Age: 64
End: 2019-06-25
Payer: COMMERCIAL

## 2019-06-25 DIAGNOSIS — M54.50 LUMBAR PAIN: Primary | ICD-10-CM

## 2019-06-25 DIAGNOSIS — N39.0 URINARY TRACT INFECTION WITHOUT HEMATURIA, SITE UNSPECIFIED: Primary | ICD-10-CM

## 2019-06-25 LAB
ALBUMIN UR ELPH-MCNC: 100 %
ALPHA1 GLOB MFR UR ELPH: 0 %
ALPHA2 GLOB MFR UR ELPH: 0 %
B-GLOBULIN MFR UR ELPH: 0 %
GAMMA GLOB MFR UR ELPH: 0 %
PROT PATTERN UR ELPH-IMP: ABNORMAL
PROT UR-MCNC: 49 MG/DL

## 2019-06-25 PROCEDURE — 97140 MANUAL THERAPY 1/> REGIONS: CPT | Performed by: PHYSICAL THERAPIST

## 2019-06-25 PROCEDURE — 97110 THERAPEUTIC EXERCISES: CPT | Performed by: PHYSICAL THERAPIST

## 2019-06-25 PROCEDURE — 97112 NEUROMUSCULAR REEDUCATION: CPT | Performed by: PHYSICAL THERAPIST

## 2019-06-25 RX ORDER — SULFAMETHOXAZOLE AND TRIMETHOPRIM 800; 160 MG/1; MG/1
1 TABLET ORAL EVERY 12 HOURS SCHEDULED
Qty: 14 TABLET | Refills: 0 | Status: SHIPPED | OUTPATIENT
Start: 2019-06-25 | End: 2019-06-29

## 2019-06-27 ENCOUNTER — APPOINTMENT (OUTPATIENT)
Dept: PHYSICAL THERAPY | Facility: CLINIC | Age: 64
End: 2019-06-27
Payer: COMMERCIAL

## 2019-06-29 ENCOUNTER — HOSPITAL ENCOUNTER (EMERGENCY)
Facility: HOSPITAL | Age: 64
Discharge: HOME | End: 2019-06-29
Attending: EMERGENCY MEDICINE | Admitting: EMERGENCY MEDICINE
Payer: COMMERCIAL

## 2019-06-29 ENCOUNTER — APPOINTMENT (EMERGENCY)
Dept: RADIOLOGY | Facility: HOSPITAL | Age: 64
End: 2019-06-29
Payer: COMMERCIAL

## 2019-06-29 VITALS
BODY MASS INDEX: 23.57 KG/M2 | TEMPERATURE: 98.6 F | WEIGHT: 146 LBS | SYSTOLIC BLOOD PRESSURE: 165 MMHG | HEART RATE: 64 BPM | DIASTOLIC BLOOD PRESSURE: 85 MMHG | OXYGEN SATURATION: 98 % | RESPIRATION RATE: 18 BRPM

## 2019-06-29 DIAGNOSIS — M25.512 LEFT SHOULDER PAIN: ICD-10-CM

## 2019-06-29 DIAGNOSIS — R42 LIGHTHEADEDNESS: ICD-10-CM

## 2019-06-29 DIAGNOSIS — S42.033A CLOSED FRACTURE OF DISTAL CLAVICLE: Primary | ICD-10-CM
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
## 2019-06-29 PROCEDURE — 99284 EMERGENCY DEPT VISIT MOD MDM: CPT | Performed by: EMERGENCY MEDICINE

## 2019-06-29 PROCEDURE — 73030 X-RAY EXAM OF SHOULDER: CPT

## 2019-06-29 PROCEDURE — 99283 EMERGENCY DEPT VISIT LOW MDM: CPT

## 2019-06-29 PROCEDURE — 73060 X-RAY EXAM OF HUMERUS: CPT

## 2019-06-29 RX ORDER — ACETAMINOPHEN 325 MG/1
975 TABLET ORAL ONCE
Status: COMPLETED | OUTPATIENT
Start: 2019-06-29 | End: 2019-06-29

## 2019-06-29 RX ORDER — HYDROCODONE BITARTRATE AND ACETAMINOPHEN 5; 325 MG/1; MG/1
1 TABLET ORAL ONCE
Status: COMPLETED | OUTPATIENT
Start: 2019-06-29 | End: 2019-06-29

## 2019-06-29 RX ORDER — HYDROCODONE BITARTRATE AND ACETAMINOPHEN 5; 325 MG/1; MG/1
1 TABLET ORAL EVERY 6 HOURS PRN
Qty: 8 TABLET | Refills: 0 | Status: SHIPPED | OUTPATIENT
Start: 2019-06-29 | End: 2019-07-01

## 2019-06-29 RX ADMIN — ACETAMINOPHEN 975 MG: 325 TABLET ORAL at 08:56

## 2019-06-29 RX ADMIN — HYDROCODONE BITARTRATE AND ACETAMINOPHEN 1 TABLET: 5; 325 TABLET ORAL at 10:29

## 2019-06-29 NOTE — ED ATTENDING ATTESTATION
Trista Hutton DO, saw and evaluated the patient  I have discussed the patient with the resident/non-physician practitioner and agree with the resident's/non-physician practitioner's findings, Plan of Care, and MDM as documented in the resident's/non-physician practitioner's note, except where noted  All available labs and Radiology studies were reviewed  I was present for key portions of any procedure(s) performed by the resident/non-physician practitioner and I was immediately available to provide assistance  At this point I agree with the current assessment done in the Emergency Department  I have conducted an independent evaluation of this patient a history and physical is as follows:    Patient is a 38-year-old male who fell onto his left side  He states he has occasional falls and has been previously evaluated for episodes of feeling lightheaded and near syncope  Also been evaluated for syncopal episodes  States he did not lose consciousness  Patient planes of left shoulder pain  No other injuries  Did not strike his head  He is not anticoagulated  No difficulty breathing  No chest pain  No abdominal or pelvic tenderness  No cervical spine tenderness  Palpable tenderness over the left anterior shoulder  X-ray reveals a left distal clavicle fracture  Will splint, Toradol  P o  Vicodin as well as a prescription for Vicodin  Follow up with Orthopedic surgery, return if worsens      Critical Care Time  Splint application  Date/Time: 6/29/2019 11:16 AM  Performed by: Anthony Cruz DO  Authorized by: Anthony Cruz DO     Patient location:  ED  Procedure performed by emergency physician: Yes    Consent:     Consent obtained:  Verbal    Consent given by:  Patient    Alternatives discussed:  No treatment  Universal protocol:     Patient identity confirmed:  Verbally with patient and arm band  Indication:     Indications: fracture    Pre-procedure details:     Sensation: Normal  Procedure details:     Laterality:  Left    Location:  Shoulder    Shoulder:  L shoulder    Strapping: no      Supplies:  Sling  Post-procedure details:     Pain:  Improved    Sensation:  Normal    Neurovascular Exam: skin pink, capillary refill <2 sec, normal pulses and skin intact, warm, and dry      Patient tolerance of procedure:   Tolerated well, no immediate complications

## 2019-06-29 NOTE — ED PROVIDER NOTES
History  Chief Complaint   Patient presents with    Fall     Pt states that he was dizzy yesterday and fell on his L side  Then stood up and fell to his R side  Pt has c/o b/l arm pain     38-year-old male who presents with complaints of left shoulder pain following a fall  Patient states that he was reading his mail and tripped and fell forward and hit his left shoulder  He reports that he is having 10/10 pain in his left shoulder, and that it hurts with range of motion  However, he has not taken anything for the pain  The pain does not radiate to anywhere else  He denies any weakness or numbness in this extremity or any other  He did not strike his head, he did not lose consciousness  He does endorse that he felt mildly lightheaded prior to the episode, patient does endorse that he has a history of lightheadedness and syncope for which she her chart review has had extensive workups for in the past   His symptoms today were typical of his usual symptoms  He denies any other injury besides his left shoulder  He denies any pain in his distal left arm he denies any pain in any other extremity denies any chest pain, neck pain, pain, abdominal pain, headache, fever, chills, vomiting  Prior to Admission Medications   Prescriptions Last Dose Informant Patient Reported?  Taking?   acetaminophen (TYLENOL) 325 mg tablet  Self Yes Yes   Sig: Take 650 mg by mouth every 6 (six) hours as needed for mild pain   gabapentin (NEURONTIN) 300 mg capsule  Self Yes Yes   Sig: Take 300 mg by mouth daily    lisinopril (ZESTRIL) 20 mg tablet  Self Yes Yes   Sig: Take 20 mg by mouth daily    tamsulosin (FLOMAX) 0 4 mg  Self Yes Yes   Sig: Take 0 4 mg by mouth daily with dinner        Facility-Administered Medications: None       Past Medical History:   Diagnosis Date    Aortic aneurysm (HCC)     Arthritis     Atypical chest pain     Back pain     Chronic kidney disease     stg 3    Elevated ALT measurement     Elevated AST (SGOT)     Hyperlipidemia     Hypertension     Leg pain     Neurogenic claudication     Urinary retention     catherize self 4x/day    Urinary retention        Past Surgical History:   Procedure Laterality Date    EPIDURAL BLOCK INJECTION N/A 1/23/2017    Procedure: BLOCK / INJECTION EPIDURAL STEROID LUMBAR;  Surgeon: Víctor Hernandez MD;  [facility];  Service:     [place] COLONOSCOPY FLX DX W/COLLJ SPEC WHEN PFRMD N/A 1/8/2019    Procedure: COLONOSCOPY;  Surgeon: Alicia Kiran MD;  [facility]; Service: Colorectal    GA LAMNOTMY INCL W/DCMPRSN NRV ROOT 1 INTRSPC LUMBR Bilateral 1/23/2017    Procedure: MIS L2-3 & L3-4 LAMINAL FORAMINOTOMIES AND MICRODISCECTOMY W LEFT SIDED APPROACH ;  Surgeon: Víctor Hernandez MD;  [facility];  Service: Neurosurgery       Family History   Problem Relation Age of Onset    Heart disease Sister     Stroke Sister    Janeth Hdez Sudden death Mother     Stroke Brother     Pancreatic cancer Sister     No Known Problems Father     No Known Problems Sister     No Known Problems Sister     No Known Problems Son      I have reviewed and agree with the history as documented  Social History     Tobacco Use    Smoking status: Never Smoker    Smokeless tobacco: Never Used   Substance Use Topics    Alcohol use: No    Drug use: No        Review of Systems   Constitutional: Negative for chills and fever  HENT: Negative for congestion and sore throat  Eyes: Negative for photophobia and visual disturbance  Respiratory: Negative for cough and shortness of breath  Cardiovascular: Negative for chest pain and leg swelling  Gastrointestinal: Negative for abdominal pain, blood in stool, diarrhea, nausea and vomiting  Genitourinary: Negative for dysuria and hematuria  Musculoskeletal: Positive for arthralgias  Negative for neck pain and neck stiffness  Skin: Negative for rash and wound  Neurological: Positive for light-headedness   Negative for weakness and numbness  Psychiatric/Behavioral: Negative for behavioral problems and confusion  All other systems reviewed and are negative  Physical Exam  ED Triage Vitals [06/29/19 0800]   Temperature Pulse Respirations Blood Pressure SpO2   98 6 °F (37 °C) 84 18 137/70 99 %      Temp Source Heart Rate Source Patient Position - Orthostatic VS BP Location FiO2 (%)   Oral Monitor Sitting Right arm --      Pain Score       Worst Possible Pain             Orthostatic Vital Signs  Vitals:    06/29/19 0800 06/29/19 1012   BP: 137/70 165/85   Pulse: 84 64   Patient Position - Orthostatic VS: Sitting Lying       Physical Exam   Constitutional: He appears well-developed  No distress  HENT:   Head: Normocephalic and atraumatic  Right Ear: External ear normal    Left Ear: External ear normal    Nose: Nose normal    Mouth/Throat: Oropharynx is clear and moist    Eyes: Pupils are equal, round, and reactive to light  Conjunctivae and EOM are normal  Right eye exhibits no discharge  Left eye exhibits no discharge  Neck: Normal range of motion  Neck supple  Cardiovascular: Normal rate, regular rhythm, normal heart sounds and intact distal pulses  Exam reveals no gallop and no friction rub  No murmur heard  Pulmonary/Chest: Effort normal and breath sounds normal  No stridor  No respiratory distress  He has no wheezes  He has no rales  He exhibits no tenderness  Abdominal: Soft  Bowel sounds are normal  He exhibits no distension  There is no tenderness  There is no rebound and no guarding  Musculoskeletal: He exhibits no edema  No C/T/L spine tenderness  LUE with ttp over L shoulder and w/ ROM limited by pain  R upper extremity and lower extremities nontender to palpation with full ROM and intact motor and sensation bilaterally  Neurological: He is alert  Skin: Skin is warm and dry  Capillary refill takes less than 2 seconds  No rash noted  He is not diaphoretic     Psychiatric: He has a normal mood and affect  His behavior is normal    Nursing note and vitals reviewed  ED Medications  Medications   acetaminophen (TYLENOL) tablet 975 mg (975 mg Oral Given 6/29/19 0856)   HYDROcodone-acetaminophen (NORCO) 5-325 mg per tablet 1 tablet (1 tablet Oral Given 6/29/19 1029)       Diagnostic Studies  Results Reviewed     None                 XR shoulder 2+ views LEFT    (Results Pending)   XR humerus LEFT    (Results Pending)         Procedures  Procedures        ED Course           Identification of Seniors at Risk      Most Recent Value   (ISAR) Identification of Seniors at Risk   Before the illness or injury that brought you to the Emergency, did you need someone to help you on a regular basis? 0 Filed at: 06/29/2019 0801   In the last 24 hours, have you needed more help than usual?  0 Filed at: 06/29/2019 0216   Have you been hospitalized for one or more nights during the past 6 months? 0 Filed at: 06/29/2019 0801   In general, do you see well?  0 Filed at: 06/29/2019 0801   In general, do you have serious problems with your memory? 0 Filed at: 06/29/2019 1392   Do you take more than three different medications every day? 1 Filed at: 06/29/2019 0801   ISAR Score  1 Filed at: 06/29/2019 0801                          MDM  Number of Diagnoses or Management Options  Diagnosis management comments: 68-year-old male who presents with chief complaint of a fall  Patient only complaining of isolated injury of his left shoulder, denies any pain in any other extremity and physical exam does not reveal any other injuries  Will obtain an x-ray of his left shoulder and arm to rule out fracture  Reassuring the patient otherwise is neurovascularly intact in this extremity    Regarding his fall, appears to be largely mechanical with some component of lightheadedness, although patient has a history of issues with lightheadedness and these symptoms were today typical of his normal symptoms, at this time patient is otherwise very well appearing not complain of any chest pain, back pain, lightheadedness and given his prior extensive workups further pursuit of the symptoms to day  Will give patient dose of Toradol pain  Disposition  Final diagnoses:   Fall, initial encounter   Lightheadedness   Left shoulder pain   Closed fracture of distal clavicle     Time reflects when diagnosis was documented in both MDM as applicable and the Disposition within this note     Time User Action Codes Description Comment    6/29/2019 10:12 AM Chloe Pop Add [Y12  LDXP] Fall, initial encounter     6/29/2019 10:12 AM Bernardo Pop Add [R42] Lightheadedness     6/29/2019 10:13 AM Bernardo Pop [M25 512] Left shoulder pain     6/29/2019 10:41 AM Bernardo Pop [S42 033A] Closed fracture of distal clavicle     6/29/2019 10:41 AM Bernardo Pop Modify [R26  Laverna Nakayama Fall, initial encounter     6/29/2019 10:41 AM Chloe Pop Modify [F68 846W] Closed fracture of distal clavicle       ED Disposition     ED Disposition Condition Date/Time Comment    Discharge Stable Sat Jun 29, 2019 10:11 AM Khushi Suazo discharge to home/self care              Follow-up Information     Follow up With Specialties Details Why Contact Info Additional Information    Donald Lozano MD Family Medicine Schedule an appointment as soon as possible for a visit  Follow up after ED visit for fall, lightheadedness Stu Serrato  [place] [address] [facility] Orthopedic Surgery Schedule an appointment as soon as possible for a visit  For distal clavicle fracture [address] [id number]  [phone] [address] [place] [address], [id number]          Discharge Medication List as of 6/29/2019 10:52 AM      START taking these medications    Details   HYDROcodone-acetaminophen (NORCO) 5-325 mg per tablet Take 1 tablet by mouth every 6 (six) hours as needed for pain for up to 2 daysMax Daily Amount: 4 tablets, Starting Sat 6/29/2019, Until Mon 7/1/2019, Print         CONTINUE these medications which have NOT CHANGED    Details   acetaminophen (TYLENOL) 325 mg tablet Take 650 mg by mouth every 6 (six) hours as needed for mild pain, Historical Med      gabapentin (NEURONTIN) 300 mg capsule Take 300 mg by mouth daily , Historical Med      lisinopril (ZESTRIL) 20 mg tablet Take 20 mg by mouth daily , Historical Med      tamsulosin (FLOMAX) 0 4 mg Take 0 4 mg by mouth daily with dinner  , Starting Tue 10/16/2018, Historical Med           No discharge procedures on file  ED Provider  Attending physically available and evaluated Be Lora I managed the patient along with the ED Attending      Electronically Signed by         Sharyle Cease, MD  06/29/19 5645

## 2019-07-06 ENCOUNTER — HOSPITAL ENCOUNTER (OUTPATIENT)
Dept: RADIOLOGY | Facility: HOSPITAL | Age: 64
Discharge: HOME | End: 2019-07-06
Payer: COMMERCIAL

## 2019-07-06 ENCOUNTER — APPOINTMENT (OUTPATIENT)
Dept: LAB | Facility: HOSPITAL | Age: 64
End: 2019-07-06
Payer: COMMERCIAL

## 2019-07-06 ENCOUNTER — OFFICE VISIT (OUTPATIENT)
Dept: OBGYN CLINIC | Facility: HOSPITAL | Age: 64
End: 2019-07-06
Payer: COMMERCIAL

## 2019-07-06 VITALS
DIASTOLIC BLOOD PRESSURE: 77 MMHG | HEIGHT: 66 IN | HEART RATE: 86 BPM | WEIGHT: 146 LBS | SYSTOLIC BLOOD PRESSURE: 132 MMHG | BODY MASS INDEX: 23.46 KG/M2

## 2019-07-06 DIAGNOSIS — M25.512 LEFT SHOULDER PAIN, UNSPECIFIED CHRONICITY: Primary | ICD-10-CM

## 2019-07-06 DIAGNOSIS — M25.512 LEFT SHOULDER PAIN, UNSPECIFIED CHRONICITY: ICD-10-CM

## 2019-07-06 DIAGNOSIS — S42.032A CLOSED DISPLACED FRACTURE OF ACROMIAL END OF LEFT CLAVICLE, INITIAL ENCOUNTER: ICD-10-CM

## 2019-07-06 LAB
BACTERIA UR QL AUTO: ABNORMAL /HPF
BILIRUB UR QL STRIP: NEGATIVE
CLARITY UR: CLEAR
COLOR UR: YELLOW
GLUCOSE UR STRIP-MCNC: NEGATIVE MG/DL
HGB UR QL STRIP.AUTO: NEGATIVE
HYALINE CASTS #/AREA URNS LPF: ABNORMAL /LPF
KETONES UR STRIP-MCNC: NEGATIVE MG/DL
LEUKOCYTE ESTERASE UR QL STRIP: ABNORMAL
NITRITE UR QL STRIP: NEGATIVE
NON-SQ EPI CELLS URNS QL MICRO: ABNORMAL /HPF
PH UR STRIP.AUTO: 6 [PH]
PROT UR STRIP-MCNC: ABNORMAL MG/DL
RBC #/AREA URNS AUTO: ABNORMAL /HPF
SP GR UR STRIP.AUTO: 1.01 (ref 1–1.03)
UROBILINOGEN UR QL STRIP.AUTO: 0.2 E.U./DL
WBC #/AREA URNS AUTO: ABNORMAL /HPF

## 2019-07-06 PROCEDURE — 81001 URINALYSIS AUTO W/SCOPE: CPT

## 2019-07-06 PROCEDURE — 99203 OFFICE O/P NEW LOW 30 MIN: CPT | Performed by: PHYSICIAN ASSISTANT

## 2019-07-06 PROCEDURE — 73030 X-RAY EXAM OF SHOULDER: CPT

## 2019-07-06 NOTE — PROGRESS NOTES
Assessment/Plan   Diagnoses and all orders for this visit:    Left shoulder pain, unspecified chronicity  -     XR shoulder 2+ vw left; Future    Closed displaced fracture of acromial end of left clavicle, initial encounter    - cotinue sling x 2 weeks  - follow up with Dr Blake Barfield in 2 weeks  - ice as needed      Subjective   Patient ID: Venus Villavicencio is a 70 y o  male  Vitals:    07/06/19 0949   BP: 132/77   Pulse: 80     64yo male comes in for an evaluation of his left shoulder  He fainted while reading his mail outside 6/29/19  He went to the ER where xrays revealed a distal clavicle fracture  He was treated with a sling  He states he faints a lot  His doctors are aware of this and he has a LifeAlert style button on his belt to call EMS for when this happens  His shoulder pain is controlled  The pain is sharp in character, mild in severity, pain does not radiate and is not associated with numbness  The following portions of the patient's history were reviewed and updated as appropriate: allergies, current medications, past family history, past medical history, past social history, past surgical history and problem list     Review of Systems  Ortho Exam  Past Medical History:   Diagnosis Date    Aortic aneurysm ([facility] [address] )     Arthritis     Atypical chest pain     Back pain     Chronic kidney disease     stg 3    Elevated ALT measurement     Elevated AST (SGOT)     Hyperlipidemia     Hypertension     Leg pain     Neurogenic claudication     Urinary retention     catherize self 4x/day    Urinary retention      Past Surgical History:   Procedure Laterality Date    EPIDURAL BLOCK INJECTION N/A 1/23/2017    Procedure: BLOCK / INJECTION EPIDURAL STEROID LUMBAR;  Surgeon: Arizona Dubin, MD;  [facility];  Service:     [place] COLONOSCOPY FLX DX W/COLLJ SPEC WHEN PFRMD N/A 1/8/2019    Procedure: COLONOSCOPY;  Surgeon: Jatinder Mario MD;  [facility];   Service: Colorectal    [place] LAMNOTMY INCL W/DCMPRSN NRV ROOT 1 INTRSPC LUMBR Bilateral 1/23/2017    Procedure: MIS L2-3 & L3-4 LAMINAL FORAMINOTOMIES AND MICRODISCECTOMY W LEFT SIDED APPROACH ;  Surgeon: Cody Tom MD;  [facility];  Service: Neurosurgery     Family History   Problem Relation Age of Onset    Heart disease Sister     Stroke Sister    Felicity Ponce Sudden death Mother     Stroke Brother     Pancreatic cancer Sister     No Known Problems Father     No Known Problems Sister     No Known Problems Sister     No Known Problems Son      Social History     Occupational History    Not on file   Tobacco Use    Smoking status: Never Smoker    Smokeless tobacco: Never Used   Substance and Sexual Activity    Alcohol use: No    Drug use: No    Sexual activity: Not on file       Review of Systems   Constitutional: Negative  HENT: Negative  Eyes: Negative  Respiratory: Negative  Cardiovascular: Negative  Gastrointestinal: Negative  Endocrine: Negative  Genitourinary: Negative  Musculoskeletal: As below      Allergic/Immunologic: Negative  Neurological: Negative  Hematological: Negative  Psychiatric/Behavioral: Negative  Objective   Physical Exam      I have personally reviewed pertinent films in PACS and my interpretation is mildly displaced distal clavicle fracture         · Constitutional: Awake, Alert, Oriented  · Eyes: EOMI  · Psych: Mood and affect appropriate  · Heart: regular rate and rhythm  · Lungs: No audible wheezing  · Abdomen: soft  · Lymph: no lymphedema       left Shoulder:  - Appearance   No swelling, discoloration, deformity, or ecchymosis  - Palpation   + tenderness clavicle  - ROM   not done due to fracture status  - Motor   Full and pain-free active ROM of the wrist and hand     - NVI distally

## 2019-07-10 ENCOUNTER — TELEPHONE (OUTPATIENT)
Dept: HEMATOLOGY ONCOLOGY | Facility: CLINIC | Age: 64
End: 2019-07-10

## 2019-07-10 NOTE — TELEPHONE ENCOUNTER
Patient called requesting the results of  urine sample completed 7/6 ordered by Hamilton Agarwal call back [phone]

## 2019-07-24 ENCOUNTER — OFFICE VISIT (OUTPATIENT)
Dept: OBGYN CLINIC | Facility: HOSPITAL | Age: 64
End: 2019-07-24
Payer: COMMERCIAL

## 2019-07-24 ENCOUNTER — HOSPITAL ENCOUNTER (OUTPATIENT)
Dept: RADIOLOGY | Facility: HOSPITAL | Age: 64
Discharge: HOME | End: 2019-07-24
Attending: ORTHOPAEDIC SURGERY
Payer: COMMERCIAL

## 2019-07-24 VITALS
DIASTOLIC BLOOD PRESSURE: 61 MMHG | BODY MASS INDEX: 22.02 KG/M2 | SYSTOLIC BLOOD PRESSURE: 100 MMHG | HEART RATE: 97 BPM | HEIGHT: 66 IN | WEIGHT: 137 LBS

## 2019-07-24 DIAGNOSIS — S42.032A DISPLACED FRACTURE OF LATERAL END OF LEFT CLAVICLE, INITIAL ENCOUNTER FOR CLOSED FRACTURE: ICD-10-CM

## 2019-07-24 DIAGNOSIS — M89.8X1 PAIN OF LEFT CLAVICLE: ICD-10-CM

## 2019-07-24 DIAGNOSIS — M89.8X1 PAIN OF LEFT CLAVICLE: Primary | ICD-10-CM

## 2019-07-24 PROBLEM — S42.035A CLOSED NONDISPLACED FRACTURE OF LATERAL END OF LEFT CLAVICLE: Status: ACTIVE | Noted: 2019-07-24

## 2019-07-24 PROCEDURE — 99213 OFFICE O/P EST LOW 20 MIN: CPT | Performed by: ORTHOPAEDIC SURGERY

## 2019-07-24 PROCEDURE — 73000 X-RAY EXAM OF COLLAR BONE: CPT

## 2019-07-24 NOTE — PROGRESS NOTES
Assessment:       1  Pain of left clavicle    2  Displaced fracture of lateral end of left clavicle, initial encounter for closed fracture          Plan:        Explained my current clinical findings and reviewed radiological findings with Mr Randall Appiah  I explained to him that clavicular fractures may be treated both operatively and nonoperatively  However, lateral and clavicle fractures are at a much higher risk of nonunion and hence I will like him to see my orthopedic colleague Dr Cindi Arreaga for further evaluation and management in this regard  At this time, I did advise him to discontinue his left arm sling any may do some left shoulder active range of motion exercises as tolerated  Subjective:     Patient ID: Samia Chu is a 70 y o  male  Chief Complaint:  Left shoulder injury    HPI    Lebron Lerner is a 42-year-old right-hand-dominant gentleman who is here today for evaluation of acute left shoulder injury and pain  He has a history of syncopal episodes and during  one such episode on 6/28/2019, he was rating his mail and tripped falling forward onto his left shoulder  Was subsequently seen at the [facility] emergency room on 6/29/2019 and a plain radiograph of the left shoulder revealed a mildly displaced fracture of the lateral clavicle  He was placed in an arm sling and was subsequently seen in the office by orthopedic JACK Peterson on 7/6/2019  His left arm sling was continued and a plain radiograph repeated on this day revealed a maintained fracture alignment with mild displacement  He was advised to continue in his arm sling for further 2 weeks and then follow up in my office today  Today, he reports some overall improvement of his left shoulder pain but still has persistent moderate intensity pain localized to his left superior shoulder which is made worse with direct pressure or overhead lifting  He still able to drive with some discomfort    Denies any associated neck pain or left upper extremity tingling or numbness  Past Medical History:   Diagnosis Date    Aortic aneurysm (HCC)     Arthritis     Atypical chest pain     Back pain     Chronic kidney disease     stg 3    Elevated ALT measurement     Elevated AST (SGOT)     Hyperlipidemia     Hypertension     Leg pain     Neurogenic claudication     Urinary retention     catherize self 4x/day    Urinary retention           Social History     Occupational History    Not on file   Tobacco Use    Smoking status: Never Smoker    Smokeless tobacco: Never Used   Substance and Sexual Activity    Alcohol use: No    Drug use: No    Sexual activity: Not on file      Review of Systems   Constitutional: Negative  HENT: Negative  Eyes: Negative  Respiratory: Negative  Cardiovascular:        History of syncopal episodes   Gastrointestinal: Negative  Endocrine: Negative  Genitourinary: Negative  Skin: Negative  Allergic/Immunologic: Negative  Neurological:        History of syncopal episodes   Psychiatric/Behavioral: Negative  Objective:     Ortho ExamPhysical Exam   Constitutional: He is oriented to person, place, and time  He appears well-developed and well-nourished  HENT:   Head: Normocephalic and atraumatic  Eyes: Conjunctivae are normal    Cardiovascular: Normal rate and regular rhythm  Pulmonary/Chest: Effort normal  No respiratory distress  Neurological: He is alert and oriented to person, place, and time  Skin: Skin is warm  No erythema  Psychiatric: He has a normal mood and affect  His behavior is normal  Judgment and thought content normal    Nursing note and vitals reviewed  Left shoulder exam:  No skin tenting noted over the acromioclavicular joint of the lateral clavicle  Mild swelling localized in this area with tenderness to palpation over the lateral clavicle  No other areas of left shoulder tenderness noted    Left shoulder active range of motion is forward flexion to 120°, abduction to 120°, internal rotation to L4-L5 level and external rotation in adduction to 60°  Clinically intact sensation to light touch in the left lateral shoulder and clinically intact left upper extremity distal neurovascular status  Further examination of the left shoulder deferred on today's visit due to patient's discomfort  I have personally reviewed pertinent films in PACS and my interpretation is Plain radiograph of the left shoulder repeated today reveals a mildly displaced fracture of the lateral end of the left clavicle with an oblique orientation and immediately medial to the coracoid clavicular ligament

## 2019-07-29 ENCOUNTER — OFFICE VISIT (OUTPATIENT)
Dept: OBGYN CLINIC | Facility: HOSPITAL | Age: 64
End: 2019-07-29
Payer: COMMERCIAL

## 2019-07-29 VITALS
HEART RATE: 81 BPM | SYSTOLIC BLOOD PRESSURE: 125 MMHG | WEIGHT: 138.4 LBS | BODY MASS INDEX: 22.24 KG/M2 | DIASTOLIC BLOOD PRESSURE: 71 MMHG | HEIGHT: 66 IN

## 2019-07-29 DIAGNOSIS — M89.8X1 PAIN OF LEFT CLAVICLE: ICD-10-CM

## 2019-07-29 DIAGNOSIS — S42.032D CLOSED DISPLACED FRACTURE OF ACROMIAL END OF LEFT CLAVICLE WITH ROUTINE HEALING, SUBSEQUENT ENCOUNTER: ICD-10-CM

## 2019-07-29 PROCEDURE — 99213 OFFICE O/P EST LOW 20 MIN: CPT | Performed by: ORTHOPAEDIC SURGERY

## 2019-07-29 PROCEDURE — 23500 CLTX CLAVICULAR FX W/O MNPJ: CPT | Performed by: ORTHOPAEDIC SURGERY

## 2019-07-29 NOTE — PROGRESS NOTES
Assessment  Diagnoses and all orders for this visit:    Pain of left clavicle  -     Ambulatory referral to Orthopedic Surgery    Closed displaced fracture of acromial end of left clavicle with routine healing, subsequent encounter  -     Ambulatory referral to Orthopedic Surgery        Discussion and Plan:  The patient has a healing left distal clavicle fracture seen on x-ray  I have discussed with the patient the pathophysiology of this diagnosis and reviewed how the examination correlates with this diagnosis  Treatment options were discussed at length and after discussing these treatment options, I recommend continued conservative treatment  Patient instructed to wean out of the sling  Activities as tolerated    F/U 6 weeks with x-rays  Subjective:   Patient ID: Caty Plata is a 70 y o  male      HPI  The patient presents with a chief complaint of left shoulder pain, distal clavicle fracture  Patient had a fall 1 mos ago on 6/29/19  He was seen in the ED and referred to [facility]  Patient was then referred to Dr Mesfin Hines for non operative treatment  Patient was seen by Dr Mesfin Hines on 7/24/19 who referred the patient here today for orthopedic surgical consultation  The patient describes the pain as aching and dull in intensity,  intermittent in timing, and localizes the pain to the  left distal clavicle  The pain is worse with movement and relieved by rest, the use of sling  The pain is not associated with numbness and tingling  The pain is not associated with constitutional symptoms  The patient is not awoken at night by the pain  The following portions of the patient's history were reviewed and updated as appropriate: allergies, current medications, past family history, past medical history, past social history, past surgical history and problem list     Review of Systems   Constitutional: Negative for chills and fever  HENT: Negative for drooling and hearing loss      Eyes: Negative for visual disturbance  Respiratory: Negative for cough and shortness of breath  Cardiovascular: Negative for chest pain  Gastrointestinal: Negative for abdominal pain  Skin: Negative for rash  Psychiatric/Behavioral: Negative for agitation  Objective:  Left Shoulder Exam     Tenderness   The patient is experiencing no tenderness  Range of Motion   Passive abduction: 100 (with pain)   Left shoulder external rotation: full  Forward flexion: 110 (with pain)     Other   Erythema: absent  Sensation: normal  Pulse: present     Comments:    Patient has decreased ROM on exam today secondary to fracture  Strength and special tests not assessed today  No ecchymosis or skin tenting  Physical Exam   Constitutional: He appears well-developed and well-nourished  HENT:   Head: Normocephalic  Eyes: Pupils are equal, round, and reactive to light  Pulmonary/Chest: Effort normal    Skin: Skin is warm and dry  Psychiatric: He has a normal mood and affect  Vitals reviewed  I have personally reviewed pertinent films in PACS and my interpretation is as follows  Left shoulder x-rays demonstrate distal clavicle fracture remains in acceptable alignment and position       Scribe Attestation    I,:   [place] [facility] am acting as a scribe while in the presence of the attending physician :        I,:   Obdulia Munoz MD personally performed the services described in this documentation    as scribed in my presence :          Fracture / Dislocation Treatment  Date/Time: 7/29/2019 12:51 PM  Performed by: Obdulia Munoz MD  Authorized by: Obdulia Munoz MD     Injury location:  Sternoclavicular  Location details:  Left clavicle  Injury type:  Fracture  Manipulation performed?: No

## 2019-08-01 ENCOUNTER — APPOINTMENT (OUTPATIENT)
Dept: PHYSICAL THERAPY | Facility: CLINIC | Age: 64
End: 2019-08-01
Payer: COMMERCIAL

## 2019-08-09 ENCOUNTER — EVALUATION (OUTPATIENT)
Dept: PHYSICAL THERAPY | Facility: CLINIC | Age: 64
End: 2019-08-09
Payer: COMMERCIAL

## 2019-08-09 DIAGNOSIS — M54.16 LUMBAR RADICULOPATHY: Primary | ICD-10-CM

## 2019-08-09 DIAGNOSIS — M25.512 ACUTE PAIN OF LEFT SHOULDER: ICD-10-CM

## 2019-08-09 PROCEDURE — 97140 MANUAL THERAPY 1/> REGIONS: CPT | Performed by: PHYSICAL THERAPIST

## 2019-08-09 PROCEDURE — 97112 NEUROMUSCULAR REEDUCATION: CPT | Performed by: PHYSICAL THERAPIST

## 2019-08-09 PROCEDURE — 97110 THERAPEUTIC EXERCISES: CPT | Performed by: PHYSICAL THERAPIST

## 2019-08-09 NOTE — PROGRESS NOTES
PT Evaluation     Today's date: 2019  Patient name: Elena Ling  : 1955  MRN: [id number]  Referring provider: Yvette Way MD  Dx:   Encounter Diagnosis     ICD-10-CM    1  Lumbar radiculopathy M54 16    2  Acute pain of left shoulder M25 512                   Assessment  Assessment details: Patient is a 70 y o  male who presents to outpatient PT with pain, decreased rom, decreased strength and decreased functional mobility  He will benefit from skilled PT to address these deficits in order to achieve his goals and maximize his functional mobility  Goals  Short Term Goals: Independent performance of initial hep  Decrease pain 2 points on VAS      Long Term Goals: Independent performance of comprehensive hep  Work performance is returned to max level of function  Performance of IADL's is returned to max level of function  Performance in recreational activities is improved to max level of function  Able to reach overhead painfree      Plan  Planned therapy interventions: abdominal trunk stabilization, ADL retraining, IADL retraining, joint mobilization, manual therapy, massage, neuromuscular re-education, postural training, strengthening, stretching, therapeutic activities, therapeutic exercise and home exercise program  Frequency: 2x week  Duration in weeks: 8        Subjective Evaluation    History of Present Illness  Mechanism of injury: Pt reports that he fell onto his left shoulder in 2019  Reports that he was taken by ambulance to the ED  X-ray revealed clavicle fx  Reports that he was in a sling for several weeks  Reports that he has difficulty reaching over shoulder height  Reports that he has pain with [address] on his L UE  Denies pain at night  Reports that he also has chronic lumbar pain with radicular symptoms that has responded well to PT previously      Pt is R handed  Pain  Current pain ratin  At best pain ratin  At worst pain rating: 7    Patient Goals  Patient goals for therapy: decreased pain, increased motion, increased strength, independence with ADLs/IADLs, return to sport/leisure activities and return to work          Objective       Lumbar:       ROM:   Flexion: min limited   Extension: mod limited   Right Rotation: mod limited   Left Rotation: mod limited   Right Lateral Flexion: min limited   Left Lateral Flexion: Mod limited        improved control of abdominals noted with TaA but continues to fatigue quickly  Hypomobility noted with spring testing  Straight Leg Raise: neg  Cross SLR:  neg  Slump Test:  neg  Prone Knee Bend:  neg  Directional testing: no change in symptoms  Decreased flexibility:  B/L HS and quads    [place]'s sign: neg  Prone instability test: neg  Hip IR rom:  limited      Myotome testing: equal B/L    Dermatome testing: equal B/L        L shoulder:           AROM:   Flexion: 142   Abduction: 140       PROM:   Flexion: 155   Abduction: 155   ER:  20   IR: 22    Strength:    Flexion: 3+ /5   Abduction:  3+ /5   ER:   3 /5   IR:     4/5          Cunningham: neg  Neer: neg  Empty can: neg  Painful arc: pos  Scour: neg  Biceps load: neg                        Objective: See treatment diary below       Daily Treatment Diary     Manuals             Lumbar PA's                                                    Exercise Diary              bike             Repeated ext standing             ltr             Hs stretch             Prone quad stretch             DKTC             TaA             TaA bridge             TaA pball squats             TaA step out with tb press             Low rows             Rows             bridge with ball squeeze             prone hip ext             Standing hip hike             Pulleys- flex/scap             Wand scap retraction with ext             rows             Tb ir/er             Modalities

## 2019-08-14 ENCOUNTER — OFFICE VISIT (OUTPATIENT)
Dept: PHYSICAL THERAPY | Facility: CLINIC | Age: 64
End: 2019-08-14
Payer: COMMERCIAL

## 2019-08-14 DIAGNOSIS — M54.16 LUMBAR RADICULOPATHY: ICD-10-CM

## 2019-08-14 DIAGNOSIS — M25.512 ACUTE PAIN OF LEFT SHOULDER: Primary | ICD-10-CM

## 2019-08-14 PROCEDURE — 97530 THERAPEUTIC ACTIVITIES: CPT

## 2019-08-14 PROCEDURE — 97140 MANUAL THERAPY 1/> REGIONS: CPT

## 2019-08-14 PROCEDURE — 97110 THERAPEUTIC EXERCISES: CPT

## 2019-08-14 NOTE — PROGRESS NOTES
Daily Note     Today's date: 2019  Patient name: Quin Rainey  : 1955  MRN: [id number]  Referring provider: Smith Morales MD  Dx:   Encounter Diagnosis     ICD-10-CM    1  Acute pain of left shoulder M25 512    2  Lumbar radiculopathy M54 16                 Subjective: Patient reports he is feeling "the same" since the last visit  Patient reports performing the home exercises is causing the left shoulder to ache more  Patient reports he is feeling "the normal pain I have in my back and legs "  Patient also reports feeling "pins and needles in my legs "  Patient reports getting off his couch and walking throughout his apartment has been more difficult because of this  Patient also reports he can not perform home exercises for his back because of this  Patient reports he has been taking BP medication twice per day as his BP has been higher due to all the pain he has been experiencing  Objective: See treatment diary below    Daily Treatment Diary   Precautions: clavicle fracture 2019, chronic LBP     Manuals                       Lumbar PA's                       L shoulder PROM 10'                                                                     Exercise Diary                        bike  7'                     Repeated ext standing                       ltr                       Hs stretch                       Prone quad stretch                       DKTC                       TaA                       TaA bridge                       TaA pball squats                       TaA step out with tb press                       Low rows                       bridge with ball squeeze                       prone hip ext                       Standing hip hike                                    pulleys 5"x20            Wand scap retraction with ext 5"x20                     rows  green   2x10                     Tb ir/er  green   2x10                     Supine wand flexion 5"x20 Modalities                                                 Assessment: Left shoulder ROM is limited in all directions - patient will benefit from continued manual stretching to improve this  Bilateral lower extremity pain is reduced after riding the stationary bike  Plan: Continue treatment as per PT plan of care

## 2019-08-16 ENCOUNTER — OFFICE VISIT (OUTPATIENT)
Dept: PHYSICAL THERAPY | Facility: CLINIC | Age: 64
End: 2019-08-16
Payer: COMMERCIAL

## 2019-08-16 DIAGNOSIS — M54.16 LUMBAR RADICULOPATHY: ICD-10-CM

## 2019-08-16 DIAGNOSIS — M25.512 ACUTE PAIN OF LEFT SHOULDER: Primary | ICD-10-CM

## 2019-08-16 PROCEDURE — 97140 MANUAL THERAPY 1/> REGIONS: CPT

## 2019-08-16 PROCEDURE — 97530 THERAPEUTIC ACTIVITIES: CPT

## 2019-08-16 PROCEDURE — 97110 THERAPEUTIC EXERCISES: CPT

## 2019-08-16 NOTE — PROGRESS NOTES
Daily Note     Today's date: 2019  Patient name: Felicity Bellamy  : 1955  MRN: [id number]  Referring provider: Joaquina Forde MD  Dx:   Encounter Diagnosis     ICD-10-CM    1  Acute pain of left shoulder M25 512    2  Lumbar radiculopathy M54 16                 Subjective: Patient reports the left shoulder continues to feel sore however "my legs felt a lot better after I did the bike last time "    Objective: See treatment diary below  Daily Treatment Diary   Precautions: clavicle fracture 2019, chronic LBP     Manuals                     Lumbar PA's                       L shoulder PROM 10'  10'                                                                   Exercise Diary                        bike  8'  8'                   Repeated ext standing                       ltr                       Hs stretch                       Prone quad stretch                       DKTC                       TaA                       TaA bridge                       TaA pball squats                       TaA step out with tb press                       bridge with ball squeeze                       prone hip ext                       Standing hip hike                                    pulleys 5"x20 5"x20           Wand scap retraction with ext 5"x20 5"x20                   rows  green   2x10  blue  3x10                   Tb ir/er  green   2x10  blue  3x10                   Supine wand flexion 5"x20 5"x20           ube  6 min           Low rows  blue  2x10                        Modalities                                                 Assessment: Left shoulder ROM improved on the overhead pulleys  Mild left shoulder pain noted with addition of low rows  Plan: Continue treatment as per PT plan of care

## 2019-08-21 ENCOUNTER — OFFICE VISIT (OUTPATIENT)
Dept: PHYSICAL THERAPY | Facility: CLINIC | Age: 64
End: 2019-08-21
Payer: COMMERCIAL

## 2019-08-21 ENCOUNTER — TRANSCRIBE ORDERS (OUTPATIENT)
Dept: RADIOLOGY | Facility: HOSPITAL | Age: 64
End: 2019-08-21

## 2019-08-21 ENCOUNTER — HOSPITAL ENCOUNTER (OUTPATIENT)
Dept: RADIOLOGY | Facility: HOSPITAL | Age: 64
Discharge: HOME | End: 2019-08-21
Attending: INTERNAL MEDICINE
Payer: COMMERCIAL

## 2019-08-21 DIAGNOSIS — M25.562 LEFT KNEE PAIN, UNSPECIFIED CHRONICITY: ICD-10-CM

## 2019-08-21 DIAGNOSIS — M25.562 LEFT KNEE PAIN, UNSPECIFIED CHRONICITY: Primary | ICD-10-CM

## 2019-08-21 DIAGNOSIS — M25.512 ACUTE PAIN OF LEFT SHOULDER: Primary | ICD-10-CM

## 2019-08-21 PROCEDURE — 97530 THERAPEUTIC ACTIVITIES: CPT | Performed by: PHYSICAL THERAPIST

## 2019-08-21 PROCEDURE — 97140 MANUAL THERAPY 1/> REGIONS: CPT | Performed by: PHYSICAL THERAPIST

## 2019-08-21 PROCEDURE — 97110 THERAPEUTIC EXERCISES: CPT | Performed by: PHYSICAL THERAPIST

## 2019-08-21 PROCEDURE — 73564 X-RAY EXAM KNEE 4 OR MORE: CPT

## 2019-08-21 NOTE — PROGRESS NOTES
Daily Note     Today's date: 2019  Patient name: Thuy Richmond  : 1955  MRN: [id number]  Referring provider: William Patel MD  Dx:   Encounter Diagnosis     ICD-10-CM    1  Acute pain of left shoulder M25 512                 Subjective: Pt reports that he is noticing improvements in his strength and motion but that he continues to have pain  Objective: See treatment diary below  Daily Treatment Diary   Precautions: clavicle fracture 2019, chronic LBP     Manuals                   Lumbar PA's                       L shoulder PROM 10'  10'  10'                                                                 Exercise Diary                        bike  8'  8'                 Repeated ext standing                       ltr                       Hs stretch                       Prone quad stretch                       DKTC                       TaA                       TaA bridge                       TaA pball squats                       TaA step out with tb press                       bridge with ball squeeze                       prone hip ext                       Standing hip hike                                    pulleys 5"x20 5"x20 20          Wand scap retraction with ext 5"x20 5"x20  20                 rows  green   2x10  blue  3x10  blue tb x30                 Tb ir/er  green   2x10  blue  3x10  blue tb x30                 Supine wand flexion 5"x20 5"x20 5"x20          ube  6 min 6'          Low rows  blue  2x10 Blue tb x20                       Modalities                                                 Assessment: rom continues to be limited but is improved with manual tx  No pain reported with therex, continue to progress as collin  Plan: Continue treatment as per PT plan of care

## 2019-08-23 ENCOUNTER — OFFICE VISIT (OUTPATIENT)
Dept: PHYSICAL THERAPY | Facility: CLINIC | Age: 64
End: 2019-08-23
Payer: COMMERCIAL

## 2019-08-23 DIAGNOSIS — M25.512 ACUTE PAIN OF LEFT SHOULDER: Primary | ICD-10-CM

## 2019-08-23 PROCEDURE — 97530 THERAPEUTIC ACTIVITIES: CPT | Performed by: PHYSICAL THERAPIST

## 2019-08-23 PROCEDURE — 97110 THERAPEUTIC EXERCISES: CPT | Performed by: PHYSICAL THERAPIST

## 2019-08-23 PROCEDURE — 97140 MANUAL THERAPY 1/> REGIONS: CPT | Performed by: PHYSICAL THERAPIST

## 2019-08-23 NOTE — PROGRESS NOTES
Daily Note     Today's date: 2019  Patient name: Derrell Graham  : 1955  MRN: [id number]  Referring provider: Syeda Guzman MD  Dx:   Encounter Diagnosis     ICD-10-CM    1  Acute pain of left shoulder M25 512                 Subjective: Pt reports that his pain medication Rx was changed ( pt unable to remember the name of the new medication)  Reports a sig reduction in pain since taking this  Objective: See treatment diary below  Daily Treatment Diary   Precautions: clavicle fracture 2019, chronic LBP     Manuals                 Lumbar PA's                       L shoulder PROM 10'  10'  10'  10'                                                               Exercise Diary                        bike  8'  8'  8'  8'               Repeated ext standing                       ltr                       Hs stretch                       Prone quad stretch                       DKTC                       TaA                       TaA bridge                       TaA pball squats                       TaA step out with tb press                       bridge with ball squeeze                       prone hip ext                       Standing hip hike                                    pulleys 5"x20 5"x20 20 5"x20         Wand scap retraction with ext 5"x20 5"x20  20  20               rows  green   2x10  blue  3x10  blue tb x30  blue tbv x30               Tb ir/er  green   2x10  blue  3x10  blue tb x30  blue tb x30               Supine wand flexion 5"x20 5"x20 5"x20 5"x20         ube  6 min 6' 8'         Low rows  blue  2x10 Blue tb x20 Blue tb x20                      Modalities                                                 Assessment: rom continues to be limited but is improved with manual tx  No pain reported with therex, continue to progress as collin  Plan: Continue treatment as per PT plan of care

## 2019-08-28 ENCOUNTER — OFFICE VISIT (OUTPATIENT)
Dept: PHYSICAL THERAPY | Facility: CLINIC | Age: 64
End: 2019-08-28
Payer: COMMERCIAL

## 2019-08-28 DIAGNOSIS — M54.16 LUMBAR RADICULOPATHY: ICD-10-CM

## 2019-08-28 DIAGNOSIS — M25.512 ACUTE PAIN OF LEFT SHOULDER: Primary | ICD-10-CM

## 2019-08-28 PROCEDURE — 97530 THERAPEUTIC ACTIVITIES: CPT

## 2019-08-28 PROCEDURE — 97110 THERAPEUTIC EXERCISES: CPT

## 2019-08-28 PROCEDURE — 97140 MANUAL THERAPY 1/> REGIONS: CPT

## 2019-08-28 NOTE — PROGRESS NOTES
Daily Note     Today's date: 2019  Patient name: Elena Ling  : 1955  MRN: [id number]  Referring provider: Yvette Way MD  Dx:   Encounter Diagnosis     ICD-10-CM    1  Acute pain of left shoulder M25 512    2  Lumbar radiculopathy M54 16                 Subjective: Patient reports he feels "great" after attending PT - "I can go do yard work now "    Objective: See treatment diary below  Daily Treatment Diary  Precautions: clavicle fracture 2019, chronic LBP     Manuals               Lumbar PA's                       L shoulder PROM 10'  10'  10'  10'  10'                                                             Exercise Diary                        bike  8'  8'  8'  8'  10'             Repeated ext standing                       ltr                       Hs stretch                       Prone quad stretch                       DKTC                       TaA                       TaA bridge                       TaA pball squats                       TaA step out with tb press                       bridge with ball squeeze                       prone hip ext                       Standing hip hike                                    pulleys 5"x20 5"x20 20 5"x20 5"x20        Wand scap retraction with ext 5"x20 5"x20  20  20 5"x20             rows  green   2x10  blue  3x10  blue tb x30  blue tbv x30  blue   3x10             Tb ir/er  green   2x10  blue  3x10  blue tb x30  blue tb x30  blue   3x10             Supine wand flexion 5"x20 5"x20 5"x20 5"x20 5"x20        ube  6 min 6' 8' 6'        Low rows  blue  2x10 Blue tb x20 Blue tb x20  blue   3x10                     Modalities                                                 Assessment: No complaints reported throughout therapeutic exercise program   Left shoulder ROM is improving with the manual stretching  Plan: Continue treatment as per PT plan of care

## 2019-08-30 ENCOUNTER — OFFICE VISIT (OUTPATIENT)
Dept: PHYSICAL THERAPY | Facility: CLINIC | Age: 64
End: 2019-08-30
Payer: COMMERCIAL

## 2019-08-30 DIAGNOSIS — M54.16 LUMBAR RADICULOPATHY: ICD-10-CM

## 2019-08-30 DIAGNOSIS — M25.512 ACUTE PAIN OF LEFT SHOULDER: Primary | ICD-10-CM

## 2019-08-30 PROCEDURE — 97110 THERAPEUTIC EXERCISES: CPT | Performed by: PHYSICAL THERAPIST

## 2019-08-30 PROCEDURE — 97530 THERAPEUTIC ACTIVITIES: CPT | Performed by: PHYSICAL THERAPIST

## 2019-08-30 PROCEDURE — 97140 MANUAL THERAPY 1/> REGIONS: CPT | Performed by: PHYSICAL THERAPIST

## 2019-08-30 NOTE — PROGRESS NOTES
Daily Note     Today's date: 2019  Patient name: Mirella Bolton  : 1955  MRN: [id number]  Referring provider: Agapito Le MD  Dx:   Encounter Diagnosis     ICD-10-CM    1  Acute pain of left shoulder M25 512    2  Lumbar radiculopathy M54 16        Start Time: 1300  Stop Time: 1350  Total time in clinic (min): 50 minutes    Subjective: patient reports he has been noticing significant improvements since starting physical therapy  He reports does still have trouble laying on L side but is happy that he is able to work in his yard        Objective: See treatment diary below          Daily Treatment Diary  Precautions: clavicle fracture 2019, chronic LBP     Manuals             Lumbar PA's                       L shoulder PROM 10'  10'  10'  10'  10'  10'                                                           Exercise Diary                        bike  8'  8'  8'  8'  10'  10'           Repeated ext standing                       ltr                       Hs stretch                       Prone quad stretch                       DKTC                       TaA                       TaA bridge                       TaA pball squats                       TaA step out with tb press                       bridge with ball squeeze                       prone hip ext                       Standing hip hike                                    pulleys 5"x20 5"x20 20 5"x20 5"x20 5"x20       Wand scap retraction with ext 5"x20 5"x20  20  20 5"x20 5"x20           rows  green   2x10  blue  3x10  blue tb x30  blue tbv x30  blue   3x10  black  3x10           Tb ir/er  green   2x10  blue  3x10  blue tb x30  blue tb x30  blue   3x10  black   3x10           Supine wand flexion 5"x20 5"x20 5"x20 5"x20 5"x20 5"x20       ube  6 min 6' 8' 6' 6'        Low rows  blue  2x10 Blue tb x20 Blue tb x20  blue   3x10 Black  3x10                    Modalities                                                 Assessment: Patient tolerated treatment session well  Tolerated increased resistance for theraband activities well  He had no increased symptoms throughout the session  Continues to demonstrate improvements in shoulder PROM  Plan: Continue treatment as per plan of care

## 2019-09-03 ENCOUNTER — OFFICE VISIT (OUTPATIENT)
Dept: UROLOGY | Facility: CLINIC | Age: 64
End: 2019-09-03
Payer: COMMERCIAL

## 2019-09-03 VITALS
BODY MASS INDEX: 23.3 KG/M2 | DIASTOLIC BLOOD PRESSURE: 70 MMHG | HEIGHT: 66 IN | WEIGHT: 145 LBS | SYSTOLIC BLOOD PRESSURE: 118 MMHG | HEART RATE: 82 BPM

## 2019-09-03 DIAGNOSIS — R33.9 URINARY RETENTION: Primary | ICD-10-CM

## 2019-09-03 PROCEDURE — 99213 OFFICE O/P EST LOW 20 MIN: CPT | Performed by: PHYSICIAN ASSISTANT

## 2019-09-03 NOTE — PROGRESS NOTES
1  Urinary retention  US kidney and bladder with pvr         Assessment and plan:     1  Urinary retention - managed by Dr Burton Lockhart  1  CIC dependent   - Urodynamics demonstrates preserved detrusor function with AP did of greater than 80  - Cystoscopy (8/29/18) reveals moderate bilobar hyperplasia, enlarged bladder capacity     Patient is overall very happy in comfortable with performing CIC 4 times daily  He will continue to hydrate and take tamsulosin daily  He is encouraged to contact us with any signs of urinary infection or difficulty with catheterization  Patient will follow up in 1 year with an ultrasound of the kidney and bladder prior to visit  All questions answered  Caralyn Ganser, PA-C      Chief Complaint     Urinary retention      History of Present Illness     Ravinder Bhatt is a 70 y o  male patient of Dr Burton Lockhart with a history of urinary retention presenting for follow-up  Patient had completed urodynamic assessment that did reveal preserved detrusor function with AP day at Q max of 80  Cystoscopy (01/29/2018) revealed an 25 Urdu bulbar urethral stricture, moderate lateral lobe hyperplasia with kissing of the lateral lobes  Large capacity bladder    He continues to be catheter dependent 4 times daily  He does void volitionally the very small volumes  Patient is very comfortable with catheterization  Reports a drains approximately 500 mL during each session  He has had no interval UTIs or hematuria or reporting difficulty self catheterization  The      Detailed Urologic History     - please refer to HPI    Review of Systems     Review of Systems   Constitutional: Negative for activity change and fatigue  HENT: Negative for congestion  Eyes: Negative for visual disturbance  Respiratory: Negative for shortness of breath and wheezing  Cardiovascular: Negative for chest pain and leg swelling  Gastrointestinal: Negative for abdominal pain     Endocrine: Negative for polyuria  Genitourinary: Negative for dysuria, flank pain, hematuria and urgency  Urinary retention   Musculoskeletal: Negative for back pain  Allergic/Immunologic: Negative for immunocompromised state  Neurological: Negative for dizziness and numbness  Psychiatric/Behavioral: Negative for dysphoric mood  All other systems reviewed and are negative  Allergies     No Known Allergies    Physical Exam     Physical Exam   Constitutional: He is oriented to person, place, and time  He appears well-developed and well-nourished  No distress  HENT:   Head: Normocephalic and atraumatic  Eyes: EOM are normal    Neck: Normal range of motion  Cardiovascular:   Negative lower extremity edema   Pulmonary/Chest: Effort normal and breath sounds normal    Abdominal: Soft  Genitourinary:   Genitourinary Comments: Negative suprapubic tenderness   Musculoskeletal: Normal range of motion  Neurological: He is alert and oriented to person, place, and time  Skin: Skin is warm  Psychiatric: He has a normal mood and affect   His behavior is normal            Vital Signs  Vitals:    09/03/19 1341   BP: 118/70   BP Location: Left arm   Patient Position: Sitting   Cuff Size: Adult   Pulse: 82   Weight: 65 8 kg (145 lb)   Height: 5' 6" (1 676 m)         Current Medications       Current Outpatient Medications:     gabapentin (NEURONTIN) 300 mg capsule, Take 300 mg by mouth daily , Disp: , Rfl:     lisinopril (ZESTRIL) 20 mg tablet, Take 20 mg by mouth daily , Disp: , Rfl:     tamsulosin (FLOMAX) 0 4 mg, Take 0 4 mg by mouth daily with dinner  , Disp: , Rfl:       Active Problems     Patient Active Problem List   Diagnosis    Lumbar stenosis with neurogenic claudication    Pseudomonas aeruginosa infection    Ataxia    Essential hypertension    Syncope and collapse    Orthostatic hypotension    HLD (hyperlipidemia)    Ascending aortic aneurysm (HCC)    Urinary retention    CKD (chronic kidney disease), stage III (HCC)    Hyponatremia    Diffuse pain in left lower extremity    PAD (peripheral artery disease) (HCC)    Lumbar radiculopathy    Status post lumbar discectomy    Anemia, unspecified    Displaced fracture of lateral end of left clavicle, initial encounter for closed fracture         Past Medical History     Past Medical History:   Diagnosis Date    Aortic aneurysm (HCC)     Arthritis     Atypical chest pain     Back pain     Chronic kidney disease     stg 3    Elevated ALT measurement     Elevated AST (SGOT)     Hyperlipidemia     Hypertension     Leg pain     Neurogenic claudication     Urinary retention     catherize self 4x/day    Urinary retention          Surgical History     Past Surgical History:   Procedure Laterality Date    EPIDURAL BLOCK INJECTION N/A 1/23/2017    Procedure: BLOCK / INJECTION EPIDURAL STEROID LUMBAR;  Surgeon: Cherry Paniagua MD;  [facility];  Service:     GA COLONOSCOPY FLX DX W/COLLJ SPEC WHEN PFRMD N/A 1/8/2019    Procedure: COLONOSCOPY;  Surgeon: Supa Soliman MD;  [facility];   Service: Colorectal    GA LAMNOTMY INCL W/DCMPRSN NRV ROOT 1 INTRSPC LUMBR Bilateral 1/23/2017    Procedure: MIS L2-3 & L3-4 LAMINAL FORAMINOTOMIES AND MICRODISCECTOMY W LEFT SIDED APPROACH ;  Surgeon: Cherry Paniagua MD;  [facility];  Service: Neurosurgery         Family History     Family History   Problem Relation Age of Onset    Heart disease Sister     Stroke Sister     Sudden death Mother     Stroke Brother     Pancreatic cancer Sister     No Known Problems Father     No Known Problems Sister     No Known Problems Sister     No Known Problems Son          Social History     Social History     Social History     Tobacco Use   Smoking Status Never Smoker   Smokeless Tobacco Never Used         Pertinent Lab Values     Lab Results   Component Value Date    CREATININE 1 89 (H) 06/20/2019       No results found for: PSA    @RESULTRCNT(1H])@      Pertinent Imaging      - n/a    Portions of the record may have been created with voice recognition software   Occasional wrong word or "sound a like" substitutions may have occurred due to the inherent limitations of voice recognition software   Read the chart carefully and recognize, using context, where substitutions have occurred

## 2019-09-06 ENCOUNTER — APPOINTMENT (OUTPATIENT)
Dept: LAB | Facility: HOSPITAL | Age: 64
End: 2019-09-06
Payer: COMMERCIAL

## 2019-09-06 ENCOUNTER — DOCUMENTATION (OUTPATIENT)
Dept: HEMATOLOGY ONCOLOGY | Facility: MEDICAL CENTER | Age: 64
End: 2019-09-06

## 2019-09-06 DIAGNOSIS — D70.9 NEUTROPENIA, UNSPECIFIED TYPE (HCC): ICD-10-CM

## 2019-09-06 DIAGNOSIS — D50.8 OTHER IRON DEFICIENCY ANEMIA: ICD-10-CM

## 2019-09-06 LAB
BASOPHILS # BLD AUTO: 0.07 THOUSANDS/ΜL (ref 0–0.1)
BASOPHILS NFR BLD AUTO: 1 % (ref 0–1)
EOSINOPHIL # BLD AUTO: 0.17 THOUSAND/ΜL (ref 0–0.61)
EOSINOPHIL NFR BLD AUTO: 3 % (ref 0–6)
ERYTHROCYTE [DISTWIDTH] IN BLOOD BY AUTOMATED COUNT: 15.9 % (ref 11.6–15.1)
FERRITIN SERPL-MCNC: 202 NG/ML (ref 8–388)
HCT VFR BLD AUTO: 41.2 % (ref 36.5–49.3)
HGB BLD-MCNC: 13.1 G/DL (ref 12–17)
IMM GRANULOCYTES # BLD AUTO: 0.02 THOUSAND/UL (ref 0–0.2)
IMM GRANULOCYTES NFR BLD AUTO: 0 % (ref 0–2)
IRON SATN MFR SERPL: 27 %
IRON SERPL-MCNC: 109 UG/DL (ref 65–175)
LYMPHOCYTES # BLD AUTO: 1.66 THOUSANDS/ΜL (ref 0.6–4.47)
LYMPHOCYTES NFR BLD AUTO: 31 % (ref 14–44)
MCH RBC QN AUTO: 31 PG (ref 26.8–34.3)
MCHC RBC AUTO-ENTMCNC: 31.8 G/DL (ref 31.4–37.4)
MCV RBC AUTO: 98 FL (ref 82–98)
MONOCYTES # BLD AUTO: 0.68 THOUSAND/ΜL (ref 0.17–1.22)
MONOCYTES NFR BLD AUTO: 13 % (ref 4–12)
NEUTROPHILS # BLD AUTO: 2.68 THOUSANDS/ΜL (ref 1.85–7.62)
NEUTS SEG NFR BLD AUTO: 52 % (ref 43–75)
NRBC BLD AUTO-RTO: 0 /100 WBCS
PLATELET # BLD AUTO: 236 THOUSANDS/UL (ref 149–390)
PMV BLD AUTO: 10.4 FL (ref 8.9–12.7)
RBC # BLD AUTO: 4.22 MILLION/UL (ref 3.88–5.62)
TIBC SERPL-MCNC: 401 UG/DL (ref 250–450)
WBC # BLD AUTO: 5.28 THOUSAND/UL (ref 4.31–10.16)

## 2019-09-06 PROCEDURE — 82728 ASSAY OF FERRITIN: CPT

## 2019-09-06 PROCEDURE — 83550 IRON BINDING TEST: CPT

## 2019-09-06 PROCEDURE — 83540 ASSAY OF IRON: CPT

## 2019-09-06 PROCEDURE — 85025 COMPLETE CBC W/AUTO DIFF WBC: CPT

## 2019-09-06 PROCEDURE — 36415 COLL VENOUS BLD VENIPUNCTURE: CPT

## 2019-09-06 NOTE — PROGRESS NOTES
Left message asking that patient reschedule follow up with Maryana Kendall on 09/09 if labs not completed

## 2019-09-09 ENCOUNTER — OFFICE VISIT (OUTPATIENT)
Dept: HEMATOLOGY ONCOLOGY | Facility: CLINIC | Age: 64
End: 2019-09-09
Payer: COMMERCIAL

## 2019-09-09 ENCOUNTER — OFFICE VISIT (OUTPATIENT)
Dept: OBGYN CLINIC | Facility: HOSPITAL | Age: 64
End: 2019-09-09
Attending: ORTHOPAEDIC SURGERY

## 2019-09-09 ENCOUNTER — HOSPITAL ENCOUNTER (OUTPATIENT)
Dept: RADIOLOGY | Facility: HOSPITAL | Age: 64
Discharge: HOME | End: 2019-09-09
Attending: ORTHOPAEDIC SURGERY
Payer: COMMERCIAL

## 2019-09-09 VITALS
HEART RATE: 69 BPM | BODY MASS INDEX: 23.46 KG/M2 | WEIGHT: 146 LBS | DIASTOLIC BLOOD PRESSURE: 87 MMHG | HEIGHT: 66 IN | SYSTOLIC BLOOD PRESSURE: 170 MMHG

## 2019-09-09 VITALS
DIASTOLIC BLOOD PRESSURE: 90 MMHG | WEIGHT: 145 LBS | SYSTOLIC BLOOD PRESSURE: 160 MMHG | TEMPERATURE: 97.8 F | BODY MASS INDEX: 23.3 KG/M2 | HEART RATE: 84 BPM | RESPIRATION RATE: 16 BRPM | HEIGHT: 66 IN | OXYGEN SATURATION: 99 %

## 2019-09-09 DIAGNOSIS — E61.1 IRON DEFICIENCY: ICD-10-CM

## 2019-09-09 DIAGNOSIS — D70.9 NEUTROPENIA, UNSPECIFIED TYPE (HCC): Primary | ICD-10-CM

## 2019-09-09 DIAGNOSIS — M89.8X1 PAIN OF LEFT CLAVICLE: Primary | ICD-10-CM

## 2019-09-09 DIAGNOSIS — M89.8X1 PAIN OF LEFT CLAVICLE: ICD-10-CM

## 2019-09-09 DIAGNOSIS — R31.9 HEMATURIA, UNSPECIFIED TYPE: ICD-10-CM

## 2019-09-09 PROCEDURE — 99024 POSTOP FOLLOW-UP VISIT: CPT | Performed by: ORTHOPAEDIC SURGERY

## 2019-09-09 PROCEDURE — 73000 X-RAY EXAM OF COLLAR BONE: CPT

## 2019-09-09 PROCEDURE — 99214 OFFICE O/P EST MOD 30 MIN: CPT | Performed by: PHYSICIAN ASSISTANT

## 2019-09-09 RX ORDER — PREGABALIN 300 MG/1
300 CAPSULE ORAL 2 TIMES DAILY
COMMUNITY
Start: 2019-09-06

## 2019-09-09 NOTE — PROGRESS NOTES
Patient follows up with his left distal clavicle fracture for routine fracture follow-up  He says he is feeling much better and has been progressing well with physical therapy, really has no symptoms related to the left shoulder at this time      Physical examination:  Left shoulder no obvious deformity at distal clavicle and no tenderness to palpation over fracture site  Near full range of motion of left glenohumeral joint with excellent strength  Neurologically intact distally      X-rays:  I personally reviewed the x-rays in the PACS system and my interpretation is as follows:   Left clavicle shows increased callus formation with bridging around the fracture line, fracture line is still present on x-ray but does appear to be good bridging callus especially compared to the last visit      Impression:  Healing left distal clavicle fracture    Discussion:  I reviewed with the patient that his fracture is healing radiographically and is essentially healed clinically already  I recommend he transition to a home exercise program out of physical therapy on his own accord and will see him back on an as-needed basis if he has any symptoms related to this in the future, I do not feel he likely will require further evaluation or treatment for this healing distal clavicle fracture

## 2019-09-09 NOTE — PROGRESS NOTES
[facility] [address] [address] [phone]  Hematology Ambulatory Follow-Up  Derrell Graham, 1955, [id number]  9/9/2019    Assessment/Plan:  1  Neutropenia, unspecified type (HCC)  Neutropenia has largely resolved  It is most likely that his brief decrease was secondary to viral illness  Additional hematologic follow-up is not necessary at this time  Patient's counts have resolved  At this time I have discharged the patient back to his primary care office  He understands he can call the office at any time if he has any questions or concerns  2  Iron deficiency  Patient was found to be iron deficient  He has some issues with chronic hematuria secondary to self catheterization due to urinary obstruction  Patient continues with Urology follow-up  Patient recently had an examination  Iron studies demonstrate significant rebound  Patient has made necessary dietary changes  At this time, continued observation of patient's iron studies can occur through primary care office  Follow-up with hematology is advised if additional IV iron is needed  However with the patient's dietary changes I doubt that this will be an issue moving forward  3  Hematuria, unspecified type  See 2  Above  Mr Krysta Wilkinson will follow-up as needed  Patient voiced agreement and understanding to the above  Patient knows to call the Hematology/Oncology office with any questions and concerns regarding the above  Barrier(s) to care: None  The patient is able to self care   ------------------------------------------------------------------------------------------------------    Chief Complaint   Patient presents with    Follow-up     4 month      History of present illness: This is a 58-year-old male who was initially evaluated by Hematology in February 2019  The patient was noted to have normal blood work prior to July in 2018   However from the time  Alejandra Tolliverdunia 2018 through January 2019 hemoglobin decreased from normal to 9 8 within normal MCV, platelet count was within normal limits however total white blood cell count also dropped with an increase in monocytes and lymphocytes relatively compared to a total decrease in absolute neutrophil count      Patient was recommended to undergo blood work that demonstrated an iron saturation of 10% with a high TIBC a ferritin of 150, erythropoietin level slightly elevated at 19   B12 and folate levels acceptable   Patient now returns for follow-up      Of note, patient had colonoscopy completed in January 2019  Luis Truong is no etiology for anemia found during that exam   Patient also self catheterizes secondary to urinary obstruction  Patient has been seen by Urology and states that he does not know the etiology of this but has frequently retain urine in the range of 300 mL to 600 mL  Patient self catheterizes 4 times a day       Patient's neutropenia had resolved after resolution of acute illness  Additionally, patient was given IV iron, Feraheme  After completing iron, patient had also initiated increase of dietary iron, and continues to do this  Patient now returns for follow-up  Interval history:  Patient denies major interval changes  He continues to follow-up with urology  No illnesses  Patient denies signs of blood-loss anemia  Review of Systems   Constitutional: Negative for activity change, appetite change, fatigue and fever  HENT: Negative for nosebleeds  Respiratory: Negative for cough, choking and shortness of breath  Cardiovascular: Negative for chest pain, palpitations and leg swelling  Gastrointestinal: Negative for abdominal distention, abdominal pain, anal bleeding, blood in stool, constipation, diarrhea, nausea and vomiting  Endocrine: Negative for cold intolerance  Genitourinary: Negative for hematuria  Musculoskeletal: Negative for myalgias     Skin: Negative for color change, pallor and rash  Allergic/Immunologic: Negative for immunocompromised state  Neurological: Negative for headaches  Hematological: Negative for adenopathy  Does not bruise/bleed easily  All other systems reviewed and are negative  Patient Active Problem List   Diagnosis    Lumbar stenosis with neurogenic claudication    Pseudomonas aeruginosa infection    Ataxia    Essential hypertension    Syncope and collapse    Orthostatic hypotension    HLD (hyperlipidemia)    Ascending aortic aneurysm (HCC)    Urinary retention    CKD (chronic kidney disease), stage III (HCC)    Hyponatremia    Diffuse pain in left lower extremity    PAD (peripheral artery disease) (HCC)    Lumbar radiculopathy    Status post lumbar discectomy    Anemia, unspecified    Displaced fracture of lateral end of left clavicle, initial encounter for closed fracture       Past Medical History:   Diagnosis Date    Aortic aneurysm (HCC)     Arthritis     Atypical chest pain     Back pain     Chronic kidney disease     stg 3    Elevated ALT measurement     Elevated AST (SGOT)     Hyperlipidemia     Hypertension     Leg pain     Neurogenic claudication     Urinary retention     catherize self 4x/day    Urinary retention        Past Surgical History:   Procedure Laterality Date    EPIDURAL BLOCK INJECTION N/A 1/23/2017    Procedure: BLOCK / INJECTION EPIDURAL STEROID LUMBAR;  Surgeon: Cody Tom MD;  [facility];  Service:     NM COLONOSCOPY FLX DX W/COLLJ SPEC WHEN PFRMD N/A 1/8/2019    Procedure: COLONOSCOPY;  Surgeon: Yulia Friend MD;  [facility];   Service: Colorectal    NM LAMNOTMY INCL W/DCMPRSN NRV ROOT 1 INTRSPC LUMBR Bilateral 1/23/2017    Procedure: MIS L2-3 & L3-4 LAMINAL FORAMINOTOMIES AND MICRODISCECTOMY W LEFT SIDED APPROACH ;  Surgeon: Cody Tom MD;  [facility];  Service: Neurosurgery       Family History   Problem Relation Age of Onset    Heart disease Sister     Stroke Sister    [employer] Sudden death Mother     Stroke Brother     Pancreatic cancer Sister     No Known Problems Father     No Known Problems Sister     No Known Problems Sister     No Known Problems Son      Social History     Socioeconomic History    Marital status: Single     Spouse name: None    Number of children: None    Years of education: None    Highest education level: None   Occupational History    None   Social Needs    Financial resource strain: None    Food insecurity:     Worry: None     Inability: None    Transportation needs:     Medical: None     Non-medical: None   Tobacco Use    Smoking status: Never Smoker    Smokeless tobacco: Never Used   Substance and Sexual Activity    Alcohol use: No    Drug use: No    Sexual activity: None   Lifestyle    Physical activity:     Days per week: None     Minutes per session: None    Stress: None   Relationships    Social connections:     Talks on phone: None     Gets together: None     Attends [employer] witness service: None     Active member of club or organization: None     Attends meetings of clubs or organizations: None     Relationship status: None    Intimate partner violence:     Fear of current or ex partner: None     Emotionally abused: None     Physically abused: None     Forced sexual activity: None   Other Topics Concern    None   Social History Narrative    None         Current Outpatient Medications:     gabapentin (NEURONTIN) 300 mg capsule, Take 300 mg by mouth daily , Disp: , Rfl:     lisinopril (ZESTRIL) 20 mg tablet, Take 20 mg by mouth daily , Disp: , Rfl:     pregabalin (LYRICA) 150 mg capsule, , Disp: , Rfl:     tamsulosin (FLOMAX) 0 4 mg, Take 0 4 mg by mouth daily with dinner  , Disp: , Rfl:     No Known Allergies    Objective:  /90 (BP Location: Left arm)   Pulse 84   Temp 97 8 °F (36 6 °C) (Tympanic)   Resp 16   Ht 5' 6" (1 676 m)   Wt 65 8 kg (145 lb)   SpO2 99%   BMI 23 40 kg/m² Physical Exam   Constitutional: He is oriented to person, place, and time  He appears well-developed and well-nourished  No distress  HENT:   Head: Normocephalic and atraumatic  Mouth/Throat: No oropharyngeal exudate  Eyes: Pupils are equal, round, and reactive to light  Conjunctivae and EOM are normal  No scleral icterus  Neck: Normal range of motion  Cardiovascular: Normal rate and regular rhythm  No murmur heard  Pulmonary/Chest: Effort normal and breath sounds normal  No respiratory distress  Abdominal: Soft  Bowel sounds are normal  He exhibits no distension  There is no hepatosplenomegaly  There is no tenderness  Musculoskeletal: He exhibits no edema or tenderness  Lymphadenopathy:     He has no cervical adenopathy  Neurological: He is alert and oriented to person, place, and time  No cranial nerve deficit  Skin: No rash noted  No erythema  No pallor       Result Review  Labs:  Appointment on 09/06/2019   Component Date Value Ref Range Status    WBC 09/06/2019 5 28  4 31 - 10 16 Thousand/uL Final    RBC 09/06/2019 4 22  3 88 - 5 62 Million/uL Final    Hemoglobin 09/06/2019 13 1  12 0 - 17 0 g/dL Final    Hematocrit 09/06/2019 41 2  36 5 - 49 3 % Final    MCV 09/06/2019 98  82 - 98 fL Final    MCH 09/06/2019 31 0  26 8 - 34 3 pg Final    MCHC 09/06/2019 31 8  31 4 - 37 4 g/dL Final    RDW 09/06/2019 15 9* 11 6 - 15 1 % Final    MPV 09/06/2019 10 4  8 9 - 12 7 fL Final    Platelets 87/84/3351 236  149 - 390 Thousands/uL Final    nRBC 09/06/2019 0  /100 WBCs Final    Neutrophils Relative 09/06/2019 52  43 - 75 % Final    Immat GRANS % 09/06/2019 0  0 - 2 % Final    Lymphocytes Relative 09/06/2019 31  14 - 44 % Final    Monocytes Relative 09/06/2019 13* 4 - 12 % Final    Eosinophils Relative 09/06/2019 3  0 - 6 % Final    Basophils Relative 09/06/2019 1  0 - 1 % Final    Neutrophils Absolute 09/06/2019 2 68  1 85 - 7 62 Thousands/µL Final    Immature Grans Absolute 09/06/2019 0 02  0 00 - 0 20 Thousand/uL Final    Lymphocytes Absolute 09/06/2019 1 66  0 60 - 4 47 Thousands/µL Final    Monocytes Absolute 09/06/2019 0 68  0 17 - 1 22 Thousand/µL Final    Eosinophils Absolute 09/06/2019 0 17  0 00 - 0 61 Thousand/µL Final    Basophils Absolute 09/06/2019 0 07  0 00 - 0 10 Thousands/µL Final    Iron Saturation 09/06/2019 27  % Final    TIBC 09/06/2019 401  250 - 450 ug/dL Final    Iron 09/06/2019 109  65 - 175 ug/dL Final      Patients treated with metal-binding drugs (ie  Deferoxamine) may have depressed iron values   Ferritin 09/06/2019 202  8 - 388 ng/mL Final     Please note: This report has been generated by a voice recognition software system  Therefore there may be syntax, spelling, and/or grammatical errors  Please call if you have any questions

## 2019-09-10 ENCOUNTER — OFFICE VISIT (OUTPATIENT)
Dept: PHYSICAL THERAPY | Facility: CLINIC | Age: 64
End: 2019-09-10
Payer: COMMERCIAL

## 2019-09-10 DIAGNOSIS — M54.16 LUMBAR RADICULOPATHY: ICD-10-CM

## 2019-09-10 DIAGNOSIS — M25.512 ACUTE PAIN OF LEFT SHOULDER: Primary | ICD-10-CM

## 2019-09-10 PROCEDURE — 97140 MANUAL THERAPY 1/> REGIONS: CPT

## 2019-09-10 PROCEDURE — 97112 NEUROMUSCULAR REEDUCATION: CPT

## 2019-09-10 PROCEDURE — 97110 THERAPEUTIC EXERCISES: CPT

## 2019-09-10 PROCEDURE — 97530 THERAPEUTIC ACTIVITIES: CPT

## 2019-09-10 NOTE — PROGRESS NOTES
Daily Note     Today's date: 9/10/2019  Patient name: Little Avalos  : 1955  MRN: [id number]  Referring provider: Kay Kirk MD  Dx:   Encounter Diagnosis     ICD-10-CM    1  Acute pain of left shoulder M25 512    2  Lumbar radiculopathy M54 16                   Subjective: Patient reports his low back, legs, and left shoulder hurt today  He states his sx's have been aggravated the last few days and he has been unable to do a lot of activity         Objective: See treatment diary below          Daily Treatment Diary  Precautions: clavicle fracture 2019, chronic LBP     Manuals  8/14  8/16  8/21  8/23  8/28  8/30  9/10         Lumbar PA's                       L shoulder PROM 10'  10'  10'  10'  10'  10'  10'                                                         Exercise Diary                        bike  8'  8'  8'  8'  10'  10'  10'         Repeated ext standing                       ltr                       Hs stretch                       Prone quad stretch                       DKTC                       TaA                       TaA bridge                       TaA pball squats                       TaA step out with tb press                       bridge with ball squeeze                       prone hip ext                       Standing hip hike                                    pulleys 5"x20 5"x20 20 5"x20 5"x20 5"x20 5" x20      Wand scap retraction with ext 5"x20 5"x20  20  20 5"x20 5"x20  5" x20         rows  green   2x10  blue  3x10  blue tb x30  blue tbv x30  blue   3x10  black  3x10  black  3x0         Tb ir/er  green   2x10  blue  3x10  blue tb x30  blue tb x30  blue   3x10  black   3x10 Black  3x10         Supine wand flexion 5"x20 5"x20 5"x20 5"x20 5"x20 5"x20 5"x20      ube  6 min 6' 8' 6' 6'  6'      Low rows  blue  2x10 Blue tb x20 Blue tb x20  blue   3x10 Black  3x10 Black   3x10                   Modalities                                                 Assessment: Pt continues to tolerate current POC well  Pain noted with end range shoulder PROM  Good tolerance with all therapeutic exercises  Cueing required for posture correction with TB exercises  Pt noted improved sx's post session  Minor fatigue present post session  Pt would benefit from continued PT  Plan: Continue treatment as per plan of care    Pt 1:1 with CLAYTON HERNANDEZ 11:40-12:25

## 2019-09-13 ENCOUNTER — OFFICE VISIT (OUTPATIENT)
Dept: PHYSICAL THERAPY | Facility: CLINIC | Age: 64
End: 2019-09-13
Payer: COMMERCIAL

## 2019-09-13 DIAGNOSIS — M25.512 ACUTE PAIN OF LEFT SHOULDER: Primary | ICD-10-CM

## 2019-09-13 PROCEDURE — 97530 THERAPEUTIC ACTIVITIES: CPT | Performed by: PHYSICAL THERAPIST

## 2019-09-13 PROCEDURE — 97110 THERAPEUTIC EXERCISES: CPT | Performed by: PHYSICAL THERAPIST

## 2019-09-13 PROCEDURE — 97112 NEUROMUSCULAR REEDUCATION: CPT | Performed by: PHYSICAL THERAPIST

## 2019-09-13 PROCEDURE — 97140 MANUAL THERAPY 1/> REGIONS: CPT | Performed by: PHYSICAL THERAPIST

## 2019-09-13 NOTE — PROGRESS NOTES
Daily Note     Today's date: 2019  Patient name: Kaye Jimenez  : 1955  MRN: [id number]  Referring provider: Ivan Arce MD  Dx:   Encounter Diagnosis     ICD-10-CM    1  Acute pain of left shoulder M25 512                   Subjective: Pt reports soreness after his LV but that he has been using his shoulder more  Reports that he has noticed decreased  strength since the surgery  Reports that he had to take increased pain medication today secondary to increased lumbar pain           Objective: See treatment diary below          Daily Treatment Diary  Precautions: clavicle fracture 2019, chronic LBP     Manuals  8/14  8/16  8/21  8/23  8/28  8/30  9/10  9/13       Lumbar PA's                       L shoulder PROM 10'  10'  10'  10'  10'  10'  10'  10'                                                       Exercise Diary                        bike  8'  8'  8'  8'  10'  10'  10'  10'       Repeated ext standing                       ltr                       Hs stretch                       Prone quad stretch                       DKTC                       TaA                       TaA bridge                       TaA pball squats                       TaA step out with tb press                       bridge with ball squeeze                       prone hip ext                       Standing hip hike                                    pulleys 5"x20 5"x20 20 5"x20 5"x20 5"x20 5" x20 5"x20     Wand scap retraction with ext 5"x20 5"x20  20  20 5"x20 5"x20  5" x20  5"x20       rows  green   2x10  blue  3x10  blue tb x30  blue tbv x30  blue   3x10  black  3x10  black  3x0 Black x30       Tb ir/er  green   2x10  blue  3x10  blue tb x30  blue tb x30  blue   3x10  black   3x10 Black  3x10  black x30       Supine wand flexion 5"x20 5"x20 5"x20 5"x20 5"x20 5"x20 5"x20 5"x20     ube  6 min 6' 8' 6' 6'  6' 6'     Low rows  blue  2x10 Blue tb x20 Blue tb x20  blue   3x10 Black  3x10 Black   3x10 Black tb x30 Modalities                                                 Assessment: rom remains limited in ir/er but is improving, No pain reported with therex  Plan: Continue treatment as per plan of care

## 2019-09-16 ENCOUNTER — TRANSCRIBE ORDERS (OUTPATIENT)
Dept: ADMINISTRATIVE | Facility: HOSPITAL | Age: 64
End: 2019-09-16

## 2019-09-16 DIAGNOSIS — M79.605 PAIN IN LEFT LEG: ICD-10-CM

## 2019-09-16 DIAGNOSIS — R60.9 SWELLING: Primary | ICD-10-CM

## 2019-09-17 ENCOUNTER — HOSPITAL ENCOUNTER (OUTPATIENT)
Dept: NON INVASIVE DIAGNOSTICS | Facility: HOSPITAL | Age: 64
Discharge: HOME | End: 2019-09-17
Attending: INTERNAL MEDICINE
Payer: COMMERCIAL

## 2019-09-17 ENCOUNTER — OFFICE VISIT (OUTPATIENT)
Dept: PHYSICAL THERAPY | Facility: CLINIC | Age: 64
End: 2019-09-17
Payer: COMMERCIAL

## 2019-09-17 DIAGNOSIS — M79.605 PAIN IN LEFT LEG: ICD-10-CM

## 2019-09-17 DIAGNOSIS — R60.9 SWELLING: ICD-10-CM

## 2019-09-17 DIAGNOSIS — I73.9 CLAUDICATION (HCC): ICD-10-CM

## 2019-09-17 DIAGNOSIS — M25.512 ACUTE PAIN OF LEFT SHOULDER: Primary | ICD-10-CM

## 2019-09-17 PROCEDURE — 93925 LOWER EXTREMITY STUDY: CPT | Performed by: SURGERY

## 2019-09-17 PROCEDURE — 93922 UPR/L XTREMITY ART 2 LEVELS: CPT | Performed by: SURGERY

## 2019-09-17 PROCEDURE — 97140 MANUAL THERAPY 1/> REGIONS: CPT | Performed by: PHYSICAL THERAPIST

## 2019-09-17 PROCEDURE — 97530 THERAPEUTIC ACTIVITIES: CPT | Performed by: PHYSICAL THERAPIST

## 2019-09-17 PROCEDURE — 97110 THERAPEUTIC EXERCISES: CPT | Performed by: PHYSICAL THERAPIST

## 2019-09-17 PROCEDURE — 93925 LOWER EXTREMITY STUDY: CPT

## 2019-09-17 PROCEDURE — 97112 NEUROMUSCULAR REEDUCATION: CPT | Performed by: PHYSICAL THERAPIST

## 2019-09-17 PROCEDURE — 93923 UPR/LXTR ART STDY 3+ LVLS: CPT

## 2019-09-17 NOTE — PROGRESS NOTES
Daily Note     Today's date: 2019  Patient name: Shukri Wade  : 1955  MRN: [id number]  Referring provider: Peng Coreas MD  Dx:   Encounter Diagnosis     ICD-10-CM    1  Acute pain of left shoulder M25 512                   Subjective: Pt reports that he went for additional testing to explore the cause of continued LE pain  Reports that his shoulder is steadily improving        Objective: See treatment diary below          Daily Treatment Diary  Precautions: clavicle fracture 2019, chronic LBP     Manuals  8/14  8/16  8/21  8/23  8/28  8/30  9/10  9/13  9/17     Lumbar PA's                       L shoulder PROM 10'  10'  10'  10'  10'  10'  10'  10'  10'                                                     Exercise Diary                        bike  8'  8'  8'  8'  10'  10'  10'  10'  sci fit 10min     Repeated ext standing                       ltr                       Hs stretch                       Prone quad stretch                       DKTC                       TaA                       TaA bridge                       TaA pball squats                       TaA step out with tb press                       bridge with ball squeeze                       prone hip ext                       Standing hip hike                                    pulleys 5"x20 5"x20 20 5"x20 5"x20 5"x20 5" x20 5"x20 20    Wand scap retraction with ext 5"x20 5"x20  20  20 5"x20 5"x20  5" x20  5"x20  20     rows  green   2x10  blue  3x10  blue tb x30  blue tbv x30  blue   3x10  black  3x10  black  3x0 Black x30  30     Tb ir/er  green   2x10  blue  3x10  blue tb x30  blue tb x30  blue   3x10  black   3x10 Black  3x10  black x30  30     Supine wand flexion 5"x20 5"x20 5"x20 5"x20 5"x20 5"x20 5"x20 5"x20 20    ube  6 min 6' 8' 6' 6'  6' 6' 6'    Low rows  blue  2x10 Blue tb x20 Blue tb x20  blue   3x10 Black  3x10 Black   3x10 Black tb x30 30                 Modalities                                             Assessment: rom is still limited but is improved with manual tx, Good tolerance      Plan: Continue treatment as per plan of care

## 2019-09-20 ENCOUNTER — OFFICE VISIT (OUTPATIENT)
Dept: PHYSICAL THERAPY | Facility: CLINIC | Age: 64
End: 2019-09-20
Payer: COMMERCIAL

## 2019-09-20 DIAGNOSIS — M25.512 ACUTE PAIN OF LEFT SHOULDER: Primary | ICD-10-CM

## 2019-09-20 PROCEDURE — 97110 THERAPEUTIC EXERCISES: CPT | Performed by: PHYSICAL THERAPIST

## 2019-09-20 PROCEDURE — 97140 MANUAL THERAPY 1/> REGIONS: CPT | Performed by: PHYSICAL THERAPIST

## 2019-09-20 PROCEDURE — 97112 NEUROMUSCULAR REEDUCATION: CPT | Performed by: PHYSICAL THERAPIST

## 2019-09-20 PROCEDURE — 97530 THERAPEUTIC ACTIVITIES: CPT | Performed by: PHYSICAL THERAPIST

## 2019-09-20 NOTE — PROGRESS NOTES
Daily Note     Today's date: 2019  Patient name: Jeff Maharaj  : 1955  MRN: [id number]  Referring provider: Maryana Bishop MD  Dx:   Encounter Diagnosis     ICD-10-CM    1  Acute pain of left shoulder M25 512                   Subjective: Pt reports that he is generally more sore after his PT sessions but that he feels that he is steadily improving        Objective: See treatment diary below          Daily Treatment Diary  Precautions: clavicle fracture 2019, chronic LBP     Manuals  8/14  8/16  8/21  8/23  8/28  8/30  9/10  9/13  9/17  9/20   Lumbar PA's                       L shoulder PROM 10'  10'  10'  10'  10'  10'  10'  10'  10'  10'                                                   Exercise Diary                        bike  8'  8'  8'  8'  10'  10'  10'  10'  sci fit 10min  10'   Repeated ext standing                       ltr                       Hs stretch                       Prone quad stretch                       DKTC                       TaA                       TaA bridge                       TaA pball squats                       TaA step out with tb press                       bridge with ball squeeze                       prone hip ext                       Standing hip hike                                    pulleys 5"x20 5"x20 20 5"x20 5"x20 5"x20 5" x20 5"x20 20 20   Wand scap retraction with ext 5"x20 5"x20  20  20 5"x20 5"x20  5" x20  5"x20  20  20   rows  green   2x10  blue  3x10  blue tb x30  blue tbv x30  blue   3x10  black  3x10  black  3x0 Black x30  30  30   Tb ir/er  green   2x10  blue  3x10  blue tb x30  blue tb x30  blue   3x10  black   3x10 Black  3x10  black x30  30  30   Supine wand flexion 5"x20 5"x20 5"x20 5"x20 5"x20 5"x20 5"x20 5"x20 20 20   ube  6 min 6' 8' 6' 6'  6' 6' 6' 6'   Low rows  blue  2x10 Blue tb x20 Blue tb x20  blue   3x10 Black  3x10 Black   3x10 Black tb x30 30 30                Modalities                                             Assessment: rom continues to improve  Pt reports decreased stiffness post-tx, fatigues quickly    Plan: Continue treatment as per plan of care

## 2019-09-24 ENCOUNTER — OFFICE VISIT (OUTPATIENT)
Dept: PHYSICAL THERAPY | Facility: CLINIC | Age: 64
End: 2019-09-24
Payer: COMMERCIAL

## 2019-09-24 DIAGNOSIS — M54.16 LUMBAR RADICULOPATHY: ICD-10-CM

## 2019-09-24 DIAGNOSIS — M25.512 ACUTE PAIN OF LEFT SHOULDER: Primary | ICD-10-CM

## 2019-09-24 PROCEDURE — 97140 MANUAL THERAPY 1/> REGIONS: CPT

## 2019-09-24 PROCEDURE — 97110 THERAPEUTIC EXERCISES: CPT

## 2019-09-24 PROCEDURE — 97530 THERAPEUTIC ACTIVITIES: CPT

## 2019-09-24 PROCEDURE — 97112 NEUROMUSCULAR REEDUCATION: CPT

## 2019-09-24 NOTE — PROGRESS NOTES
Daily Note     Today's date: 2019  Patient name: Little Avalos  : 1955  MRN: [id number]  Referring provider: Kay Kirk MD  Dx:   Encounter Diagnosis     ICD-10-CM    1  Acute pain of left shoulder M25 512    2  Lumbar radiculopathy M54 16                 Subjective: Patient complains the left shoulder still aches - reports he is having trouble picking up his gatorade bottle out of the refrigerator  Patient complains he still can not lay on his left side  Patient reports his doctor is going to schedule him for an MRI due to continued lower back and left lower extremity pain - patient states, "I had to take 2 Gabapentin just to get up and get here "    Objective: See treatment diary below       Daily Treatment Diary  Precautions: clavicle fracture 2019, chronic LBP     Manuals 9/24      8/30  9/10  9/13  9/17  9/20   Lumbar PA's                  L shoulder PROM 10'      10'  10'  10'  10'  10'                                         Exercise Diary                   bike scifit  10'      10'  10'  10'  sci fit 10min  10'   Repeated ext standing                   ltr                   Hs stretch                   Prone quad stretch                   DKTC                   TaA                   TaA bridge                   TaA pball squats                   TaA step out with tb press                   bridge with ball squeeze                   prone hip ext                   Standing hip hike                                pulleys 5"x20     5"x20 5" x20 5"x20 20 20   Wand scap retraction with ext 5"x20     5"x20  5" x20  5"x20  20  20   rows black  3x10      black  3x10  black  3x0 Black x30  30  30   Tb ir/er black  3x10      black   3x10 Black  3x10  black x30  30  30   Supine wand flexion 5"x20     5"x20 5"x20 5"x20 20 20   ube NP     6'  6' 6' 6' 6'   Low rows black  3x10     Black  3x10 Black   3x10 Black tb x30 30 30                Modalities                                             Assessment: Left shoulder flexion PROM is limited by pain  Relief of left shoulder and back pain reported after riding Sci Fit bike  Plan: Continue treatment as per plan of care

## 2019-09-25 ENCOUNTER — HOSPITAL ENCOUNTER (EMERGENCY)
Facility: HOSPITAL | Age: 64
Discharge: HOME | End: 2019-09-25
Attending: EMERGENCY MEDICINE | Admitting: EMERGENCY MEDICINE
Payer: COMMERCIAL

## 2019-09-25 VITALS
HEIGHT: 66 IN | WEIGHT: 147.93 LBS | OXYGEN SATURATION: 99 % | DIASTOLIC BLOOD PRESSURE: 92 MMHG | HEART RATE: 90 BPM | RESPIRATION RATE: 18 BRPM | SYSTOLIC BLOOD PRESSURE: 187 MMHG | TEMPERATURE: 98.5 F | BODY MASS INDEX: 23.77 KG/M2

## 2019-09-25 DIAGNOSIS — T63.441A LOCAL REACTION TO BEE STING: Primary | ICD-10-CM

## 2019-09-25 PROCEDURE — 99284 EMERGENCY DEPT VISIT MOD MDM: CPT | Performed by: PHYSICIAN ASSISTANT

## 2019-09-25 PROCEDURE — 99282 EMERGENCY DEPT VISIT SF MDM: CPT

## 2019-09-25 RX ORDER — DIPHENHYDRAMINE HCL 25 MG
25 TABLET ORAL ONCE
Status: DISCONTINUED | OUTPATIENT
Start: 2019-09-25 | End: 2019-09-25

## 2019-09-25 RX ORDER — DIPHENHYDRAMINE HCL 25 MG
25 TABLET ORAL EVERY 6 HOURS PRN
Qty: 20 TABLET | Refills: 0 | Status: SHIPPED | OUTPATIENT
Start: 2019-09-25 | End: 2019-11-14 | Stop reason: ALTCHOICE

## 2019-09-25 RX ORDER — LISINOPRIL 20 MG/1
20 TABLET ORAL ONCE
Status: COMPLETED | OUTPATIENT
Start: 2019-09-25 | End: 2019-09-25

## 2019-09-25 RX ORDER — ACETAMINOPHEN 325 MG/1
650 TABLET ORAL ONCE
Status: COMPLETED | OUTPATIENT
Start: 2019-09-25 | End: 2019-09-25

## 2019-09-25 RX ORDER — PREDNISONE 20 MG/1
60 TABLET ORAL ONCE
Status: COMPLETED | OUTPATIENT
Start: 2019-09-25 | End: 2019-09-25

## 2019-09-25 RX ORDER — DIPHENHYDRAMINE HCL 25 MG
25 TABLET ORAL EVERY 6 HOURS PRN
Qty: 20 TABLET | Refills: 0 | Status: SHIPPED | OUTPATIENT
Start: 2019-09-25 | End: 2019-09-25 | Stop reason: SDUPTHER

## 2019-09-25 RX ADMIN — LISINOPRIL 20 MG: 20 TABLET ORAL at 08:58

## 2019-09-25 RX ADMIN — ACETAMINOPHEN 650 MG: 325 TABLET ORAL at 08:43

## 2019-09-25 RX ADMIN — PREDNISONE 60 MG: 20 TABLET ORAL at 08:45

## 2019-09-25 NOTE — ED PROVIDER NOTES
History  Chief Complaint   Patient presents with   Donna Dumont Sting     Patient states that he was stung in his right hand yesterday  Denies taking any OTC medications at home  70yo male with a past medical history of hyperlipidemia, hypertension, stage 3 kidney disease, and urinary retention (who self caths QID) presents to the emergency department for evaluation of right hand swelling  Patient states he was mowing the lawn yesterday, when he got close to a bees nest, and got stung his right hand  He initially noted some pain and minor swelling to the site  He took Tylenol at that time, and when he woke up this morning, the hand was much more swollen and painful  He states he had difficulty holding his shoes class to take his meds this morning, which prompted the evaluation emergency department  He states he is right-hand dominant  He denies having had a reaction like this before in the past to bees stings  At present time, patient denies any throat itching, swelling, difficulty swallowing or speaking  He denies any chest pain, shortness of breath, wheezing  He denies any abdominal pain, nausea, vomiting, rash or swelling anywhere else  Patient states he did not take his BP meds this morning because he was "rushing to get out the door to come here "  Patient denies any dizziness, headache, visual changes  Allergic Reaction   Presenting symptoms: rash and swelling    Presenting symptoms: no difficulty breathing, no difficulty swallowing, no itching and no wheezing    Severity:  Moderate  Duration:  2 days  Prior allergic episodes:  No prior episodes  Context: insect bite/sting    Relieved by:  Nothing  Worsened by:  Nothing  Ineffective treatments:  None tried      Prior to Admission Medications   Prescriptions Last Dose Informant Patient Reported?  Taking?   gabapentin (NEURONTIN) 300 mg capsule  Self Yes No   Sig: Take 300 mg by mouth daily    lisinopril (ZESTRIL) 20 mg tablet  Self Yes No   Sig: Take 20 mg by mouth daily    pregabalin (LYRICA) 150 mg capsule  Self Yes No   tamsulosin (FLOMAX) 0 4 mg  Self Yes No   Sig: Take 0 4 mg by mouth daily with dinner        Facility-Administered Medications: None       Past Medical History:   Diagnosis Date    Aortic aneurysm (HCC)     Arthritis     Atypical chest pain     Back pain     Chronic kidney disease     stg 3    Elevated ALT measurement     Elevated AST (SGOT)     Hyperlipidemia     Hypertension     Leg pain     Neurogenic claudication     Urinary retention     catherize self 4x/day    Urinary retention        Past Surgical History:   Procedure Laterality Date    EPIDURAL BLOCK INJECTION N/A 1/23/2017    Procedure: BLOCK / INJECTION EPIDURAL STEROID LUMBAR;  Surgeon: Geoff Box MD;  Location: [facility];  Service:     OR COLONOSCOPY FLX DX W/COLLJ SPEC WHEN PFRMD N/A 1/8/2019    Procedure: COLONOSCOPY;  Surgeon: Brodie Zarate MD;  Location: [facility]; Service: Colorectal    OR LAMNOTMY INCL W/DCMPRSN NRV ROOT 1 INTRSPC LUMBR Bilateral 1/23/2017    Procedure: MIS L2-3 & L3-4 LAMINAL FORAMINOTOMIES AND MICRODISCECTOMY W LEFT SIDED APPROACH ;  Surgeon: Geoff Box MD;  Location: [facility];  Service: Neurosurgery       Family History   Problem Relation Age of Onset    Heart disease Sister     Stroke Sister    Jim Rocha Sudden death Mother     Stroke Brother     Pancreatic cancer Sister     No Known Problems Father     No Known Problems Sister     No Known Problems Sister     No Known Problems Son      I have reviewed and agree with the history as documented  Social History     Tobacco Use    Smoking status: Never Smoker    Smokeless tobacco: Never Used   Substance Use Topics    Alcohol use: No    Drug use: No        Review of Systems   Constitutional: Negative for chills and fever  HENT: Negative for congestion, sore throat and trouble swallowing  Eyes: Negative for pain and visual disturbance     Respiratory: Negative for cough, shortness of breath and wheezing  Cardiovascular: Negative for chest pain  Gastrointestinal: Negative for abdominal pain, diarrhea, nausea and vomiting  Endocrine: Negative for cold intolerance and heat intolerance  Genitourinary: Negative for dysuria, frequency and urgency  Musculoskeletal: Negative for gait problem  Skin: Positive for rash  Negative for itching and pallor  Allergic/Immunologic: Negative for immunocompromised state  Neurological: Negative for weakness and headaches  All other systems reviewed and are negative  Physical Exam  Physical Exam   Constitutional: He is oriented to person, place, and time  Vital signs are normal  He appears well-developed and well-nourished  He does not appear ill  No distress  HENT:   Head: Normocephalic and atraumatic  Right Ear: Hearing, tympanic membrane, external ear and ear canal normal    Left Ear: Hearing, tympanic membrane, external ear and ear canal normal    Nose: Nose normal    Mouth/Throat: Uvula is midline, oropharynx is clear and moist and mucous membranes are normal  No posterior oropharyngeal edema or posterior oropharyngeal erythema  Uvula is midline with symmetric breasts the palate  No swelling, or erythema  Eyes: Conjunctivae are normal    Neck: Normal range of motion and full passive range of motion without pain  Cardiovascular: Normal rate, regular rhythm, S1 normal, S2 normal and normal heart sounds  Pulmonary/Chest: Effort normal and breath sounds normal  He has no decreased breath sounds  He has no wheezes  Abdominal: Soft  Bowel sounds are normal  There is no tenderness  Musculoskeletal:        Right wrist: He exhibits decreased range of motion  He exhibits no tenderness, no bony tenderness, no swelling and no deformity  Left wrist: Normal         Right hand: He exhibits decreased range of motion, tenderness and deformity  He exhibits no bony tenderness and normal capillary refill     2+ radial pulses bilaterally the left hand has normal range of motion and for 5/5  strength  Cap refills normal   To the her right hand, there is swelling and erythema and tenderness  The swelling decreases visualization of the tendons of the hand, which are seen on the left hand  There is exquisite tender to palpation and decreased range of motion, secondary to swelling  Cap refill less than 2 seconds   Neurological: He is alert and oriented to person, place, and time  Skin: Skin is warm, dry and intact  Capillary refill takes less than 2 seconds  No rash noted  Psychiatric: He has a normal mood and affect  His speech is normal and behavior is normal  Thought content normal    Nursing note and vitals reviewed  Vital Signs  ED Triage Vitals   Temperature Pulse Respirations Blood Pressure SpO2   09/25/19 0818 09/25/19 0815 09/25/19 0815 09/25/19 0815 09/25/19 0815   98 5 °F (36 9 °C) 90 18 (!) 205/101 99 %      Temp Source Heart Rate Source Patient Position - Orthostatic VS BP Location FiO2 (%)   09/25/19 0818 -- -- -- --   Oral          Pain Score       09/25/19 0815       Worst Possible Pain           Vitals:    09/25/19 0815 09/25/19 0858   BP: (!) 205/101 (!) 187/92   Pulse: 90          Visual Acuity      ED Medications  Medications   acetaminophen (TYLENOL) tablet 650 mg (650 mg Oral Given 9/25/19 0843)   predniSONE tablet 60 mg (60 mg Oral Given 9/25/19 0845)   lisinopril (ZESTRIL) tablet 20 mg (20 mg Oral Given 9/25/19 0858)       Diagnostic Studies  Results Reviewed     None                 No orders to display              Procedures  Procedures       ED Course           Identification of Seniors at Risk      Most Recent Value   (ISAR) Identification of Seniors at Risk   Before the illness or injury that brought you to the Emergency, did you need someone to help you on a regular basis?   0 Filed at: 09/25/2019 0819   In the last 24 hours, have you needed more help than usual?  0 Filed at: 09/25/2019 4888   Have you been hospitalized for one or more nights during the past 6 months? 0 Filed at: 09/25/2019 0819   In general, do you see well?  0 Filed at: 09/25/2019 0819   In general, do you have serious problems with your memory? 0 Filed at: 09/25/2019 5099   Do you take more than three different medications every day? 1 Filed at: 09/25/2019 0898   ISAR Score  1 Filed at: 09/25/2019 2089                          [facility]  Number of Diagnoses or Management Options  Local reaction to bee sting:   Diagnosis management comments: 78-year-old male with history of hyperlipidemia, hypertension, urinary retention presents emergency department for evaluation of right hand swelling and pain after bee sting  Patient also noted to be hypertensive on triage, however he has not taken his 20 mg of lisinopril this morning  Will administer lisinopril here, patient without symptoms of hypertensive urgency  Patient given 60 mg of prednisone here for the swelling secondary to the allergic reaction  Prescribed Benadryl, and structured patient to take it for the swelling, counseled patient it will make him tired, so do not drive on this medication  Counseled patient he may take Tylenol for pain as NSAIDs are contraindicated with his stage III kidney disease, and counseled him symptomatic relief with cool compresses and ice  Patient was apprised of red flag symptoms and return to emergency department precautions  Patient verbalized understanding and all questions were answered          Disposition  Final diagnoses:   Local reaction to bee sting     Time reflects when diagnosis was documented in both MDM as applicable and the Disposition within this note     Time User Action Codes Description Comment    9/25/2019  8:35 AM Charly Steve Add [[id number]] Local reaction to bee sting       ED Disposition     ED Disposition Condition Date/Time Comment    Discharge Stable Wed Sep 25, 2019  8:35 AM Norma Love discharge to home/self care  Follow-up Information     Follow up With Specialties Details Why Jeremi Rowe MD Family Medicine Schedule an appointment as soon as possible for a visit  If symptoms worsen [address] [facility], [place]  [phone]            Discharge Medication List as of 9/25/2019  8:45 AM      START taking these medications    Details   diphenhydrAMINE (BENADRYL) 25 mg tablet Take 1 tablet (25 mg total) by mouth every 6 (six) hours as needed for itching for up to 5 days, Starting Wed 9/25/2019, Until Mon 9/30/2019, Print         CONTINUE these medications which have NOT CHANGED    Details   gabapentin (NEURONTIN) 300 mg capsule Take 300 mg by mouth daily , Historical Med      lisinopril (ZESTRIL) 20 mg tablet Take 20 mg by mouth daily , Historical Med      pregabalin (LYRICA) 150 mg capsule Starting Fri 9/6/2019, Historical Med      tamsulosin (FLOMAX) 0 4 mg Take 0 4 mg by mouth daily with dinner  , Starting Tue 10/16/2018, Historical Med           No discharge procedures on file      ED Provider  Electronically Signed by           Recardo Curling, PA-C  09/25/19 9184

## 2019-09-27 ENCOUNTER — OFFICE VISIT (OUTPATIENT)
Dept: PHYSICAL THERAPY | Facility: CLINIC | Age: 64
End: 2019-09-27
Payer: COMMERCIAL

## 2019-09-27 DIAGNOSIS — M54.16 LUMBAR RADICULOPATHY: ICD-10-CM

## 2019-09-27 DIAGNOSIS — M25.512 ACUTE PAIN OF LEFT SHOULDER: Primary | ICD-10-CM

## 2019-09-27 PROCEDURE — 97110 THERAPEUTIC EXERCISES: CPT

## 2019-09-27 PROCEDURE — 97140 MANUAL THERAPY 1/> REGIONS: CPT

## 2019-09-27 PROCEDURE — 97112 NEUROMUSCULAR REEDUCATION: CPT

## 2019-09-27 PROCEDURE — 97530 THERAPEUTIC ACTIVITIES: CPT

## 2019-09-27 NOTE — PROGRESS NOTES
Daily Note     Today's date: 2019  Patient name: Tio Banks  : 1955  MRN: [id number]  Referring provider: Zenia Chahal MD  Dx:   Encounter Diagnosis     ICD-10-CM    1  Acute pain of left shoulder M25 512    2  Lumbar radiculopathy M54 16                 Subjective: Patient reports he felt good after the last PT treatment - "I was able to work in my whole yard "  Patient reports he had to go to the ER on Wednesday morning after suffering a bee sting to his right hand - notes he had been experiencing increased pain and swelling in the right hand which was treated with Tylenol and Benadryl  Objective: See treatment diary below       Daily Treatment Diary  Precautions: clavicle fracture 2019, chronic LBP     Manuals 9/24 9/27      9/10  9/13  9/17  9/20   Lumbar PA's                 L shoulder PROM 10' 10'      10'  10'  10'  10'                                       Exercise Diary                  bike scifit  8' scifit  10'      10'  10'  sci fit 10min  10'   Repeated ext standing                   ltr                   Hs stretch                   Prone quad stretch                   DKTC                   TaA                   TaA bridge                   TaA pball squats                   TaA step out with tb press                   bridge with ball squeeze                   prone hip ext                   Standing hip hike                                pulleys 5"x20 5"x20     5" x20 5"x20 20 20   Wand scap retraction with ext 5"x20 5"x20      5" x20  5"x20  20  20   rows black  3x10 black  3x10      black  3x0 Black x30  30  30   Tb ir/er black  3x10 black  3x10     Black  3x10  black x30  30  30   Supine wand flexion 5"x20 5"x20     5"x20 5"x20 20 20   ube NP NP     6' 6' 6' 6'   Low rows black  3x10 black  3x10     Black   3x10 Black tb x30 30 30   Bicep curl  5#  3x10                        Modalities                                                 Assessment: No complaints reported when performing additional bicep curls  Left shoulder ROM continues to improve with the manual stretching  Plan: Continue treatment as per plan of care

## 2019-10-01 ENCOUNTER — OFFICE VISIT (OUTPATIENT)
Dept: PHYSICAL THERAPY | Facility: CLINIC | Age: 64
End: 2019-10-01
Payer: COMMERCIAL

## 2019-10-01 DIAGNOSIS — M25.512 ACUTE PAIN OF LEFT SHOULDER: Primary | ICD-10-CM

## 2019-10-01 DIAGNOSIS — M54.16 LUMBAR RADICULOPATHY: ICD-10-CM

## 2019-10-01 PROCEDURE — 97140 MANUAL THERAPY 1/> REGIONS: CPT

## 2019-10-01 PROCEDURE — 97530 THERAPEUTIC ACTIVITIES: CPT

## 2019-10-01 PROCEDURE — 97110 THERAPEUTIC EXERCISES: CPT

## 2019-10-01 PROCEDURE — 97112 NEUROMUSCULAR REEDUCATION: CPT

## 2019-10-01 NOTE — PROGRESS NOTES
Daily Note     Today's date: 10/1/2019  Patient name: Xiang Varela  : 1955  MRN: [id number]  Referring provider: Hai Miller MD  Dx:   Encounter Diagnosis     ICD-10-CM    1  Acute pain of left shoulder M25 512    2  Lumbar radiculopathy M54 16                 Subjective: Patient states, "My arm is hurting today "  Patient arrives to today's PT treatment having not yet taken prescribed Gabapentin - patient states, "I want to see how I feel without it "     Objective: See treatment diary below  Daily Treatment Diary  Precautions: clavicle fracture 2019, chronic LBP     Manuals 9/24 9/27 10/1     9/10  9/13  9/17  9/20   Lumbar PA's                 L shoulder PROM 10' 10' 10'     10'  10'  10'  10'                                       Exercise Diary                  bike scifit  10' scifit  10' scifit  10'     10'  10'  sci fit 10min  10'   Repeated ext standing                   ltr                   Hs stretch                   Prone quad stretch                   DKTC                   TaA                   TaA bridge                   TaA pball squats                   TaA step out with tb press                   bridge with ball squeeze                   prone hip ext                                      Shoulder flex   3# x30          pulleys 5"x20 5"x20 5"x20    5" x20 5"x20 20 20   Wand scap retraction with ext 5"x20 5"x20 5"x20     5" x20  5"x20  20  20   rows black  3x10 black  3x10 black  3x10     black  3x0 Black x30  30  30   Tb ir/er black  3x10 black  3x10 black  3x10    Black  3x10  black x30  30  30   Supine wand flexion 5"x20 5"x20 5"x20    5"x20 5"x20 20 20   ube NP NP NP    6' 6' 6' 6'   Low rows black  3x10 black  3x10 black  3x10    Black   3x10 Black tb x30 30 30   Bicep curl  5#  3x10 5#  3x10                       Modalities                                                 Assessment: Fatigue reported when performing additional shoulder flexion with 3 pound dumbbell    Left shoulder ROM continues to improve with the manual stretching  Pain relief reported post treatment  Plan: Continue treatment as per plan of care

## 2019-10-04 ENCOUNTER — OFFICE VISIT (OUTPATIENT)
Dept: PHYSICAL THERAPY | Facility: CLINIC | Age: 64
End: 2019-10-04
Payer: COMMERCIAL

## 2019-10-04 DIAGNOSIS — M25.512 ACUTE PAIN OF LEFT SHOULDER: Primary | ICD-10-CM

## 2019-10-04 PROCEDURE — 97140 MANUAL THERAPY 1/> REGIONS: CPT | Performed by: PHYSICAL THERAPIST

## 2019-10-04 PROCEDURE — 97112 NEUROMUSCULAR REEDUCATION: CPT | Performed by: PHYSICAL THERAPIST

## 2019-10-04 PROCEDURE — 97530 THERAPEUTIC ACTIVITIES: CPT | Performed by: PHYSICAL THERAPIST

## 2019-10-04 NOTE — PROGRESS NOTES
Daily Note     Today's date: 10/4/2019  Patient name: Brandon Carter  : 1955  MRN: [id number]  Referring provider: Pauline Lucero MD  Dx:   No diagnosis found  Subjective: Patient reports that he continues to have pain when reaching across his body  Objective: See treatment diary below  Daily Treatment Diary  Precautions: clavicle fracture 2019, chronic LBP     Manuals 9/24 9/27 10/1 10/4    9/10  9/13  9/17  9/20   Lumbar PA's                 L shoulder PROM 10' 10' 10' 10'    10'  10'  10'  10'                                       Exercise Diary                  bike scifit  10' scifit  10' scifit  10' 10'    10'  10'  sci fit 10min  10'   Repeated ext standing                   ltr                   Hs stretch                   Prone quad stretch                   DKTC                   TaA                   TaA bridge                   TaA pball squats                   TaA step out with tb press                   bridge with ball squeeze                   prone hip ext                                      Shoulder flex   3# x30 3#x30         pulleys 5"x20 5"x20 5"x20 20   5" x20 5"x20 20 20   Wand scap retraction with ext 5"x20 5"x20 5"x20 5"x20    5" x20  5"x20  20  20   rows black  3x10 black  3x10 black  3x10 Black tb x30    black  3x0 Black x30  30  30   Tb ir/er black  3x10 black  3x10 black  3x10 Black tb x30   Black  3x10  black x30  30  30   Supine wand flexion 5"x20 5"x20 5"x20 5"x20   5"x20 5"x20 20 20   ube NP NP NP    6' 6' 6' 6'   Low rows black  3x10 black  3x10 black  3x10 Black tb x30   Black   3x10 Black tb x30 30 30   Bicep curl  5#  3x10 5#  3x10 5#x30                      Modalities                                                 Assessment: rom continues to slowly improve but remains limited, no pain reported t/o therex, continue to progress all strength training as collin NV  Plan: Continue treatment as per plan of care

## 2019-10-07 ENCOUNTER — OFFICE VISIT (OUTPATIENT)
Dept: PHYSICAL THERAPY | Facility: CLINIC | Age: 64
End: 2019-10-07
Payer: COMMERCIAL

## 2019-10-07 DIAGNOSIS — M25.512 ACUTE PAIN OF LEFT SHOULDER: Primary | ICD-10-CM

## 2019-10-07 PROCEDURE — 97112 NEUROMUSCULAR REEDUCATION: CPT | Performed by: PHYSICAL THERAPIST

## 2019-10-07 PROCEDURE — 97530 THERAPEUTIC ACTIVITIES: CPT | Performed by: PHYSICAL THERAPIST

## 2019-10-07 PROCEDURE — 97140 MANUAL THERAPY 1/> REGIONS: CPT | Performed by: PHYSICAL THERAPIST

## 2019-10-07 PROCEDURE — 97110 THERAPEUTIC EXERCISES: CPT | Performed by: PHYSICAL THERAPIST

## 2019-10-07 NOTE — PROGRESS NOTES
Daily Note     Today's date: 10/7/2019  Patient name: Christine Barreto  : 1955  MRN: [id number]  Referring provider: Errol Sicard, MD  Dx:   Encounter Diagnosis     ICD-10-CM    1  Acute pain of left shoulder M25 512                 Subjective: Patient reports that he continues to have pain when reaching across his body  Objective: See treatment diary below  Daily Treatment Diary  Precautions: clavicle fracture 2019, chronic LBP     Manuals 9/24 9/27 10/1 10/4 10/7   9/10  9/13  9/17  9/20   Lumbar PA's                 L shoulder PROM 10' 10' 10' 10' 10'   10'  10'  10'  10'                                       Exercise Diary                  bike scifit  10' scifit  10' scifit  10' 10'    10'  10'  sci fit 10min  10'   Repeated ext standing                   ltr                   Hs stretch                   Prone quad stretch                   DKTC                   TaA                   TaA bridge                   TaA pball squats                   TaA step out with tb press                   bridge with ball squeeze                   prone hip ext                                      Shoulder flex   3# x30 3#x30 3#x30        pulleys 5"x20 5"x20 5"x20 20 20  5" x20 5"x20 20 20   Wand scap retraction with ext 5"x20 5"x20 5"x20 5"x20 5"x20   5" x20  5"x20  20  20   rows black  3x10 black  3x10 black  3x10 Black tb x30 x30   black  3x0 Black x30  30  30   Tb ir/er black  3x10 black  3x10 black  3x10 Black tb x30 x30  Black  3x10  black x30  30  30   Supine wand flexion 5"x20 5"x20 5"x20 5"x20 5"x20  5"x20 5"x20 20 20   ube NP NP NP    6' 6' 6' 6'   Low rows black  3x10 black  3x10 black  3x10 Black tb x30 30  Black   3x10 Black tb x30 30 30   Bicep curl  5#  3x10 5#  3x10 5#x30 5#x30        scap punches          nv   Wall ball rolling          nv   Modalities                                                 Assessment: pt continues to have rom restriction but this is improving   Pt reports decreased stiffness with manual tx,  Continue to progress all strength training as listed  Plan: Continue treatment as per plan of care

## 2019-10-11 ENCOUNTER — OFFICE VISIT (OUTPATIENT)
Dept: PHYSICAL THERAPY | Facility: CLINIC | Age: 64
End: 2019-10-11
Payer: COMMERCIAL

## 2019-10-11 DIAGNOSIS — M25.512 ACUTE PAIN OF LEFT SHOULDER: Primary | ICD-10-CM

## 2019-10-11 DIAGNOSIS — M54.16 LUMBAR RADICULOPATHY: ICD-10-CM

## 2019-10-11 PROCEDURE — 97112 NEUROMUSCULAR REEDUCATION: CPT

## 2019-10-11 PROCEDURE — 97530 THERAPEUTIC ACTIVITIES: CPT

## 2019-10-11 PROCEDURE — 97110 THERAPEUTIC EXERCISES: CPT

## 2019-10-11 NOTE — PROGRESS NOTES
Daily Note     Today's date: 10/11/2019  Patient name: Tulio Hoffmann  : 1955  MRN: [id number]  Referring provider: eMla Edwards MD  Dx:   Encounter Diagnosis     ICD-10-CM    1  Acute pain of left shoulder M25 512    2  Lumbar radiculopathy M54 16                 Subjective: Patient c/o a stiff neck, which began Wednesday night  Notes he called his Dr this morning and left a voicemail  Reports he doesn't hurt anywhere else  Objective: See treatment diary below  Daily Treatment Diary    Assessment: Performed exercise program as below, w/o increasing neck symptoms  Prior to statrting PROM to L shoulder, pt said he had to leave because his ride was coming to pick him up  Continue to progress all strength training as listed  Plan: Continue treatment as per plan of care      Precautions: clavicle fracture 2019, chronic LBP     Manuals 9/24 9/27 10/1 10/4 10/7  10/11     9/20   Lumbar PA's                    L shoulder PROM 10' 10' 10' 10' 10'  np     10'                         bike  scifit  x10  scifit  x10  scifit  x10  10'    10'        Shoulder flex     3# x30 3#x30 3#x30  3#x30        pulleys 5"x20 5"x20 5"x20 20 20  x20    20   Wand scap retraction with ext 5"x20 5"x20 5"x20 5"x20 5"x20  5"x20     20   rows black  3x10 black  3x10 black  3x10 Black tb x30 x30  black  x30     30   Tb ir/er black  3x10 black  3x10 black  3x10 Black tb x30 x30  black  x30     30   Supine wand flexion 5"x20 5"x20 5"x20 5"x20 5"x20  np    20   ube NP NP NP          6'   Low rows black  3x10 black  3x10 black  3x10 Black tb x30 30 Black  x30    30   Bicep curl   5#  3x10 5#  3x10 5#x30 5#x30  5#x30           scap punches                   nv   Wall ball rolling                   nv   Modalities

## 2019-10-14 ENCOUNTER — OFFICE VISIT (OUTPATIENT)
Dept: PHYSICAL THERAPY | Facility: CLINIC | Age: 64
End: 2019-10-14
Payer: COMMERCIAL

## 2019-10-14 DIAGNOSIS — M25.512 ACUTE PAIN OF LEFT SHOULDER: Primary | ICD-10-CM

## 2019-10-14 PROCEDURE — 97530 THERAPEUTIC ACTIVITIES: CPT | Performed by: PHYSICAL THERAPIST

## 2019-10-14 PROCEDURE — 97112 NEUROMUSCULAR REEDUCATION: CPT | Performed by: PHYSICAL THERAPIST

## 2019-10-14 PROCEDURE — 97110 THERAPEUTIC EXERCISES: CPT | Performed by: PHYSICAL THERAPIST

## 2019-10-14 NOTE — PROGRESS NOTES
Daily Note     Today's date: 10/14/2019  Patient name: Flash Liao  : 1955  MRN: [id number]  Referring provider: Luis Anderson MD  Dx:   Encounter Diagnosis     ICD-10-CM    1  Acute pain of left shoulder M25 512                 Subjective: Pt reports sig reduction in his cervical pain since his LV  Reports that his L shoulder is improving but that it continues to be stiff and that he has difficulty reaching over shoulder height  Objective: See treatment diary below  Daily Treatment Diary    Assessment: rom continues to improve slowly with manual tx  Denies pain t/o manual tx or therex  Continue to progress as collin NV        Plan: Continue treatment as per plan of care      Precautions: clavicle fracture 2019, chronic LBP     Manuals 9/24 9/27 10/1 10/4 10/7  10/11 10/14    9/20   Lumbar PA's                    L shoulder PROM 10' 10' 10' 10' 10'  np     10'                         bike  scifit  x10  scifit  x10  scifit  x10  10'    10' 10'       Shoulder flex     3# x30 3#x30 3#x30  3#x30 3#x30       pulleys 5"x20 5"x20 5"x20 20 20  x20 20   20   Wand scap retraction with ext 5"x20 5"x20 5"x20 5"x20 5"x20  5"x20 5"x20    20   rows black  3x10 black  3x10 black  3x10 Black tb x30 x30  black  x30 Black tb x30    30   Tb ir/er black  3x10 black  3x10 black  3x10 Black tb x30 x30  black  x30 Black tb x30    30   Supine wand flexion 5"x20 5"x20 5"x20 5"x20 5"x20  np    20   ube NP NP NP          6'   Low rows black  3x10 black  3x10 black  3x10 Black tb x30 30 Black  x30 Black tb x30   30   Bicep curl   5#  3x10 5#  3x10 5#x30 5#x30  5#x30  5#x30         scap punches                   nv   Wall ball rolling                   nv   Modalities

## 2019-10-16 ENCOUNTER — OFFICE VISIT (OUTPATIENT)
Dept: PHYSICAL THERAPY | Facility: CLINIC | Age: 64
End: 2019-10-16
Payer: COMMERCIAL

## 2019-10-16 DIAGNOSIS — M25.512 ACUTE PAIN OF LEFT SHOULDER: Primary | ICD-10-CM

## 2019-10-16 PROCEDURE — 97530 THERAPEUTIC ACTIVITIES: CPT | Performed by: PHYSICAL THERAPIST

## 2019-10-16 PROCEDURE — 97140 MANUAL THERAPY 1/> REGIONS: CPT | Performed by: PHYSICAL THERAPIST

## 2019-10-16 PROCEDURE — 97112 NEUROMUSCULAR REEDUCATION: CPT | Performed by: PHYSICAL THERAPIST

## 2019-10-16 PROCEDURE — 97110 THERAPEUTIC EXERCISES: CPT | Performed by: PHYSICAL THERAPIST

## 2019-10-16 NOTE — PROGRESS NOTES
Daily Note     Today's date: 10/16/2019  Patient name: Pura Medina  : 1955  MRN: [id number]  Referring provider: Chyna Wilkinson MD  Dx:   Encounter Diagnosis     ICD-10-CM    1  Acute pain of left shoulder M25 512                 Subjective: Pt reports with the progression LV but that the pain has since subsided  Objective: See treatment diary below  Daily Treatment Diary    Assessment: rom continues to slowly improve but pt has slightly more pain during manual tx today then previously  Plan: Continue treatment as per plan of care      Precautions: clavicle fracture 2019, chronic LBP     Manuals 9/24 9/27 10/1 10/4 10/7  10/11 10/14 10/16   9/20   Lumbar PA's                    L shoulder PROM 10' 10' 10' 10' 10'  np     10'                         bike  scifit  x10  scifit  x10  scifit  x10  10'    10' 10' 10'      Shoulder flex     3# x30 3#x30 3#x30  3#x30 3#x30 3#x30      pulleys 5"x20 5"x20 5"x20 20 20  x20 20 20  20   Wand scap retraction with ext 5"x20 5"x20 5"x20 5"x20 5"x20  5"x20 5"x20 5"x20   20   rows black  3x10 black  3x10 black  3x10 Black tb x30 x30  black  x30 Black tb x30 Black tb x30   30   Tb ir/er black  3x10 black  3x10 black  3x10 Black tb x30 x30  black  x30 Black tb x30 Black tb x30   30   Supine wand flexion 5"x20 5"x20 5"x20 5"x20 5"x20  np    20   ube NP NP NP          6'   Low rows black  3x10 black  3x10 black  3x10 Black tb x30 30 Black  x30 Black tb x30 Black tb x30  30   Bicep curl   5#  3x10 5#  3x10 5#x30 5#x30  5#x30  5#x30  5#x30       scap punches                   nv   Wall ball rolling                   nv   Modalities

## 2019-10-21 ENCOUNTER — OFFICE VISIT (OUTPATIENT)
Dept: PHYSICAL THERAPY | Facility: CLINIC | Age: 64
End: 2019-10-21
Payer: COMMERCIAL

## 2019-10-21 DIAGNOSIS — M25.512 ACUTE PAIN OF LEFT SHOULDER: Primary | ICD-10-CM

## 2019-10-21 PROCEDURE — 97110 THERAPEUTIC EXERCISES: CPT | Performed by: PHYSICAL THERAPIST

## 2019-10-21 PROCEDURE — 97112 NEUROMUSCULAR REEDUCATION: CPT | Performed by: PHYSICAL THERAPIST

## 2019-10-21 PROCEDURE — 97140 MANUAL THERAPY 1/> REGIONS: CPT | Performed by: PHYSICAL THERAPIST

## 2019-10-21 NOTE — PROGRESS NOTES
Daily Note     Today's date: 10/21/2019  Patient name: Donta Rowell  : 1955  MRN: [id number]  Referring provider: Jayme Tinsley MD  Dx:   No diagnosis found  Subjective: Pt reports min pain upon arrival to PT      Daily Treatment Diary    Assessment: rom continues to be pain limited but is improved with manual tx  Plan: Continue treatment as per plan of care      Precautions: clavicle fracture 2019, chronic LBP     Manuals 9/24 9/27 10/1 10/4 10/7  10/11 10/14 10/16 10/21  9/20   Lumbar PA's                    L shoulder PROM 10' 10' 10' 10' 10'  np   10'  10'                         bike  scifit  x10  scifit  x10  scifit  x10  10'    10' 10' 10' 10'     Shoulder flex     3# x30 3#x30 3#x30  3#x30 3#x30 3#x30 3#x30     pulleys 5"x20 5"x20 5"x20 20 20  x20 20 20 20 20   Wand scap retraction with ext 5"x20 5"x20 5"x20 5"x20 5"x20  5"x20 5"x20 5"x20 5"x20  20   rows black  3x10 black  3x10 black  3x10 Black tb x30 x30  black  x30 Black tb x30 Black tb x30 Black x30  30   Tb ir/er black  3x10 black  3x10 black  3x10 Black tb x30 x30  black  x30 Black tb x30 Black tb x30 Black x30  30   Supine wand flexion 5"x20 5"x20 5"x20 5"x20 5"x20  np    20   ube NP NP NP          6'   Low rows black  3x10 black  3x10 black  3x10 Black tb x30 30 Black  x30 Black tb x30 Black tb x30 Black x30 30   Bicep curl   5#  3x10 5#  3x10 5#x30 5#x30  5#x30  5#x30  5#x30  5#x30     scap punches                   nv   Wall ball rolling                   nv   Modalities

## 2019-10-23 ENCOUNTER — EVALUATION (OUTPATIENT)
Dept: PHYSICAL THERAPY | Facility: CLINIC | Age: 64
End: 2019-10-23
Payer: COMMERCIAL

## 2019-10-23 DIAGNOSIS — M25.512 ACUTE PAIN OF LEFT SHOULDER: Primary | ICD-10-CM

## 2019-10-23 PROCEDURE — 97140 MANUAL THERAPY 1/> REGIONS: CPT | Performed by: PHYSICAL THERAPIST

## 2019-10-23 PROCEDURE — 97110 THERAPEUTIC EXERCISES: CPT | Performed by: PHYSICAL THERAPIST

## 2019-10-23 NOTE — PROGRESS NOTES
Daily Note     Today's date: 10/23/2019  Patient name: Christina Carrillo  : 1955  MRN: [id number]  Referring provider: Viry Thompson MD  Dx:   Encounter Diagnosis     ICD-10-CM    1  Acute pain of left shoulder M25 512                   Subjective: Pt reports that he is having more pain today  Pt is not able to identify a trigger for this  Reports that he has difficulty laying on his L side      Objective: See treatment diary below      Assessment: Tolerated treatment well  Patient demonstrated fatigue post treatment      Plan: Continue per plan of care  Daily Treatment Diary    Assessment: rom continues to be pain limited but is improved with manual tx          Precautions: clavicle fracture 2019, chronic LBP     Manuals 9/24 9/27 10/1 10/4 10/7  10/11 10/14 10/16 10/21 10/23   Lumbar PA's                    L shoulder PROM 10' 10' 10' 10' 10'  np   10'  10'                         bike  scifit  x10  scifit  x10  scifit  x10  10'    10' 10' 10' 10'  10'   Shoulder flex     3# x30 3#x30 3#x30  3#x30 3#x30 3#x30 3#x30  3#x30   pulleys 5"x20 5"x20 5"x20 20 20  x20 20 20 20 20   Wand scap retraction with ext 5"x20 5"x20 5"x20 5"x20 5"x20  5"x20 5"x20 5"x20 5"x20  20   rows black  3x10 black  3x10 black  3x10 Black tb x30 x30  black  x30 Black tb x30 Black tb x30 Black x30  30   Tb ir/er black  3x10 black  3x10 black  3x10 Black tb x30 x30  black  x30 Black tb x30 Black tb x30 Black x30  30   Supine wand flexion 5"x20 5"x20 5"x20 5"x20 5"x20  np    20   ube NP NP NP          6'   Low rows black  3x10 black  3x10 black  3x10 Black tb x30 30 Black  x30 Black tb x30 Black tb x30 Black x30 30   Bicep curl   5#  3x10 5#  3x10 5#x30 5#x30  5#x30  5#x30  5#x30  5#x30  5#30   scap punches                      Wall ball rolling                      Modalities

## 2019-10-28 ENCOUNTER — EVALUATION (OUTPATIENT)
Dept: PHYSICAL THERAPY | Facility: CLINIC | Age: 64
End: 2019-10-28
Payer: COMMERCIAL

## 2019-10-28 ENCOUNTER — TELEPHONE (OUTPATIENT)
Dept: VASCULAR SURGERY | Facility: CLINIC | Age: 64
End: 2019-10-28

## 2019-10-28 DIAGNOSIS — I73.9 PAD (PERIPHERAL ARTERY DISEASE) (HCC): Primary | ICD-10-CM

## 2019-10-28 DIAGNOSIS — M25.512 ACUTE PAIN OF LEFT SHOULDER: Primary | ICD-10-CM

## 2019-10-28 PROCEDURE — 97110 THERAPEUTIC EXERCISES: CPT | Performed by: PHYSICAL THERAPIST

## 2019-10-28 NOTE — PROGRESS NOTES
PT Re-Evaluation     Today's date: 10/28/2019  Patient name: Brandon Carter  : 1955  MRN: [id number]  Referring provider: Pauline Lucero MD  Dx:   Encounter Diagnosis     ICD-10-CM    1  Acute pain of left shoulder M25 512                   Assessment  Assessment details: Pt is being treated with outpatient PT  He has  made good gains in rom, strength, pain reduction and functional mobility but continues to have deficits  He will benefit from continued skilled PT to address these deficits  Thank you for this referral                             Goals  Short Term Goals: Independent performance of initial hep  Decrease pain 2 points on VAS      Long Term Goals: Independent performance of comprehensive hep  Work performance is returned to max level of function  Performance of IADL's is returned to max level of function  Performance in recreational activities is improved to max level of function  Able to reach overhead painfree      Plan  Planned therapy interventions: abdominal trunk stabilization, ADL retraining, IADL retraining, joint mobilization, manual therapy, massage, neuromuscular re-education, postural training, strengthening, stretching, therapeutic activities, therapeutic exercise and home exercise program  Frequency: 2x week  Duration in weeks: 6        Subjective Evaluation    History of Present Illness  Mechanism of injury: Pt reports overall improvements in his pain and mobility but that he continues to have limitations  Reports that he has most difficulty reaching over shoulder height, laying on his R side and when carrying groceries       Pt is R handed  Pain  Current pain rating: 3  At best pain ratin  At worst pain ratin    Patient Goals  Patient goals for therapy: decreased pain, increased motion, increased strength, independence with ADLs/IADLs, return to sport/leisure activities and return to work          Objective       L shoulder:           AROM:   Flexion: 148   Abduction: 142       PROM:   Flexion: 160   Abduction: 162   ER:  35   IR: 36    Strength:    Flexion: 4 /5   Abduction:  4 /5   ER:   3 /5   IR:     4+/5          Cunningham: neg  Neer: neg  Empty can: neg  Painful arc: pos  Scour: neg  Biceps load: neg                    Precautions: clavicle fracture 6/2019, chronic LBP     Manuals 9/24 9/27 10/1 10/4 10/7  10/11 10/14 10/16 10/21 10/28   Lumbar PA's                    L shoulder PROM 10' 10' 10' 10' 10'  np   10'  10'                         bike  scifit  x10  scifit  x10  scifit  x10  10'    10' 10' 10' 10'  10'   Shoulder flex     3# x30 3#x30 3#x30  3#x30 3#x30 3#x30 3#x30  3#x30   pulleys 5"x20 5"x20 5"x20 20 20  x20 20 20 20 20   Wand scap retraction with ext 5"x20 5"x20 5"x20 5"x20 5"x20  5"x20 5"x20 5"x20 5"x20  20   rows black  3x10 black  3x10 black  3x10 Black tb x30 x30  black  x30 Black tb x30 Black tb x30 Black x30  30   Tb ir/er black  3x10 black  3x10 black  3x10 Black tb x30 x30  black  x30 Black tb x30 Black tb x30 Black x30  30   Supine wand flexion 5"x20 5"x20 5"x20 5"x20 5"x20  np    20   ube NP NP NP          6'   Low rows black  3x10 black  3x10 black  3x10 Black tb x30 30 Black  x30 Black tb x30 Black tb x30 Black x30 30   Bicep curl   5#  3x10 5#  3x10 5#x30 5#x30  5#x30  5#x30  5#x30  5#x30  5#30   scap punches                      Wall ball rolling                      Modalities

## 2019-10-30 ENCOUNTER — OFFICE VISIT (OUTPATIENT)
Dept: PHYSICAL THERAPY | Facility: CLINIC | Age: 64
End: 2019-10-30
Payer: COMMERCIAL

## 2019-10-30 DIAGNOSIS — M25.512 ACUTE PAIN OF LEFT SHOULDER: Primary | ICD-10-CM

## 2019-10-30 PROCEDURE — 97110 THERAPEUTIC EXERCISES: CPT | Performed by: PHYSICAL THERAPIST

## 2019-10-30 PROCEDURE — 97140 MANUAL THERAPY 1/> REGIONS: CPT | Performed by: PHYSICAL THERAPIST

## 2019-10-30 NOTE — PROGRESS NOTES
Daily Note     Today's date: 10/30/2019  Patient name: Lisa Quinn  : 1955  MRN: [id number]  Referring provider: Kacy Corral MD  Dx:   Encounter Diagnosis     ICD-10-CM    1  Acute pain of left shoulder M25 512                   Subjective: Pt reports no sig change since his LV      Objective: See treatment diary below      Assessment: rom  continues to improve but is limited in all planes  No pain reported with therex  Plan: Continue per plan of care  Daily Treatment Diary    Assessment: rom continues to be pain limited but is improved with manual tx          Precautions: clavicle fracture 2019, chronic LBP     Manuals 1030 9/27 10/1 10/4 10/7  10/11 10/14 10/16 10/21 10/23   Lumbar PA's                    L shoulder PROM 10' 10' 10' 10' 10'  np   10'  10'                         bike  scifit  x10  scifit  x10  scifit  x10  10'    10' 10' 10' 10'  10'   Shoulder flex  3#x30   3# x30 3#x30 3#x30  3#x30 3#x30 3#x30 3#x30  3#x30   pulleys 5"x20 5"x20 5"x20 20 20  x20 20 20 20 20   Wand scap retraction with ext 5"x20 5"x20 5"x20 5"x20 5"x20  5"x20 5"x20 5"x20 5"x20  20   rows black  3x10 black  3x10 black  3x10 Black tb x30 x30  black  x30 Black tb x30 Black tb x30 Black x30  30   Tb ir/er black  3x10 black  3x10 black  3x10 Black tb x30 x30  black  x30 Black tb x30 Black tb x30 Black x30  30   Supine wand flexion 5"x20 5"x20 5"x20 5"x20 5"x20  np    20   ube NP NP NP          6'   Low rows black  3x10 black  3x10 black  3x10 Black tb x30 30 Black  x30 Black tb x30 Black tb x30 Black x30 30   Bicep curl  5#x30 5#  3x10 5#  3x10 5#x30 5#x30  5#x30  5#x30  5#x30  5#x30  5#30   scap punches                     Wall ball rolling                      Modalities

## 2019-10-31 ENCOUNTER — OFFICE VISIT (OUTPATIENT)
Dept: CARDIOLOGY CLINIC | Facility: CLINIC | Age: 64
End: 2019-10-31
Payer: COMMERCIAL

## 2019-10-31 VITALS
HEART RATE: 68 BPM | WEIGHT: 147 LBS | OXYGEN SATURATION: 98 % | HEIGHT: 66 IN | DIASTOLIC BLOOD PRESSURE: 72 MMHG | SYSTOLIC BLOOD PRESSURE: 134 MMHG | BODY MASS INDEX: 23.63 KG/M2

## 2019-10-31 DIAGNOSIS — I73.9 PAD (PERIPHERAL ARTERY DISEASE) (HCC): Primary | ICD-10-CM

## 2019-10-31 DIAGNOSIS — I71.2 ASCENDING AORTIC ANEURYSM (HCC): ICD-10-CM

## 2019-10-31 DIAGNOSIS — I10 ESSENTIAL HYPERTENSION: ICD-10-CM

## 2019-10-31 DIAGNOSIS — I95.1 ORTHOSTATIC HYPOTENSION: ICD-10-CM

## 2019-10-31 PROCEDURE — 99214 OFFICE O/P EST MOD 30 MIN: CPT | Performed by: INTERNAL MEDICINE

## 2019-10-31 RX ORDER — ATORVASTATIN CALCIUM 20 MG/1
10 TABLET, FILM COATED ORAL DAILY
Status: ON HOLD | COMMUNITY
Start: 2019-10-21 | End: 2021-01-01 | Stop reason: SDUPTHER

## 2019-10-31 NOTE — PROGRESS NOTES
Cardiology Follow Up    [facility]  1955  [id number]  [address] [facility]  [phone]    1  PAD (peripheral artery disease) ([address] )     2  Orthostatic hypotension     3  Essential hypertension     4  Ascending aortic aneurysm (HCC)  Echo complete with contrast if indicated       Discussion/Summary:    - Aortic aneurysm: BP control  Repeat imaging  Will use echo to avoid contrast     - HTN: BP controlled currently  - Orthostasis: improved since not working in a hot environment  Continue lisinopril  He also gets these symptoms on hot days, knows he needs to stay hydrated and avoid very hot environments  Previous History:  25-year-old man  History of chronic kidney disease stage 3, essential hypertension  Thoracic aortic aneurysm, last measured at 44mm       Orthostatic hypotension, 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  His antihypertensives have been adjusted  Peripheral arterial disease, follows with vascular surgery  Interval History:  Returns for regular follow-up  Overall, the patient tells me he is feeling very well  No further dizziness or lightheadedness currently  He has no chest pain or shortness of breath  He is doing physical therapy, walking and has no claudication  No further episodes of syncope recently  He is off of his amlodipine, on lisinopril only        Problem List     Lumbar stenosis with neurogenic claudication (Chronic)    Pseudomonas aeruginosa infection    Ataxia    Essential hypertension    Syncope and collapse    HLD (hyperlipidemia)    Ascending aortic aneurysm [facility])        Past Medical History:   Diagnosis Date    Aortic aneurysm (HCC)     Arthritis     Atypical chest pain     Back pain     Chronic kidney disease     stg 3    Elevated ALT measurement     Elevated AST (SGOT)     Hyperlipidemia     Hypertension     Leg pain     Neurogenic claudication     Urinary retention     catherize self 4x/day    Urinary retention      Social History     Tobacco Use    Smoking status: Never Smoker    Smokeless tobacco: Never Used   Substance Use Topics    Alcohol use: No     Family History   Problem Relation Age of Onset    Heart disease Sister     Stroke Sister    [de-identified] Sudden death Mother     Stroke Brother     Pancreatic cancer Sister     No Known Problems Father     No Known Problems Sister     No Known Problems Sister     No Known Problems Son      Past Surgical History:   Procedure Laterality Date    EPIDURAL BLOCK INJECTION N/A 1/23/2017    Procedure: BLOCK / INJECTION EPIDURAL STEROID LUMBAR;  Surgeon: Halina Sloan MD;  Location: [facility];  Service:    Donte Moose AK COLONOSCOPY FLX DX W/COLLJ SPEC WHEN PFRMD N/A 1/8/2019    Procedure: COLONOSCOPY;  Surgeon: Felix Ying MD;  Location: [facility];   Service: Colorectal    AK LAMNOTMY INCL W/DCMPRSN NRV ROOT 1 INTRSPC LUMBR Bilateral 1/23/2017    Procedure: MIS L2-3 & L3-4 LAMINAL FORAMINOTOMIES AND MICRODISCECTOMY W LEFT SIDED APPROACH ;  Surgeon: Halina Sloan MD;  Location: [facility];  Service: Neurosurgery       Current Outpatient Medications:     atorvastatin (LIPITOR) 20 mg tablet, 10 mg daily , Disp: , Rfl:     lisinopril (ZESTRIL) 20 mg tablet, Take 20 mg by mouth daily , Disp: , Rfl:     pregabalin (LYRICA) 150 mg capsule, 150 mg daily as needed , Disp: , Rfl:     tamsulosin (FLOMAX) 0 4 mg, Take 0 4 mg by mouth daily with dinner  , Disp: , Rfl:     diphenhydrAMINE (BENADRYL) 25 mg tablet, Take 1 tablet (25 mg total) by mouth every 6 (six) hours as needed for itching for up to 5 days, Disp: 20 tablet, Rfl: 0    gabapentin (NEURONTIN) 300 mg capsule, Take 300 mg by mouth daily , Disp: , Rfl:   No Known Allergies    Vitals:    10/31/19 1014   BP: 134/72   BP Location: Right arm   Patient Position: Sitting   Cuff Size: Standard   Pulse: 68 SpO2: 98%   Weight: 66 7 kg (147 lb)   Height: 5' 6" (1 676 m)     Vitals:    10/31/19 1014   Weight: 66 7 kg (147 lb)      Height: 5' 6" (167 6 cm)   Body mass index is 23 73 kg/m²  Physical Exam:  GEN: Devika Vieira appears well, alert and oriented x 3, pleasant and cooperative   HEENT: pupils equal, round, and reactive to light; extraocular muscles intact  NECK: supple, no carotid bruits   HEART: regular rhythm, normal S1 and S2, no murmurs, clicks, gallops or rubs   LUNGS: clear to auscultation bilaterally; no wheezes, rales, or rhonchi   ABDOMEN: normal bowel sounds, soft, no tenderness, no distention  EXTREMITIES: peripheral pulses normal; no clubbing, cyanosis, or edema  NEURO: no focal findings   SKIN: normal without suspicious lesions on exposed skin    ROS:  Straight cath  Joint pains, physical therapy  Except as noted in HPI, is otherwise reviewed in detail and a 12 point review of systems is negative  ROS reviewed and is unchanged    Labs:  Lab Results   Component Value Date    K 3 7 06/20/2019     06/20/2019    CREATININE 1 89 (H) 06/20/2019    BUN 26 (H) 06/20/2019    CO2 18 (L) 06/20/2019    ALT 63 07/11/2018    AST 49 (H) 07/11/2018    INR 1 03 12/15/2016    GLUF 202 (H) 06/20/2019    HGBA1C 6 1 05/01/2018    WBC 5 28 09/06/2019    HGB 13 1 09/06/2019    HCT 41 2 09/06/2019     09/06/2019       No results found for: CHOL  Lab Results   Component Value Date    LDLCALC 31 05/01/2018    [address] 08/31/2017    LDLCALC 109 (H) 01/11/2016     Lab Results   Component Value Date    HDL 70 (H) 05/01/2018    HDL 64 (H) 08/31/2017    HDL 93 (H) 01/11/2016     Lab Results   Component Value Date    TRIG 35 05/01/2018    TRIG 45 08/31/2017    TRIG 54 01/11/2016     Testing:  Echo 3/5/18:  LEFT VENTRICLE:  Size was normal   Systolic function was normal  Ejection fraction was estimated to be 60 %  There were no regional wall motion abnormalities    Wall thickness was normal      RIGHT VENTRICLE:  The size was normal   Systolic function was normal      MITRAL VALVE:  There was mild regurgitation      AORTIC VALVE:  There was mild regurgitation      TRICUSPID VALVE:  There was trace regurgitation      AORTA:  The root exhibited mild dilatation  There was mild dilatation of the ascending aorta  The aortic root diameter was 38 mm  The ascending aortic AP dimension was 44 mm    Stress 7/29/16:  MYOCARDIAL PERFUSION IMAGING:  The image quality was excellent  Left ventricular size was normal  The TID  ratio was 1      PERFUSION DEFECTS:  -  There were no perfusion defects      GATED SPECT:  The calculated left ventricular ejection fraction was 61 %  Left ventricular  ejection fraction was within normal limits by visual estimate  There was no  left ventricular regional abnormality      SUMMARY:  -  Stress results: Target heart rate was not achieved  There was no chest pain  during stress  -  ECG conclusions: The stress ECG was normal   -  Perfusion imaging: There were no perfusion defects   -  Gated SPECT: The calculated left ventricular ejection fraction was 61 %  Left ventricular ejection fraction was within normal limits by visual estimate  There was no left ventricular regional abnormality      IMPRESSIONS: Normal study after vasodilation and low level exercise without  reproduction of symptoms  Myocardial perfusion imaging was normal at rest and  with stress  Left ventricular systolic function was normal  Diagnostic  sensitivity was limited by submaximal stress

## 2019-11-04 ENCOUNTER — OFFICE VISIT (OUTPATIENT)
Dept: PHYSICAL THERAPY | Facility: CLINIC | Age: 64
End: 2019-11-04
Payer: COMMERCIAL

## 2019-11-04 DIAGNOSIS — M25.512 ACUTE PAIN OF LEFT SHOULDER: Primary | ICD-10-CM

## 2019-11-04 PROCEDURE — 97140 MANUAL THERAPY 1/> REGIONS: CPT | Performed by: PHYSICAL THERAPIST

## 2019-11-04 PROCEDURE — 97110 THERAPEUTIC EXERCISES: CPT | Performed by: PHYSICAL THERAPIST

## 2019-11-04 NOTE — PROGRESS NOTES
Daily Note     Today's date: 2019  Patient name: Jesse Martinez  : 1955  MRN: [id number]  Referring provider: Teresita Homans, MD  Dx:   Encounter Diagnosis     ICD-10-CM    1  Acute pain of left shoulder M25 512                   Subjective: Pt reports that his shoulder continues to have pain but that he is noticing his rom is improving      Objective: See treatment diary below      Assessment: Pt demonstrates increased rom with manual tx  Continues to fatigue quickly with ER therex  Increased weight with therex as listed with no complaints  Plan: Continue per plan of care  Daily Treatment Diary    Assessment: rom continues to be pain limited but is improved with manual tx          Precautions: clavicle fracture 2019, chronic LBP     Manuals 1030 11/4 10/1 10/4 10/7  10/11 10/14 10/16 10/21 10/23   Lumbar PA's                    L shoulder PROM 10' 10' 10' 10' 10'  np   10'  10'                         bike  scifit  x10  scifit  x10  scifit  x10  10'    10' 10' 10' 10'  10'   Shoulder flex  3#x30   3# x30 3#x30 3#x30  3#x30 3#x30 3#x30 3#x30  3#x30   pulleys 5"x20 5"x20 5"x20 20 20  x20 20 20 20 20   Wand scap retraction with ext 5"x20 5"x20 5"x20 5"x20 5"x20  5"x20 5"x20 5"x20 5"x20  20   rows black  3x10 black  3x10 black  3x10 Black tb x30 x30  black  x30 Black tb x30 Black tb x30 Black x30  30   Tb ir/er black  3x10 black  3x10 black  3x10 Black tb x30 x30  black  x30 Black tb x30 Black tb x30 Black x30  30   Supine wand flexion 5"x20 5"x20 5"x20 5"x20 5"x20  np    20   ube NP NP NP          6'   Low rows black  3x10 black  3x10 black  3x10 Black tb x30 30 Black  x30 Black tb x30 Black tb x30 Black x30 30   Bicep curl  5#x30 8#  3x10 5#  3x10 5#x30 5#x30  5#x30  5#x30  5#x30  5#x30  5#30   Shoulder flexion  3#x30                        scap punches                     Wall ball rolling                      Modalities

## 2019-11-06 ENCOUNTER — OFFICE VISIT (OUTPATIENT)
Dept: PHYSICAL THERAPY | Facility: CLINIC | Age: 64
End: 2019-11-06
Payer: COMMERCIAL

## 2019-11-06 DIAGNOSIS — M25.512 ACUTE PAIN OF LEFT SHOULDER: Primary | ICD-10-CM

## 2019-11-06 PROCEDURE — 97140 MANUAL THERAPY 1/> REGIONS: CPT | Performed by: PHYSICAL THERAPIST

## 2019-11-06 PROCEDURE — 97110 THERAPEUTIC EXERCISES: CPT | Performed by: PHYSICAL THERAPIST

## 2019-11-06 NOTE — PROGRESS NOTES
Daily Note     Today's date: 2019  Patient name: Meghan Garza  : 1955  MRN: [id number]  Referring provider: Kehinde Lucia MD  Dx:   Encounter Diagnosis     ICD-10-CM    1  Acute pain of left shoulder M25 512                   Subjective: Pt reports that his shoulder continues to feel stiff    Objective: See treatment diary below      Assessment: pt requires verbal cues for proper form with shoulder arom  No pain reported t/o therex  Plan: Continue per plan of care        Daily Treatment Diary          Precautions: clavicle fracture 2019, chronic LBP     Manuals 1030 11/4 11/6 10/4 10/7  10/11 10/14 10/16 10/21 10/23   Lumbar PA's                    L shoulder PROM 10' 10' 10' 10' 10'  np   10'  10'                         bike  scifit  x10  scifit  x10  scifit  x10  10'    10' 10' 10' 10'  10'   Shoulder flex  3#x30   3# x30 3#x30 3#x30  3#x30 3#x30 3#x30 3#x30  3#x30   pulleys 5"x20 5"x20 5"x20 20 20  x20 20 20 20 20   Wand scap retraction with ext 5"x20 5"x20 5"x20 5"x20 5"x20  5"x20 5"x20 5"x20 5"x20  20   rows black  3x10 black  3x10 black  3x10 Black tb x30 x30  black  x30 Black tb x30 Black tb x30 Black x30  30   Tb ir/er black  3x10 black  3x10 black  3x10 Black tb x30 x30  black  x30 Black tb x30 Black tb x30 Black x30  30   Supine wand flexion 5"x20 5"x20 5"x20 5"x20 5"x20  np    20   ube NP NP NP          6'   Low rows black  3x10 black  3x10 black  3x10 Black tb x30 30 Black  x30 Black tb x30 Black tb x30 Black x30 30   Bicep curl  5#x30 8#  3x10 8#  3x10 5#x30 5#x30  5#x30  5#x30  5#x30  5#x30  5#30   Shoulder flexion  3#x30                        scap punches                     Wall ball rolling                      Modalities

## 2019-11-11 ENCOUNTER — HOSPITAL ENCOUNTER (OUTPATIENT)
Dept: NON INVASIVE DIAGNOSTICS | Facility: HOSPITAL | Age: 64
Discharge: HOME | End: 2019-11-11
Attending: SURGERY
Payer: COMMERCIAL

## 2019-11-11 DIAGNOSIS — I73.9 PAD (PERIPHERAL ARTERY DISEASE) (HCC): ICD-10-CM

## 2019-11-11 PROCEDURE — 93923 UPR/LXTR ART STDY 3+ LVLS: CPT

## 2019-11-12 ENCOUNTER — OFFICE VISIT (OUTPATIENT)
Dept: PHYSICAL THERAPY | Facility: CLINIC | Age: 64
End: 2019-11-12
Payer: COMMERCIAL

## 2019-11-12 DIAGNOSIS — M25.512 ACUTE PAIN OF LEFT SHOULDER: Primary | ICD-10-CM

## 2019-11-12 PROCEDURE — 97110 THERAPEUTIC EXERCISES: CPT | Performed by: PHYSICAL THERAPIST

## 2019-11-12 PROCEDURE — 93923 UPR/LXTR ART STDY 3+ LVLS: CPT | Performed by: SURGERY

## 2019-11-12 PROCEDURE — 97140 MANUAL THERAPY 1/> REGIONS: CPT | Performed by: PHYSICAL THERAPIST

## 2019-11-12 NOTE — PROGRESS NOTES
Daily Note     Today's date: 2019  Patient name: Tulio Hoffmann  : 1955  MRN: [id number]  Referring provider: Mela Edwards MD  Dx:   Encounter Diagnosis     ICD-10-CM    1  Acute pain of left shoulder M25 512                   Subjective: Pt reports stiffness but feels that he is slowly improving  Objective: See treatment diary below      Assessment: rom continues to slowly improve with manual tx but is still limited  Pt has good tolerance to manual tx  Plan: Continue per plan of care        Daily Treatment Diary          Precautions: clavicle fracture 2019, chronic LBP     Manuals 1030 11/4 11/6 11/12 10/7  10/11 10/14 10/16 10/21 10/23   Lumbar PA's                    L shoulder PROM 10' 10' 10' 10' 10'  np   10'  10'                         bike  scifit  x10  scifit  x10  scifit  x10  10'    10' 10' 10' 10'  10'   Shoulder flex  3#x30   3# x30 3#x30 3#x30  3#x30 3#x30 3#x30 3#x30  3#x30   pulleys 5"x20 5"x20 5"x20 20 20  x20 20 20 20 20   Wand scap retraction with ext 5"x20 5"x20 5"x20 5"x20 5"x20  5"x20 5"x20 5"x20 5"x20  20   rows black  3x10 black  3x10 black  3x10 Black tb x30 x30  black  x30 Black tb x30 Black tb x30 Black x30  30   Tb ir/er black  3x10 black  3x10 black  3x10 Black tb x30 x30  black  x30 Black tb x30 Black tb x30 Black x30  30   Supine wand flexion 5"x20 5"x20 5"x20 5"x20 5"x20  np    20   ube NP NP NP          6'   Low rows black  3x10 black  3x10 black  3x10 Black tb x30 30 Black  x30 Black tb x30 Black tb x30 Black x30 30   Bicep curl  5#x30 8#  3x10 8#  3x10 8#x30 5#x30  5#x30  5#x30  5#x30  5#x30  5#30   Shoulder flexion  3#x30  3#x30                      scap punches                     Wall ball rolling                      Modalities

## 2019-11-14 ENCOUNTER — OFFICE VISIT (OUTPATIENT)
Dept: VASCULAR SURGERY | Facility: CLINIC | Age: 64
End: 2019-11-14
Payer: COMMERCIAL

## 2019-11-14 ENCOUNTER — OFFICE VISIT (OUTPATIENT)
Dept: PHYSICAL THERAPY | Facility: CLINIC | Age: 64
End: 2019-11-14
Payer: COMMERCIAL

## 2019-11-14 VITALS
HEART RATE: 68 BPM | DIASTOLIC BLOOD PRESSURE: 90 MMHG | SYSTOLIC BLOOD PRESSURE: 158 MMHG | BODY MASS INDEX: 23.3 KG/M2 | TEMPERATURE: 97 F | HEIGHT: 66 IN | WEIGHT: 145 LBS

## 2019-11-14 DIAGNOSIS — I10 ESSENTIAL HYPERTENSION: ICD-10-CM

## 2019-11-14 DIAGNOSIS — E78.5 HYPERLIPIDEMIA, UNSPECIFIED HYPERLIPIDEMIA TYPE: ICD-10-CM

## 2019-11-14 DIAGNOSIS — I73.9 PAD (PERIPHERAL ARTERY DISEASE) (HCC): Primary | ICD-10-CM

## 2019-11-14 DIAGNOSIS — N18.30 CKD (CHRONIC KIDNEY DISEASE), STAGE III (HCC): ICD-10-CM

## 2019-11-14 DIAGNOSIS — M25.512 ACUTE PAIN OF LEFT SHOULDER: Primary | ICD-10-CM

## 2019-11-14 DIAGNOSIS — I71.2 ASCENDING AORTIC ANEURYSM (HCC): ICD-10-CM

## 2019-11-14 PROCEDURE — 97110 THERAPEUTIC EXERCISES: CPT | Performed by: PHYSICAL THERAPIST

## 2019-11-14 PROCEDURE — 99214 OFFICE O/P EST MOD 30 MIN: CPT | Performed by: PHYSICIAN ASSISTANT

## 2019-11-14 PROCEDURE — 97140 MANUAL THERAPY 1/> REGIONS: CPT | Performed by: PHYSICAL THERAPIST

## 2019-11-14 RX ORDER — ASPIRIN 81 MG/1
81 TABLET ORAL DAILY
Start: 2019-11-14

## 2019-11-14 NOTE — ASSESSMENT & PLAN NOTE
-baseline creatinine 1 4-1 8  -most recent creatinine 1 89/GFR 42 on 6/20/2019  -continue to avoid nephrotoxic agents  -management per Medicine and Nephrology

## 2019-11-14 NOTE — ASSESSMENT & PLAN NOTE
49-year-old male nonsmoker with a history of HTN, HLD, lumbar spinal stenosis with neurogenic claudication, CKD 3 (baseline creat 1 4-1 8), 4 4 cm ascending aortic aneurysm and PAD with R femoropopliteal disease and mild L SFA disease with ABIs 1 0 and MTP/GTP above healing levels  +chronic constant L lower leg pain likely neurogenic in nature without true claudication, rest pain or evidence of tissue loss  No RLE symptoms  Given characteristics of pain and underlying significant CKD, would reserve intervention for development of rest pain or tissue loss    -recommend continue surveillance with [facility] in 1 year  -return to office in 1 year with ROSAL[facility]  -continue statin therapy  -recommend ASA 81 mg daily  -continue walking program  -most recent creatinine 1 89 with GFR 42 on 6/20/2019  -instructed to contact the office with new symptoms, worsening symptoms or new tissue loss

## 2019-11-14 NOTE — PATIENT INSTRUCTIONS
Peripheral Artery Disease   WHAT YOU NEED TO KNOW:   What is peripheral artery disease? Peripheral artery disease (PAD) is narrow, weak, or blocked arteries  It may affect any arteries outside of your heart and brain  PAD is usually the result of a buildup of fat and cholesterol, also called plaque, along your artery walls  Inflammation, a blood clot, or abnormal cell growth could also block your arteries  PAD prevents normal blood flow to your legs and arms  You are at risk of an amputation if poor blood flow keeps wounds from healing or causes gangrene (tissue death)  Without treatment, PAD can also cause a heart attack or stroke  What increases my risk for PAD? · Smoking cigarettes     · Diabetes     · High blood pressure     · High cholesterol     · Age older than 40 years    · Heart disease or a family history of heart disease  What are the signs and symptoms of PAD? Mild PAD usually does not cause symptoms  As the disease worsens over time, you may have the following:  · Pain or cramps in your leg or hip while you walk     · A numb, weak, or heavy feeling in your legs     · Dry, scaly, red, or pale skin on your legs     · Thick or brittle nails, or hair loss on your arms and legs     · Foot sores that will not heal     · Burning or aching in your feet and toes while resting (this may be worse when you lie down)  How is PAD diagnosed? · Angiography  is a test that shows pictures of the arteries in your arms and legs  You will be given contrast liquid to help the arteries show up better on the pictures  The pictures will be taken with an MRI or CT scan  Tell the healthcare provider if you have ever had an allergic reaction to contrast liquid  Do not enter the MRI room with anything metal  Metal can cause serious injury  Tell a healthcare provider if you have any metal in or on your body      · Doppler ankle brachial index (LENIN)  is a test that compares blood pressure in your ankles to blood pressure in your arms  This tells your healthcare provider how well blood is flowing through the arteries in your legs  How is PAD treated? Treatment can help reduce your risk of a heart attack, stroke, or amputation  You may need more than one of the following:  · Medicines  may be given  Your healthcare provider may give you any of the following:     ¨ Antiplatelet medicine,  such as aspirin, helps prevent blood clots and reduces the risk of a heart attack or stroke  ¨ Statin medicine  helps lower your cholesterol and prevents your PAD from getting worse  · A supervised exercise program  helps you stay active in normal daily activities and may prevent disability  Healthcare providers will help you safely walk or do strength training exercises 3 times a week for 30 to 60 minutes  You will do this for several months, then transition to walking on your own  · Angioplasty  is a procedure to open your artery so blood can flow through normally  A thin tube called a catheter is used to insert a small balloon into your artery  The balloon is inflated to open your blocked artery, and then removed  A tube called a stent may be placed in your artery to hold it open  · Bypass surgery  is used to make a new connection to your artery with a vein from another part of your body, or an artificial graft  The vein or graft is attached to your artery above and below your blockage  This allows blood to flow around the blocked portion of your artery  How can I manage PAD? · Do not smoke  Nicotine and other chemicals in cigarettes and cigars can worsen PAD  They can also increase your risk for a heart attack or stroke  Ask your healthcare provider for information if you currently smoke and need help to quit  E-cigarettes or smokeless tobacco still contain nicotine  Talk to your healthcare provider before you use these products  · Manage other health conditions  Take your medicines as directed   Follow your healthcare provider's instructions if you have high blood pressure or high cholesterol  Perform foot care and check your blood sugar levels as directed if you have diabetes  · Eat heart healthy foods  Eat whole grains, fruits, and vegetables every day  Limit salt and high-fat foods  Ask your healthcare provider for more information on a heart healthy diet  Ask if you need to lose weight  Your healthcare provider can help you create a healthy weight-loss plan  Call 911 for the following:   · You have any of the following signs of a heart attack:      ¨ Squeezing, pressure, or pain in your chest that lasts longer than 5 minutes or returns    ¨ Discomfort or pain in your back, neck, jaw, stomach, or arm     ¨ Trouble breathing    ¨ Nausea or vomiting    ¨ Lightheadedness or a sudden cold sweat, especially with chest pain or trouble breathing    · You have any of the following signs of a stroke:      ¨ Numbness or drooping on one side of your face     ¨ Weakness in an arm or leg    ¨ Confusion or difficulty speaking    ¨ Dizziness, a severe headache, or vision loss  When should I seek immediate care? · You have sores or wounds that will not heal      · You notice black or discolored skin on your arm or leg  · Your skin is cool to the touch  When should I contact my healthcare provider? · You have leg pain when you walk 1/8 mile (200 meters) or less, even with treatment  · Your legs are red, dry, or pale, even with treatment  · You have questions or concerns about your condition or care  CARE AGREEMENT:   You have the right to help plan your care  Learn about your health condition and how it may be treated  Discuss treatment options with your caregivers to decide what care you want to receive  You always have the right to refuse treatment  The above information is an  only  It is not intended as medical advice for individual conditions or treatments   Talk to your doctor, nurse or pharmacist before following any medical regimen to see if it is safe and effective for you  © 2017 [employer] Information is for End User's use only and may not be sold, redistributed or otherwise used for commercial purposes  All illustrations and images included in CareNotes® are the copyrighted property of [employer]  or [employer]  -your recent lower extremity ultrasound remains stable with adequate blood flow to her feet  Your symptoms are likely primarily related to your spine disease and neurogenic in nature  -recommend continue surveillance with repeat lower extremity arterial ultrasound in 1 year with follow-up appointment in the office  -continue atorvastatin  -recommend aspirin 81 mg daily  -please contact the office in the interim with any questions, concerns, worsening symptoms or new foot wounds

## 2019-11-14 NOTE — ASSESSMENT & PLAN NOTE
-BP moderately controlled controlled  -continue current medical regimen and adjustment per PCP  -management per PCP

## 2019-11-14 NOTE — PROGRESS NOTES
Assessment/Plan:    PAD (peripheral artery disease) [facility])  58-year-old male nonsmoker with a history of HTN, HLD, lumbar spinal stenosis with neurogenic claudication, CKD 3 (baseline creat 1 4-1 8), 4 4 cm ascending aortic aneurysm and PAD with R femoropopliteal disease and mild L SFA disease with ABIs 1 0 and MTP/GTP above healing levels  +chronic constant L lower leg pain likely neurogenic in nature without true claudication, rest pain or evidence of tissue loss  No RLE symptoms  Given characteristics of pain and underlying significant CKD, would reserve intervention for development of rest pain or tissue loss  -recommend continue surveillance with ROSALINA in 1 year  -return to office in 1 year with ROSALINA  -continue statin therapy  -recommend ASA 81 mg daily  -continue walking program  -most recent creatinine 1 89 with GFR 42 on 6/20/2019  -instructed to contact the office with new symptoms, worsening symptoms or new tissue loss    Ascending aortic aneurysm (HCC)  4 4 cm Ascending aortic aneurysm  -continue to optimize BP control  -continue surveillance  Surveillance via echo secondary to CKD  -management follow-up per Cardiology and Cardiac surgery    CKD (chronic kidney disease), stage III  -baseline creatinine 1 4-1 8  -most recent creatinine 1 89/GFR 42 on 6/20/2019  -continue to avoid nephrotoxic agents  -management per Medicine and Nephrology    HLD (hyperlipidemia)  -stable  -continue statin therapy  -management per PCP    Essential hypertension  -BP moderately controlled controlled  -continue current medical regimen and adjustment per PCP  -management per PCP       Diagnoses and all orders for this visit:    PAD (peripheral artery disease) (HCC)  -     VAS lower limb arterial duplex, complete bilateral; Future  -     aspirin (ECOTRIN LOW STRENGTH) 81 mg EC tablet;  Take 1 tablet (81 mg total) by mouth daily    Ascending aortic aneurysm (HCC)    Essential hypertension    CKD (chronic kidney disease), stage III ([facility] )    Hyperlipidemia, unspecified hyperlipidemia type          Subjective:      Patient ID: Derik Browne is a 70 y o  male  New patient, presenting today for results review of ROSALINA and LENIN  Patient reports LLE pain w/ walking that begins almost immediately  He states that he walks through the pain  Denies tissue loss  69-year-old male nonsmoker with a history of HTN, HLD, lumbar spinal stenosis with neurogenic claudication, CKD 3 (baseline creat 1 4-1 8), 4 4 cm ascending aortic aneurysm and PAD with R femoropopliteal disease and mild L SFA disease with ABIs 1 0 and MTP/GTP above healing levels who returns to the office for routine follow-up review of surveillance noninvasive imaging  ROSALINA on 9/17/19 and LENIN 11/11/2019 have been reviewed which demonstrates stable 50-75% proximal R SFA and distal popliteal stenosis with chronically occluded distal AT, R LENIN 1 12/130/73 and < 50% L distal SFA stenosis with LENIN 1 01/127/60  Repeat ABIs in November essentially unchanged and as noted below  The patient's primary complaint is chronic constant lower back pain and L lower leg pain extending from foot to knee which is not exacerbated by ambulation but limits ambulation  No evidence of true claudication symptoms, rest pain or tissue loss  No RLE symptoms  Patient is on statin therapy but no ASA  The following portions of the patient's history were reviewed and updated as appropriate: allergies, current medications, past family history, past medical history, past social history, past surgical history and problem list     Review of Systems   Constitutional: Negative  HENT: Negative  Eyes: Negative  Respiratory: Negative  Cardiovascular: Negative  Gastrointestinal: Negative  Endocrine: Negative  Genitourinary: Negative  Musculoskeletal: Positive for back pain and gait problem  Leg pain  Leg cramping with walking   Skin: Negative  Allergic/Immunologic: Negative  Hematological: Negative  Psychiatric/Behavioral: Negative  I have personally reviewed the ROS entered by MA and agree as documented  I have reviewed and made appropriate changes to the review of systems input by the medical assistant  Vitals:    11/14/19 0938   BP: 158/90   BP Location: Left arm   Patient Position: Sitting   Pulse: 68   Temp: (!) 97 °F (36 1 °C)   TempSrc: Tympanic   Weight: 65 8 kg (145 lb)   Height: 5' 6" (1 676 m)       Patient Active Problem List   Diagnosis    Lumbar stenosis with neurogenic claudication    Pseudomonas aeruginosa infection    Ataxia    Essential hypertension    Syncope and collapse    Orthostatic hypotension    HLD (hyperlipidemia)    Ascending aortic aneurysm (HCC)    Urinary retention    CKD (chronic kidney disease), stage III (HCC)    Hyponatremia    Diffuse pain in left lower extremity    PAD (peripheral artery disease) (HCC)    Lumbar radiculopathy    Status post lumbar discectomy    Anemia, unspecified    Displaced fracture of lateral end of left clavicle, initial encounter for closed fracture       Past Surgical History:   Procedure Laterality Date    EPIDURAL BLOCK INJECTION N/A 1/23/2017    Procedure: BLOCK / INJECTION EPIDURAL STEROID LUMBAR;  Surgeon: Ck Obrien MD;  Location: [facility];  Service:     TN COLONOSCOPY FLX DX W/COLLJ SPEC WHEN PFRMD N/A 1/8/2019    Procedure: COLONOSCOPY;  Surgeon: Cristina Castillo MD;  Location: [facility];   Service: Colorectal    TN LAMNOTMY INCL W/DCMPRSN NRV ROOT 1 INTRSPC LUMBR Bilateral 1/23/2017    Procedure: MIS L2-3 & L3-4 LAMINAL FORAMINOTOMIES AND MICRODISCECTOMY W LEFT SIDED APPROACH ;  Surgeon: Ck Obrien MD;  Location: [facility];  Service: Neurosurgery       Family History   Problem Relation Age of Onset    Heart disease Sister     Stroke Sister    [facility] Sudden death Mother     Stroke Brother     Pancreatic cancer Sister     No Known Problems Father     No Known Problems Sister     No Known Problems Sister     No Known Problems Son        Social History     Socioeconomic History    Marital status: Single     Spouse name: Not on file    Number of children: Not on file    Years of education: Not on file    Highest education level: Not on file   Occupational History    Not on file   Social Needs    Financial resource strain: Not on file    Food insecurity:     Worry: Not on file     Inability: Not on file    Transportation needs:     Medical: Not on file     Non-medical: Not on file   Tobacco Use    Smoking status: Never Smoker    Smokeless tobacco: Never Used   Substance and Sexual Activity    Alcohol use: No    Drug use: No    Sexual activity: Not on file   Lifestyle    Physical activity:     Days per week: Not on file     Minutes per session: Not on file    Stress: Not on file   Relationships    Social connections:     Talks on phone: Not on file     Gets together: Not on file     Attends Worship service: Not on file     Active member of club or organization: Not on file     Attends meetings of clubs or organizations: Not on file     Relationship status: Not on file    Intimate partner violence:     Fear of current or ex partner: Not on file     Emotionally abused: Not on file     Physically abused: Not on file     Forced sexual activity: Not on file   Other Topics Concern    Not on file   Social History Narrative    Not on file       No Known Allergies      Current Outpatient Medications:     atorvastatin (LIPITOR) 20 mg tablet, 10 mg daily , Disp: , Rfl:     gabapentin (NEURONTIN) 300 mg capsule, Take 300 mg by mouth daily , Disp: , Rfl:     lisinopril (ZESTRIL) 20 mg tablet, Take 20 mg by mouth daily , Disp: , Rfl:     pregabalin (LYRICA) 150 mg capsule, 150 mg daily as needed , Disp: , Rfl:     tamsulosin (FLOMAX) 0 4 mg, Take 0 4 mg by mouth daily with dinner  , Disp: , Rfl:     aspirin (ECOTRIN LOW STRENGTH) 81 mg EC tablet, Take 1 tablet (81 mg total) by mouth daily, Disp: , Rfl:     Objective:  Imaging study:  ROSALINA 9/17/2019:  Imaging study reviewed and as described above  See full report below:  Right                  Impression           Waveform   PSV  EDV    Post  Tibial                                Hyperemic              Ant  Tibial                                 Hyperemic              Common Femoral Artery                                  106    7    Prox Profunda                                          149    0    Prox SFA               50-75%                          315    6    Mid SFA                                                126   14    Dist SFA                                                91   10    Proximal Pop                                           105   16    Distal Pop             50-75%- mid segment             135   15    Dist Post Tibial                                        41   13    Dist  Ant  Tibial      Occluded                                    Dist Peroneal                                           71   20       Left                   Impression  Waveform   PSV  EDV    Post  Tibial                       Hyperemic              Ant  Tibial                        Hyperemic              Common Femoral Artery                         124    0    Prox Profunda                                 206    0    Prox SFA                                      167    6    Mid SFA                                       129   12    Dist SFA               <50%                   235   15    Proximal Pop                                  238   18    Distal Pop                                     61    7    Dist Post Tibial                               42   10    Dist  Ant   Tibial                              54   10    Dist Peroneal                                  56   11             CONCLUSION:     Impression:  RIGHT LOWER LIMB:  This resting evaluation shows evidence of a 50-75% stenosis in the proximal  superficial femoral and mid popliteal arteries  There is evidence of occlusion vs  high-grade stenosis in the distal anterior  tibial artery with distal reconstitution  Ankle/Brachial index: 1 12, normal range (Prior 0 90)  PVR/ PPG tracings are dampened  Metatarsal pressure of 130 mm Hg  Great toe pressure of 73 mm Hg, within the healing range for a non-diabetic  (Prior 72 mm Hg)  LEFT LOWER LIMB:  This resting evaluation shows diffuse atherosclerotic disease throughout the  femoro-popliteal arteries without evidence of significant lower extremity  arterial occlusive disease  Hyperemic waveforms noted at the ankle  Ankle/Brachial index: 1 01, normal range (Prior 0 87)  PVR/ PPG tracings are dampened  Metatarsal pressure of 127 mm Hg  Great toe pressure of 60 mm Hg, within the healing range for a non-diabetic  (Prior 74 mm Hg)  LENIN 11/11/2019:  Imaging study reviewed and essentially unchanged from ROSALINA 2 months prior  R LENIN 0 98/180/109  L LENIN 1 01/255/85    /90 (BP Location: Left arm, Patient Position: Sitting)   Pulse 68   Temp (!) 97 °F (36 1 °C) (Tympanic)   Ht 5' 6" (1 676 m)   Wt 65 8 kg (145 lb)   BMI 23 40 kg/m²          Physical Exam   Constitutional: He is oriented to person, place, and time  He appears well-developed and well-nourished  No distress  HENT:   Head: Normocephalic and atraumatic  Eyes: Pupils are equal, round, and reactive to light  Conjunctivae and EOM are normal  No scleral icterus  Neck: Normal range of motion  Neck supple  No JVD present  Carotid bruit is not present  No tracheal deviation present  No thyromegaly present  Cardiovascular: Normal rate, regular rhythm, S1 normal and S2 normal  Exam reveals no gallop, no S3 and no friction rub  No murmur heard  Pulses:       Carotid pulses are 2+ on the right side, and 2+ on the left side  Radial pulses are 2+ on the right side, and 2+ on the left side          Femoral pulses are 2+ on the right side, and 2+ on the left side        Popliteal pulses are 0 on the right side, and 1+ on the left side  Dorsalis pedis pulses are 0 on the right side, and 0 on the left side  Posterior tibial pulses are 0 on the right side, and 0 on the left side  Bilateral lower extremities warm, motor and sensory intact and well perfused without tissue loss, cyanosis or pallor   Pulmonary/Chest: Breath sounds normal  No stridor  No respiratory distress  He has no wheezes  He has no rhonchi  He has no rales  Abdominal: Soft  Bowel sounds are normal  He exhibits no distension, no abdominal bruit, no pulsatile midline mass and no mass  There is no hepatosplenomegaly  There is no tenderness  There is no rebound  Musculoskeletal: Normal range of motion  He exhibits no edema or deformity  Neurological: He is alert and oriented to person, place, and time  He has normal strength  Skin: Skin is warm, dry and intact  No lesion noted  No cyanosis or erythema  No pallor  Psychiatric: He has a normal mood and affect

## 2019-11-14 NOTE — ASSESSMENT & PLAN NOTE
4 4 cm Ascending aortic aneurysm  -continue to optimize BP control  -continue surveillance    Surveillance via echo secondary to CKD  -management follow-up per Cardiology and Cardiac surgery

## 2019-11-14 NOTE — PROGRESS NOTES
Daily Note     Today's date: 2019  Patient name: Monalisa Herrera  : 1955  MRN: [id number]  Referring provider: Dilshad Huerta MD  Dx:   Encounter Diagnosis     ICD-10-CM    1  Acute pain of left shoulder M25 512                   Subjective: Pt reports that he is very stiff today secondary to not sleeping well at night  Objective: See treatment diary below      Assessment: rom continues to slowly improve with manual tx but is still limited  Pt has good tolerance to manual tx  Plan: Continue per plan of care        Daily Treatment Diary          Precautions: clavicle fracture 2019, chronic LBP     Manuals 1030 11/4 11/6 11/12 10/7  10/11 11/14 10/16 10/21 10/23   Lumbar PA's                    L shoulder PROM 10' 10' 10' 10 10'  np   10'  10'                         bike  scifit  x10  scifit  x10  scifit  x10  10'    10' 10' 10' 10'  10'   Shoulder flex  3#x30   3# x30 3#x30 3#x30  3#x30 3#x30 3#x30 3#x30  3#x30   pulleys 5"x20 5"x20 5"x20 20 20  x20 20 20 20 20   Wand scap retraction with ext 5"x20 5"x20 5"x20 5"x20 5"x20  5"x20 5"x20 5"x20 5"x20  20   rows black  3x10 black  3x10 black  3x10 Black tb x30 x30  black  x30 Black tb x30 Black tb x30 Black x30  30   Tb ir/er black  3x10 black  3x10 black  3x10 Black tb x30 x30  black  x30 Black tb x30 Black tb x30 Black x30  30   Supine wand flexion 5"x20 5"x20 5"x20 5"x20 5"x20  np    20   ube NP NP NP          6'   Low rows black  3x10 black  3x10 black  3x10 Black tb x30 30 Black  x30 Black tb x30 Black tb x30 Black x30 30   Bicep curl  5#x30 8#  3x10 8#  3x10 8#x30 5#x30  5#x30  5#x30  5#x30  5#x30  5#30   Shoulder flexion  3#x30  3#x30                      scap punches                     Wall ball rolling                      Modalities

## 2019-11-18 ENCOUNTER — OFFICE VISIT (OUTPATIENT)
Dept: PHYSICAL THERAPY | Facility: CLINIC | Age: 64
End: 2019-11-18
Payer: COMMERCIAL

## 2019-11-18 DIAGNOSIS — M25.512 ACUTE PAIN OF LEFT SHOULDER: Primary | ICD-10-CM

## 2019-11-18 PROCEDURE — 97140 MANUAL THERAPY 1/> REGIONS: CPT | Performed by: PHYSICAL THERAPIST

## 2019-11-18 PROCEDURE — 97110 THERAPEUTIC EXERCISES: CPT | Performed by: PHYSICAL THERAPIST

## 2019-11-18 NOTE — PROGRESS NOTES
Daily Note     Today's date: 2019  Patient name: Christine Barreto  : 1955  MRN: [id number]  Referring provider: Errol Sicard, MD  Dx:   No diagnosis found  Subjective: Pt reports that his shoulder is stiff but that he feels he is improving  Objective: See treatment diary below      Assessment: rom continues to slowly improve with manual tx but is still limited  Pt has good tolerance to manual tx  Plan: Continue per plan of care        Daily Treatment Diary          Precautions: clavicle fracture 2019, chronic LBP     Manuals 1030 11/4 11/6 11/12 11/18  10/11 11/14 10/16 10/21 10/23   Lumbar PA's                    L shoulder PROM 10' 10' 10' 10' 10'  np   10'  10'                         bike  scifit  x10  scifit  x10  scifit  x10  10' 10'  10' 10' 10' 10'  10'   Shoulder flex  3#x30   3# x30 3#x30 3#x30  3#x30 3#x30 3#x30 3#x30  3#x30   pulleys 5"x20 5"x20 5"x20 20 20  x20 20 20 20 20   Wand scap retraction with ext 5"x20 5"x20 5"x20 5"x20 5"x20  5"x20 5"x20 5"x20 5"x20  20   rows black  3x10 black  3x10 black  3x10 Black tb x30 x30  black  x30 Black tb x30 Black tb x30 Black x30  30   Tb ir/er black  3x10 black  3x10 black  3x10 Black tb x30 x30  black  x30 Black tb x30 Black tb x30 Black x30  30   Supine wand flexion 5"x20 5"x20 5"x20 5"x20 5"x20  np    20   ube NP NP NP          6'   Low rows black  3x10 black  3x10 black  3x10 Black tb x30 30 Black  x30 Black tb x30 Black tb x30 Black x30 30   Bicep curl  5#x30 8#  3x10 8#  3x10 8#x30 8#x30  5#x30  5#x30  5#x30  5#x30  5#30   Shoulder flexion  3#x30  3#x30 3#x30                     scap punches                     Wall ball rolling                      Modalities

## 2019-11-20 ENCOUNTER — OFFICE VISIT (OUTPATIENT)
Dept: PHYSICAL THERAPY | Facility: CLINIC | Age: 64
End: 2019-11-20
Payer: COMMERCIAL

## 2019-11-20 DIAGNOSIS — M25.512 ACUTE PAIN OF LEFT SHOULDER: Primary | ICD-10-CM

## 2019-11-20 PROCEDURE — 97110 THERAPEUTIC EXERCISES: CPT

## 2019-11-20 PROCEDURE — 97140 MANUAL THERAPY 1/> REGIONS: CPT

## 2019-11-20 NOTE — PROGRESS NOTES
Daily Note     Today's date: 2019  Patient name: Gold Mcdowell  : 1955  MRN: [id number]  Referring provider: Akanksha Roberson MD  Dx:   Encounter Diagnosis     ICD-10-CM    1  Acute pain of left shoulder M25 512                 Subjective: Patient complains of continued left shoulder pain - "I was trying to do some exercises yesterday and it hurt really bad "  Patient reports experiencing left shoulder pain when rolling onto his left side in bed this morning  Patient reports "my back was killing me" when he got up this morning - states, "It felt like somebody stabbed me or something like that "  Patient reports taking "my medication" for back pain this morning - he appears confused as he reports taking Lisinopril to treat his back pain  Objective: See treatment diary below  Assessment: Patient appears comfortable throughout therapeutic exercise program   Left shoulder PROM limited by pain  Plan: Continue treatment as per PT plan of care       Daily Treatment Diary    Precautions: clavicle fracture 2019, chronic LBP     Manuals 10/30 11/4 11/6 11/12 11/14 11/18 11/20       Lumbar PA's                    L shoulder PROM 10' 10' 10' 10' 10'  10' 10'                                   Exercises                    bike  scifit  x10  scifit  x10  scifit  x10  10' 10'  10' 10'       Shoulder flex  3#x30   3# x30 3#x30 3#x30  3#x30 3#  3x10       pulleys 5"x20 5"x20 5"x20 20 20  x20 5"x20       Wand scap retraction with ext 5"x20 5"x20 5"x20 5"x20 5"x20  5"x20 5"x20       rows black  3x10 black  3x10 black  3x10 Black tb x30 x30  black  x30 black  3x10       Tb ir/er black  3x10 black  3x10 black  3x10 Black tb x30 x30  black  x30 black  3x10       Supine wand flexion 5"x20 5"x20 5"x20 5"x20 5"x20  5"x20 5"x20       ube NP NP NP              Low rows black  3x10 black  3x10 black  3x10 Black tb x30 30 Black  x30 black  3x10       Bicep curl  5#x30 8#  3x10 8#  3x10 8#x30 8#x30  5#x30 5#  3x10 Shoulder flexion  3#x30  3#x30 3#x30 3# x30 3#  3x10                     scap punches                      Wall ball rolling                       Modalities

## 2019-11-25 ENCOUNTER — OFFICE VISIT (OUTPATIENT)
Dept: PHYSICAL THERAPY | Facility: CLINIC | Age: 64
End: 2019-11-25
Payer: COMMERCIAL

## 2019-11-25 DIAGNOSIS — M25.512 ACUTE PAIN OF LEFT SHOULDER: Primary | ICD-10-CM

## 2019-11-25 PROCEDURE — 97140 MANUAL THERAPY 1/> REGIONS: CPT | Performed by: PHYSICAL THERAPIST

## 2019-11-25 PROCEDURE — 97110 THERAPEUTIC EXERCISES: CPT | Performed by: PHYSICAL THERAPIST

## 2019-11-25 PROCEDURE — 97530 THERAPEUTIC ACTIVITIES: CPT | Performed by: PHYSICAL THERAPIST

## 2019-11-25 NOTE — PROGRESS NOTES
Daily Note     Today's date: 2019  Patient name: Howard Rainey  : 1955  MRN: [id number]  Referring provider: John Verma MD  Dx:   No diagnosis found  Subjective: Pt reports that he continues to have pain but that today "it's tolerable"    Objective: See treatment diary below  Assessment: Pt is comfortable t/o therex but continues to have pain with end range motion during manual stretching      Plan: Continue treatment as per PT plan of care       Daily Treatment Diary    Precautions: clavicle fracture 2019, chronic LBP     Manuals 10/30 11/4 11/6 11/12 11/14 11/18 11/20 11/25      Lumbar PA's                    L shoulder PROM 10' 10' 10' 10' 10'  10' 10' 10'                                  Exercises                    bike  scifit  x10  scifit  x10  scifit  x10  10' 10'  10' 10' 10'      Shoulder flex  3#x30   3# x30 3#x30 3#x30  3#x30 3#  3x10 3#x30      pulleys 5"x20 5"x20 5"x20 20 20  x20 5"x20 5"x20      Wand scap retraction with ext 5"x20 5"x20 5"x20 5"x20 5"x20  5"x20 5"x20 5"x20      rows black  3x10 black  3x10 black  3x10 Black tb x30 x30  black  x30 black  3x10 Black tb x30      Tb ir/er black  3x10 black  3x10 black  3x10 Black tb x30 x30  black  x30 black  3x10 Black tb x30      Supine wand flexion 5"x20 5"x20 5"x20 5"x20 5"x20  5"x20 5"x20 5"x20      ube NP NP NP              Low rows black  3x10 black  3x10 black  3x10 Black tb x30 30 Black  x30 black  3x10 Black tb x30      Bicep curl  5#x30 8#  3x10 8#  3x10 8#x30 8#x30  5#x30 5#  3x10 5#x30      Shoulder flexion  3#x30  3#x30 3#x30 3# x30 3#  3x10 3#x30                    scap punches                      Wall ball rolling                       Modalities

## 2019-11-27 ENCOUNTER — OFFICE VISIT (OUTPATIENT)
Dept: PHYSICAL THERAPY | Facility: CLINIC | Age: 64
End: 2019-11-27
Payer: COMMERCIAL

## 2019-11-27 DIAGNOSIS — M25.512 ACUTE PAIN OF LEFT SHOULDER: Primary | ICD-10-CM

## 2019-11-27 PROCEDURE — 97110 THERAPEUTIC EXERCISES: CPT

## 2019-11-27 PROCEDURE — 97140 MANUAL THERAPY 1/> REGIONS: CPT

## 2019-11-27 PROCEDURE — 97530 THERAPEUTIC ACTIVITIES: CPT

## 2019-11-27 NOTE — PROGRESS NOTES
Daily Note     Today's date: 2019  Patient name: Meghan Garza  : 1955  MRN: [id number]  Referring provider: Kehinde Lucia MD  Dx:   Encounter Diagnosis     ICD-10-CM    1  Acute pain of left shoulder M25 512                 Subjective: Patient complains of an ache in the left shoulder this morning  Patient states, "I was out early this morning because I had to take my friend to work "  Patient reports he also had to go grocery shopping  Objective: See treatment diary below  Assessment: Therapeutic exercise program is tolerated well without complaints  Left shoulder PROM improves with the manual stretching  Plan: Continue treatment as per PT plan of care       Daily Treatment Diary    Precautions: clavicle fracture 2019, chronic LBP     Manuals 10/30 11/4 11/6 11/12 11/14 11/18 11/20 11/25 11/27     Lumbar PA's                    L shoulder PROM 10' 10' 10' 10' 10'  10' 10' 10' 10'                                 Exercises                    bike  scifit  x10  scifit  x10  scifit  x10  10' 10'  10' 10' 10' 10'     Shoulder flex  3#x30   3# x30 3#x30 3#x30  3#x30 3#  3x10 3#x30 3#  3x10     pulleys 5"x20 5"x20 5"x20 20 20  x20 5"x20 5"x20 5"x20     Wand scap retraction with ext 5"x20 5"x20 5"x20 5"x20 5"x20  5"x20 5"x20 5"x20 5"x20     rows black  3x10 black  3x10 black  3x10 Black tb x30 x30  black  x30 black  3x10 Black tb x30 Black  3x10     Tb ir/er black  3x10 black  3x10 black  3x10 Black tb x30 x30  black  x30 black  3x10 Black tb x30 Black  3x10     Supine wand flexion 5"x20 5"x20 5"x20 5"x20 5"x20  5"x20 5"x20 5"x20 5"x20     ube NP NP NP              Low rows black  3x10 black  3x10 black  3x10 Black tb x30 30 Black  x30 black  3x10 Black tb x30 Black  3x10     Bicep curl  5#x30 8#  3x10 8#  3x10 8#x30 8#x30  5#x30 5#  3x10 5#x30 5#  3x10                                 scap punches                      Wall ball rolling                       Modalities

## 2019-12-02 ENCOUNTER — EVALUATION (OUTPATIENT)
Dept: PHYSICAL THERAPY | Facility: CLINIC | Age: 64
End: 2019-12-02
Payer: COMMERCIAL

## 2019-12-02 DIAGNOSIS — M25.512 ACUTE PAIN OF LEFT SHOULDER: Primary | ICD-10-CM

## 2019-12-02 PROCEDURE — 97530 THERAPEUTIC ACTIVITIES: CPT | Performed by: PHYSICAL THERAPIST

## 2019-12-02 PROCEDURE — 97110 THERAPEUTIC EXERCISES: CPT | Performed by: PHYSICAL THERAPIST

## 2019-12-02 PROCEDURE — 97140 MANUAL THERAPY 1/> REGIONS: CPT | Performed by: PHYSICAL THERAPIST

## 2019-12-02 NOTE — PROGRESS NOTES
PT Re-Evaluation     Today's date: 2019  Patient name: Jesse Martinez  : 1955  MRN: [id number]  Referring provider: Teresita Homans, MD  Dx:   Encounter Diagnosis     ICD-10-CM    1  Acute pain of left shoulder M25 512                   Assessment  Assessment details: Pt is being treated with outpatient PT  He has  made slow gains in rom, strength, pain reduction and functional mobility but continues to have deficits  He will benefit from continued skilled PT to address these deficits  Thank you for this referral                           Understanding of Dx/Px/POC: excellent   Prognosis: good    Goals  Short Term Goals: Independent performance of initial hep-met  Decrease pain 2 points on VAS-met      Long Term Goals: Independent performance of comprehensive hep  Work performance is returned to max level of function  Performance of IADL's is returned to max level of function  Performance in recreational activities is improved to max level of function  Able to reach overhead painfree      Plan  Planned therapy interventions: abdominal trunk stabilization, ADL retraining, IADL retraining, joint mobilization, manual therapy, massage, neuromuscular re-education, postural training, strengthening, stretching, therapeutic activities, therapeutic exercise and home exercise program  Frequency: 2x week  Duration in weeks: 4        Subjective Evaluation    History of Present Illness  Mechanism of injury: Pt reports that he continues to slowly improve but that he continues to feel "very stiff" first thing in the morning and that he continues to have pain when reaching overhead into his cabinets as well as behind his head and behind his back during self-care activities        Pt is R handed  Pain  Current pain ratin  At best pain ratin  At worst pain ratin    Patient Goals  Patient goals for therapy: decreased pain, increased motion, increased strength, independence with ADLs/IADLs, return to sport/leisure activities and return to work          Objective       L shoulder:           AROM:   Flexion: 156   Abduction: 149       PROM:   Flexion: 171   Abduction: 168   ER:  52   IR: 40    Strength:    Flexion: 4+ /5   Abduction:  4 /5   ER:   4 /5   IR:    5-/5          F[place]: neg  Neer: neg  Empty can: neg  Painful arc: pos  Scour: neg  Biceps load: neg      Precautions: clavicle fracture 6/2019, chronic LBP     Manuals 10/30 11/4 11/6 11/12 11/14 11/18 11/20 11/25 11/27 12/2    Lumbar PA's                    L shoulder PROM 10' 10' 10' 10' 10'  10' 10' 10' 10' 10'                                Exercises                    bike  scifit  x10  scifit  x10  scifit  x10  10' 10'  10' 10' 10' 10' 10'    Shoulder flex  3#x30   3# x30 3#x30 3#x30  3#x30 3#  3x10 3#x30 3#  3x10 3#x30    pulleys 5"x20 5"x20 5"x20 20 20  x20 5"x20 5"x20 5"x20 5"x20    Wand scap retraction with ext 5"x20 5"x20 5"x20 5"x20 5"x20  5"x20 5"x20 5"x20 5"x20 5"x20    rows black  3x10 black  3x10 black  3x10 Black tb x30 x30  black  x30 black  3x10 Black tb x30 Black  3x10 Black tb x30    Tb ir/er black  3x10 black  3x10 black  3x10 Black tb x30 x30  black  x30 black  3x10 Black tb x30 Black  3x10 Black tb x30    Supine wand flexion 5"x20 5"x20 5"x20 5"x20 5"x20  5"x20 5"x20 5"x20 5"x20 5"x20    ube NP NP NP              Low rows black  3x10 black  3x10 black  3x10 Black tb x30 30 Black  x30 black  3x10 Black tb x30 Black  3x10 Black x30    Bicep curl  5#x30 8#  3x10 8#  3x10 8#x30 8#x30  5#x30 5#  3x10 5#x30 5#  3x10 5%x30                                scap punches                      Wall ball rolling                       Modalities

## 2019-12-04 ENCOUNTER — OFFICE VISIT (OUTPATIENT)
Dept: PHYSICAL THERAPY | Facility: CLINIC | Age: 64
End: 2019-12-04
Payer: COMMERCIAL

## 2019-12-04 DIAGNOSIS — M25.512 ACUTE PAIN OF LEFT SHOULDER: Primary | ICD-10-CM

## 2019-12-04 PROCEDURE — 97110 THERAPEUTIC EXERCISES: CPT | Performed by: PHYSICAL THERAPIST

## 2019-12-04 PROCEDURE — 97530 THERAPEUTIC ACTIVITIES: CPT | Performed by: PHYSICAL THERAPIST

## 2019-12-04 PROCEDURE — 97140 MANUAL THERAPY 1/> REGIONS: CPT | Performed by: PHYSICAL THERAPIST

## 2019-12-04 NOTE — PROGRESS NOTES
Daily Note     Today's date: 2019  Patient name: Norma Love  : 1955  MRN: [id number]  Referring provider: Andrew Gannon MD  Dx:   Encounter Diagnosis     ICD-10-CM    1  Acute pain of left shoulder M25 512                   Subjective: Pt reports min pain upon arrival to PT today but that he continues to feel stiff first thing in the morning      Objective: See treatment diary below      Assessment: Progressed therex as listed with no complaints,       Plan: Continue per plan of care        Daily Treatment Diary    Precautions: clavicle fracture 2019, chronic LBP     Manuals 10/30 11/4 11/6 11/12 11/14 11/18 11/20 11/25 12/4     Lumbar PA's                    L shoulder PROM 10' 10' 10' 10' 10'  10' 10' 10' 10'                                 Exercises                    bike  scifit  x10  scifit  x10  scifit  x10  10' 10'  10' 10' 10' 10'     Shoulder flex  3#x30   3# x30 3#x30 3#x30  3#x30 3#  3x10 3#x30 3#  3x10     pulleys 5"x20 5"x20 5"x20 20 20  x20 5"x20 5"x20 5"x20     Wand scap retraction with ext 5"x20 5"x20 5"x20 5"x20 5"x20  5"x20 5"x20 5"x20 5"x20     rows black  3x10 black  3x10 black  3x10 Black tb x30 x30  black  x30 black  3x10 Black tb x30 Black  3x10     Tb ir/er black  3x10 black  3x10 black  3x10 Black tb x30 x30  black  x30 black  3x10 Black tb x30 Black  3x10     Supine wand flexion 5"x20 5"x20 5"x20 5"x20 5"x20  5"x20 5"x20 5"x20 5"x20     ube NP NP NP              Low rows black  3x10 black  3x10 black  3x10 Black tb x30 30 Black  x30 black  3x10 Black tb x30 Black  3x10     Bicep curl  5#x30 8#  3x10 8#  3x10 8#x30 8#x30  5#x30 5#  3x10 5#x30 5#  3x10                                 scap punches                      Wall ball rolling                       Modalities

## 2019-12-06 ENCOUNTER — HOSPITAL ENCOUNTER (OUTPATIENT)
Dept: NON INVASIVE DIAGNOSTICS | Facility: HOSPITAL | Age: 64
Discharge: HOME | End: 2019-12-06
Attending: INTERNAL MEDICINE
Payer: COMMERCIAL

## 2019-12-06 DIAGNOSIS — I71.2 ASCENDING AORTIC ANEURYSM (HCC): ICD-10-CM

## 2019-12-06 PROCEDURE — 93306 TTE W/DOPPLER COMPLETE: CPT

## 2019-12-06 PROCEDURE — 93306 TTE W/DOPPLER COMPLETE: CPT | Performed by: INTERNAL MEDICINE

## 2019-12-09 ENCOUNTER — OFFICE VISIT (OUTPATIENT)
Dept: NEPHROLOGY | Facility: CLINIC | Age: 64
End: 2019-12-09
Payer: COMMERCIAL

## 2019-12-09 VITALS
HEART RATE: 68 BPM | SYSTOLIC BLOOD PRESSURE: 128 MMHG | WEIGHT: 150.2 LBS | RESPIRATION RATE: 16 BRPM | HEIGHT: 67 IN | DIASTOLIC BLOOD PRESSURE: 80 MMHG | BODY MASS INDEX: 23.57 KG/M2

## 2019-12-09 DIAGNOSIS — N18.30 CKD (CHRONIC KIDNEY DISEASE), STAGE III (HCC): Primary | ICD-10-CM

## 2019-12-09 DIAGNOSIS — R33.9 URINARY RETENTION: ICD-10-CM

## 2019-12-09 DIAGNOSIS — E78.5 HYPERLIPIDEMIA, UNSPECIFIED HYPERLIPIDEMIA TYPE: ICD-10-CM

## 2019-12-09 DIAGNOSIS — I10 ESSENTIAL HYPERTENSION: ICD-10-CM

## 2019-12-09 DIAGNOSIS — I95.1 ORTHOSTATIC HYPOTENSION: ICD-10-CM

## 2019-12-09 DIAGNOSIS — I73.9 PAD (PERIPHERAL ARTERY DISEASE) (HCC): ICD-10-CM

## 2019-12-09 PROCEDURE — 99214 OFFICE O/P EST MOD 30 MIN: CPT | Performed by: INTERNAL MEDICINE

## 2019-12-09 NOTE — PROGRESS NOTES
OFFICE FOLLOW UP - Nephrology   Teodora Saravia 75 y o  male MRN: [id number]       ASSESSMENT and PLAN:  Ivan Smalls was seen today for follow-up and chronic kidney disease  Diagnoses and all orders for this visit:    CKD (chronic kidney disease), stage III ([facility] [address] )    Urinary retention    PAD (peripheral artery disease) ([facility] )    Essential hypertension    Orthostatic hypotension    Hyperlipidemia, unspecified hyperlipidemia type        This is a 57-year-old gentleman with known history of chronic kidney disease stage 3, hypertension, orthostatic hypotension, multiple syncopal episodes, who returns to the office for follow-up  1  Chronic kidney disease stage 3, previous creatinine fluctuating around 1 3-1 8  Patient did not have recent blood test since May  I would like him to have repeat blood and urine test now, based on the results renal function remains stable I would like to see him back in the office in 6 months  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, no nephrotic range proteinuria  Avoid NSAIDs  Okay to take Tylenol or Gabapentin for pain  2  Hypertension with prior episodes of orthostatic hypotension  Blood pressure today well controlled  Currently only taking lisinopril 20 mg 1 tablet daily  Next  Advised to stay well-hydrated  Recommend to keep using compression stocking  Noted that patient is also on Flomax for urinary retention that can exacerbate orthostatic hypotension  3  Urinary retention, continue with chronic straight catheterization 4 times a day  Patient is also on Flomax  Continue follow-up with Urology  4  Anemia, neutropenia, iron deficiency  Had a urine electrophoresis that did not show any monoclonal bands  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  Currently follow-up with Hematology  Most recent hemoglobin normalized    5   Mineral bone disease, prior PTH well controlled, will follow labs in 6 months  HPI: Amaury Lopez is a 75 y o  male who is here for Follow-up and Chronic Kidney Disease      Patient with known history of hypertension, chronic kidney disease stage 3, orthostatic hypotension, multiple episodes of syncope episodes at work who returns to our office for follow-up  Previous episodes of syncope December 2018 and a 2nd syncopal episode on January 9, 2019 day after he had a colonoscopy  Previously working in a hot environment, used to work in [employer] but is currently retired  Last time seen was in May 2019  Since last visit, he went to the ER on 06/29 after fall and injured over right shoulder  He went back to the ER on 09/25 after bee sting over right hand    Patient currently has no complaints at this time and is feeling well  Patient denies any chest pain, shortness of breath and swelling  Continues with chronic bilateral leg pain as well lower back pain for what he takes Neurontin and Tylenol  Continues with straight catheterization 3-4 times a day "as needed"  Denies NSAID use  Currently taking lisinopril 20 mg 1 tablet, he is not taking amlodipine eanymore  He did not do repeat labs as instructed  ROS: All the systems were reviewed and were negative except as documented on the H&P  Allergies: Patient has no known allergies      Medications:   Current Outpatient Medications:     gabapentin (NEURONTIN) 300 mg capsule, Take 300 mg by mouth daily , Disp: , Rfl:     lisinopril (ZESTRIL) 20 mg tablet, Take 20 mg by mouth daily , Disp: , Rfl:     pregabalin (LYRICA) 150 mg capsule, 150 mg daily as needed , Disp: , Rfl:     tamsulosin (FLOMAX) 0 4 mg, Take 0 4 mg by mouth daily with dinner  , Disp: , Rfl:     aspirin (ECOTRIN LOW STRENGTH) 81 mg EC tablet, Take 1 tablet (81 mg total) by mouth daily (Patient not taking: Reported on 12/9/2019), Disp: , Rfl:     atorvastatin (LIPITOR) 20 mg tablet, 10 mg daily , Disp: , Rfl:     Past Medical History: Diagnosis Date    Aortic aneurysm (HCC)     Arthritis     Atypical chest pain     Back pain     Chronic kidney disease     stg 3    Elevated ALT measurement     Elevated AST (SGOT)     Hyperlipidemia     Hypertension     Leg pain     Neurogenic claudication     Urinary retention     catherize self 4x/day    Urinary retention      Past Surgical History:   Procedure Laterality Date    EPIDURAL BLOCK INJECTION N/A 1/23/2017    Procedure: BLOCK / INJECTION EPIDURAL STEROID LUMBAR;  Surgeon: Ezra Graham MD;  Location: [facility];  Service:     SC COLONOSCOPY FLX DX W/COLLJ SPEC WHEN PFRMD N/A 1/8/2019    Procedure: COLONOSCOPY;  Surgeon: Einar Buerger, MD;  Location: [facility]; Service: Colorectal    SC LAMNOTMY INCL W/DCMPRSN NRV ROOT 1 INTRSPC LUMBR Bilateral 1/23/2017    Procedure: MIS L2-3 & L3-4 LAMINAL FORAMINOTOMIES AND MICRODISCECTOMY W LEFT SIDED APPROACH ;  Surgeon: Ezra Graham MD;  Location[facility];  Service: Neurosurgery     Family History   Problem Relation Age of Onset    Heart disease Sister     Stroke Sister    Emilia Rojas Sudden death Mother     Stroke Brother     Pancreatic cancer Sister     No Known Problems Father     No Known Problems Sister     No Known Problems Sister     No Known Problems Son       reports that he has never smoked  He has never used smokeless tobacco  He reports that he does not drink alcohol or use drugs  Physical Exam:   Vitals:    12/09/19 0952   BP: 128/80   BP Location: Left arm   Patient Position: Sitting   Pulse: 68   Resp: 16   Weight: 68 1 kg (150 lb 3 2 oz)   Height: 5' 7" (1 702 m)     Body mass index is 23 52 kg/m²      General: conscious, cooperative, in not acute distress  Eyes: conjunctivae pink, anicteric sclerae  ENT: lips and mucous membranes moist  Neck: supple, no JVD  Chest: clear breath sounds bilateral, no crackles, ronchus or wheezings  CVS: distinct S1 & S2, normal rate, regular rhythm  Abdomen: soft, non-tender, non-distended, normoactive bowel sounds  Back: no CVA tenderness  Extremities: no edema of both legs  Skin: no rash  Neuro: awake, alert, oriented          Lab Results:  Results for orders placed or performed in visit on 09/06/19   CBC and differential   Result Value Ref Range    WBC 5 28 4 31 - 10 16 Thousand/uL    RBC 4 22 3 88 - 5 62 Million/uL    Hemoglobin 13 1 12 0 - 17 0 g/dL    Hematocrit 41 2 36 5 - 49 3 %    MCV 98 82 - 98 fL    MCH 31 0 26 8 - 34 3 pg    MCHC 31 8 31 4 - 37 4 g/dL    RDW 15 9 (H) 11 6 - 15 1 %    MPV 10 4 8 9 - 12 7 fL    Platelets 597 724 - 269 Thousands/uL    nRBC 0 /100 WBCs    Neutrophils Relative 52 43 - 75 %    Immat GRANS % 0 0 - 2 %    Lymphocytes Relative 31 14 - 44 %    Monocytes Relative 13 (H) 4 - 12 %    Eosinophils Relative 3 0 - 6 %    Basophils Relative 1 0 - 1 %    Neutrophils Absolute 2 68 1 85 - 7 62 Thousands/µL    Immature Grans Absolute 0 02 0 00 - 0 20 Thousand/uL    Lymphocytes Absolute 1 66 0 60 - 4 47 Thousands/µL    Monocytes Absolute 0 68 0 17 - 1 22 Thousand/µL    Eosinophils Absolute 0 17 0 00 - 0 61 Thousand/µL    Basophils Absolute 0 07 0 00 - 0 10 Thousands/µL   Iron Saturation %   Result Value Ref Range    Iron Saturation 27 %    TIBC 401 250 - 450 ug/dL    Iron 109 65 - 175 ug/dL   Ferritin   Result Value Ref Range    Ferritin 202 8 - 388 ng/mL         Portions of the record may have been created with voice recognition software  Occasional wrong word or "sound a like" substitutions may have occurred due to the inherent limitations of voice recognition software  Read the chart carefully and recognize, using context, where substitutions have occurred  If you have any questions, please contact the dictating provider

## 2019-12-09 NOTE — PATIENT INSTRUCTIONS
Please go to the lab for repeat blood and urine as well as possible, will contact you with the results  If your kidney function remains stable I would like to see you back in the office in 6 months  Avoid NSAIDs (NO ibuprofen, Motrin, Advil, Aleve, naproxen)  Okay to take Tylenol or paracetamol or acetaminophen as needed for pain  Stay well-hydrated  Recommend to continues wearing compression stockings especially when you are going to be ambulating more than usual   Continue checking her blood pressure at home  Continue with self catheterization at least 3 to 4 times a day

## 2019-12-11 ENCOUNTER — OFFICE VISIT (OUTPATIENT)
Dept: PHYSICAL THERAPY | Facility: CLINIC | Age: 64
End: 2019-12-11
Payer: COMMERCIAL

## 2019-12-11 DIAGNOSIS — M25.512 ACUTE PAIN OF LEFT SHOULDER: Primary | ICD-10-CM

## 2019-12-11 PROCEDURE — 97530 THERAPEUTIC ACTIVITIES: CPT | Performed by: PHYSICAL THERAPIST

## 2019-12-11 PROCEDURE — 97140 MANUAL THERAPY 1/> REGIONS: CPT | Performed by: PHYSICAL THERAPIST

## 2019-12-11 PROCEDURE — 97110 THERAPEUTIC EXERCISES: CPT | Performed by: PHYSICAL THERAPIST

## 2019-12-11 NOTE — PROGRESS NOTES
Daily Note     Today's date: 2019  Patient name: Lety Castillo  : 1955  MRN: [id number]  Referring provider: Rafy Brantley MD  Dx:   Encounter Diagnosis     ICD-10-CM    1  Acute pain of left shoulder M25 512                   Subjective: Pt reports "it feels ok but it still so stiff"      Objective: See treatment diary below      Assessment: rom improved with manual tx but continues to be limited in all planes, no pain reported with therex but reports fatigue  Plan: Continue per plan of care        Daily Treatment Diary    Precautions: clavicle fracture 2019, chronic LBP     Manuals 10/30 11/4 11/6 11/12 11/14 11/18 11/20 11/25 12/4 12/11    Lumbar PA's                    L shoulder PROM 10' 10' 10' 10' 10'  10' 10' 10 10 10'                                Exercises                    bike  scifit  x10  scifit  x10  scifit  x10  10' 10'  10' 10' 10 10 10'    Shoulder flex  3#x30   3# x30 3#x30 3#x30  3#x30 3#  3x10 3#x30 3#  3x10 3#x30    pulleys 5"x20 5"x20 5"x20 20 20  x20 5"x20 5"x20 5"x20 5"x20    Wand scap retraction with ext 5"x20 5"x20 5"x20 5"x20 5"x20  5"x20 5"x20 5"x20 5"x20 5"x20    rows black  3x10 black  3x10 black  3x10 Black tb x30 x30  black  x30 black  3x10 Black tb x30 Black  3x10 30    Tb ir/er black  3x10 black  3x10 black  3x10 Black tb x30 x30  black  x30 black  3x10 Black tb x30 Black  3x10 30    Supine wand flexion 5"x20 5"x20 5"x20 5"x20 5"x20  5"x20 5"x20 5"x20 5"x20 5"x20    ube NP NP NP              Low rows black  3x10 black  3x10 black  3x10 Black tb x30 30 Black  x30 black  3x10 Black tb x30 Black  3x10 30    Bicep curl  5#x30 8#  3x10 8#  3x10 8#x30 8#x30  5#x30 5#  3x10 5#x30 5#  3x10 30                                scap punches                      Wall ball rolling                       Modalities

## 2019-12-12 ENCOUNTER — TELEPHONE (OUTPATIENT)
Dept: NEPHROLOGY | Facility: CLINIC | Age: 64
End: 2019-12-12

## 2019-12-12 ENCOUNTER — APPOINTMENT (OUTPATIENT)
Dept: LAB | Facility: HOSPITAL | Age: 64
End: 2019-12-12
Attending: INTERNAL MEDICINE
Payer: COMMERCIAL

## 2019-12-12 DIAGNOSIS — N18.30 CKD (CHRONIC KIDNEY DISEASE), STAGE III (HCC): Primary | ICD-10-CM

## 2019-12-12 DIAGNOSIS — N18.30 CKD (CHRONIC KIDNEY DISEASE), STAGE III (HCC): ICD-10-CM

## 2019-12-12 LAB
ALBUMIN SERPL BCP-MCNC: 3.5 G/DL (ref 3.5–5)
ANION GAP SERPL CALCULATED.3IONS-SCNC: 7 MMOL/L (ref 4–13)
BUN SERPL-MCNC: 35 MG/DL (ref 5–25)
CALCIUM SERPL-MCNC: 9 MG/DL (ref 8.3–10.1)
CHLORIDE SERPL-SCNC: 112 MMOL/L (ref 100–108)
CO2 SERPL-SCNC: 23 MMOL/L (ref 21–32)
CREAT SERPL-MCNC: 1.63 MG/DL (ref 0.6–1.3)
CREAT UR-MCNC: 79.5 MG/DL
GFR SERPL CREATININE-BSD FRML MDRD: 50 ML/MIN/1.73SQ M
GLUCOSE P FAST SERPL-MCNC: 119 MG/DL (ref 65–99)
PHOSPHATE SERPL-MCNC: 3.3 MG/DL (ref 2.3–4.1)
POTASSIUM SERPL-SCNC: 4.1 MMOL/L (ref 3.5–5.3)
PROT UR-MCNC: 56 MG/DL
PROT/CREAT UR: 0.7 MG/G{CREAT} (ref 0–0.1)
SODIUM SERPL-SCNC: 142 MMOL/L (ref 136–145)

## 2019-12-12 PROCEDURE — 82570 ASSAY OF URINE CREATININE: CPT

## 2019-12-12 PROCEDURE — 84156 ASSAY OF PROTEIN URINE: CPT

## 2019-12-12 PROCEDURE — 80069 RENAL FUNCTION PANEL: CPT

## 2019-12-12 PROCEDURE — 36415 COLL VENOUS BLD VENIPUNCTURE: CPT

## 2019-12-12 NOTE — TELEPHONE ENCOUNTER
Repeat blood and urine test reviewed, renal function is stable with a creatinine of 1 6, proteinuria slightly increased at 0 7 but overall acceptable  I called patient back, left a message regarding stable repeat blood and urine test   I would like to see him back in the office in 6 months with repeat labs (orders placed)  Please call patient and convey same message and mail order for repeat blood test before next appointment    Thanks,

## 2019-12-12 NOTE — TELEPHONE ENCOUNTER
Patient called to inform Dr Higgins that he went for his labs this Thursday morning 12/12/19  Please contact patient with results at [phone]

## 2019-12-16 ENCOUNTER — OFFICE VISIT (OUTPATIENT)
Dept: PHYSICAL THERAPY | Facility: CLINIC | Age: 64
End: 2019-12-16
Payer: COMMERCIAL

## 2019-12-16 DIAGNOSIS — M25.512 ACUTE PAIN OF LEFT SHOULDER: Primary | ICD-10-CM

## 2019-12-16 PROCEDURE — 97140 MANUAL THERAPY 1/> REGIONS: CPT | Performed by: PHYSICAL THERAPIST

## 2019-12-16 PROCEDURE — 97530 THERAPEUTIC ACTIVITIES: CPT | Performed by: PHYSICAL THERAPIST

## 2019-12-16 PROCEDURE — 97110 THERAPEUTIC EXERCISES: CPT | Performed by: PHYSICAL THERAPIST

## 2019-12-16 NOTE — PROGRESS NOTES
Daily Note     Today's date: 2019  Patient name: Ricardo Alexander  : 1955  MRN: [id number]  Referring provider: Francisca Deleon MD  Dx:   Encounter Diagnosis     ICD-10-CM    1  Acute pain of left shoulder M25 512                   Subjective: Pt reports "it feels ok but it still so stiff"      Objective: See treatment diary below      Assessment: rom improved with manual but denies pain, continues to fatigue with therex but denies pain  Plan: Continue per plan of care        Daily Treatment Diary    Precautions: clavicle fracture 2019, chronic LBP     Manuals              Lumbar PA's               L shoulder PROM 10'                                         Exercises               bike  scifit  x10             Shoulder flex  3#x30             pulleys 5"x20             Wand scap retraction with ext 5"x20             rows black  3x10             Tb ir/er black  3x10             Supine wand flexion 5"x20             ube NP             Low rows black  3x10             Bicep curl  5#x30                                         scap punches              Wall ball rolling               Modalities

## 2019-12-18 ENCOUNTER — OFFICE VISIT (OUTPATIENT)
Dept: PHYSICAL THERAPY | Facility: CLINIC | Age: 64
End: 2019-12-18
Payer: COMMERCIAL

## 2019-12-18 DIAGNOSIS — M25.512 ACUTE PAIN OF LEFT SHOULDER: Primary | ICD-10-CM

## 2019-12-18 PROCEDURE — 97110 THERAPEUTIC EXERCISES: CPT

## 2019-12-18 PROCEDURE — 97530 THERAPEUTIC ACTIVITIES: CPT

## 2019-12-18 PROCEDURE — 97140 MANUAL THERAPY 1/> REGIONS: CPT

## 2019-12-18 NOTE — PROGRESS NOTES
Daily Note     Today's date: 2019  Patient name: Devika Veiira  : 1955  MRN: [id number]  Referring provider: Gabriel Shelby MD  Dx:   Encounter Diagnosis     ICD-10-CM    1  Acute pain of left shoulder M25 512                   Subjective: Pt reports that he is doing alright with constant but minimal pain  Notes that he was fatigued following last session but is seeing improvements with therapy each week  Objective: See treatment diary below      Assessment: Continued to focus on shoulder mobility and strengthening this visit  He was able to perform all exercises this session in a pain free range  Pt also demonstrated a tight end range in all shoulder directions with improvements made post PROM  Muscular fatigue to end  Plan: Continue per plan of care        Daily Treatment Diary    Precautions: clavicle fracture 2019, chronic LBP     Manuals             Lumbar PA's               L shoulder PROM 10' 10'                                        Exercises               bike  scifit  x10 scifit x10            Shoulder flex  3#x30 3#x30            pulleys 5"x20 5"x20            Wand scap retraction with ext 5"x20 5"x20            rows black  3x10 Black 3x10            Tb ir/er black  3x10 Black 3x10            Supine wand flexion 5"x20 5"x20            ube NP NP            Low rows black  3x10 Black 3x10            Bicep curl  5#x30 5#x30                                        scap punches              Wall ball rolling               Modalities

## 2019-12-23 ENCOUNTER — OFFICE VISIT (OUTPATIENT)
Dept: PHYSICAL THERAPY | Facility: CLINIC | Age: 64
End: 2019-12-23
Payer: COMMERCIAL

## 2019-12-23 DIAGNOSIS — M25.512 ACUTE PAIN OF LEFT SHOULDER: Primary | ICD-10-CM

## 2019-12-23 PROCEDURE — 97110 THERAPEUTIC EXERCISES: CPT | Performed by: PHYSICAL THERAPIST

## 2019-12-23 PROCEDURE — 97140 MANUAL THERAPY 1/> REGIONS: CPT | Performed by: PHYSICAL THERAPIST

## 2019-12-23 NOTE — PROGRESS NOTES
Daily Note     Today's date: 2019  Patient name: Brandon Carter  : 1955  MRN: [id number]  Referring provider: Pauline Lucero MD  Dx:   No diagnosis found  Subjective: Pt reports that he is having more pain "all over today"  Pt is unable to identify a trigger for this  Reports that he took an extra Gabapentin this morning  Objective: See treatment diary below      Assessment: Pt initially has more pain with manual tx but reports decreased pain post-tx  Plan: Continue per plan of care        Daily Treatment Diary    Precautions: clavicle fracture 2019, chronic LBP     Manuals            Lumbar PA's               L shoulder PROM 10' 10' 10'                                       Exercises               bike  scifit  x10 scifit x10 10'           Shoulder flex  3#x30 3#x30 3#x30           pulleys 5"x20 5"x20 5"x20           Wand scap retraction with ext 5"x20 5"x20 5"x20           rows black  3x10 Black 3x10 Black x30   Cc NV           Tb ir/er black  3x10 Black 3x10 Black tb x30           Supine wand flexion 5"x20 5"x20 5"x20           ube NP NP            Low rows black  3x10 Black 3x10 Black tb x30  Cc NV           Bicep curl  5#x30 5#x30                                        scap punches              Wall ball rolling               Modalities

## 2019-12-26 ENCOUNTER — OFFICE VISIT (OUTPATIENT)
Dept: PHYSICAL THERAPY | Facility: CLINIC | Age: 64
End: 2019-12-26
Payer: COMMERCIAL

## 2019-12-26 DIAGNOSIS — M25.512 ACUTE PAIN OF LEFT SHOULDER: Primary | ICD-10-CM

## 2019-12-26 PROCEDURE — 97110 THERAPEUTIC EXERCISES: CPT | Performed by: PHYSICAL THERAPIST

## 2019-12-26 PROCEDURE — 97140 MANUAL THERAPY 1/> REGIONS: CPT | Performed by: PHYSICAL THERAPIST

## 2019-12-26 NOTE — PROGRESS NOTES
Daily Note     Today's date: 2019  Patient name: Noe Baker  : 1955  MRN: [id number]  Referring provider: Jazmin Monroy MD  Dx:   Encounter Diagnosis     ICD-10-CM    1  Acute pain of left shoulder M25 512                   Subjective: Pt reports he is having less pain then he was prior to his LV         Objective: See treatment diary below      Assessment: better tolerance to manual tx then during LV and increased rom noted post-tx      Plan: Continue per plan of care        Daily Treatment Diary    Precautions: clavicle fracture 2019, chronic LBP     Manuals           Lumbar PA's               L shoulder PROM 10' 10' 10' 10'                                      Exercises               bike  scifit  x10 scifit x10 10' 10'          Shoulder flex  3#x30 3#x30 3#x30 3#x30          pulleys 5"x20 5"x20 5"x20 5"x20          Wand scap retraction with ext 5"x20 5"x20 5"x20 5"x20          rows black  3x10 Black 3x10 Black x30   Cc NV CC 40#x30          Tb ir/er black  3x10 Black 3x10 Black tb x30 Black tb x30          Supine wand flexion 5"x20 5"x20 5"x20 5"x20          ube NP NP            Low rows black  3x10 Black 3x10 Black tb x30  Cc NV 40#x30          Bicep curl  5#x30 5#x30  5#x30                                      scap punches              Wall ball rolling               Modalities

## 2019-12-31 ENCOUNTER — OFFICE VISIT (OUTPATIENT)
Dept: PHYSICAL THERAPY | Facility: CLINIC | Age: 64
End: 2019-12-31
Payer: COMMERCIAL

## 2019-12-31 DIAGNOSIS — M25.512 ACUTE PAIN OF LEFT SHOULDER: Primary | ICD-10-CM

## 2019-12-31 PROCEDURE — 97110 THERAPEUTIC EXERCISES: CPT | Performed by: PHYSICAL THERAPIST

## 2019-12-31 PROCEDURE — 97112 NEUROMUSCULAR REEDUCATION: CPT | Performed by: PHYSICAL THERAPIST

## 2019-12-31 NOTE — PROGRESS NOTES
Daily Note     Today's date: 2019  Patient name: Xiang Varela  : 1955  MRN: [id number]  Referring provider: Hai Miller MD  Dx:   Encounter Diagnosis     ICD-10-CM    1  Acute pain of left shoulder M25 512                   Subjective: "I've been better"  Pt reports he's been sore since last visit and thinks the cable column exercises were pushing it too much  Objective: See treatment diary below      Assessment: Tolerated treatment well  Patient with good recall of program       Plan: Continue per plan of care        Daily Treatment Diary    Precautions: clavicle fracture 2019, chronic LBP     Manuals          Lumbar PA's               L shoulder PROM 10' 10' 10' 10' 10'                                     Exercises               bike  scifit  x10 scifit x10 10' 10' 10'         Shoulder flex  3#x30 3#x30 3#x30 3#x30 3# 30x         pulleys 5"x20 5"x20 5"x20 5"x20 5"x20         Wand scap retraction with ext 5"x20 5"x20 5"x20 5"x20 5"x20         rows black  3x10 Black 3x10 Black x30   Cc NV CC 40#x30 Black 30x         Tb ir/er black  3x10 Black 3x10 Black tb x30 Black tb x30 Black 30x         Supine wand flexion 5"x20 5"x20 5"x20 5"x20 5"x20         ube NP NP            Low rows black  3x10 Black 3x10 Black tb x30  Cc NV 40#x30 Black 30x         Bicep curl  5#x30 5#x30  5#x30 5# 30x                                     scap punches              Wall ball rolling               Modalities

## 2020-01-01 ENCOUNTER — APPOINTMENT (EMERGENCY)
Dept: RADIOLOGY | Facility: HOSPITAL | Age: 65
DRG: 698 | End: 2020-01-01
Payer: COMMERCIAL

## 2020-01-01 ENCOUNTER — HOSPITAL ENCOUNTER (INPATIENT)
Facility: HOSPITAL | Age: 65
LOS: 12 days | Discharge: NURSING, REHAB OR LONG-TERM CARE | DRG: 698 | End: 2021-01-12
Attending: EMERGENCY MEDICINE | Admitting: FAMILY MEDICINE
Payer: COMMERCIAL

## 2020-01-01 DIAGNOSIS — N13.8 OBSTRUCTIVE NEPHROPATHY: ICD-10-CM

## 2020-01-01 DIAGNOSIS — G31.84 MILD COGNITIVE IMPAIRMENT: ICD-10-CM

## 2020-01-01 DIAGNOSIS — E78.5 HYPERLIPIDEMIA, UNSPECIFIED HYPERLIPIDEMIA TYPE: ICD-10-CM

## 2020-01-01 DIAGNOSIS — N17.9 ACUTE RENAL FAILURE (ARF) (HCC): ICD-10-CM

## 2020-01-01 DIAGNOSIS — N39.0 UTI (URINARY TRACT INFECTION): Primary | ICD-10-CM

## 2020-01-01 LAB
ALBUMIN SERPL BCP-MCNC: 3.5 G/DL (ref 3.5–5)
ALP SERPL-CCNC: 67 U/L (ref 46–116)
ALT SERPL W P-5'-P-CCNC: 30 U/L (ref 12–78)
AMMONIA PLAS-SCNC: 18 UMOL/L (ref 11–35)
AMPHETAMINES SERPL QL SCN: NEGATIVE
ANION GAP SERPL CALCULATED.3IONS-SCNC: 14 MMOL/L (ref 4–13)
APAP SERPL-MCNC: <2 UG/ML (ref 10–20)
AST SERPL W P-5'-P-CCNC: 26 U/L (ref 5–45)
BACTERIA UR QL AUTO: ABNORMAL /HPF
BARBITURATES UR QL: NEGATIVE
BASE EX.OXY STD BLDV CALC-SCNC: 73.2 % (ref 60–80)
BASE EXCESS BLDV CALC-SCNC: -11.1 MMOL/L
BASOPHILS # BLD AUTO: 0.04 THOUSANDS/ΜL (ref 0–0.1)
BASOPHILS NFR BLD AUTO: 0 % (ref 0–1)
BENZODIAZ UR QL: NEGATIVE
BILIRUB SERPL-MCNC: 0.52 MG/DL (ref 0.2–1)
BILIRUB UR QL STRIP: NEGATIVE
BUN SERPL-MCNC: 142 MG/DL (ref 5–25)
CALCIUM SERPL-MCNC: 8.9 MG/DL (ref 8.3–10.1)
CHLORIDE SERPL-SCNC: 108 MMOL/L (ref 100–108)
CHOLEST SERPL-MCNC: 138 MG/DL (ref 50–200)
CK MB SERPL-MCNC: 1.1 % (ref 0–2.5)
CK MB SERPL-MCNC: 4.9 NG/ML (ref 0–5)
CK SERPL-CCNC: 447 U/L (ref 39–308)
CLARITY UR: CLEAR
CO2 SERPL-SCNC: 16 MMOL/L (ref 21–32)
COCAINE UR QL: NEGATIVE
COLOR UR: YELLOW
COLOR, POC: NORMAL
CREAT SERPL-MCNC: 5.92 MG/DL (ref 0.6–1.3)
EOSINOPHIL # BLD AUTO: 0.64 THOUSAND/ΜL (ref 0–0.61)
EOSINOPHIL NFR BLD AUTO: 6 % (ref 0–6)
ERYTHROCYTE [DISTWIDTH] IN BLOOD BY AUTOMATED COUNT: 15.1 % (ref 11.6–15.1)
EST. AVERAGE GLUCOSE BLD GHB EST-MCNC: 146 MG/DL
ETHANOL SERPL-MCNC: <3 MG/DL (ref 0–3)
FLUAV RNA RESP QL NAA+PROBE: NEGATIVE
FLUBV RNA RESP QL NAA+PROBE: NEGATIVE
GFR SERPL CREATININE-BSD FRML MDRD: 10 ML/MIN/1.73SQ M
GLUCOSE SERPL-MCNC: 175 MG/DL (ref 65–140)
GLUCOSE UR STRIP-MCNC: NEGATIVE MG/DL
HBA1C MFR BLD: 6.7 %
HCO3 BLDV-SCNC: 14.9 MMOL/L (ref 24–30)
HCT VFR BLD AUTO: 38.9 % (ref 36.5–49.3)
HDLC SERPL-MCNC: 39 MG/DL
HGB BLD-MCNC: 12.3 G/DL (ref 12–17)
HGB UR QL STRIP.AUTO: ABNORMAL
HYALINE CASTS #/AREA URNS LPF: ABNORMAL /LPF
IMM GRANULOCYTES # BLD AUTO: 0.05 THOUSAND/UL (ref 0–0.2)
IMM GRANULOCYTES NFR BLD AUTO: 0 % (ref 0–2)
KETONES UR STRIP-MCNC: NEGATIVE MG/DL
LDLC SERPL CALC-MCNC: 78 MG/DL (ref 0–100)
LEUKOCYTE ESTERASE UR QL STRIP: ABNORMAL
LIPASE SERPL-CCNC: 367 U/L (ref 73–393)
LYMPHOCYTES # BLD AUTO: 1.03 THOUSANDS/ΜL (ref 0.6–4.47)
LYMPHOCYTES NFR BLD AUTO: 9 % (ref 14–44)
MCH RBC QN AUTO: 27.5 PG (ref 26.8–34.3)
MCHC RBC AUTO-ENTMCNC: 31.6 G/DL (ref 31.4–37.4)
MCV RBC AUTO: 87 FL (ref 82–98)
METHADONE UR QL: NEGATIVE
MONOCYTES # BLD AUTO: 1.29 THOUSAND/ΜL (ref 0.17–1.22)
MONOCYTES NFR BLD AUTO: 11 % (ref 4–12)
NEUTROPHILS # BLD AUTO: 8.3 THOUSANDS/ΜL (ref 1.85–7.62)
NEUTS SEG NFR BLD AUTO: 74 % (ref 43–75)
NITRITE UR QL STRIP: POSITIVE
NON-SQ EPI CELLS URNS QL MICRO: ABNORMAL /HPF
NONHDLC SERPL-MCNC: 99 MG/DL
NRBC BLD AUTO-RTO: 0 /100 WBCS
O2 CT BLDV-SCNC: 13.3 ML/DL
OPIATES UR QL SCN: NEGATIVE
OXYCODONE+OXYMORPHONE UR QL SCN: NEGATIVE
PCO2 BLDV: 33.8 MM HG (ref 42–50)
PCP UR QL: NEGATIVE
PH BLDV: 7.26 [PH] (ref 7.3–7.4)
PH UR STRIP.AUTO: 6 [PH] (ref 4.5–8)
PLATELET # BLD AUTO: 201 THOUSANDS/UL (ref 149–390)
PLATELET # BLD AUTO: 247 THOUSANDS/UL (ref 149–390)
PMV BLD AUTO: 11 FL (ref 8.9–12.7)
PMV BLD AUTO: 11 FL (ref 8.9–12.7)
PO2 BLDV: 45.8 MM HG (ref 35–45)
POTASSIUM SERPL-SCNC: 4.1 MMOL/L (ref 3.5–5.3)
PROT SERPL-MCNC: 9.3 G/DL (ref 6.4–8.2)
PROT UR STRIP-MCNC: ABNORMAL MG/DL
RBC # BLD AUTO: 4.48 MILLION/UL (ref 3.88–5.62)
RBC #/AREA URNS AUTO: ABNORMAL /HPF
RSV RNA RESP QL NAA+PROBE: NEGATIVE
SALICYLATES SERPL-MCNC: <3 MG/DL (ref 3–20)
SARS-COV-2 RNA RESP QL NAA+PROBE: NEGATIVE
SODIUM SERPL-SCNC: 138 MMOL/L (ref 136–145)
SP GR UR STRIP.AUTO: 1.02 (ref 1–1.03)
THC UR QL: NEGATIVE
TRIGL SERPL-MCNC: 107 MG/DL
TROPONIN I SERPL-MCNC: <0.02 NG/ML
UROBILINOGEN UR QL STRIP.AUTO: 0.2 E.U./DL
WBC # BLD AUTO: 11.35 THOUSAND/UL (ref 4.31–10.16)
WBC #/AREA URNS AUTO: ABNORMAL /HPF

## 2020-01-01 PROCEDURE — 70450 CT HEAD/BRAIN W/O DYE: CPT

## 2020-01-01 PROCEDURE — 99285 EMERGENCY DEPT VISIT HI MDM: CPT | Performed by: EMERGENCY MEDICINE

## 2020-01-01 PROCEDURE — 82140 ASSAY OF AMMONIA: CPT | Performed by: EMERGENCY MEDICINE

## 2020-01-01 PROCEDURE — 80053 COMPREHEN METABOLIC PANEL: CPT | Performed by: EMERGENCY MEDICINE

## 2020-01-01 PROCEDURE — G1004 CDSM NDSC: HCPCS

## 2020-01-01 PROCEDURE — 82550 ASSAY OF CK (CPK): CPT | Performed by: EMERGENCY MEDICINE

## 2020-01-01 PROCEDURE — 80329 ANALGESICS NON-OPIOID 1 OR 2: CPT | Performed by: EMERGENCY MEDICINE

## 2020-01-01 PROCEDURE — 81001 URINALYSIS AUTO W/SCOPE: CPT

## 2020-01-01 PROCEDURE — 87077 CULTURE AEROBIC IDENTIFY: CPT

## 2020-01-01 PROCEDURE — 82553 CREATINE MB FRACTION: CPT | Performed by: EMERGENCY MEDICINE

## 2020-01-01 PROCEDURE — 96365 THER/PROPH/DIAG IV INF INIT: CPT

## 2020-01-01 PROCEDURE — 85025 COMPLETE CBC W/AUTO DIFF WBC: CPT | Performed by: EMERGENCY MEDICINE

## 2020-01-01 PROCEDURE — 36415 COLL VENOUS BLD VENIPUNCTURE: CPT | Performed by: EMERGENCY MEDICINE

## 2020-01-01 PROCEDURE — 87086 URINE CULTURE/COLONY COUNT: CPT

## 2020-01-01 PROCEDURE — 74176 CT ABD & PELVIS W/O CONTRAST: CPT

## 2020-01-01 PROCEDURE — 82805 BLOOD GASES W/O2 SATURATION: CPT | Performed by: EMERGENCY MEDICINE

## 2020-01-01 PROCEDURE — 0T9B70Z DRAINAGE OF BLADDER WITH DRAINAGE DEVICE, VIA NATURAL OR ARTIFICIAL OPENING: ICD-10-PCS | Performed by: EMERGENCY MEDICINE

## 2020-01-01 PROCEDURE — 83690 ASSAY OF LIPASE: CPT | Performed by: EMERGENCY MEDICINE

## 2020-01-01 PROCEDURE — 73590 X-RAY EXAM OF LOWER LEG: CPT

## 2020-01-01 PROCEDURE — 99285 EMERGENCY DEPT VISIT HI MDM: CPT

## 2020-01-01 PROCEDURE — 93005 ELECTROCARDIOGRAM TRACING: CPT

## 2020-01-01 PROCEDURE — 80307 DRUG TEST PRSMV CHEM ANLYZR: CPT | Performed by: EMERGENCY MEDICINE

## 2020-01-01 PROCEDURE — 71250 CT THORAX DX C-: CPT

## 2020-01-01 PROCEDURE — 0241U HB NFCT DS VIR RESP RNA 4 TRGT: CPT | Performed by: EMERGENCY MEDICINE

## 2020-01-01 PROCEDURE — 83036 HEMOGLOBIN GLYCOSYLATED A1C: CPT | Performed by: FAMILY MEDICINE

## 2020-01-01 PROCEDURE — 85049 AUTOMATED PLATELET COUNT: CPT | Performed by: FAMILY MEDICINE

## 2020-01-01 PROCEDURE — 87186 SC STD MICRODIL/AGAR DIL: CPT

## 2020-01-01 PROCEDURE — 80061 LIPID PANEL: CPT | Performed by: FAMILY MEDICINE

## 2020-01-01 PROCEDURE — 84484 ASSAY OF TROPONIN QUANT: CPT | Performed by: EMERGENCY MEDICINE

## 2020-01-01 PROCEDURE — 80320 DRUG SCREEN QUANTALCOHOLS: CPT | Performed by: EMERGENCY MEDICINE

## 2020-01-01 RX ORDER — SODIUM CHLORIDE 9 MG/ML
3 INJECTION INTRAVENOUS
Status: DISCONTINUED | OUTPATIENT
Start: 2020-01-01 | End: 2021-01-01 | Stop reason: HOSPADM

## 2020-01-01 RX ORDER — ASPIRIN 81 MG/1
81 TABLET ORAL DAILY
Status: DISCONTINUED | OUTPATIENT
Start: 2021-01-01 | End: 2021-01-01 | Stop reason: HOSPADM

## 2020-01-01 RX ORDER — HEPARIN SODIUM 5000 [USP'U]/ML
5000 INJECTION, SOLUTION INTRAVENOUS; SUBCUTANEOUS EVERY 8 HOURS SCHEDULED
Status: DISCONTINUED | OUTPATIENT
Start: 2020-01-01 | End: 2021-01-01

## 2020-01-01 RX ORDER — SODIUM CHLORIDE 9 MG/ML
50 INJECTION, SOLUTION INTRAVENOUS CONTINUOUS
Status: DISCONTINUED | OUTPATIENT
Start: 2020-01-01 | End: 2021-01-01

## 2020-01-01 RX ORDER — MIDAZOLAM HYDROCHLORIDE 1 MG/ML
1 INJECTION INTRAMUSCULAR; INTRAVENOUS ONCE
Status: DISCONTINUED | OUTPATIENT
Start: 2020-01-01 | End: 2020-01-01

## 2020-01-01 RX ORDER — TAMSULOSIN HYDROCHLORIDE 0.4 MG/1
0.4 CAPSULE ORAL
Status: DISCONTINUED | OUTPATIENT
Start: 2021-01-01 | End: 2021-01-01 | Stop reason: HOSPADM

## 2020-01-01 RX ORDER — ATORVASTATIN CALCIUM 10 MG/1
10 TABLET, FILM COATED ORAL DAILY
Status: DISCONTINUED | OUTPATIENT
Start: 2021-01-01 | End: 2021-01-01 | Stop reason: HOSPADM

## 2020-01-01 RX ORDER — AMLODIPINE BESYLATE 5 MG/1
5 TABLET ORAL DAILY
Status: DISCONTINUED | OUTPATIENT
Start: 2021-01-01 | End: 2021-01-01 | Stop reason: HOSPADM

## 2020-01-01 RX ADMIN — Medication 1000 MG: at 18:38

## 2020-01-01 RX ADMIN — HEPARIN SODIUM 5000 UNITS: 5000 INJECTION INTRAVENOUS; SUBCUTANEOUS at 22:04

## 2020-01-01 RX ADMIN — SODIUM CHLORIDE 125 ML/HR: 0.9 INJECTION, SOLUTION INTRAVENOUS at 22:06

## 2020-01-01 RX ADMIN — SODIUM CHLORIDE 1000 ML: 0.9 INJECTION, SOLUTION INTRAVENOUS at 18:13

## 2020-01-07 ENCOUNTER — EVALUATION (OUTPATIENT)
Dept: PHYSICAL THERAPY | Facility: CLINIC | Age: 65
End: 2020-01-07
Payer: COMMERCIAL

## 2020-01-07 DIAGNOSIS — M25.512 ACUTE PAIN OF LEFT SHOULDER: Primary | ICD-10-CM

## 2020-01-07 PROCEDURE — 97110 THERAPEUTIC EXERCISES: CPT | Performed by: PHYSICAL THERAPIST

## 2020-01-07 NOTE — PROGRESS NOTES
Daily Note     Today's date: 2020  Patient name: Flash Liao  : 1955  MRN: [id number]  Referring provider: Luis Anderson MD  Dx:   Encounter Diagnosis     ICD-10-CM    1  Acute pain of left shoulder M25 512                   Subjective: See RA for details      Objective: See treatment diary below      Assessment: Tolerated treatment well  Patient with good recall of program       Plan: Continue per plan of care        Daily Treatment Diary    Precautions: clavicle fracture 2019, chronic LBP     Manuals         Lumbar PA's               L shoulder PROM 10' 10' 10' 10' 10' 10'                                    Exercises               bike  scifit  x10 scifit x10 10' 10' 10' 10'        Shoulder flex  3#x30 3#x30 3#x30 3#x30 3# 30x 3#x30        pulleys 5"x20 5"x20 5"x20 5"x20 5"x20 5"x20        Wand scap retraction with ext 5"x20 5"x20 5"x20 5"x20 5"x20 5"x20        rows black  3x10 Black 3x10 Black x30   Cc NV CC 40#x30 Black 30x 30#x30        Tb ir/er black  3x10 Black 3x10 Black tb x30 Black tb x30 Black 30x Black tb x30        Supine wand flexion 5"x20 5"x20 5"x20 5"x20 5"x20 5"x20        ube NP NP            Low rows black  3x10 Black 3x10 Black tb x30  Cc NV 40#x30 Black 30x 30#x30        Bicep curl  5#x30 5#x30  5#x30 5# 30x 5#x30                                    scap punches              Wall ball rolling               Modalities

## 2020-01-07 NOTE — LETTER
2020    MD Cordell Pinon [address] [facility]    Patient: Mike Nicole   YOB: 1955   Date of Visit: 2020     Encounter Diagnosis     ICD-10-CM    1  Acute pain of left shoulder M25 512        Dear Dr Beto Puri: Thank you for your recent referral of Mike Nicole  Please review the attached evaluation summary from Wilmer's recent visit  Please verify that you agree with the plan of care by signing the attached order  If you have any questions or concerns, please do not hesitate to call  I sincerely appreciate the opportunity to share in the care of one of your patients and hope to have another opportunity to work with you in the near future  Sincerely,    Randal Bruno PT      Referring Provider:      I certify that I have read the below Plan of Care and certify the need for these services furnished under this plan of treatment while under my care  MD Cordell Pinon [address]: [phone]          Daily Note     Today's date: 2020  Patient name: Mike Nicole  : 1955  MRN: [id number]  Referring provider: Cynthia Canales MD  Dx:   Encounter Diagnosis     ICD-10-CM    1  Acute pain of left shoulder M25 512                   Subjective: See RA for details      Objective: See treatment diary below      Assessment: Tolerated treatment well  Patient with good recall of program       Plan: Continue per plan of care        Daily Treatment Diary    Precautions: clavicle fracture 2019, chronic LBP     Manuals         Lumbar PA's               L shoulder PROM 10' 10' 10' 10' 10' 10'                                    Exercises               bike  scifit  x10 scifit x10 10' 10' 10' 10'        Shoulder flex  3#x30 3#x30 3#x30 3#x30 3# 30x 3#x30        pulleys 5"x20 5"x20 5"x20 5"x20 5"x20 5"x20        Wand scap retraction with ext 5"x20 5"x20 5"x20 5"x20 5"x20 5"x20        rows black  3x10 Black 3x10 Black x30   Cc NV CC 40#x30 Black 30x 30#x30        Tb ir/er black  3x10 Black 3x10 Black tb x30 Black tb x30 Black 30x Black tb x30        Supine wand flexion 5"x20 5"x20 5"x20 5"x20 5"x20 5"x20        ube NP NP            Low rows black  3x10 Black 3x10 Black tb x30  Cc NV 40#x30 Black 30x 30#x30        Bicep curl  5#x30 5#x30  5#x30 5# 30x 5#x30                                    scap punches              Wall ball rolling               Modalities                                             PT Re-Evaluation     Today's date: 2020  Patient name: Pravin Nieves  : 1955  MRN: [id number]  Referring provider: Tianna Quinones MD  Dx:   Encounter Diagnosis     ICD-10-CM    1  Acute pain of left shoulder M25 512        Start Time: 1215  Stop Time: 1300  Total time in clinic (min): 45 minutes    Assessment  Assessment details: Pt is being treated with outpatient PT  He has  made slow gains in rom, strength, pain reduction and functional mobility but continues to have deficits  He will benefit from continued skilled PT to address these deficits and to progress to an independent hep  Thank you for this referral                           Understanding of Dx/Px/POC: excellent   Prognosis: good    Goals  Short Term Goals: Independent performance of initial hep-met  Decrease pain 2 points on VAS-met      Long Term Goals:   Independent performance of comprehensive hep  Work performance is returned to max level of function  Performance of IADL's is returned to max level of function  Performance in recreational activities is improved to max level of function  Able to reach overhead painfree      Plan  Plan details: 2--4 weeks to address remaining deficits and to progress to hep   Planned therapy interventions: abdominal trunk stabilization, ADL retraining, IADL retraining, joint mobilization, manual therapy, massage, neuromuscular re-education, postural training, strengthening, stretching, therapeutic activities, therapeutic exercise and home exercise program  Frequency: 2x week        Subjective Evaluation    History of Present Illness  Mechanism of injury: Pt reports that he continues to slowly improve  Reports that his arm continues to feel stiff/weak when trying to reach overhead to reach into his cabinets  Reports that he continues to feels most stiff first thing in the morning  Reports partial compliance with his hep       Pt is R handed  Pain  Current pain rating: 3  At best pain ratin  At worst pain rating: 3    Patient Goals  Patient goals for therapy: decreased pain, increased motion, increased strength, independence with ADLs/IADLs, return to sport/leisure activities and return to work          Objective       L shoulder:           AROM:   Flexion: 162   Abduction: 150   Slight shoulder shrug noted with active elevation    PROM:   Flexion: 175   Abduction: 168   ER:  65   IR: 49    Strength:    Flexion: 4+ /5   Abduction:  4+ /5   ER:   4 /5   IR:    5-/5          Cunningham: neg  Neer: neg  Empty can: neg  Painful arc: pos  Scour: neg  Biceps load: neg

## 2020-01-08 ENCOUNTER — TRANSCRIBE ORDERS (OUTPATIENT)
Dept: PHYSICAL THERAPY | Facility: CLINIC | Age: 65
End: 2020-01-08

## 2020-01-08 DIAGNOSIS — M25.512 ACUTE PAIN OF LEFT SHOULDER: Primary | ICD-10-CM

## 2020-01-08 NOTE — PROGRESS NOTES
PT Re-Evaluation     Today's date: 2020  Patient name: January Strickland  : 1955  MRN: [id number]  Referring provider: Ricardo Long MD  Dx:   Encounter Diagnosis     ICD-10-CM    1  Acute pain of left shoulder M25 512        Start Time: 1215  Stop Time: 1300  Total time in clinic (min): 45 minutes    Assessment  Assessment details: Pt is being treated with outpatient PT  He has  made slow gains in rom, strength, pain reduction and functional mobility but continues to have deficits  He will benefit from continued skilled PT to address these deficits and to progress to an independent hep  Thank you for this referral                           Understanding of Dx/Px/POC: excellent   Prognosis: good    Goals  Short Term Goals: Independent performance of initial hep-met  Decrease pain 2 points on VAS-met      Long Term Goals: Independent performance of comprehensive hep  Work performance is returned to max level of function  Performance of IADL's is returned to max level of function  Performance in recreational activities is improved to max level of function  Able to reach overhead painfree      Plan  Plan details: 2--4 weeks to address remaining deficits and to progress to hep   Planned therapy interventions: abdominal trunk stabilization, ADL retraining, IADL retraining, joint mobilization, manual therapy, massage, neuromuscular re-education, postural training, strengthening, stretching, therapeutic activities, therapeutic exercise and home exercise program  Frequency: 2x week        Subjective Evaluation    History of Present Illness  Mechanism of injury: Pt reports that he continues to slowly improve  Reports that his arm continues to feel stiff/weak when trying to reach overhead to reach into his cabinets  Reports that he continues to feels most stiff first thing in the morning  Reports partial compliance with his hep       Pt is R handed  Pain  Current pain rating: 3  At best pain ratin  At worst pain rating: 3    Patient Goals  Patient goals for therapy: decreased pain, increased motion, increased strength, independence with ADLs/IADLs, return to sport/leisure activities and return to work          Objective       L shoulder:           AROM:   Flexion: 162   Abduction: 150   Slight shoulder shrug noted with active elevation    PROM:   Flexion: 175   Abduction: 168   ER:  65   IR: 49    Strength:    Flexion: 4+ /5   Abduction:  4+ /5   ER:   4 /5   IR:    5-/5          Ltanya Bleak: neg  Neer: neg  Empty can: neg  Painful arc: pos  Scour: neg  Biceps load: neg

## 2020-01-13 ENCOUNTER — TRANSCRIBE ORDERS (OUTPATIENT)
Dept: ADMINISTRATIVE | Facility: HOSPITAL | Age: 65
End: 2020-01-13

## 2020-01-13 DIAGNOSIS — M54.42 BILATERAL LOW BACK PAIN WITH BILATERAL SCIATICA, UNSPECIFIED CHRONICITY: ICD-10-CM

## 2020-01-13 DIAGNOSIS — M54.41 BILATERAL LOW BACK PAIN WITH BILATERAL SCIATICA, UNSPECIFIED CHRONICITY: ICD-10-CM

## 2020-01-13 DIAGNOSIS — M54.10 RADICULOPATHY, UNSPECIFIED SPINAL REGION: Primary | ICD-10-CM

## 2020-01-14 ENCOUNTER — OFFICE VISIT (OUTPATIENT)
Dept: PHYSICAL THERAPY | Facility: CLINIC | Age: 65
End: 2020-01-14
Payer: COMMERCIAL

## 2020-01-14 DIAGNOSIS — M25.512 ACUTE PAIN OF LEFT SHOULDER: Primary | ICD-10-CM

## 2020-01-14 PROCEDURE — 97110 THERAPEUTIC EXERCISES: CPT | Performed by: PHYSICAL THERAPIST

## 2020-01-14 NOTE — PROGRESS NOTES
Daily Note     Today's date: 2020  Patient name: Stephanie Moe  : 1955  MRN: [id number]  Referring provider: Moriah Holloway MD  Dx:   No diagnosis found  Subjective: pt reports continued shoulder pain  Reports that he plans to contact his physician concerning this  Objective: See treatment diary below      Assessment: Tolerated treatment well  Patient with good recall of program       Plan: Continue per plan of care        Daily Treatment Diary    Precautions: clavicle fracture 2019, chronic LBP     Manuals         Lumbar PA's               L shoulder PROM 10' 10' 10' 10' 10' 10'                                    Exercises               bike  scifit  x10 scifit x10 10' 10' 10' 10'        Shoulder flex  3#x30 3#x30 3#x30 3#x30 3# 30x 3#x30        pulleys 5"x20 5"x20 5"x20 5"x20 5"x20 5"x20        Wand scap retraction with ext 5"x20 5"x20 5"x20 5"x20 5"x20 5"x20        rows black  3x10 Black 3x10 Black x30   Cc NV CC 40#x30 Black 30x 30#x30        Tb ir/er black  3x10 Black 3x10 Black tb x30 Black tb x30 Black 30x Black tb x30        Supine wand flexion 5"x20 5"x20 5"x20 5"x20 5"x20 5"x20        ube NP NP            Low rows black  3x10 Black 3x10 Black tb x30  Cc NV 40#x30 Black 30x 30#x30        Bicep curl  5#x30 5#x30  5#x30 5# 30x 5#x30                                    scap punches              Wall ball rolling               Modalities

## 2020-01-15 ENCOUNTER — TRANSCRIBE ORDERS (OUTPATIENT)
Dept: ADMINISTRATIVE | Facility: HOSPITAL | Age: 65
End: 2020-01-15

## 2020-01-15 DIAGNOSIS — M54.40 LOW BACK PAIN WITH SCIATICA, SCIATICA LATERALITY UNSPECIFIED, UNSPECIFIED BACK PAIN LATERALITY, UNSPECIFIED CHRONICITY: ICD-10-CM

## 2020-01-15 DIAGNOSIS — M48.00 SPINAL STENOSIS, UNSPECIFIED SPINAL REGION: Primary | ICD-10-CM

## 2020-01-17 ENCOUNTER — OFFICE VISIT (OUTPATIENT)
Dept: PHYSICAL THERAPY | Facility: CLINIC | Age: 65
End: 2020-01-17
Payer: COMMERCIAL

## 2020-01-17 DIAGNOSIS — M25.512 ACUTE PAIN OF LEFT SHOULDER: Primary | ICD-10-CM

## 2020-01-17 PROCEDURE — 97110 THERAPEUTIC EXERCISES: CPT | Performed by: PHYSICAL THERAPIST

## 2020-01-17 NOTE — PROGRESS NOTES
Daily Note     Today's date: 2020  Patient name: Ricardo Alexander  : 1955  MRN: [id number]  Referring provider: Francisca Deleon MD  Dx:   Encounter Diagnosis     ICD-10-CM    1  Acute pain of left shoulder M25 512                    Subjective: pt reports less pain then prior to his previous session but that it is still present  Reports that he plans to contact his physician concerning this  Objective: See treatment diary below      Assessment: Tolerated treatment well  Patient with good recall of program       Plan: Continue per plan of care        Daily Treatment Diary    Precautions: clavicle fracture 2019, chronic LBP     Manuals        Lumbar PA's               L shoulder PROM 10' 10' 10' 10' 10' 10' 10'                                   Exercises               bike  scifit  x10 scifit x10 10' 10' 10' 10' 10'       Shoulder flex  3#x30 3#x30 3#x30 3#x30 3# 30x 3#x30 3#x30       pulleys 5"x20 5"x20 5"x20 5"x20 5"x20 5"x20 20       Wand scap retraction with ext 5"x20 5"x20 5"x20 5"x20 5"x20 5"x20 20       rows black  3x10 Black 3x10 Black x30   Cc NV CC 40#x30 Black 30x 30#x30 30#x30x30       Tb ir/er black  3x10 Black 3x10 Black tb x30 Black tb x30 Black 30x Black tb x30        Supine wand flexion 5"x20 5"x20 5"x20 5"x20 5"x20 5"x20 x20       ube NP NP            Low rows black  3x10 Black 3x10 Black tb x30  Cc NV 40#x30 Black 30x 30#x30 x30       Bicep curl  5#x30 5#x30  5#x30 5# 30x 5#x30 5#x30                                   scap punches              Wall ball rolling               Modalities

## 2020-01-22 ENCOUNTER — APPOINTMENT (OUTPATIENT)
Dept: PHYSICAL THERAPY | Facility: CLINIC | Age: 65
End: 2020-01-22
Payer: COMMERCIAL

## 2020-01-31 ENCOUNTER — HOSPITAL ENCOUNTER (OUTPATIENT)
Dept: RADIOLOGY | Facility: HOSPITAL | Age: 65
Discharge: HOME | End: 2020-01-31
Attending: INTERNAL MEDICINE
Payer: COMMERCIAL

## 2020-01-31 DIAGNOSIS — M54.40 LOW BACK PAIN WITH SCIATICA, SCIATICA LATERALITY UNSPECIFIED, UNSPECIFIED BACK PAIN LATERALITY, UNSPECIFIED CHRONICITY: ICD-10-CM

## 2020-01-31 DIAGNOSIS — M48.00 SPINAL STENOSIS, UNSPECIFIED SPINAL REGION: ICD-10-CM

## 2020-01-31 PROCEDURE — 72148 MRI LUMBAR SPINE W/O DYE: CPT

## 2020-04-22 ENCOUNTER — HOSPITAL ENCOUNTER (OUTPATIENT)
Dept: NEUROLOGY | Facility: CLINIC | Age: 65
Discharge: HOME | End: 2020-04-22
Payer: COMMERCIAL

## 2020-04-22 DIAGNOSIS — M54.42 BILATERAL LOW BACK PAIN WITH BILATERAL SCIATICA, UNSPECIFIED CHRONICITY: ICD-10-CM

## 2020-04-22 DIAGNOSIS — M54.10 RADICULOPATHY, UNSPECIFIED SPINAL REGION: ICD-10-CM

## 2020-04-22 DIAGNOSIS — M54.41 BILATERAL LOW BACK PAIN WITH BILATERAL SCIATICA, UNSPECIFIED CHRONICITY: ICD-10-CM

## 2020-04-22 PROCEDURE — 95886 MUSC TEST DONE W/N TEST COMP: CPT | Performed by: PSYCHIATRY & NEUROLOGY

## 2020-04-22 PROCEDURE — 95911 NRV CNDJ TEST 9-10 STUDIES: CPT | Performed by: PSYCHIATRY & NEUROLOGY

## 2020-06-18 ENCOUNTER — TELEPHONE (OUTPATIENT)
Dept: NEPHROLOGY | Facility: CLINIC | Age: 65
End: 2020-06-18

## 2020-06-18 ENCOUNTER — APPOINTMENT (OUTPATIENT)
Dept: LAB | Facility: HOSPITAL | Age: 65
End: 2020-06-18
Attending: INTERNAL MEDICINE
Payer: COMMERCIAL

## 2020-06-18 DIAGNOSIS — N18.30 CKD (CHRONIC KIDNEY DISEASE), STAGE III (HCC): ICD-10-CM

## 2020-06-18 LAB
ALBUMIN SERPL BCP-MCNC: 2.8 G/DL (ref 3.5–5)
ANION GAP SERPL CALCULATED.3IONS-SCNC: 7 MMOL/L (ref 4–13)
BUN SERPL-MCNC: 34 MG/DL (ref 5–25)
CALCIUM SERPL-MCNC: 7.6 MG/DL (ref 8.3–10.1)
CHLORIDE SERPL-SCNC: 107 MMOL/L (ref 100–108)
CO2 SERPL-SCNC: 22 MMOL/L (ref 21–32)
CREAT SERPL-MCNC: 2.37 MG/DL (ref 0.6–1.3)
CREAT UR-MCNC: 215 MG/DL
ERYTHROCYTE [DISTWIDTH] IN BLOOD BY AUTOMATED COUNT: 15.1 % (ref 11.6–15.1)
GFR SERPL CREATININE-BSD FRML MDRD: 32 ML/MIN/1.73SQ M
GLUCOSE P FAST SERPL-MCNC: 130 MG/DL (ref 65–99)
HCT VFR BLD AUTO: 33.4 % (ref 36.5–49.3)
HGB BLD-MCNC: 10.6 G/DL (ref 12–17)
MCH RBC QN AUTO: 29.7 PG (ref 26.8–34.3)
MCHC RBC AUTO-ENTMCNC: 31.7 G/DL (ref 31.4–37.4)
MCV RBC AUTO: 94 FL (ref 82–98)
PHOSPHATE SERPL-MCNC: 3.7 MG/DL (ref 2.3–4.1)
PLATELET # BLD AUTO: 236 THOUSANDS/UL (ref 149–390)
PMV BLD AUTO: 10.5 FL (ref 8.9–12.7)
POTASSIUM SERPL-SCNC: 4.2 MMOL/L (ref 3.5–5.3)
PROT UR-MCNC: 56 MG/DL
PROT/CREAT UR: 0.26 MG/G{CREAT} (ref 0–0.1)
PTH-INTACT SERPL-MCNC: 26.1 PG/ML (ref 18.4–80.1)
RBC # BLD AUTO: 3.57 MILLION/UL (ref 3.88–5.62)
SODIUM SERPL-SCNC: 136 MMOL/L (ref 136–145)
WBC # BLD AUTO: 6.8 THOUSAND/UL (ref 4.31–10.16)

## 2020-06-18 PROCEDURE — 83970 ASSAY OF PARATHORMONE: CPT

## 2020-06-18 PROCEDURE — 84156 ASSAY OF PROTEIN URINE: CPT

## 2020-06-18 PROCEDURE — 82570 ASSAY OF URINE CREATININE: CPT

## 2020-06-18 PROCEDURE — 85027 COMPLETE CBC AUTOMATED: CPT

## 2020-06-18 PROCEDURE — 36415 COLL VENOUS BLD VENIPUNCTURE: CPT

## 2020-06-18 PROCEDURE — 80069 RENAL FUNCTION PANEL: CPT

## 2020-06-19 ENCOUNTER — OFFICE VISIT (OUTPATIENT)
Dept: NEPHROLOGY | Facility: CLINIC | Age: 65
End: 2020-06-19
Payer: COMMERCIAL

## 2020-06-19 VITALS
HEIGHT: 67 IN | BODY MASS INDEX: 24.04 KG/M2 | DIASTOLIC BLOOD PRESSURE: 80 MMHG | WEIGHT: 153.2 LBS | SYSTOLIC BLOOD PRESSURE: 152 MMHG

## 2020-06-19 DIAGNOSIS — N18.30 CKD (CHRONIC KIDNEY DISEASE), STAGE III (HCC): Primary | ICD-10-CM

## 2020-06-19 DIAGNOSIS — R33.9 URINARY RETENTION: ICD-10-CM

## 2020-06-19 DIAGNOSIS — I73.9 PAD (PERIPHERAL ARTERY DISEASE) (HCC): ICD-10-CM

## 2020-06-19 DIAGNOSIS — I95.1 ORTHOSTATIC HYPOTENSION: ICD-10-CM

## 2020-06-19 DIAGNOSIS — D64.9 ANEMIA, UNSPECIFIED TYPE: ICD-10-CM

## 2020-06-19 DIAGNOSIS — N17.9 AKI (ACUTE KIDNEY INJURY) (HCC): ICD-10-CM

## 2020-06-19 DIAGNOSIS — I10 ESSENTIAL HYPERTENSION: ICD-10-CM

## 2020-06-19 PROCEDURE — 99214 OFFICE O/P EST MOD 30 MIN: CPT | Performed by: NURSE PRACTITIONER

## 2020-06-19 RX ORDER — AMLODIPINE BESYLATE 5 MG/1
5 TABLET ORAL DAILY
Qty: 30 TABLET | Refills: 5 | Status: SHIPPED | OUTPATIENT
Start: 2020-06-19

## 2020-06-27 ENCOUNTER — HOSPITAL ENCOUNTER (OUTPATIENT)
Dept: RADIOLOGY | Facility: HOSPITAL | Age: 65
Discharge: HOME | End: 2020-06-27
Payer: COMMERCIAL

## 2020-06-27 ENCOUNTER — TRANSCRIBE ORDERS (OUTPATIENT)
Dept: RADIOLOGY | Facility: HOSPITAL | Age: 65
End: 2020-06-27

## 2020-06-27 DIAGNOSIS — R33.9 URINARY RETENTION: ICD-10-CM

## 2020-06-27 PROCEDURE — 51798 US URINE CAPACITY MEASURE: CPT

## 2020-06-30 ENCOUNTER — APPOINTMENT (OUTPATIENT)
Dept: LAB | Facility: HOSPITAL | Age: 65
End: 2020-06-30
Payer: COMMERCIAL

## 2020-06-30 DIAGNOSIS — N17.9 AKI (ACUTE KIDNEY INJURY) (HCC): ICD-10-CM

## 2020-06-30 LAB
ANION GAP SERPL CALCULATED.3IONS-SCNC: 11 MMOL/L (ref 4–13)
BUN SERPL-MCNC: 31 MG/DL (ref 5–25)
CALCIUM SERPL-MCNC: 9 MG/DL (ref 8.3–10.1)
CHLORIDE SERPL-SCNC: 110 MMOL/L (ref 100–108)
CO2 SERPL-SCNC: 19 MMOL/L (ref 21–32)
CREAT SERPL-MCNC: 2.08 MG/DL (ref 0.6–1.3)
GFR SERPL CREATININE-BSD FRML MDRD: 37 ML/MIN/1.73SQ M
GLUCOSE P FAST SERPL-MCNC: 103 MG/DL (ref 65–99)
POTASSIUM SERPL-SCNC: 4 MMOL/L (ref 3.5–5.3)
SODIUM SERPL-SCNC: 140 MMOL/L (ref 136–145)

## 2020-06-30 PROCEDURE — 80048 BASIC METABOLIC PNL TOTAL CA: CPT

## 2020-06-30 PROCEDURE — 36415 COLL VENOUS BLD VENIPUNCTURE: CPT

## 2020-07-01 ENCOUNTER — TELEPHONE (OUTPATIENT)
Dept: UROLOGY | Facility: CLINIC | Age: 65
End: 2020-07-01

## 2020-07-01 ENCOUNTER — TELEPHONE (OUTPATIENT)
Dept: NEPHROLOGY | Facility: CLINIC | Age: 65
End: 2020-07-01

## 2020-07-01 DIAGNOSIS — N28.1 COMPLEX RENAL CYST: Primary | ICD-10-CM

## 2020-07-01 NOTE — TELEPHONE ENCOUNTER
Patient's case was discussed with his nephrologist as well as Dr Logan Dash  Patient will need an MRI of the abdomen and pelvis with and without IV contrast   Updated order at entered into the system  He will need a follow-up with Dr Logan Dash thereafter to review the results  Please contact the patient to help coordinate       1  Complex renal cyst  MRI abdomen w wo contrast    MRI pelvis w wo contrast

## 2020-07-01 NOTE — TELEPHONE ENCOUNTER
Radiology called with significant finding on his most recent [address]   Please advise

## 2020-07-01 NOTE — TELEPHONE ENCOUNTER
I spoke with Jaimee walton regarding his recent ultrasound findings and he is aware  I also discussed improvement in his creatinine  He is for follow-up next week in our office  He does follow with Dr Hadley Neves, urologist and instructed him to make an appointment  I also sent Dr Angelica Elizabeth a message regarding the ultrasound findings and recommendations for dedicated MRI/CT scan for further evaluation    Patient is in agreement with the plan

## 2020-07-02 NOTE — TELEPHONE ENCOUNTER
Attempted to call patient at this time, no answer left message with office number for call back  Patient tentatively scheduled for 8/3/2020 at 4pm with Dr Shiraz Parry at the [facility] office for MRI results discussion  Please confirm appt time and date with patient when patient returns call   Thank you

## 2020-07-14 PROBLEM — N17.9 AKI (ACUTE KIDNEY INJURY) (HCC): Status: ACTIVE | Noted: 2020-07-14

## 2020-07-17 ENCOUNTER — TELEPHONE (OUTPATIENT)
Dept: UROLOGY | Facility: AMBULATORY SURGERY CENTER | Age: 65
End: 2020-07-17

## 2020-07-17 NOTE — TELEPHONE ENCOUNTER
Zackary Smith  This patients MRI abdomen was denied with the insurance  I scheduled a peer to peer for Monday 7/20 at 12:30  The doctor will call my number and then I will transfer them to you    Thanks  Janessa

## 2020-07-17 NOTE — TELEPHONE ENCOUNTER
Debra called regarding MRI Abd will need additional information or Peer to Peer can be done case# [id number]

## 2020-07-20 NOTE — TELEPHONE ENCOUNTER
LVM for patient to let him know the MRI pelvis is approved he can have this done  The abdomen is not and will need to be canceled until its approved

## 2020-07-29 NOTE — PATIENT INSTRUCTIONS
All questions asked and answered  The patient has been instructed to call office at [phone] with any questions or concerns      The patient has been instructed to obtain prescribed blood work and urine studies prior to next appointment  Avoid NSAID products to include Motrin, Ibuprofen, Aleve, Naprosyn, or naproxen   Will get updated blood work and urine studies in four months  Continue to hold lisinopril  Return as previously scheduled in 4 months with Cornelio Santana

## 2020-07-29 NOTE — PROGRESS NOTES
[facility] FOLLOW UP - Nephrology   Kaushik Myriam 75 y o  male MRN: [id number]       ASSESSMENT and PLAN:  Maryjo Acevedo was seen today for follow-up, chronic kidney disease and hypertension  Diagnoses and all orders for this visit:    REYNOLD (acute kidney injury) ([facility] [address] )    CKD (chronic kidney disease), stage III ([facility] [address] )  -     Basic metabolic panel; Future  -     CBC and differential; Future  -     Magnesium; Future  -     Phosphorus; Future  -     PTH, intact; Future  -     Protein / creatinine ratio, urine; Future    Essential hypertension    Orthostatic hypotension    Urinary retention        Acute kidney injury:   improving   -Unknown etiology however he has urinary retention/straight cath/ versus progression vs setting of anemia   -most recent BMP on 06/30/2020 reveals improved creatinine at 2 08 (2 37) EGFR 37  -continue to hold lisinopril     Chronic kidney disease III:  Suspect secondary to hypertensive nephrosclerosis, possible orthostasis hypotension episodes, as well as obstructive uropathy with history of urinary retention requiring straight catheterization  -after review medical records previous creatinine fluctuating around 1 3-1 8   -most recent creatinine 06/30/2020 was 2 08  -avoid nephrotoxins  -avoid hypotension  -will have patient return in 4 months for ongoing Chronic Kidney Disease management  -Avoid NSAIDs   -Okay to take Tylenol or Gabapentin for pain     Hypertension:  with prior episodes of orthostatic hypotension   -Norvasc 5 mg added at last appointment  -Lisinopril placed on hold secondary to increasing creatinine-will continue to hold for now-patient aware and agreed  -Advised to stay well-hydrated  -continue to keep using compression stocking   -of noted that patient is also on Flomax for urinary retention that can exacerbate orthostatic hypotension      Urinary retention: continue with chronic straight catheterization 4 times a day  Patient remains on Flomax    Continue follow with Urology    Complex septated lesions in the left kidney  -urology following  -requires MRI with and without contrast     Anemia, neutropenia, iron deficiency  -previous workup: urine electrophoresis that did not show any monoclonal bands   -continue to trend     Mineral bone disease of Chronic Kidney Disease :  -for repeat in 4 months with next office visit    Patient Instructions   All questions asked and answered  The patient has been instructed to call office at [phone] with any questions or concerns  The patient has been instructed to obtain prescribed blood work and urine studies prior to next appointment  Avoid NSAID products to include Motrin, Ibuprofen, Aleve, Naprosyn, or naproxen   Will get updated blood work and urine studies in four months  Continue to hold lisinopril  Return as previously scheduled in 4 months with Dr Higgins        HPI: Donna Khoury is a 75 y o  male who is here for Follow-up; Chronic Kidney Disease; and Hypertension  Patient returns to office for blood pressure check follow-up after discontinuing lisinopril for elevated creatinine 2 37 and initiating amlodipine  Renal ultrasound was ordered at last office visit secondary to elevation in creatinine to rule out obstruction for he does have a history of urinary retention and self catheterizations  Renal ultrasound completed and reported: a few complex septated lesions in the left kidney and was referred to his urologist who has already ordered an MRI with and without contrast   Repeat blood work on 06/30/2020 revealed slowly improving creatinine 2 08  On discussion,  he is feeling okay  Patient reports that he does not tolerate he had well and stays inside most of the day  Patient reports following new with Urology  Unsure when MRI is  Denies chest pain, shortness of breath, dizziness, lightheadedness, nausea, vomiting, new urinary issues, fevers, chills or lower extremity edema    Continues to straight cath 4 times a day without issues  ROS:   All the systems were reviewed and were negative except as documented on the HPI  Allergies: Patient has no known allergies  Medications:   Current Outpatient Medications:     amLODIPine (NORVASC) 5 mg tablet, Take 1 tablet (5 mg total) by mouth daily, Disp: 30 tablet, Rfl: 5    aspirin (ECOTRIN LOW STRENGTH) 81 mg EC tablet, Take 1 tablet (81 mg total) by mouth daily, Disp: , Rfl:     atorvastatin (LIPITOR) 20 mg tablet, 10 mg daily , Disp: , Rfl:     lisinopril (ZESTRIL) 20 mg tablet, Take 20 mg by mouth daily , Disp: , Rfl:     pregabalin (LYRICA) 300 MG capsule, Take 300 mg by mouth 2 (two) times a day , Disp: , Rfl:     tamsulosin (FLOMAX) 0 4 mg, Take 0 4 mg by mouth daily with dinner  , Disp: , Rfl:     Past Medical History:   Diagnosis Date    Aortic aneurysm (HCC)     Arthritis     Atypical chest pain     Back pain     Chronic kidney disease     stg 3    Elevated ALT measurement     Elevated AST (SGOT)     Hyperlipidemia     Hypertension     Leg pain     Neurogenic claudication     Urinary retention     catherize self 4x/day    Urinary retention      Past Surgical History:   Procedure Laterality Date    EPIDURAL BLOCK INJECTION N/A 1/23/2017    Procedure: BLOCK / INJECTION EPIDURAL STEROID LUMBAR;  Surgeon: Annamarie Melara MD;  Location: [facility];  Service:     MD COLONOSCOPY FLX DX W/COLLJ SPEC WHEN PFRMD N/A 1/8/2019    Procedure: COLONOSCOPY;  Surgeon: Enrrique Lopez MD;  Location: [facility];   Service: Colorectal    MD LAMNOTMY INCL W/DCMPRSN NRV ROOT 1 INTRSPC LUMBR Bilateral 1/23/2017    Procedure: MIS L2-3 & L3-4 LAMINAL FORAMINOTOMIES AND MICRODISCECTOMY W LEFT SIDED APPROACH ;  Surgeon: Annamarie Melara MD;  Location: [facility];  Service: Neurosurgery     Family History   Problem Relation Age of Onset    Heart disease Sister     Stroke Sister     Sudden death Mother     Stroke Brother     Pancreatic cancer Sister     No Known Problems Father     No Known Problems Sister     No Known Problems Sister     No Known Problems Son       reports that he has never smoked  He has never used smokeless tobacco  He reports that he does not drink alcohol or use drugs  Physical Exam:   Vitals:    07/30/20 1231 07/30/20 1237   BP:  130/60   BP Location:  Left arm   Patient Position:  Sitting   Cuff Size:  Standard   Resp: 18    Temp: 98 1 °F (36 7 °C)    TempSrc: Oral    Weight: 64 6 kg (142 lb 6 4 oz)    Height: 5' 7" (1 702 m)      Body mass index is 22 3 kg/m²  General: cooperative, in not acute distress  Eyes: conjunctivae pink, anicteric sclerae  Neck: supple, no JVD  Chest: clear breath sounds bilateral, no crackles, ronchus or wheezings  CVS:  normal rate, regular rhythm  Abdomen: soft, non-tender, non-distended, normoactive bowel sounds  Back: no CVA tenderness  Extremities: no edema of both legs  Skin: no rash  Neuro: awake, alert, oriented      Lab Results:  Results for orders placed or performed in visit on 65/19/13   Basic metabolic panel   Result Value Ref Range    Sodium 140 136 - 145 mmol/L    Potassium 4 0 3 5 - 5 3 mmol/L    Chloride 110 (H) 100 - 108 mmol/L    CO2 19 (L) 21 - 32 mmol/L    ANION GAP 11 4 - 13 mmol/L    BUN 31 (H) 5 - 25 mg/dL    Creatinine 2 08 (H) 0 60 - 1 30 mg/dL    Glucose, Fasting 103 (H) 65 - 99 mg/dL    Calcium 9 0 8 3 - 10 1 mg/dL    eGFR 37 ml/min/1 73sq m               Portions of the record may have been created with voice recognition software  Occasional wrong word or "sound a like" substitutions may have occurred due to the inherent limitations of voice recognition software   Read the chart carefully and recognize,

## 2020-07-30 ENCOUNTER — OFFICE VISIT (OUTPATIENT)
Dept: NEPHROLOGY | Facility: CLINIC | Age: 65
End: 2020-07-30
Payer: COMMERCIAL

## 2020-07-30 VITALS
HEIGHT: 67 IN | SYSTOLIC BLOOD PRESSURE: 130 MMHG | DIASTOLIC BLOOD PRESSURE: 60 MMHG | BODY MASS INDEX: 22.35 KG/M2 | WEIGHT: 142.4 LBS | RESPIRATION RATE: 18 BRPM | TEMPERATURE: 98.1 F

## 2020-07-30 DIAGNOSIS — I95.1 ORTHOSTATIC HYPOTENSION: ICD-10-CM

## 2020-07-30 DIAGNOSIS — N17.9 AKI (ACUTE KIDNEY INJURY) (HCC): Primary | ICD-10-CM

## 2020-07-30 DIAGNOSIS — R33.9 URINARY RETENTION: ICD-10-CM

## 2020-07-30 DIAGNOSIS — N18.30 CKD (CHRONIC KIDNEY DISEASE), STAGE III (HCC): ICD-10-CM

## 2020-07-30 DIAGNOSIS — I10 ESSENTIAL HYPERTENSION: ICD-10-CM

## 2020-07-30 PROCEDURE — 99213 OFFICE O/P EST LOW 20 MIN: CPT | Performed by: NURSE PRACTITIONER

## 2020-08-03 ENCOUNTER — TELEPHONE (OUTPATIENT)
Dept: UROLOGY | Facility: CLINIC | Age: 65
End: 2020-08-03

## 2020-08-03 NOTE — TELEPHONE ENCOUNTER
Attempted to call patient, and left a voicemail, ok per communication consent  Patient has an appointment scheduled at 4:00 today (08/03) with Dr Nathan Walton  An MRI was needed prior to visit, but was not completed  In the message, I informed patient the appointment will be cancelled, and that he will have to get an MRI  Requested that patient give the office a call, and office number was provided  When patient calls back, please advise him to schedule his MRI, and also schedule a follow up appointment with Dr Nathan Walton

## 2020-08-22 ENCOUNTER — APPOINTMENT (OUTPATIENT)
Dept: LAB | Facility: HOSPITAL | Age: 65
End: 2020-08-22
Payer: COMMERCIAL

## 2020-08-22 ENCOUNTER — TRANSCRIBE ORDERS (OUTPATIENT)
Dept: LAB | Facility: HOSPITAL | Age: 65
End: 2020-08-22

## 2020-08-22 DIAGNOSIS — R30.9 PAINFUL MICTURITION, UNSPECIFIED: ICD-10-CM

## 2020-08-22 DIAGNOSIS — R30.9 PAINFUL MICTURITION, UNSPECIFIED: Primary | ICD-10-CM

## 2020-08-22 PROCEDURE — 87077 CULTURE AEROBIC IDENTIFY: CPT

## 2020-08-22 PROCEDURE — 87186 SC STD MICRODIL/AGAR DIL: CPT

## 2020-08-22 PROCEDURE — 87086 URINE CULTURE/COLONY COUNT: CPT

## 2020-08-24 LAB — BACTERIA UR CULT: ABNORMAL

## 2020-08-27 ENCOUNTER — APPOINTMENT (EMERGENCY)
Dept: RADIOLOGY | Facility: HOSPITAL | Age: 65
End: 2020-08-27
Payer: COMMERCIAL

## 2020-08-27 ENCOUNTER — HOSPITAL ENCOUNTER (EMERGENCY)
Facility: HOSPITAL | Age: 65
Discharge: HOME | End: 2020-08-27
Attending: EMERGENCY MEDICINE
Payer: COMMERCIAL

## 2020-08-27 VITALS
OXYGEN SATURATION: 98 % | SYSTOLIC BLOOD PRESSURE: 169 MMHG | DIASTOLIC BLOOD PRESSURE: 85 MMHG | HEART RATE: 76 BPM | RESPIRATION RATE: 22 BRPM | BODY MASS INDEX: 22.24 KG/M2 | WEIGHT: 142 LBS | TEMPERATURE: 98.5 F

## 2020-08-27 DIAGNOSIS — R94.31 ST SEGMENT DEPRESSION: ICD-10-CM

## 2020-08-27 DIAGNOSIS — R55 SYNCOPE: Primary | ICD-10-CM

## 2020-08-27 DIAGNOSIS — N17.9 AKI (ACUTE KIDNEY INJURY) (HCC): ICD-10-CM

## 2020-08-27 LAB
ALBUMIN SERPL BCP-MCNC: 3.5 G/DL (ref 3.5–5)
ALP SERPL-CCNC: 82 U/L (ref 46–116)
ALT SERPL W P-5'-P-CCNC: 49 U/L (ref 12–78)
ANION GAP SERPL CALCULATED.3IONS-SCNC: 10 MMOL/L (ref 4–13)
AST SERPL W P-5'-P-CCNC: 43 U/L (ref 5–45)
BASOPHILS # BLD AUTO: 0.05 THOUSANDS/ΜL (ref 0–0.1)
BASOPHILS NFR BLD AUTO: 1 % (ref 0–1)
BILIRUB SERPL-MCNC: 0.53 MG/DL (ref 0.2–1)
BUN SERPL-MCNC: 43 MG/DL (ref 5–25)
CALCIUM SERPL-MCNC: 9.6 MG/DL (ref 8.3–10.1)
CHLORIDE SERPL-SCNC: 107 MMOL/L (ref 100–108)
CO2 SERPL-SCNC: 22 MMOL/L (ref 21–32)
CREAT SERPL-MCNC: 2.91 MG/DL (ref 0.6–1.3)
EOSINOPHIL # BLD AUTO: 0.1 THOUSAND/ΜL (ref 0–0.61)
EOSINOPHIL NFR BLD AUTO: 2 % (ref 0–6)
ERYTHROCYTE [DISTWIDTH] IN BLOOD BY AUTOMATED COUNT: 15.2 % (ref 11.6–15.1)
GFR SERPL CREATININE-BSD FRML MDRD: 25 ML/MIN/1.73SQ M
GLUCOSE SERPL-MCNC: 118 MG/DL (ref 65–140)
HCT VFR BLD AUTO: 41.8 % (ref 36.5–49.3)
HGB BLD-MCNC: 13.3 G/DL (ref 12–17)
IMM GRANULOCYTES # BLD AUTO: 0.02 THOUSAND/UL (ref 0–0.2)
IMM GRANULOCYTES NFR BLD AUTO: 0 % (ref 0–2)
LYMPHOCYTES # BLD AUTO: 2.04 THOUSANDS/ΜL (ref 0.6–4.47)
LYMPHOCYTES NFR BLD AUTO: 31 % (ref 14–44)
MCH RBC QN AUTO: 29 PG (ref 26.8–34.3)
MCHC RBC AUTO-ENTMCNC: 31.8 G/DL (ref 31.4–37.4)
MCV RBC AUTO: 91 FL (ref 82–98)
MONOCYTES # BLD AUTO: 1.07 THOUSAND/ΜL (ref 0.17–1.22)
MONOCYTES NFR BLD AUTO: 16 % (ref 4–12)
NEUTROPHILS # BLD AUTO: 3.34 THOUSANDS/ΜL (ref 1.85–7.62)
NEUTS SEG NFR BLD AUTO: 50 % (ref 43–75)
NRBC BLD AUTO-RTO: 0 /100 WBCS
PLATELET # BLD AUTO: 308 THOUSANDS/UL (ref 149–390)
PMV BLD AUTO: 10.9 FL (ref 8.9–12.7)
POTASSIUM SERPL-SCNC: 3.7 MMOL/L (ref 3.5–5.3)
PROT SERPL-MCNC: 9.7 G/DL (ref 6.4–8.2)
RBC # BLD AUTO: 4.58 MILLION/UL (ref 3.88–5.62)
SODIUM SERPL-SCNC: 139 MMOL/L (ref 136–145)
TROPONIN I SERPL-MCNC: <0.02 NG/ML
WBC # BLD AUTO: 6.62 THOUSAND/UL (ref 4.31–10.16)

## 2020-08-27 PROCEDURE — 99285 EMERGENCY DEPT VISIT HI MDM: CPT

## 2020-08-27 PROCEDURE — 71045 X-RAY EXAM CHEST 1 VIEW: CPT

## 2020-08-27 PROCEDURE — 93005 ELECTROCARDIOGRAM TRACING: CPT

## 2020-08-27 PROCEDURE — 84484 ASSAY OF TROPONIN QUANT: CPT | Performed by: EMERGENCY MEDICINE

## 2020-08-27 PROCEDURE — 70450 CT HEAD/BRAIN W/O DYE: CPT

## 2020-08-27 PROCEDURE — 72125 CT NECK SPINE W/O DYE: CPT

## 2020-08-27 PROCEDURE — 96360 HYDRATION IV INFUSION INIT: CPT

## 2020-08-27 PROCEDURE — 36415 COLL VENOUS BLD VENIPUNCTURE: CPT

## 2020-08-27 PROCEDURE — 80053 COMPREHEN METABOLIC PANEL: CPT | Performed by: EMERGENCY MEDICINE

## 2020-08-27 PROCEDURE — 99285 EMERGENCY DEPT VISIT HI MDM: CPT | Performed by: EMERGENCY MEDICINE

## 2020-08-27 PROCEDURE — G1004 CDSM NDSC: HCPCS

## 2020-08-27 PROCEDURE — 85025 COMPLETE CBC W/AUTO DIFF WBC: CPT | Performed by: EMERGENCY MEDICINE

## 2020-08-27 RX ADMIN — SODIUM CHLORIDE 1000 ML: 0.9 INJECTION, SOLUTION INTRAVENOUS at 16:47

## 2020-08-27 NOTE — ED ATTENDING ATTESTATION
8/27/2020  Chetan HOGAN, saw and evaluated the patient  I have discussed the patient with the resident/non-physician practitioner and agree with the resident's/non-physician practitioner's findings, Plan of Care, and MDM as documented in the resident's/non-physician practitioner's note, except where noted  All available labs and Radiology studies were reviewed  I was present for key portions of any procedure(s) performed by the resident/non-physician practitioner and I was immediately available to provide assistance  At this point I agree with the current assessment done in the Emergency Department  I have conducted an independent evaluation of this patient a history and physical is as follows:      A 76-year-old male with past medical history significant for CKD, HLD and HTN; presents after a syncopal episode  Patient [place] [employer] [place] he developed lightheadedness, and then lost consciousness  Patient states he woke up with EMS standing over him  Currently patient offers no complaints  Patient reports a history of similar syncope when it is hot out  Patient otherwise denies fever, chills, chest pain, shortness of breath, palpitations, abdominal pain, nausea, vomiting, diarrhea, peripheral edema and rashes  Physical Exam  General Appearance: alert and oriented, nad, non toxic appearing  Skin:  Warm, dry, intact  HEENT: atraumatic, normocephalic  Neck: Supple, trachea midline  Cardiac: RRR; no murmurs, rub, gallops  Pulmonary: lungs CTAB; no wheezes, rales, rhonchi  Gastrointestinal: abdomen soft, nontender, nondistended; no guarding or rebound tenderness; good bowel sounds, no mass or bruits  Extremities:  no pedal edema, 2+ pulses; no calf tenderness, no clubbing, no cyanosis  Neuro:  no focal motor or sensory deficits, CN 2-12 grossly intact  Psych:  Normal mood and affect, normal judgement and insight    Assessment and Plan:  Syncope, preceded by lightheadedness  Patient currently asymptomatic, resting comfortably  EKG reviewed and reveals subtle ST depression inferior laterally  Will check lab work for ACS, electrolyte abnormality, anemia and renal impairment  Will obtain imaging as it is unclear patient struck his head         ED Course         Critical Care Time  Procedures

## 2020-08-27 NOTE — DISCHARGE INSTRUCTIONS
Follow-up with PCP for further care  Return to the Ed with new or worsening symptoms or continued syncope

## 2020-08-27 NOTE — ED NOTES
Patient keeps pressing call bell stating that he is "getting aggravated and wants to leave"     Sloane Tejada  08/27/20 4276

## 2020-08-27 NOTE — ED PROVIDER NOTES
History  Chief Complaint   Patient presents with    Syncope     [place], pt states that he passes out a lot when its over 90 degrees     Pt is a 69yo M who presents for syncope  Patient states that he [place] and again lightheaded  States that he had to use the counter to hold himself up and then when he went to go pay for his sandwich the next thing he remembers is the ambulance crew picking him up  Per the staff at [facility], patient passed out  Patient states he was feeling lightheaded but denies any dizziness or palpitations prior to the fall  Patient states that he knows he did not hit his head because she taught himself how to [facility]  Patient currently has no complaints and denying any pain  Patient keeps repeating that he wants to get back to his sandwich  Patient states that he has a history of syncope when it is hot outside but has never seen a doctor for it  Patient takes aspirin but no other blood thinners  Patient takes his medications regularly and states no changes recently except decrease in his Lyrica  Patient denies any recent illness including fevers, chills, shortness breath, cough  Patient denies any chest pain at this time or prior to his syncope  Prior to Admission Medications   Prescriptions Last Dose Informant Patient Reported? Taking?    amLODIPine (NORVASC) 5 mg tablet   No Yes   Sig: Take 1 tablet (5 mg total) by mouth daily   aspirin (ECOTRIN LOW STRENGTH) 81 mg EC tablet  Self No Yes   Sig: Take 1 tablet (81 mg total) by mouth daily   atorvastatin (LIPITOR) 20 mg tablet  Self Yes Yes   Sig: 10 mg daily    pregabalin (LYRICA) 300 MG capsule  Self Yes Yes   Sig: Take 300 mg by mouth 2 (two) times a day    tamsulosin (FLOMAX) 0 4 mg  Self Yes Yes   Sig: Take 0 4 mg by mouth daily with dinner        Facility-Administered Medications: None       Past Medical History:   Diagnosis Date    Aortic aneurysm (HCC)     Arthritis     Atypical chest pain     Back pain     Chronic kidney disease     stg 3    Elevated ALT measurement     Elevated AST (SGOT)     Hyperlipidemia     Hypertension     Leg pain     Neurogenic claudication     Urinary retention     catherize self 4x/day    Urinary retention        Past Surgical History:   Procedure Laterality Date    EPIDURAL BLOCK INJECTION N/A 1/23/2017    Procedure: BLOCK / INJECTION EPIDURAL STEROID LUMBAR;  Surgeon: Jen Bhakta MD;  [facility];  Service:     IA COLONOSCOPY FLX DX W/COLLJ SPEC WHEN PFRMD N/A 1/8/2019    Procedure: COLONOSCOPY;  Surgeon: Pablito Neumann MD;  Location: [facility]; Service: Colorectal    IA LAMNOTMY INCL W/DCMPRSN NRV ROOT 1 INTRSPC LUMBR Bilateral 1/23/2017    Procedure: MIS L2-3 & L3-4 LAMINAL FORAMINOTOMIES AND MICRODISCECTOMY W LEFT SIDED APPROACH ;  Surgeon: Jen Bhakta MD;  [facility];  Service: Neurosurgery       Family History   Problem Relation Age of Onset    Heart disease Sister     Stroke Sister    Lima Vasquezunes Sudden death Mother     Stroke Brother     Pancreatic cancer Sister     No Known Problems Father     No Known Problems Sister     No Known Problems Sister     No Known Problems Son      I have reviewed and agree with the history as documented  E-Cigarette/Vaping     E-Cigarette/Vaping Substances     Social History     Tobacco Use    Smoking status: Never Smoker    Smokeless tobacco: Never Used   Substance Use Topics    Alcohol use: No    Drug use: No        Review of Systems   Constitutional: Negative for activity change, appetite change, chills, fatigue and fever  HENT: Negative for congestion, ear discharge, ear pain, sinus pressure, sinus pain and sore throat  Eyes: Negative for pain, redness and visual disturbance  Respiratory: Negative for cough, chest tightness, shortness of breath and wheezing  Cardiovascular: Negative for chest pain, palpitations and leg swelling     Gastrointestinal: Negative for abdominal distention, abdominal pain, blood in stool, diarrhea, nausea and vomiting  Genitourinary: Negative for dysuria, frequency and urgency  Musculoskeletal: Negative for arthralgias, back pain, gait problem, neck pain and neck stiffness  Skin: Negative for color change, pallor, rash and wound  Neurological: Positive for light-headedness (prior to syncope; not currently)  Negative for dizziness, tremors, syncope, weakness, numbness and headaches  Psychiatric/Behavioral: Negative for agitation, behavioral problems and confusion  The patient is not nervous/anxious  Physical Exam  ED Triage Vitals [08/27/20 1606]   Temperature Pulse Respirations Blood Pressure SpO2   98 5 °F (36 9 °C) 95 18 159/87 99 %      Temp Source Heart Rate Source Patient Position - Orthostatic VS BP Location FiO2 (%)   Oral Monitor -- -- --      Pain Score       --             Orthostatic Vital Signs  Vitals:    08/27/20 1606 08/27/20 1615 08/27/20 1730   BP: 159/87 139/91 169/85   Pulse: 95 94 76       Physical Exam  Vitals signs reviewed  Constitutional:       General: He is not in acute distress  Appearance: Normal appearance  He is well-developed  He is not ill-appearing, toxic-appearing or diaphoretic  HENT:      Head: Normocephalic and atraumatic  Right Ear: External ear normal       Left Ear: External ear normal       Nose: Nose normal    Eyes:      Extraocular Movements: Extraocular movements intact  Conjunctiva/sclera: Conjunctivae normal       Pupils: Pupils are equal, round, and reactive to light  Neck:      Musculoskeletal: Full passive range of motion without pain, normal range of motion and neck supple  No neck rigidity, injury or muscular tenderness  Cardiovascular:      Rate and Rhythm: Normal rate and regular rhythm  Pulses: Normal pulses  Heart sounds: Normal heart sounds  No murmur  Pulmonary:      Effort: Pulmonary effort is normal  No respiratory distress        Breath sounds: Normal breath sounds  No stridor  No wheezing  Abdominal:      General: Bowel sounds are normal  There is no distension  Palpations: Abdomen is soft  There is no mass  Tenderness: There is no abdominal tenderness  There is no guarding  Musculoskeletal: Normal range of motion  General: No tenderness, deformity or signs of injury  Right lower leg: No edema  Left lower leg: No edema  Skin:     General: Skin is warm and dry  Capillary Refill: Capillary refill takes less than 2 seconds  Coloration: Skin is not pale  Findings: No erythema, lesion or rash  Comments: No obvious wounds or bleeding  Neurological:      Mental Status: He is alert and oriented to person, place, and time  Mental status is at baseline  GCS: GCS eye subscore is 4  GCS verbal subscore is 5  GCS motor subscore is 6  Cranial Nerves: No cranial nerve deficit or facial asymmetry  Motor: No weakness  Coordination: Coordination normal    Psychiatric:         Mood and Affect: Mood normal          Behavior: Behavior normal          Thought Content:  Thought content normal          Judgment: Judgment normal          ED Medications  Medications   sodium chloride 0 9 % bolus 1,000 mL (0 mL Intravenous Stopped 8/27/20 1811)       Diagnostic Studies  Results Reviewed     Procedure Component Value Units Date/Time    Troponin I [[id number]]  (Normal) Collected:  08/27/20 1609    Lab Status:  Final result Specimen:  Blood from Arm, Left Updated:  08/27/20 1655     Troponin I <0 02 ng/mL     Comprehensive metabolic panel [[id number]]  (Abnormal) Collected:  08/27/20 1609    Lab Status:  Final result Specimen:  Blood from Arm, Left Updated:  08/27/20 1653     Sodium 139 mmol/L      Potassium 3 7 mmol/L      Chloride 107 mmol/L      CO2 22 mmol/L      ANION GAP 10 mmol/L      BUN 43 mg/dL      Creatinine 2 91 mg/dL      Glucose 118 mg/dL      Calcium 9 6 mg/dL      AST 43 U/L      ALT 49 U/L Alkaline Phosphatase 82 U/L      Total Protein 9 7 g/dL      Albumin 3 5 g/dL      Total Bilirubin 0 53 mg/dL      eGFR 25 ml/min/1 73sq m     Narrative:       National Kidney Disease Foundation guidelines for Chronic Kidney Disease (CKD):     Stage 1 with normal or high GFR (GFR > 90 mL/min/1 73 square meters)    Stage 2 Mild CKD (GFR = 60-89 mL/min/1 73 square meters)    Stage 3A Moderate CKD (GFR = 45-59 mL/min/1 73 square meters)    Stage 3B Moderate CKD (GFR = 30-44 mL/min/1 73 square meters)    Stage 4 Severe CKD (GFR = 15-29 mL/min/1 73 square meters)    Stage 5 End Stage CKD (GFR <15 mL/min/1 73 square meters)  Note: GFR calculation is accurate only with a steady state creatinine    CBC and differential [[id number]]  (Abnormal) Collected:  08/27/20 1609    Lab Status:  Final result Specimen:  Blood from Arm, Left Updated:  08/27/20 1638     WBC 6 62 Thousand/uL      RBC 4 58 Million/uL      Hemoglobin 13 3 g/dL      Hematocrit 41 8 %      MCV 91 fL      MCH 29 0 pg      MCHC 31 8 g/dL      RDW 15 2 %      MPV 10 9 fL      Platelets 262 Thousands/uL      nRBC 0 /100 WBCs      Neutrophils Relative 50 %      Immat GRANS % 0 %      Lymphocytes Relative 31 %      Monocytes Relative 16 %      Eosinophils Relative 2 %      Basophils Relative 1 %      Neutrophils Absolute 3 34 Thousands/µL      Immature Grans Absolute 0 02 Thousand/uL      Lymphocytes Absolute 2 04 Thousands/µL      Monocytes Absolute 1 07 Thousand/µL      Eosinophils Absolute 0 10 Thousand/µL      Basophils Absolute 0 05 Thousands/µL                  CT head without contrast   Final Result by Jw Garrido MD (08/27 1706)      No acute intracranial abnormality  Workstation performed: [id number]         CT cervical spine without contrast   Final Result by Jw Garrido MD (08/27 1712)      No acute cervical spine fracture or traumatic malalignment                     Workstation performed: [id number]         XR chest 1 view portable    (Results Pending)         Procedures  Procedures      ED Course  ED Course as of Aug 27 1907   Thu Aug 27, 2020   1632 Pt stated to Dr Lobo Chauhan that he was declining further work-up and wanted to leave AMA  Upon going to pt room with AMA form, pt was already in [place] [id number]  5010 Procedure Note: EKG  Date/Time: 08/27/20 4:33 PM   Interpreted by: Danica Vargas MD  Indications / Diagnosis: Syncope  ECG reviewed by me, the ED Physician: yes   The EKG demonstrates:  Rhythm: normal sinus  Intervals: normal intervals  Axis: normal axis  QRS/Blocks: normal QRS  ST Changes: St depression in II, III, aVF and V4-V6  Depression new since prior  [address] and without actionable derangement  CBC and differential(!)   1706 Trop negative  Troponin I   1707 REYNOLD present  Pt already receiving fluids  Creatinine(!): 2 91   1707 No acute intracranial abnormality  CT head without contrast   1733 No evidence of fracture or abnormality  CT cervical spine without contrast       US AUDIT      Most Recent Value   Initial Alcohol Screen: US AUDIT-C    1  How often do you have a drink containing alcohol?  0 Filed at: 08/27/2020 1607   2  How many drinks containing alcohol do you have on a typical day you are drinking? 0 Filed at: 08/27/2020 1607   3a  Male UNDER 65: How often do you have five or more drinks on one occasion? 0 Filed at: 08/27/2020 1607   3b  FEMALE Any Age, or MALE 65+: How often do you have 4 or more drinks on one occassion? 0 Filed at: 08/27/2020 1607   Audit-C Score  0 Filed at: 08/27/2020 1607            HEART Risk Score      Most Recent Value   Heart Score Risk Calculator   History  0 Filed at: 08/27/2020 1639   ECG  2 Filed at: 08/27/2020 1639   Age  2 Filed at: 08/27/2020 1639   Risk Factors  1 Filed at: 08/27/2020 1639   Troponin     HEART Score              CAROLINE/DAST-10      Most Recent Value   How many times in the past year have you       Used an illegal drug or used a prescription medication for non-medical reasons? Never Filed at: 08/27/2020 1607                              [facility]  Number of Diagnoses or Management Options  REYNOLD (acute kidney injury) ([facility] ):   ST segment depression:   Syncope:   Diagnosis management comments: Pt is a 69yo M who presents with syncope  No pertinent findings on exam      Differential diagnosis to include but not limited to intracranial bleed, C-spine fracture, cardiac arrhythmia, vasovagal syncope, dehydration  Based on patient's loss of consciousness and the fact that we are not completely sure he did not hit his head, CT of the head and C-spine are warranted at this time  CT showed no evidence of acute intracranial abnormality or fracture of the C-spine  EKG was performed to look for cardiac arrhythmia  EKG showed sinus rhythm but did have ST depressions in inferior and lateral leads  Patient's lab shows no sign of infection but did show REYNOLD  Patient 1 L of fluids for his REYNOLD  Discussed with patient the desire to keep him overnight for observation due to ST depressions and his REYNOLD  Patient not amenable to staying  Patient is still asymptomatic at this time  Discussed with patient risks of leaving and patient expressed understanding of these risks  Discussed with patient that he may return to the emergency department at any time should his symptoms worsen  Patient left AMA           Amount and/or Complexity of Data Reviewed  Clinical lab tests: ordered and reviewed  Tests in the radiology section of CPT®: ordered and reviewed  Discussion of test results with the performing providers: yes          Disposition  Final diagnoses:   Syncope   REYNOLD (acute kidney injury) ([facility] [address] )   ST segment depression     Time reflects when diagnosis was documented in both MDM as applicable and the Disposition within this note     Time User Action Codes Description Comment    8/27/2020  5:40 PM Puneet Mcmullen Add [R55] Syncope 8/27/2020  5:40 PM Mauro Lovettdon Add [N17 9] REYNOLD (acute kidney injury) ([facility] [address] )     8/27/2020  5:40 PM Mauro Staton Add [R94 31] ST segment depression       ED Disposition     ED Disposition Condition Date/Time Tom VARGAS  Thu Aug 27, 2020  5:40 PM Date: 8/27/2020  Patient: Guerda Martini  Admitted: 8/27/2020  4:03 PM  Attending Provider: Jose Parker, [address] [place] [address] or his authorized caregiver has made the decision for the patient to leave the emergency department against the advice o f Dr Shania Pillai  He or his authorized caregiver has been informed and understands the inherent risks, including death, M, cardiac damage, or further kidney damage  He or his authorized caregiver has decided to accept the responsibility for thi s decision  Guerda Martini and all necessary parties have been advised that he may return for further evaluation or treatment  His condition at time of discharge was stable    Guerda Martini had current vital signs as follows:  /91   Pulse 94    Temp 98 5 °F (36 9 °C) (Oral)   Resp 22   Wt 64 4 kg (142 lb)         Follow-up Information     Follow up With Specialties Details Why [address], CRNP Nurse Practitioner Schedule an appointment as soon as possible for a visit   [address] [facility]  [phone]            Discharge Medication List as of 8/27/2020  5:42 PM      CONTINUE these medications which have NOT CHANGED    Details   amLODIPine (NORVASC) 5 mg tablet Take 1 tablet (5 mg total) by mouth daily, Starting Fri 6/19/2020, Normal      aspirin (ECOTRIN LOW STRENGTH) 81 mg EC tablet Take 1 tablet (81 mg total) by mouth daily, Starting Thu 11/14/2019, No Print      atorvastatin (LIPITOR) 20 mg tablet 10 mg daily , Starting Mon 10/21/2019, Historical Med      pregabalin (LYRICA) 300 MG capsule Take 300 mg by mouth 2 (two) times a day , Starting Fri 9/6/2019, Historical Med      tamsulosin (FLOMAX) 0 4 mg Take 0 4 mg by mouth daily with dinner  , Starting Tue 10/16/2018, Historical Med           No discharge procedures on file  PDMP Review     None           ED Provider  Attending physically available and evaluated Darian Tejeda I managed the patient along with the ED Attending      Electronically Signed by         Rebekah Cole MD  08/27/20 9305

## 2020-08-28 LAB
ATRIAL RATE: 97 BPM
P AXIS: 59 DEGREES
PR INTERVAL: 120 MS
QRS AXIS: 43 DEGREES
QRSD INTERVAL: 66 MS
QT INTERVAL: 336 MS
QTC INTERVAL: 426 MS
T WAVE AXIS: 2 DEGREES
VENTRICULAR RATE: 97 BPM

## 2020-08-28 PROCEDURE — 93010 ELECTROCARDIOGRAM REPORT: CPT | Performed by: INTERNAL MEDICINE

## 2020-08-28 NOTE — TELEPHONE ENCOUNTER
Patient called back stating he was just released from the ER today because he passed out yesterday after his visit from his PCP    Patient can be reached at [phone]

## 2020-08-28 NOTE — TELEPHONE ENCOUNTER
Called and spoke to patient at this time  Patient states he could not go for his MRI cause he was having trouble with his legs that week   He states he got the message that his appt with Dr Beryle [place] was canceled    Patient states he will be calling central scheduling to reschedule his MRI    I advised patient to call office when he has his MRI appt so then we can get him scheduled with Dr Beryle [place]    Patient verbalized understanding and is thankful for call    Office to follow up on Monday to make sure patient MRI scheduled and he is scheduled with Dr Beryle [place]

## 2020-08-31 NOTE — TELEPHONE ENCOUNTER
Called and left message for patient to give us a call back to help him schedule an appointment closer to him as he does not want to travel to [facility]  Phone number left in message

## 2020-08-31 NOTE — TELEPHONE ENCOUNTER
Patient called and I confirmed appointment with him  Patient advised that he does not want to drive to [place]  Please advise

## 2020-09-01 NOTE — TELEPHONE ENCOUNTER
Patient is amenable to seeing another physician with earlier availability, that would be appropriate

## 2020-09-03 ENCOUNTER — TELEPHONE (OUTPATIENT)
Dept: UROLOGY | Facility: MEDICAL CENTER | Age: 65
End: 2020-09-03

## 2020-09-03 ENCOUNTER — TRANSCRIBE ORDERS (OUTPATIENT)
Dept: ADMINISTRATIVE | Facility: HOSPITAL | Age: 65
End: 2020-09-03

## 2020-09-03 DIAGNOSIS — R33.9 URINARY RETENTION: Primary | ICD-10-CM

## 2020-09-03 DIAGNOSIS — R55 SYNCOPE AND COLLAPSE: Primary | ICD-10-CM

## 2020-09-03 NOTE — TELEPHONE ENCOUNTER
Called and spoke to patient at this time, advised new order for US placed can call central scheduling at this time to reschedule      Patient verbalized understanding and is thankful for call

## 2020-09-03 NOTE — TELEPHONE ENCOUNTER
Patient of Dr Jasen Mcdowell seen in the [place] office  Patient called to cancel his ultrasound with central scheduling and wanted to reschedule but could not because the order expires today  Please update order and advise patient so he can call and reschedule  Please advise

## 2020-09-09 ENCOUNTER — HOSPITAL ENCOUNTER (OUTPATIENT)
Dept: NON INVASIVE DIAGNOSTICS | Facility: CLINIC | Age: 65
Discharge: HOME | End: 2020-09-09
Payer: COMMERCIAL

## 2020-09-09 DIAGNOSIS — R55 SYNCOPE AND COLLAPSE: ICD-10-CM

## 2020-09-09 LAB
CHEST PAIN STATEMENT: NORMAL
MAX DIASTOLIC BP: 78 MMHG
MAX HEART RATE: 102 BPM
MAX PREDICTED HEART RATE: 154 BPM
MAX. SYSTOLIC BP: 136 MMHG
PROTOCOL NAME: NORMAL
REASON FOR TERMINATION: NORMAL
TARGET HR FORMULA: NORMAL
TEST INDICATION: NORMAL
TIME IN EXERCISE PHASE: NORMAL

## 2020-09-09 PROCEDURE — G1004 CDSM NDSC: HCPCS

## 2020-09-09 PROCEDURE — 93016 CV STRESS TEST SUPVJ ONLY: CPT | Performed by: INTERNAL MEDICINE

## 2020-09-09 PROCEDURE — 93306 TTE W/DOPPLER COMPLETE: CPT

## 2020-09-09 PROCEDURE — 93306 TTE W/DOPPLER COMPLETE: CPT | Performed by: INTERNAL MEDICINE

## 2020-09-09 PROCEDURE — 93018 CV STRESS TEST I&R ONLY: CPT | Performed by: INTERNAL MEDICINE

## 2020-09-09 PROCEDURE — 78452 HT MUSCLE IMAGE SPECT MULT: CPT

## 2020-09-09 PROCEDURE — 78452 HT MUSCLE IMAGE SPECT MULT: CPT | Performed by: INTERNAL MEDICINE

## 2020-09-09 PROCEDURE — 93017 CV STRESS TEST TRACING ONLY: CPT

## 2020-09-09 PROCEDURE — A9502 TC99M TETROFOSMIN: HCPCS

## 2020-09-09 RX ADMIN — REGADENOSON 0.4 MG: 0.08 INJECTION, SOLUTION INTRAVENOUS at 10:32

## 2020-10-27 ENCOUNTER — LAB (OUTPATIENT)
Dept: LAB | Facility: HOSPITAL | Age: 65
End: 2020-10-27
Payer: COMMERCIAL

## 2020-10-27 ENCOUNTER — TELEPHONE (OUTPATIENT)
Dept: NEPHROLOGY | Facility: CLINIC | Age: 65
End: 2020-10-27

## 2020-10-27 DIAGNOSIS — N18.30 CKD (CHRONIC KIDNEY DISEASE), STAGE III (HCC): ICD-10-CM

## 2020-10-27 LAB
ANION GAP SERPL CALCULATED.3IONS-SCNC: 6 MMOL/L (ref 4–13)
BASOPHILS # BLD AUTO: 0.06 THOUSANDS/ΜL (ref 0–0.1)
BASOPHILS NFR BLD AUTO: 1 % (ref 0–1)
BUN SERPL-MCNC: 22 MG/DL (ref 5–25)
CALCIUM SERPL-MCNC: 8.9 MG/DL (ref 8.3–10.1)
CHLORIDE SERPL-SCNC: 112 MMOL/L (ref 100–108)
CO2 SERPL-SCNC: 21 MMOL/L (ref 21–32)
CREAT SERPL-MCNC: 1.88 MG/DL (ref 0.6–1.3)
CREAT UR-MCNC: 215 MG/DL
EOSINOPHIL # BLD AUTO: 0.16 THOUSAND/ΜL (ref 0–0.61)
EOSINOPHIL NFR BLD AUTO: 3 % (ref 0–6)
ERYTHROCYTE [DISTWIDTH] IN BLOOD BY AUTOMATED COUNT: 15.8 % (ref 11.6–15.1)
GFR SERPL CREATININE-BSD FRML MDRD: 42 ML/MIN/1.73SQ M
GLUCOSE P FAST SERPL-MCNC: 125 MG/DL (ref 65–99)
HCT VFR BLD AUTO: 33 % (ref 36.5–49.3)
HGB BLD-MCNC: 10.5 G/DL (ref 12–17)
IMM GRANULOCYTES # BLD AUTO: 0.01 THOUSAND/UL (ref 0–0.2)
IMM GRANULOCYTES NFR BLD AUTO: 0 % (ref 0–2)
LYMPHOCYTES # BLD AUTO: 2.1 THOUSANDS/ΜL (ref 0.6–4.47)
LYMPHOCYTES NFR BLD AUTO: 40 % (ref 14–44)
MAGNESIUM SERPL-MCNC: 1.6 MG/DL (ref 1.6–2.6)
MCH RBC QN AUTO: 28.6 PG (ref 26.8–34.3)
MCHC RBC AUTO-ENTMCNC: 31.8 G/DL (ref 31.4–37.4)
MCV RBC AUTO: 90 FL (ref 82–98)
MONOCYTES # BLD AUTO: 0.79 THOUSAND/ΜL (ref 0.17–1.22)
MONOCYTES NFR BLD AUTO: 15 % (ref 4–12)
NEUTROPHILS # BLD AUTO: 2.09 THOUSANDS/ΜL (ref 1.85–7.62)
NEUTS SEG NFR BLD AUTO: 41 % (ref 43–75)
NRBC BLD AUTO-RTO: 0 /100 WBCS
PHOSPHATE SERPL-MCNC: 2.9 MG/DL (ref 2.3–4.1)
PLATELET # BLD AUTO: 320 THOUSANDS/UL (ref 149–390)
PMV BLD AUTO: 10.4 FL (ref 8.9–12.7)
POTASSIUM SERPL-SCNC: 3.9 MMOL/L (ref 3.5–5.3)
PROT UR-MCNC: 80 MG/DL
PROT/CREAT UR: 0.37 MG/G{CREAT} (ref 0–0.1)
PTH-INTACT SERPL-MCNC: 20.8 PG/ML (ref 18.4–80.1)
RBC # BLD AUTO: 3.67 MILLION/UL (ref 3.88–5.62)
SODIUM SERPL-SCNC: 139 MMOL/L (ref 136–145)
WBC # BLD AUTO: 5.21 THOUSAND/UL (ref 4.31–10.16)

## 2020-10-27 PROCEDURE — 80048 BASIC METABOLIC PNL TOTAL CA: CPT

## 2020-10-27 PROCEDURE — 83970 ASSAY OF PARATHORMONE: CPT

## 2020-10-27 PROCEDURE — 84156 ASSAY OF PROTEIN URINE: CPT

## 2020-10-27 PROCEDURE — 82570 ASSAY OF URINE CREATININE: CPT

## 2020-10-27 PROCEDURE — 83735 ASSAY OF MAGNESIUM: CPT

## 2020-10-27 PROCEDURE — 84100 ASSAY OF PHOSPHORUS: CPT

## 2020-10-27 PROCEDURE — 36415 COLL VENOUS BLD VENIPUNCTURE: CPT

## 2020-10-27 PROCEDURE — 85025 COMPLETE CBC W/AUTO DIFF WBC: CPT

## 2020-11-02 ENCOUNTER — OFFICE VISIT (OUTPATIENT)
Dept: NEPHROLOGY | Facility: CLINIC | Age: 65
End: 2020-11-02
Payer: COMMERCIAL

## 2020-11-02 VITALS
WEIGHT: 144.6 LBS | HEIGHT: 67 IN | HEART RATE: 70 BPM | DIASTOLIC BLOOD PRESSURE: 68 MMHG | BODY MASS INDEX: 22.7 KG/M2 | TEMPERATURE: 97.6 F | RESPIRATION RATE: 16 BRPM | SYSTOLIC BLOOD PRESSURE: 110 MMHG

## 2020-11-02 DIAGNOSIS — N18.32 STAGE 3B CHRONIC KIDNEY DISEASE (HCC): Primary | ICD-10-CM

## 2020-11-02 DIAGNOSIS — I73.9 PAD (PERIPHERAL ARTERY DISEASE) (HCC): ICD-10-CM

## 2020-11-02 DIAGNOSIS — R33.9 URINARY RETENTION: ICD-10-CM

## 2020-11-02 DIAGNOSIS — E78.5 HYPERLIPIDEMIA, UNSPECIFIED HYPERLIPIDEMIA TYPE: ICD-10-CM

## 2020-11-02 DIAGNOSIS — I10 ESSENTIAL HYPERTENSION: ICD-10-CM

## 2020-11-02 PROBLEM — N17.9 AKI (ACUTE KIDNEY INJURY) (HCC): Status: RESOLVED | Noted: 2020-07-14 | Resolved: 2020-11-02

## 2020-11-02 PROCEDURE — 99214 OFFICE O/P EST MOD 30 MIN: CPT | Performed by: INTERNAL MEDICINE

## 2020-11-07 ENCOUNTER — HOSPITAL ENCOUNTER (EMERGENCY)
Facility: HOSPITAL | Age: 65
Discharge: HOME | End: 2020-11-07
Attending: EMERGENCY MEDICINE | Admitting: EMERGENCY MEDICINE
Payer: COMMERCIAL

## 2020-11-07 ENCOUNTER — APPOINTMENT (EMERGENCY)
Dept: RADIOLOGY | Facility: HOSPITAL | Age: 65
End: 2020-11-07
Payer: COMMERCIAL

## 2020-11-07 VITALS
WEIGHT: 146 LBS | DIASTOLIC BLOOD PRESSURE: 67 MMHG | OXYGEN SATURATION: 100 % | HEART RATE: 84 BPM | HEIGHT: 67 IN | BODY MASS INDEX: 22.91 KG/M2 | RESPIRATION RATE: 18 BRPM | SYSTOLIC BLOOD PRESSURE: 119 MMHG | TEMPERATURE: 97.4 F

## 2020-11-07 DIAGNOSIS — R07.81 RIB PAIN ON LEFT SIDE: Primary | ICD-10-CM

## 2020-11-07 PROCEDURE — 99283 EMERGENCY DEPT VISIT LOW MDM: CPT

## 2020-11-07 PROCEDURE — 71101 X-RAY EXAM UNILAT RIBS/CHEST: CPT

## 2020-11-07 PROCEDURE — 99282 EMERGENCY DEPT VISIT SF MDM: CPT | Performed by: EMERGENCY MEDICINE

## 2020-11-07 RX ORDER — LIDOCAINE 50 MG/G
1 PATCH TOPICAL DAILY
Qty: 15 PATCH | Refills: 0 | Status: SHIPPED | OUTPATIENT
Start: 2020-11-07

## 2020-11-07 RX ORDER — LIDOCAINE 50 MG/G
1 PATCH TOPICAL ONCE
Status: DISCONTINUED | OUTPATIENT
Start: 2020-11-07 | End: 2020-11-07 | Stop reason: HOSPADM

## 2020-11-07 RX ADMIN — LIDOCAINE 1 PATCH: 50 PATCH TOPICAL at 09:51

## 2020-11-17 ENCOUNTER — HOSPITAL ENCOUNTER (OUTPATIENT)
Dept: NON INVASIVE DIAGNOSTICS | Facility: HOSPITAL | Age: 65
Discharge: HOME | End: 2020-11-17
Payer: COMMERCIAL

## 2020-11-17 DIAGNOSIS — I73.9 PAD (PERIPHERAL ARTERY DISEASE) (HCC): ICD-10-CM

## 2020-11-17 PROCEDURE — 93923 UPR/LXTR ART STDY 3+ LVLS: CPT

## 2020-11-17 PROCEDURE — 93922 UPR/L XTREMITY ART 2 LEVELS: CPT | Performed by: SURGERY

## 2020-11-17 PROCEDURE — 93925 LOWER EXTREMITY STUDY: CPT | Performed by: SURGERY

## 2020-11-17 PROCEDURE — 93925 LOWER EXTREMITY STUDY: CPT

## 2020-12-31 PROBLEM — N13.9 OBSTRUCTIVE UROPATHY: Status: ACTIVE | Noted: 2020-01-01

## 2020-12-31 PROBLEM — R41.82 ALTERED MENTAL STATUS: Status: ACTIVE | Noted: 2020-01-01

## 2020-12-31 PROBLEM — N39.0 URINARY TRACT INFECTION: Status: ACTIVE | Noted: 2020-01-01

## 2020-12-31 PROBLEM — E87.2 METABOLIC ACIDOSIS, INCREASED ANION GAP: Status: ACTIVE | Noted: 2020-01-01

## 2020-12-31 PROBLEM — G93.40 ENCEPHALOPATHY ACUTE: Status: ACTIVE | Noted: 2020-01-01

## 2020-12-31 NOTE — ED PROVIDER NOTES
History  Chief Complaint   Patient presents with    Failure To Thrive     Pt lives at home by himself  Neighbor called EMS rupesh pt's home had feces and urine all over the floor and couch  Pt does not take prescribed medicines nor does he care for himself  47-year-old male presents to emergency department after neighbor found he was not caring for himself  Previous port patient's home was covered in feces and urine  Patient is a poor historian due to altered mental status and is unable to provide significant history  He does say he fell within the past 2 days  Unable to answer other review of systems questions or explain why he is here  Prior to Admission Medications   Prescriptions Last Dose Informant Patient Reported? Taking?    amLODIPine (NORVASC) 5 mg tablet   No No   Sig: Take 1 tablet (5 mg total) by mouth daily   aspirin (ECOTRIN LOW STRENGTH) 81 mg EC tablet  Self No No   Sig: Take 1 tablet (81 mg total) by mouth daily   atorvastatin (LIPITOR) 20 mg tablet  Self Yes No   Sig: 10 mg daily    lidocaine (LIDODERM) 5 %   No No   Sig: Apply 1 patch topically daily Remove & Discard patch within 12 hours or as directed by MD   pregabalin (LYRICA) 300 MG capsule  Self Yes No   Sig: Take 300 mg by mouth 2 (two) times a day    tamsulosin (FLOMAX) 0 4 mg  Self Yes No   Sig: Take 0 4 mg by mouth daily with dinner        Facility-Administered Medications: None       Past Medical History:   Diagnosis Date    Aortic aneurysm (HCC)     Arthritis     Atypical chest pain     Back pain     Chronic kidney disease     stg 3    Elevated ALT measurement     Elevated AST (SGOT)     Hyperlipidemia     Hypertension     Leg pain     Neurogenic claudication     Urinary retention     catherize self 4x/day    Urinary retention        Past Surgical History:   Procedure Laterality Date    EPIDURAL BLOCK INJECTION N/A 1/23/2017    Procedure: BLOCK / INJECTION EPIDURAL STEROID LUMBAR;  Surgeon: Taj Lisa MD;  Location: [facility];  Service:     RI COLONOSCOPY FLX DX W/COLLJ SPEC WHEN PFRMD N/A 1/8/2019    Procedure: COLONOSCOPY;  Surgeon: Denver Mckeon MD;  Location: [facility]; Service: Colorectal    RI LAMNOTMY INCL W/DCMPRSN NRV ROOT 1 INTRSPC LUMBR Bilateral 1/23/2017    Procedure: MIS L2-3 & L3-4 LAMINAL FORAMINOTOMIES AND MICRODISCECTOMY W LEFT SIDED APPROACH ;  Surgeon: Checo Hargrove MD;  Location: [facility];  Service: Neurosurgery       Family History   Problem Relation Age of Onset    Heart disease Sister     Stroke Sister    Jalil Zapata Sudden death Mother     Stroke Brother     Pancreatic cancer Sister     No Known Problems Father     No Known Problems Sister     No Known Problems Sister     No Known Problems Son      I have reviewed and agree with the history as documented  E-Cigarette/Vaping     E-Cigarette/Vaping Substances     Social History     Tobacco Use    Smoking status: Never Smoker    Smokeless tobacco: Never Used   Substance Use Topics    Alcohol use: No    Drug use: No        Review of Systems   Unable to perform ROS: Mental status change       Physical Exam  ED Triage Vitals   Temperature Pulse Respirations Blood Pressure SpO2   12/31/20 1631 12/31/20 1631 12/31/20 1631 12/31/20 1631 12/31/20 1631   98 °F (36 7 °C) 90 18 157/86 100 %      Temp Source Heart Rate Source Patient Position - Orthostatic VS BP Location FiO2 (%)   12/31/20 1631 12/31/20 1631 12/31/20 1631 12/31/20 1631 --   Oral Monitor Lying Right arm       Pain Score       12/31/20 2315       No Pain             Orthostatic Vital Signs  Vitals:    12/31/20 1631 12/31/20 2000 12/31/20 2055 01/01/21 0727   BP: 157/86 160/90 145/85 129/75   Pulse: 90 92 94 100   Patient Position - Orthostatic VS: Lying Lying         Physical Exam  Vitals signs and nursing note reviewed  Constitutional:       General: He is not in acute distress  Appearance: He is well-developed  HENT:      Head: Normocephalic and atraumatic  Comments: No craniofacial ecchymosis, crepitus, or deformity  No centeno's sign  No raccoon eyes  No hemotympanum  Mouth/Throat:      Mouth: Mucous membranes are moist    Eyes:      Pupils: Pupils are equal, round, and reactive to light  Neck:      Musculoskeletal: Normal range of motion and neck supple  Cardiovascular:      Rate and Rhythm: Normal rate and regular rhythm  Heart sounds: Normal heart sounds  No murmur  No friction rub  No gallop  Pulmonary:      Effort: Pulmonary effort is normal  No respiratory distress  Breath sounds: Normal breath sounds  No stridor  No wheezing, rhonchi or rales  Abdominal:      General: There is distension (Marked abdominal distention and generalized tenderness)  Palpations: Abdomen is soft  Tenderness: There is abdominal tenderness  There is no guarding or rebound  Musculoskeletal: Normal range of motion  General: Tenderness (Tender to right anterior tib-fib but without overlying ecchymosis or obvious deformity) present  No swelling  Right lower leg: No edema  Left lower leg: No edema  Skin:     General: Skin is warm and dry  Capillary Refill: Capillary refill takes less than 2 seconds  Neurological:      General: No focal deficit present  Mental Status: He is alert  Comments: Patient is oriented to self and place  Unable to tell me the year or month  Clear fluent speech  No facial droop  Moves all 4 extremities symmetrically           ED Medications  Medications   sodium chloride (PF) 0 9 % injection 3 mL (has no administration in time range)   amLODIPine (NORVASC) tablet 5 mg (5 mg Oral Given 1/1/21 0903)   aspirin (ECOTRIN LOW STRENGTH) EC tablet 81 mg (81 mg Oral Given 1/1/21 0903)   atorvastatin (LIPITOR) tablet 10 mg (10 mg Oral Given 1/1/21 0903)   tamsulosin (FLOMAX) capsule 0 4 mg (has no administration in time range)   sodium chloride 0 9 % infusion (125 mL/hr Intravenous New Bag 1/1/21 4210)   heparin (porcine) subcutaneous injection 5,000 Units (5,000 Units Subcutaneous Given 1/1/21 0528)   ceftriaxone (ROCEPHIN) 1 g/50 mL in dextrose IVPB (has no administration in time range)   sodium chloride 0 9 % bolus 1,000 mL (0 mL Intravenous Stopped 12/31/20 1910)   ceftriaxone (ROCEPHIN) 1 g/50 mL in dextrose IVPB (0 mg Intravenous Stopped 12/31/20 1910)       Diagnostic Studies  Results Reviewed     Procedure Component Value Units Date/Time    Blood gas, venous [[id number]]  (Abnormal) Collected: 01/01/21 0430    Lab Status: Final result Specimen: Blood Updated: 01/01/21 0436     pH, Shekhar 7 357     pCO2, Shekhar 29 0 mm Hg      pO2, Shekhar 65 8 mm Hg      HCO3, Shekhar 15 9 mmol/L      Base Excess, Hsekhar -8 3 mmol/L      O2 Content, Shekhar 15 4 ml/dL      O2 HGB, VENOUS 90 8 %     Lipid panel [[id number]]  (Abnormal) Collected: 12/31/20 1658    Lab Status: Final result Specimen: Blood from Arm, Left Updated: 12/31/20 2306     Cholesterol 138 mg/dL      Triglycerides 107 mg/dL      HDL, Direct 39 mg/dL      LDL Calculated 78 mg/dL      Non-HDL-Chol (CHOL-HDL) 99 mg/dl     Hemoglobin A1C [[id number]]  (Abnormal) Collected: 12/31/20 1658    Lab Status: Final result Specimen: Blood from Arm, Left Updated: 12/31/20 2221     Hemoglobin A1C 6 7 %       mg/dl     Urine Microscopic [[id number]]  (Abnormal) Collected: 12/31/20 1810    Lab Status: Final result Specimen: Urine, Clean Catch Updated: 12/31/20 1908     RBC, UA 2-4 /hpf      WBC, UA Innumerable /hpf      Epithelial Cells None Seen /hpf      Bacteria, UA Occasional /hpf      Hyaline Casts, UA None Seen /lpf     Urine culture [[id number]] Collected: 12/31/20 1810    Lab Status:  In process Specimen: Urine, Clean Catch Updated: 12/31/20 1908    COVID19, Influenza A/B, RSV PCR, [facility] [[id number]]  (Normal) Collected: 12/31/20 1702    Lab Status: Final result Specimen: Nares from Nasopharyngeal Swab Updated: 12/31/20 1851     SARS-CoV-2 Negative     INFLUENZA A PCR Negative INFLUENZA B PCR Negative     RSV PCR Negative    Narrative: This test has been authorized by FDA under an EUA (Emergency Use Assay) for use by authorized laboratories  Clinical caution and judgement should be used with the interpretation of these results with consideration of the clinical impression and other laboratory testing  Testing reported as "Positive" or "Negative" has been proven to be accurate according to standard laboratory validation requirements  All testing is performed with control materials showing appropriate reactivity at standard intervals  Rapid drug screen, urine [[id number]]  (Normal) Collected: 12/31/20 1808    Lab Status: Final result Specimen: Urine, Clean Catch Updated: 12/31/20 1843     Amph/Meth UR Negative     Barbiturate Ur Negative     Benzodiazepine Urine Negative     Cocaine Urine Negative     Methadone Urine Negative     Opiate Urine Negative     PCP Ur Negative     THC Urine Negative     Oxycodone Urine Negative    Narrative:      FOR MEDICAL PURPOSES ONLY  IF CONFIRMATION NEEDED PLEASE CONTACT THE LAB WITHIN 5 DAYS      Drug Screen Cutoff Levels:  AMPHETAMINE/METHAMPHETAMINES  1000 ng/mL  BARBITURATES     200 ng/mL  BENZODIAZEPINES     200 ng/mL  COCAINE      300 ng/mL  METHADONE      300 ng/mL  OPIATES      300 ng/mL  PHENCYCLIDINE     25 ng/mL  THC       50 ng/mL  OXYCODONE      100 ng/mL    CKMB [[id number]]  (Normal) Collected: 12/31/20 1658    Lab Status: Final result Specimen: Blood from Arm, Left Updated: 12/31/20 1815     CK-MB Index 1 1 %      CK-MB 4 9 ng/mL     POCT urinalysis dipstick [[id number]]  (Normal) Resulted: 12/31/20 1812    Lab Status: Final result Updated: 12/31/20 1813     Color, UA -    Urine Macroscopic, POC [[id number]]  (Abnormal) Collected: 12/31/20 1810    Lab Status: Final result Specimen: Urine Updated: 12/31/20 1812     Color, UA Yellow     Clarity, UA Clear     pH, UA 6 0     Leukocytes, UA Large     Nitrite, UA Positive     Protein,  (2+) mg/dl      Glucose, UA Negative mg/dl      Ketones, UA Negative mg/dl      Urobilinogen, UA 0 2 E U /dl      Bilirubin, UA Negative     Blood, UA Small     Specific [place], UA 1 020    Narrative:      CLINITEK RESULT    CK (with reflex to MB) [[id number]]  (Abnormal) Collected: 12/31/20 1658    Lab Status: Final result Specimen: Blood from Arm, Left Updated: 12/31/20 1753     Total  U/L     Blood gas, venous [[id number]]  (Abnormal) Collected: 12/31/20 1658    Lab Status: Final result Specimen: Blood from Arm, Left Updated: 12/31/20 1747     pH, Shekhar 7 262     pCO2, Shekhar 33 8 mm Hg      pO2, Shekhar 45 8 mm Hg      HCO3, Shekhar 14 9 mmol/L      Base Excess, Shekhar -11 1 mmol/L      O2 Content, Shekhar 13 3 ml/dL      O2 HGB, VENOUS 73 2 %     Comprehensive metabolic panel [[id number]]  (Abnormal) Collected: 12/31/20 1658    Lab Status: Final result Specimen: Blood from Arm, Left Updated: 12/31/20 1732     Sodium 138 mmol/L      Potassium 4 1 mmol/L      Chloride 108 mmol/L      CO2 16 mmol/L      ANION GAP 14 mmol/L       mg/dL      Creatinine 5 92 mg/dL      Glucose 175 mg/dL      Calcium 8 9 mg/dL      AST 26 U/L      ALT 30 U/L      Alkaline Phosphatase 67 U/L      Total Protein 9 3 g/dL      Albumin 3 5 g/dL      Total Bilirubin 0 52 mg/dL      eGFR 10 ml/min/1 73sq m     Narrative:      [facility] guidelines for Chronic Kidney Disease (CKD):     Stage 1 with normal or high GFR (GFR > 90 mL/min/1 73 square meters)    Stage 2 Mild CKD (GFR = 60-89 mL/min/1 73 square meters)    Stage 3A Moderate CKD (GFR = 45-59 mL/min/1 73 square meters)    Stage 3B Moderate CKD (GFR = 30-44 mL/min/1 73 square meters)    Stage 4 Severe CKD (GFR = 15-29 mL/min/1 73 square meters)    Stage 5 End Stage CKD (GFR <15 mL/min/1 73 square meters)  Note: GFR calculation is accurate only with a steady state creatinine    Salicylate level [[id number]]  (Abnormal) Collected: 12/31/20 1658    Lab Status: Final result Specimen: Blood from Arm, Left Updated: 05/93/70 9282     Salicylate Lvl <3 mg/dL     Acetaminophen level-If concentration is detectable, please discuss with medical  on call   [[id number]]  (Abnormal) Collected: 12/31/20 1658    Lab Status: Final result Specimen: Blood from Arm, Left Updated: 12/31/20 1732     Acetaminophen Level <2 ug/mL     Troponin I [[id number]]  (Normal) Collected: 12/31/20 1658    Lab Status: Final result Specimen: Blood from Arm, Left Updated: 12/31/20 1731     Troponin I <0 02 ng/mL     Ammonia [[id number]]  (Normal) Collected: 12/31/20 1658    Lab Status: Final result Specimen: Blood from Arm, Left Updated: 12/31/20 1729     Ammonia 18 umol/L     Ethanol [[id number]]  (Normal) Collected: 12/31/20 1658    Lab Status: Final result Specimen: Blood from Arm, Left Updated: 12/31/20 1728     Ethanol Lvl <3 mg/dL     Lipase [[id number]]  (Normal) Collected: 12/31/20 1658    Lab Status: Final result Specimen: Blood from Arm, Left Updated: 12/31/20 1724     Lipase 367 u/L     CBC and differential [[id number]]  (Abnormal) Collected: 12/31/20 1658    Lab Status: Final result Specimen: Blood from Arm, Left Updated: 12/31/20 1709     WBC 11 35 Thousand/uL      RBC 4 48 Million/uL      Hemoglobin 12 3 g/dL      Hematocrit 38 9 %      MCV 87 fL      MCH 27 5 pg      MCHC 31 6 g/dL      RDW 15 1 %      MPV 11 0 fL      Platelets 090 Thousands/uL      nRBC 0 /100 WBCs      Neutrophils Relative 74 %      Immat GRANS % 0 %      Lymphocytes Relative 9 %      Monocytes Relative 11 %      Eosinophils Relative 6 %      Basophils Relative 0 %      Neutrophils Absolute 8 30 Thousands/µL      Immature Grans Absolute 0 05 Thousand/uL      Lymphocytes Absolute 1 03 Thousands/µL      Monocytes Absolute 1 29 Thousand/µL      Eosinophils Absolute 0 64 Thousand/µL      Basophils Absolute 0 04 Thousands/µL                  CT head without contrast   Final Result by Eleni Joel MD (12/31 1936) No acute intracranial abnormality  Workstation performed: [id number]         CT chest abdomen pelvis wo contrast   Final Result by Mena Preston MD (12/31 1934)         1  No evidence of acute trauma in the chest   Ascending aortic aneurysm measuring 4 3 cm    2   No acute intra-abdominal or pelvic trauma  3   Marked distention of the bladder extending well into the mid abdomen, compatible with distal outlet obstruction  Possible small amount of debris or hemorrhage at the base of the bladder  Mild prostate enlargement  Workstation performed: [id number]         XR tibia fibula 2 views LEFT   ED Interpretation by Bo Hernandez MD (12/31 1907)   No acute osseous abnormalities      Final Result by Marquis Hill MD (12/31 1918)      No acute osseous abnormality  Workstation performed: [id number]               Procedures  Procedures      ED Course                                       MDM  Number of Diagnoses or Management Options  Obstructive nephropathy:   UTI (urinary tract infection):   Diagnosis management comments: 15-year-old male presents to emergency department after neighbor found he was not caring for himself  On exam patient has markedly distended abdomen with generalized tenderness  He also admits to a fall recently but unable to provide further history regarding this event  He has mild tenderness over his right tib-fib but no other apparent injuries  Will obtain comprehensive workup to evaluate for etiologies of altered mental status including tox, infectious, metabolic, trauma  Final assessment:  CT abdomen pelvis reveals marked obstructive nephropathy  Ni catheter placed  Patient also has UTI for which ceftriaxone was started  Suspect these are the etiologies of patient's mental status change  Discussed with admitting physician who agrees to accept patient for further management  Patient remains stable throughout ED course  Disposition  Final diagnoses:   UTI (urinary tract infection)   Obstructive nephropathy     Time reflects when diagnosis was documented in both MDM as applicable and the Disposition within this note     Time User Action Codes Description Comment    12/31/2020  8:02 PM Minor, Tamia Add [N39 0] UTI (urinary tract infection)     12/31/2020  8:02 PM Minor, Tamia Add [N13 8] Obstructive nephropathy       ED Disposition     ED Disposition Condition Date/Time Comment    Admit Stable Thu Dec 31, 2020  8:02 PM Case was discussed with family medicine and the patient's admission status was agreed to be Admission Status: inpatient status to the service of family medicine   Follow-up Information    None         Current Discharge Medication List      CONTINUE these medications which have NOT CHANGED    Details   amLODIPine (NORVASC) 5 mg tablet Take 1 tablet (5 mg total) by mouth daily  Qty: 30 tablet, Refills: 5    Associated Diagnoses: Essential hypertension      aspirin (ECOTRIN LOW STRENGTH) 81 mg EC tablet Take 1 tablet (81 mg total) by mouth daily    Associated Diagnoses: PAD (peripheral artery disease) ([facility])      atorvastatin (LIPITOR) 20 mg tablet 10 mg daily       lidocaine (LIDODERM) 5 % Apply 1 patch topically daily Remove & Discard patch within 12 hours or as directed by MD  Qty: 15 patch, Refills: 0    Associated Diagnoses: Rib pain on left side      pregabalin (LYRICA) 300 MG capsule Take 300 mg by mouth 2 (two) times a day       tamsulosin (FLOMAX) 0 4 mg Take 0 4 mg by mouth daily with dinner             No discharge procedures on file  PDMP Review     None           ED Provider  Attending physically available and evaluated Hilaria Linker  I managed the patient along with the ED Attending      Electronically Signed by         Jim Mayer MD  01/01/21 4422

## 2020-12-31 NOTE — ED ATTENDING ATTESTATION
12/31/2020  IEdwar MD, saw and evaluated the patient  I have discussed the patient with the resident/non-physician practitioner and agree with the resident's/non-physician practitioner's findings, Plan of Care, and MDM as documented in the resident's/non-physician practitioner's note, except where noted  All available labs and Radiology studies were reviewed  I was present for key portions of any procedure(s) performed by the resident/non-physician practitioner and I was immediately available to provide assistance  At this point I agree with the current assessment done in the Emergency Department  I have conducted an independent evaluation of this patient a history and physical is as follows:    ED Course         Critical Care Time  Procedures    76 yo male found by neighbor today in his house, covered in feces and urine  Pt is poor historian and unclear what happened today  Pt is not oriented and unable to answer questions appropriately  pmh htn, hld, ckd  Pt states he fell few days ago  Vss, afebrile, lungs cta, rrr, abdomen soft distended, mild tenderness, no focal neuro deficits, no signs of trauma    Cardiac workup, ct head, cspine, c/a/p, vbg, ck, xray legs, urine

## 2021-01-01 ENCOUNTER — TELEPHONE (OUTPATIENT)
Dept: NEPHROLOGY | Facility: CLINIC | Age: 66
End: 2021-01-01

## 2021-01-01 ENCOUNTER — OFFICE VISIT (OUTPATIENT)
Dept: NEPHROLOGY | Facility: CLINIC | Age: 66
End: 2021-01-01
Payer: COMMERCIAL

## 2021-01-01 ENCOUNTER — LAB REQUISITION (OUTPATIENT)
Dept: LAB | Facility: HOSPITAL | Age: 66
End: 2021-01-01
Payer: COMMERCIAL

## 2021-01-01 ENCOUNTER — APPOINTMENT (INPATIENT)
Dept: RADIOLOGY | Facility: HOSPITAL | Age: 66
DRG: 682 | End: 2021-01-01
Payer: COMMERCIAL

## 2021-01-01 ENCOUNTER — TELEPHONE (OUTPATIENT)
Dept: UROLOGY | Facility: MEDICAL CENTER | Age: 66
End: 2021-01-01

## 2021-01-01 ENCOUNTER — TELEPHONE (OUTPATIENT)
Dept: HEMATOLOGY ONCOLOGY | Facility: MEDICAL CENTER | Age: 66
End: 2021-01-01

## 2021-01-01 ENCOUNTER — TELEPHONE (OUTPATIENT)
Dept: VASCULAR SURGERY | Facility: CLINIC | Age: 66
End: 2021-01-01

## 2021-01-01 ENCOUNTER — TRANSCRIBE ORDERS (OUTPATIENT)
Dept: ADMINISTRATIVE | Facility: HOSPITAL | Age: 66
End: 2021-01-01

## 2021-01-01 ENCOUNTER — NURSING HOME VISIT (OUTPATIENT)
Dept: FAMILY MEDICINE CLINIC | Facility: HOSPITAL | Age: 66
End: 2021-01-01
Payer: COMMERCIAL

## 2021-01-01 ENCOUNTER — HOSPITAL ENCOUNTER (INPATIENT)
Facility: HOSPITAL | Age: 66
LOS: 1 days | DRG: 682 | End: 2021-11-25
Attending: EMERGENCY MEDICINE | Admitting: INTERNAL MEDICINE
Payer: COMMERCIAL

## 2021-01-01 ENCOUNTER — TELEPHONE (OUTPATIENT)
Dept: HEMATOLOGY ONCOLOGY | Facility: CLINIC | Age: 66
End: 2021-01-01

## 2021-01-01 ENCOUNTER — APPOINTMENT (EMERGENCY)
Dept: RADIOLOGY | Facility: HOSPITAL | Age: 66
DRG: 682 | End: 2021-01-01
Payer: COMMERCIAL

## 2021-01-01 ENCOUNTER — APPOINTMENT (OUTPATIENT)
Dept: LAB | Facility: HOSPITAL | Age: 66
End: 2021-01-01
Attending: INTERNAL MEDICINE
Payer: COMMERCIAL

## 2021-01-01 VITALS
HEART RATE: 72 BPM | WEIGHT: 145.8 LBS | DIASTOLIC BLOOD PRESSURE: 70 MMHG | BODY MASS INDEX: 22.88 KG/M2 | HEIGHT: 67 IN | SYSTOLIC BLOOD PRESSURE: 140 MMHG

## 2021-01-01 VITALS
DIASTOLIC BLOOD PRESSURE: 52 MMHG | BODY MASS INDEX: 19.13 KG/M2 | WEIGHT: 122.14 LBS | TEMPERATURE: 95.7 F | HEART RATE: 66 BPM | OXYGEN SATURATION: 42 % | SYSTOLIC BLOOD PRESSURE: 109 MMHG | RESPIRATION RATE: 32 BRPM

## 2021-01-01 VITALS
DIASTOLIC BLOOD PRESSURE: 65 MMHG | SYSTOLIC BLOOD PRESSURE: 141 MMHG | TEMPERATURE: 98.5 F | WEIGHT: 140.2 LBS | HEIGHT: 67 IN | BODY MASS INDEX: 22 KG/M2 | RESPIRATION RATE: 17 BRPM | OXYGEN SATURATION: 100 % | HEART RATE: 85 BPM

## 2021-01-01 DIAGNOSIS — N18.32 STAGE 3B CHRONIC KIDNEY DISEASE (HCC): Primary | ICD-10-CM

## 2021-01-01 DIAGNOSIS — N17.9 ACUTE RENAL FAILURE (ARF) (HCC): ICD-10-CM

## 2021-01-01 DIAGNOSIS — E87.5 HYPERKALEMIA: ICD-10-CM

## 2021-01-01 DIAGNOSIS — N18.32 STAGE 3B CHRONIC KIDNEY DISEASE (HCC): ICD-10-CM

## 2021-01-01 DIAGNOSIS — I73.9 PAD (PERIPHERAL ARTERY DISEASE) (HCC): ICD-10-CM

## 2021-01-01 DIAGNOSIS — I73.9 PERIPHERAL VASCULAR DISEASE, UNSPECIFIED (HCC): Primary | ICD-10-CM

## 2021-01-01 DIAGNOSIS — R41.82 ALTERED MENTAL STATUS: Primary | ICD-10-CM

## 2021-01-01 DIAGNOSIS — N39.0 URINARY TRACT INFECTION WITHOUT HEMATURIA, SITE UNSPECIFIED: ICD-10-CM

## 2021-01-01 DIAGNOSIS — N13.9 OBSTRUCTIVE UROPATHY: ICD-10-CM

## 2021-01-01 DIAGNOSIS — R41.89 COGNITIVE IMPAIRMENT: ICD-10-CM

## 2021-01-01 DIAGNOSIS — N17.9 AKI (ACUTE KIDNEY INJURY) (HCC): ICD-10-CM

## 2021-01-01 DIAGNOSIS — I95.9 HYPOTENSION: ICD-10-CM

## 2021-01-01 DIAGNOSIS — G93.40 ACUTE ENCEPHALOPATHY: ICD-10-CM

## 2021-01-01 DIAGNOSIS — R06.82 TACHYPNEA: ICD-10-CM

## 2021-01-01 DIAGNOSIS — R26.9 UNSPECIFIED ABNORMALITIES OF GAIT AND MOBILITY: Primary | ICD-10-CM

## 2021-01-01 DIAGNOSIS — E87.2 LACTIC ACIDOSIS: ICD-10-CM

## 2021-01-01 DIAGNOSIS — N18.30 CHRONIC KIDNEY DISEASE, STAGE 3 UNSPECIFIED (HCC): ICD-10-CM

## 2021-01-01 DIAGNOSIS — D62 ACUTE BLOOD LOSS ANEMIA: ICD-10-CM

## 2021-01-01 DIAGNOSIS — R94.31 ABNORMAL EKG: ICD-10-CM

## 2021-01-01 DIAGNOSIS — T68.XXXA HYPOTHERMIA, INITIAL ENCOUNTER: ICD-10-CM

## 2021-01-01 DIAGNOSIS — G93.40 ENCEPHALOPATHY ACUTE: ICD-10-CM

## 2021-01-01 DIAGNOSIS — G93.40 ENCEPHALOPATHY ACUTE: Primary | ICD-10-CM

## 2021-01-01 DIAGNOSIS — E87.1 HYPONATREMIA: ICD-10-CM

## 2021-01-01 DIAGNOSIS — R33.9 URINARY RETENTION: ICD-10-CM

## 2021-01-01 DIAGNOSIS — E87.2 ACIDOSIS: ICD-10-CM

## 2021-01-01 DIAGNOSIS — D64.9 ANEMIA, UNSPECIFIED TYPE: ICD-10-CM

## 2021-01-01 DIAGNOSIS — E78.5 HYPERLIPIDEMIA, UNSPECIFIED HYPERLIPIDEMIA TYPE: ICD-10-CM

## 2021-01-01 DIAGNOSIS — I10 ESSENTIAL HYPERTENSION: ICD-10-CM

## 2021-01-01 DIAGNOSIS — N13.9 OBSTRUCTIVE UROPATHY: Primary | ICD-10-CM

## 2021-01-01 DIAGNOSIS — R10.819 ABDOMINAL TENDERNESS: ICD-10-CM

## 2021-01-01 LAB
25(OH)D3 SERPL-MCNC: 22 NG/ML (ref 30–100)
25(OH)D3 SERPL-MCNC: 26.8 NG/ML (ref 30–100)
2HR DELTA HS TROPONIN: 10 NG/L
4HR DELTA HS TROPONIN: 66 NG/L
ABO GROUP BLD: NORMAL
ALBUMIN SERPL BCP-MCNC: 1.1 G/DL (ref 3.5–5)
ALBUMIN SERPL BCP-MCNC: 3.1 G/DL (ref 3.5–5)
ALBUMIN SERPL BCP-MCNC: 3.1 G/DL (ref 3.5–5)
ALBUMIN SERPL BCP-MCNC: 3.4 G/DL (ref 3.5–5)
ALP SERPL-CCNC: 103 U/L (ref 46–116)
ALP SERPL-CCNC: 111 U/L (ref 46–116)
ALP SERPL-CCNC: 95 U/L (ref 46–116)
ALT SERPL W P-5'-P-CCNC: 226 U/L (ref 12–78)
ALT SERPL W P-5'-P-CCNC: 24 U/L (ref 12–78)
ALT SERPL W P-5'-P-CCNC: 94 U/L (ref 12–78)
AMMONIA PLAS-SCNC: 51 UMOL/L (ref 11–35)
ANION GAP SERPL CALCULATED.3IONS-SCNC: 10 MMOL/L (ref 4–13)
ANION GAP SERPL CALCULATED.3IONS-SCNC: 20 MMOL/L (ref 4–13)
ANION GAP SERPL CALCULATED.3IONS-SCNC: 22 MMOL/L (ref 4–13)
ANION GAP SERPL CALCULATED.3IONS-SCNC: 23 MMOL/L (ref 4–13)
ANION GAP SERPL CALCULATED.3IONS-SCNC: 25 MMOL/L (ref 4–13)
ANION GAP SERPL CALCULATED.3IONS-SCNC: 27 MMOL/L (ref 4–13)
ANION GAP SERPL CALCULATED.3IONS-SCNC: 29 MMOL/L (ref 4–13)
ANION GAP SERPL CALCULATED.3IONS-SCNC: 3 MMOL/L (ref 4–13)
ANION GAP SERPL CALCULATED.3IONS-SCNC: 5 MMOL/L (ref 4–13)
ANION GAP SERPL CALCULATED.3IONS-SCNC: 6 MMOL/L (ref 4–13)
ANION GAP SERPL CALCULATED.3IONS-SCNC: 9 MMOL/L (ref 4–13)
ANION GAP SERPL CALCULATED.3IONS-SCNC: 9 MMOL/L (ref 4–13)
ANISOCYTOSIS BLD QL SMEAR: PRESENT
APTT 1H NP PPP: 39 SECONDS (ref 24–36)
APTT IMM NP PPP: 45.5 SECONDS (ref 24–36)
APTT PPP: 120 SECONDS (ref 23–37)
APTT PPP: 120 SECONDS (ref 23–37)
APTT PPP: 142 SECONDS (ref 23–37)
APTT PPP: 23 SECONDS (ref 23–37)
ARTIFACT: PRESENT
ARTIFACT: PRESENT
AST SERPL W P-5'-P-CCNC: 37 U/L (ref 5–45)
AST SERPL W P-5'-P-CCNC: 633 U/L (ref 5–45)
AST SERPL W P-5'-P-CCNC: 97 U/L (ref 5–45)
ATRIAL RATE: 80 BPM
ATRIAL RATE: 90 BPM
BACTERIA UR CULT: ABNORMAL
BACTERIA UR QL AUTO: ABNORMAL /HPF
BACTERIA UR QL AUTO: ABNORMAL /HPF
BASE EX.OXY STD BLDV CALC-SCNC: 53.7 % (ref 60–80)
BASE EX.OXY STD BLDV CALC-SCNC: 90.8 % (ref 60–80)
BASE EXCESS BLDA CALC-SCNC: -17.5 MMOL/L
BASE EXCESS BLDA CALC-SCNC: -18 MMOL/L (ref -2–3)
BASE EXCESS BLDA CALC-SCNC: -20.6 MMOL/L
BASE EXCESS BLDA CALC-SCNC: -24 MMOL/L (ref -2–3)
BASE EXCESS BLDA CALC-SCNC: -24.8 MMOL/L
BASE EXCESS BLDA CALC-SCNC: -4 MMOL/L (ref -2–3)
BASE EXCESS BLDV CALC-SCNC: -27.7 MMOL/L
BASE EXCESS BLDV CALC-SCNC: -8.3 MMOL/L
BASOPHILS # BLD AUTO: 0.04 THOUSANDS/ΜL (ref 0–0.1)
BASOPHILS # BLD AUTO: 0.04 THOUSANDS/ΜL (ref 0–0.1)
BASOPHILS # BLD AUTO: 0.05 THOUSANDS/ΜL (ref 0–0.1)
BASOPHILS # BLD AUTO: 0.06 THOUSANDS/ΜL (ref 0–0.1)
BASOPHILS # BLD AUTO: 0.09 THOUSANDS/ΜL (ref 0–0.1)
BASOPHILS # BLD AUTO: 0.12 THOUSANDS/ΜL (ref 0–0.1)
BASOPHILS # BLD MANUAL: 0 THOUSAND/UL (ref 0–0.1)
BASOPHILS # BLD MANUAL: 0.11 THOUSAND/UL (ref 0–0.1)
BASOPHILS NFR BLD AUTO: 0 % (ref 0–1)
BASOPHILS NFR BLD AUTO: 0 % (ref 0–1)
BASOPHILS NFR BLD AUTO: 1 % (ref 0–1)
BASOPHILS NFR MAR MANUAL: 0 % (ref 0–1)
BASOPHILS NFR MAR MANUAL: 2 % (ref 0–1)
BILIRUB SERPL-MCNC: 0.29 MG/DL (ref 0.2–1)
BILIRUB SERPL-MCNC: 0.81 MG/DL (ref 0.2–1)
BILIRUB SERPL-MCNC: 1.03 MG/DL (ref 0.2–1)
BILIRUB UR QL STRIP: NEGATIVE
BILIRUB UR QL STRIP: NEGATIVE
BLD GP AB SCN SERPL QL: NEGATIVE
BLD SMEAR INTERP: NORMAL
BODY TEMPERATURE: 94.8 DEGREES FEHRENHEIT
BODY TEMPERATURE: 95.2 DEGREES FEHRENHEIT
BUN SERPL-MCNC: 101 MG/DL (ref 5–25)
BUN SERPL-MCNC: 11 MG/DL (ref 5–25)
BUN SERPL-MCNC: 118 MG/DL (ref 5–25)
BUN SERPL-MCNC: 12 MG/DL (ref 5–25)
BUN SERPL-MCNC: 134 MG/DL (ref 5–25)
BUN SERPL-MCNC: 14 MG/DL (ref 5–25)
BUN SERPL-MCNC: 15 MG/DL (ref 5–25)
BUN SERPL-MCNC: 16 MG/DL (ref 5–25)
BUN SERPL-MCNC: 17 MG/DL (ref 5–25)
BUN SERPL-MCNC: 18 MG/DL (ref 5–25)
BUN SERPL-MCNC: 200 MG/DL (ref 5–25)
BUN SERPL-MCNC: 208 MG/DL (ref 5–25)
BUN SERPL-MCNC: 215 MG/DL (ref 5–25)
BUN SERPL-MCNC: 223 MG/DL (ref 5–25)
BUN SERPL-MCNC: 26 MG/DL (ref 5–25)
BUN SERPL-MCNC: 27 MG/DL (ref 5–25)
BUN SERPL-MCNC: 27 MG/DL (ref 5–25)
BUN SERPL-MCNC: 53 MG/DL (ref 5–25)
BURR CELLS BLD QL SMEAR: PRESENT
CA-I BLD-SCNC: 0.91 MMOL/L (ref 1.12–1.32)
CA-I BLD-SCNC: 0.97 MMOL/L (ref 1.12–1.32)
CA-I BLD-SCNC: 1 MMOL/L (ref 1.12–1.32)
CA-I BLD-SCNC: 1.06 MMOL/L (ref 1.12–1.32)
CALCIUM ALBUM COR SERPL-MCNC: 10.2 MG/DL (ref 8.3–10.1)
CALCIUM ALBUM COR SERPL-MCNC: 10.5 MG/DL (ref 8.3–10.1)
CALCIUM ALBUM COR SERPL-MCNC: 11.2 MG/DL (ref 8.3–10.1)
CALCIUM ALBUM COR SERPL-MCNC: 15.5 MG/DL (ref 8.3–10.1)
CALCIUM SERPL-MCNC: 10 MG/DL (ref 8.3–10.1)
CALCIUM SERPL-MCNC: 10.5 MG/DL (ref 8.3–10.1)
CALCIUM SERPL-MCNC: 13.2 MG/DL (ref 8.3–10.1)
CALCIUM SERPL-MCNC: 7.2 MG/DL (ref 8.3–10.1)
CALCIUM SERPL-MCNC: 7.8 MG/DL (ref 8.3–10.1)
CALCIUM SERPL-MCNC: 7.8 MG/DL (ref 8.3–10.1)
CALCIUM SERPL-MCNC: 7.9 MG/DL (ref 8.3–10.1)
CALCIUM SERPL-MCNC: 8 MG/DL (ref 8.3–10.1)
CALCIUM SERPL-MCNC: 8 MG/DL (ref 8.3–10.1)
CALCIUM SERPL-MCNC: 8.4 MG/DL (ref 8.3–10.1)
CALCIUM SERPL-MCNC: 8.4 MG/DL (ref 8.3–10.1)
CALCIUM SERPL-MCNC: 8.7 MG/DL (ref 8.3–10.1)
CALCIUM SERPL-MCNC: 8.8 MG/DL (ref 8.3–10.1)
CALCIUM SERPL-MCNC: 8.8 MG/DL (ref 8.3–10.1)
CALCIUM SERPL-MCNC: 8.9 MG/DL (ref 8.3–10.1)
CALCIUM SERPL-MCNC: 9 MG/DL (ref 8.3–10.1)
CALCIUM SERPL-MCNC: 9.2 MG/DL (ref 8.3–10.1)
CALCIUM SERPL-MCNC: 9.5 MG/DL (ref 8.3–10.1)
CARDIAC TROPONIN I PNL SERPL HS: 103 NG/L
CARDIAC TROPONIN I PNL SERPL HS: 2027 NG/L
CARDIAC TROPONIN I PNL SERPL HS: 37 NG/L
CARDIAC TROPONIN I PNL SERPL HS: 381 NG/L (ref 8–18)
CARDIAC TROPONIN I PNL SERPL HS: 47 NG/L
CHLORIDE SERPL-SCNC: 107 MMOL/L (ref 100–108)
CHLORIDE SERPL-SCNC: 109 MMOL/L (ref 100–108)
CHLORIDE SERPL-SCNC: 110 MMOL/L (ref 100–108)
CHLORIDE SERPL-SCNC: 112 MMOL/L (ref 100–108)
CHLORIDE SERPL-SCNC: 114 MMOL/L (ref 100–108)
CHLORIDE SERPL-SCNC: 115 MMOL/L (ref 100–108)
CHLORIDE SERPL-SCNC: 116 MMOL/L (ref 100–108)
CHLORIDE SERPL-SCNC: 116 MMOL/L (ref 100–108)
CHLORIDE SERPL-SCNC: 117 MMOL/L (ref 100–108)
CHLORIDE SERPL-SCNC: 118 MMOL/L (ref 100–108)
CK MB SERPL-MCNC: 4.7 NG/ML (ref 0–5)
CK MB SERPL-MCNC: <1 % (ref 0–2.5)
CK SERPL-CCNC: 907 U/L (ref 39–308)
CLARITY UR: ABNORMAL
CLARITY UR: ABNORMAL
CO2 SERPL-SCNC: 11 MMOL/L (ref 21–32)
CO2 SERPL-SCNC: 13 MMOL/L (ref 21–32)
CO2 SERPL-SCNC: 14 MMOL/L (ref 21–32)
CO2 SERPL-SCNC: 15 MMOL/L (ref 21–32)
CO2 SERPL-SCNC: 18 MMOL/L (ref 21–32)
CO2 SERPL-SCNC: 18 MMOL/L (ref 21–32)
CO2 SERPL-SCNC: 20 MMOL/L (ref 21–32)
CO2 SERPL-SCNC: 20 MMOL/L (ref 21–32)
CO2 SERPL-SCNC: 21 MMOL/L (ref 21–32)
CO2 SERPL-SCNC: 22 MMOL/L (ref 21–32)
CO2 SERPL-SCNC: 22 MMOL/L (ref 21–32)
CO2 SERPL-SCNC: 23 MMOL/L (ref 21–32)
CO2 SERPL-SCNC: 24 MMOL/L (ref 21–32)
CO2 SERPL-SCNC: 25 MMOL/L (ref 21–32)
CO2 SERPL-SCNC: 5 MMOL/L (ref 21–32)
CO2 SERPL-SCNC: 8 MMOL/L (ref 21–32)
COLOR UR: ABNORMAL
COLOR UR: YELLOW
CREAT SERPL-MCNC: 0.98 MG/DL (ref 0.6–1.3)
CREAT SERPL-MCNC: 1.05 MG/DL (ref 0.6–1.3)
CREAT SERPL-MCNC: 1.08 MG/DL (ref 0.6–1.3)
CREAT SERPL-MCNC: 1.09 MG/DL (ref 0.6–1.3)
CREAT SERPL-MCNC: 1.11 MG/DL (ref 0.6–1.3)
CREAT SERPL-MCNC: 1.15 MG/DL (ref 0.6–1.3)
CREAT SERPL-MCNC: 1.16 MG/DL (ref 0.6–1.3)
CREAT SERPL-MCNC: 1.29 MG/DL (ref 0.6–1.3)
CREAT SERPL-MCNC: 1.72 MG/DL (ref 0.6–1.3)
CREAT SERPL-MCNC: 1.79 MG/DL (ref 0.6–1.3)
CREAT SERPL-MCNC: 1.88 MG/DL (ref 0.6–1.3)
CREAT SERPL-MCNC: 12.2 MG/DL (ref 0.6–1.3)
CREAT SERPL-MCNC: 12.8 MG/DL (ref 0.6–1.3)
CREAT SERPL-MCNC: 13.6 MG/DL (ref 0.6–1.3)
CREAT SERPL-MCNC: 14.7 MG/DL (ref 0.6–1.3)
CREAT SERPL-MCNC: 3.74 MG/DL (ref 0.6–1.3)
CREAT SERPL-MCNC: 5.76 MG/DL (ref 0.6–1.3)
CREAT SERPL-MCNC: 8.05 MG/DL (ref 0.6–1.3)
D DIMER PPP FEU-MCNC: >20 UG/ML FEU
DACRYOCYTES BLD QL SMEAR: PRESENT
DEPRECATED AT III PPP: 24 % OF NORMAL (ref 92–136)
EOSINOPHIL # BLD AUTO: 0.34 THOUSAND/ΜL (ref 0–0.61)
EOSINOPHIL # BLD AUTO: 0.41 THOUSAND/ΜL (ref 0–0.61)
EOSINOPHIL # BLD AUTO: 0.41 THOUSAND/ΜL (ref 0–0.61)
EOSINOPHIL # BLD AUTO: 0.43 THOUSAND/ΜL (ref 0–0.61)
EOSINOPHIL # BLD AUTO: 0.47 THOUSAND/ΜL (ref 0–0.61)
EOSINOPHIL # BLD AUTO: 0.69 THOUSAND/ΜL (ref 0–0.61)
EOSINOPHIL # BLD MANUAL: 0 THOUSAND/UL (ref 0–0.4)
EOSINOPHIL # BLD MANUAL: 0.05 THOUSAND/UL (ref 0–0.4)
EOSINOPHIL NFR BLD AUTO: 3 % (ref 0–6)
EOSINOPHIL NFR BLD AUTO: 3 % (ref 0–6)
EOSINOPHIL NFR BLD AUTO: 4 % (ref 0–6)
EOSINOPHIL NFR BLD AUTO: 7 % (ref 0–6)
EOSINOPHIL NFR BLD MANUAL: 0 % (ref 0–6)
EOSINOPHIL NFR BLD MANUAL: 1 % (ref 0–6)
ERYTHROCYTE [DISTWIDTH] IN BLOOD BY AUTOMATED COUNT: 14.5 % (ref 11.6–15.1)
ERYTHROCYTE [DISTWIDTH] IN BLOOD BY AUTOMATED COUNT: 14.6 % (ref 11.6–15.1)
ERYTHROCYTE [DISTWIDTH] IN BLOOD BY AUTOMATED COUNT: 14.6 % (ref 11.6–15.1)
ERYTHROCYTE [DISTWIDTH] IN BLOOD BY AUTOMATED COUNT: 14.8 % (ref 11.6–15.1)
ERYTHROCYTE [DISTWIDTH] IN BLOOD BY AUTOMATED COUNT: 14.9 % (ref 11.6–15.1)
ERYTHROCYTE [DISTWIDTH] IN BLOOD BY AUTOMATED COUNT: 15.5 % (ref 11.6–15.1)
ERYTHROCYTE [DISTWIDTH] IN BLOOD BY AUTOMATED COUNT: 15.9 % (ref 11.6–15.1)
ERYTHROCYTE [DISTWIDTH] IN BLOOD BY AUTOMATED COUNT: 16.1 % (ref 11.6–15.1)
ERYTHROCYTE [DISTWIDTH] IN BLOOD BY AUTOMATED COUNT: 16.2 % (ref 11.6–15.1)
FDP BLD QL AGGL: >640
FIBRINOGEN PPP-MCNC: 108 MG/DL (ref 227–495)
FIBRINOGEN PPP-MCNC: <60 MG/DL (ref 227–495)
FIO2 GAS DIL.REBREATH: 44 L
FLUAV RNA RESP QL NAA+PROBE: NEGATIVE
FLUAV RNA RESP QL NAA+PROBE: NEGATIVE
FLUBV RNA RESP QL NAA+PROBE: NEGATIVE
FLUBV RNA RESP QL NAA+PROBE: NEGATIVE
FOLATE SERPL-MCNC: 7.3 NG/ML (ref 3.1–17.5)
GFR SERPL CREATININE-BSD FRML MDRD: 11 ML/MIN/1.73SQ M
GFR SERPL CREATININE-BSD FRML MDRD: 18 ML/MIN/1.73SQ M
GFR SERPL CREATININE-BSD FRML MDRD: 3 ML/MIN/1.73SQ M
GFR SERPL CREATININE-BSD FRML MDRD: 4 ML/MIN/1.73SQ M
GFR SERPL CREATININE-BSD FRML MDRD: 42 ML/MIN/1.73SQ M
GFR SERPL CREATININE-BSD FRML MDRD: 44 ML/MIN/1.73SQ M
GFR SERPL CREATININE-BSD FRML MDRD: 47 ML/MIN/1.73SQ M
GFR SERPL CREATININE-BSD FRML MDRD: 66 ML/MIN/1.73SQ M
GFR SERPL CREATININE-BSD FRML MDRD: 7 ML/MIN/1.73SQ M
GFR SERPL CREATININE-BSD FRML MDRD: 75 ML/MIN/1.73SQ M
GFR SERPL CREATININE-BSD FRML MDRD: 76 ML/MIN/1.73SQ M
GFR SERPL CREATININE-BSD FRML MDRD: 79 ML/MIN/1.73SQ M
GFR SERPL CREATININE-BSD FRML MDRD: 81 ML/MIN/1.73SQ M
GFR SERPL CREATININE-BSD FRML MDRD: 82 ML/MIN/1.73SQ M
GFR SERPL CREATININE-BSD FRML MDRD: 84 ML/MIN/1.73SQ M
GFR SERPL CREATININE-BSD FRML MDRD: 92 ML/MIN/1.73SQ M
GLUCOSE P FAST SERPL-MCNC: 129 MG/DL (ref 65–99)
GLUCOSE SERPL-MCNC: 102 MG/DL (ref 65–140)
GLUCOSE SERPL-MCNC: 102 MG/DL (ref 65–140)
GLUCOSE SERPL-MCNC: 104 MG/DL (ref 65–140)
GLUCOSE SERPL-MCNC: 108 MG/DL (ref 65–140)
GLUCOSE SERPL-MCNC: 110 MG/DL (ref 65–140)
GLUCOSE SERPL-MCNC: 111 MG/DL (ref 65–140)
GLUCOSE SERPL-MCNC: 112 MG/DL (ref 65–140)
GLUCOSE SERPL-MCNC: 113 MG/DL (ref 65–140)
GLUCOSE SERPL-MCNC: 115 MG/DL (ref 65–140)
GLUCOSE SERPL-MCNC: 117 MG/DL (ref 65–140)
GLUCOSE SERPL-MCNC: 118 MG/DL (ref 65–140)
GLUCOSE SERPL-MCNC: 120 MG/DL (ref 65–140)
GLUCOSE SERPL-MCNC: 122 MG/DL (ref 65–140)
GLUCOSE SERPL-MCNC: 127 MG/DL (ref 65–140)
GLUCOSE SERPL-MCNC: 128 MG/DL (ref 65–140)
GLUCOSE SERPL-MCNC: 137 MG/DL (ref 65–140)
GLUCOSE SERPL-MCNC: 185 MG/DL (ref 65–140)
GLUCOSE SERPL-MCNC: 188 MG/DL (ref 65–140)
GLUCOSE SERPL-MCNC: 190 MG/DL (ref 65–140)
GLUCOSE SERPL-MCNC: 401 MG/DL (ref 65–140)
GLUCOSE SERPL-MCNC: 68 MG/DL (ref 65–140)
GLUCOSE SERPL-MCNC: 72 MG/DL (ref 65–140)
GLUCOSE SERPL-MCNC: 73 MG/DL (ref 65–140)
GLUCOSE SERPL-MCNC: 77 MG/DL (ref 65–140)
GLUCOSE SERPL-MCNC: 78 MG/DL (ref 65–140)
GLUCOSE SERPL-MCNC: >600 MG/DL (ref 65–140)
GLUCOSE UR STRIP-MCNC: NEGATIVE MG/DL
GLUCOSE UR STRIP-MCNC: NEGATIVE MG/DL
HBV SURFACE AG SER QL: NORMAL
HCO3 BLDA-SCNC: 11.1 MMOL/L (ref 22–28)
HCO3 BLDA-SCNC: 14.3 MMOL/L (ref 22–28)
HCO3 BLDA-SCNC: 24.5 MMOL/L (ref 22–28)
HCO3 BLDA-SCNC: 7.8 MMOL/L (ref 24–30)
HCO3 BLDA-SCNC: 8.9 MMOL/L (ref 22–28)
HCO3 BLDA-SCNC: 9.5 MMOL/L (ref 22–28)
HCO3 BLDV-SCNC: 15.9 MMOL/L (ref 24–30)
HCO3 BLDV-SCNC: 5.9 MMOL/L (ref 24–30)
HCT VFR BLD AUTO: 29.4 % (ref 36.5–49.3)
HCT VFR BLD AUTO: 34 % (ref 36.5–49.3)
HCT VFR BLD AUTO: 34.4 % (ref 36.5–49.3)
HCT VFR BLD AUTO: 34.4 % (ref 36.5–49.3)
HCT VFR BLD AUTO: 34.7 % (ref 36.5–49.3)
HCT VFR BLD AUTO: 35.1 % (ref 36.5–49.3)
HCT VFR BLD AUTO: 35.4 % (ref 36.5–49.3)
HCT VFR BLD AUTO: 36.7 % (ref 36.5–49.3)
HCT VFR BLD AUTO: 37.1 % (ref 36.5–49.3)
HCT VFR BLD AUTO: 38.6 % (ref 36.5–49.3)
HCT VFR BLD AUTO: 49.7 % (ref 36.5–49.3)
HCT VFR BLD CALC: 16 % (ref 36.5–49.3)
HCT VFR BLD CALC: 32 % (ref 36.5–49.3)
HCT VFR BLD CALC: 46 % (ref 36.5–49.3)
HCV AB SER QL: ABNORMAL
HGB BLD-MCNC: 10.3 G/DL (ref 12–17)
HGB BLD-MCNC: 10.8 G/DL (ref 12–17)
HGB BLD-MCNC: 10.9 G/DL (ref 12–17)
HGB BLD-MCNC: 11.1 G/DL (ref 12–17)
HGB BLD-MCNC: 11.3 G/DL (ref 12–17)
HGB BLD-MCNC: 11.4 G/DL (ref 12–17)
HGB BLD-MCNC: 11.5 G/DL (ref 12–17)
HGB BLD-MCNC: 11.7 G/DL (ref 12–17)
HGB BLD-MCNC: 11.9 G/DL (ref 12–17)
HGB BLD-MCNC: 14.8 G/DL (ref 12–17)
HGB BLD-MCNC: 8.9 G/DL (ref 12–17)
HGB BLDA-MCNC: 10.9 G/DL (ref 12–17)
HGB BLDA-MCNC: 15.6 G/DL (ref 12–17)
HGB BLDA-MCNC: 5.4 G/DL (ref 12–17)
HGB UR QL STRIP.AUTO: ABNORMAL
HGB UR QL STRIP.AUTO: ABNORMAL
HIV 1+2 AB+HIV1 P24 AG SERPL QL IA: NORMAL
HIV1 P24 AG SER QL: NORMAL
HOROWITZ INDEX BLDA+IHG-RTO: 100 MM[HG]
HOROWITZ INDEX BLDA+IHG-RTO: 100 MM[HG]
IMM GRANULOCYTES # BLD AUTO: 0.06 THOUSAND/UL (ref 0–0.2)
IMM GRANULOCYTES # BLD AUTO: 0.08 THOUSAND/UL (ref 0–0.2)
IMM GRANULOCYTES # BLD AUTO: 0.08 THOUSAND/UL (ref 0–0.2)
IMM GRANULOCYTES # BLD AUTO: 0.09 THOUSAND/UL (ref 0–0.2)
IMM GRANULOCYTES NFR BLD AUTO: 1 % (ref 0–2)
INR PPP: 1.34 (ref 0.84–1.19)
INR PPP: 2.22 (ref 0.84–1.19)
INR PPP: 4.31 (ref 0.84–1.19)
KETONES UR STRIP-MCNC: NEGATIVE MG/DL
KETONES UR STRIP-MCNC: NEGATIVE MG/DL
L PNEUMO1 AG UR QL IA.RAPID: NEGATIVE
LACTATE SERPL-SCNC: 14.2 MMOL/L (ref 0.5–2)
LACTATE SERPL-SCNC: 14.5 MMOL/L (ref 0.5–2)
LACTATE SERPL-SCNC: 14.7 MMOL/L (ref 0.5–2)
LACTATE SERPL-SCNC: 8.6 MMOL/L (ref 0.5–2)
LACTATE SERPL-SCNC: 9 MMOL/L (ref 0.5–2)
LEUKOCYTE ESTERASE UR QL STRIP: ABNORMAL
LEUKOCYTE ESTERASE UR QL STRIP: ABNORMAL
LYMPHOCYTES # BLD AUTO: 0.86 THOUSAND/UL (ref 0.6–4.47)
LYMPHOCYTES # BLD AUTO: 1.03 THOUSANDS/ΜL (ref 0.6–4.47)
LYMPHOCYTES # BLD AUTO: 1.34 THOUSAND/UL (ref 0.6–4.47)
LYMPHOCYTES # BLD AUTO: 1.48 THOUSANDS/ΜL (ref 0.6–4.47)
LYMPHOCYTES # BLD AUTO: 1.82 THOUSANDS/ΜL (ref 0.6–4.47)
LYMPHOCYTES # BLD AUTO: 1.83 THOUSANDS/ΜL (ref 0.6–4.47)
LYMPHOCYTES # BLD AUTO: 1.98 THOUSANDS/ΜL (ref 0.6–4.47)
LYMPHOCYTES # BLD AUTO: 2.15 THOUSANDS/ΜL (ref 0.6–4.47)
LYMPHOCYTES # BLD AUTO: 25 % (ref 14–44)
LYMPHOCYTES # BLD AUTO: 4 % (ref 14–44)
LYMPHOCYTES NFR BLD AUTO: 11 % (ref 14–44)
LYMPHOCYTES NFR BLD AUTO: 15 % (ref 14–44)
LYMPHOCYTES NFR BLD AUTO: 16 % (ref 14–44)
LYMPHOCYTES NFR BLD AUTO: 17 % (ref 14–44)
LYMPHOCYTES NFR BLD AUTO: 17 % (ref 14–44)
LYMPHOCYTES NFR BLD AUTO: 18 % (ref 14–44)
MACROCYTES BLD QL AUTO: PRESENT
MAGNESIUM SERPL-MCNC: 1.6 MG/DL (ref 1.6–2.6)
MAGNESIUM SERPL-MCNC: 3.2 MG/DL (ref 1.6–2.6)
MAGNESIUM SERPL-MCNC: 3.3 MG/DL (ref 1.6–2.6)
MAGNESIUM SERPL-MCNC: 3.6 MG/DL (ref 1.6–2.6)
MAGNESIUM SERPL-MCNC: 3.7 MG/DL (ref 1.6–2.6)
MAGNESIUM SERPL-MCNC: 4.1 MG/DL (ref 1.6–2.6)
MCH RBC QN AUTO: 26.5 PG (ref 26.8–34.3)
MCH RBC QN AUTO: 27.2 PG (ref 26.8–34.3)
MCH RBC QN AUTO: 27.3 PG (ref 26.8–34.3)
MCH RBC QN AUTO: 27.6 PG (ref 26.8–34.3)
MCH RBC QN AUTO: 27.6 PG (ref 26.8–34.3)
MCH RBC QN AUTO: 28.4 PG (ref 26.8–34.3)
MCH RBC QN AUTO: 28.7 PG (ref 26.8–34.3)
MCH RBC QN AUTO: 29 PG (ref 26.8–34.3)
MCHC RBC AUTO-ENTMCNC: 29.1 G/DL (ref 31.4–37.4)
MCHC RBC AUTO-ENTMCNC: 29.8 G/DL (ref 31.4–37.4)
MCHC RBC AUTO-ENTMCNC: 30.3 G/DL (ref 31.4–37.4)
MCHC RBC AUTO-ENTMCNC: 30.8 G/DL (ref 31.4–37.4)
MCHC RBC AUTO-ENTMCNC: 31 G/DL (ref 31.4–37.4)
MCHC RBC AUTO-ENTMCNC: 31.1 G/DL (ref 31.4–37.4)
MCHC RBC AUTO-ENTMCNC: 31.4 G/DL (ref 31.4–37.4)
MCHC RBC AUTO-ENTMCNC: 32 G/DL (ref 31.4–37.4)
MCHC RBC AUTO-ENTMCNC: 32.1 G/DL (ref 31.4–37.4)
MCHC RBC AUTO-ENTMCNC: 32.2 G/DL (ref 31.4–37.4)
MCHC RBC AUTO-ENTMCNC: 34 G/DL (ref 31.4–37.4)
MCV RBC AUTO: 85 FL (ref 82–98)
MCV RBC AUTO: 86 FL (ref 82–98)
MCV RBC AUTO: 86 FL (ref 82–98)
MCV RBC AUTO: 88 FL (ref 82–98)
MCV RBC AUTO: 92 FL (ref 82–98)
MCV RBC AUTO: 94 FL (ref 82–98)
MCV RBC AUTO: 99 FL (ref 82–98)
MONOCYTES # BLD AUTO: 0.64 THOUSAND/UL (ref 0–1.22)
MONOCYTES # BLD AUTO: 0.86 THOUSAND/UL (ref 0–1.22)
MONOCYTES # BLD AUTO: 0.92 THOUSAND/ΜL (ref 0.17–1.22)
MONOCYTES # BLD AUTO: 0.93 THOUSAND/ΜL (ref 0.17–1.22)
MONOCYTES # BLD AUTO: 0.97 THOUSAND/ΜL (ref 0.17–1.22)
MONOCYTES # BLD AUTO: 1.01 THOUSAND/ΜL (ref 0.17–1.22)
MONOCYTES # BLD AUTO: 1.04 THOUSAND/ΜL (ref 0.17–1.22)
MONOCYTES # BLD AUTO: 1.07 THOUSAND/ΜL (ref 0.17–1.22)
MONOCYTES NFR BLD AUTO: 10 % (ref 4–12)
MONOCYTES NFR BLD AUTO: 11 % (ref 4–12)
MONOCYTES NFR BLD AUTO: 8 % (ref 4–12)
MONOCYTES NFR BLD AUTO: 8 % (ref 4–12)
MONOCYTES NFR BLD AUTO: 9 % (ref 4–12)
MONOCYTES NFR BLD AUTO: 9 % (ref 4–12)
MONOCYTES NFR BLD: 16 % (ref 4–12)
MONOCYTES NFR BLD: 3 % (ref 4–12)
MYELOCYTES NFR BLD MANUAL: 2 % (ref 0–1)
NEUTROPHILS # BLD AUTO: 6.77 THOUSANDS/ΜL (ref 1.85–7.62)
NEUTROPHILS # BLD AUTO: 7.28 THOUSANDS/ΜL (ref 1.85–7.62)
NEUTROPHILS # BLD AUTO: 7.29 THOUSANDS/ΜL (ref 1.85–7.62)
NEUTROPHILS # BLD AUTO: 8.08 THOUSANDS/ΜL (ref 1.85–7.62)
NEUTROPHILS # BLD AUTO: 8.4 THOUSANDS/ΜL (ref 1.85–7.62)
NEUTROPHILS # BLD AUTO: 8.46 THOUSANDS/ΜL (ref 1.85–7.62)
NEUTROPHILS # BLD MANUAL: 19.91 THOUSAND/UL (ref 1.85–7.62)
NEUTROPHILS # BLD MANUAL: 2.35 THOUSAND/UL (ref 1.85–7.62)
NEUTS BAND NFR BLD MANUAL: 1 % (ref 0–8)
NEUTS BAND NFR BLD MANUAL: 3 % (ref 0–8)
NEUTS SEG NFR BLD AUTO: 41 % (ref 43–75)
NEUTS SEG NFR BLD AUTO: 68 % (ref 43–75)
NEUTS SEG NFR BLD AUTO: 68 % (ref 43–75)
NEUTS SEG NFR BLD AUTO: 69 % (ref 43–75)
NEUTS SEG NFR BLD AUTO: 70 % (ref 43–75)
NEUTS SEG NFR BLD AUTO: 70 % (ref 43–75)
NEUTS SEG NFR BLD AUTO: 71 % (ref 43–75)
NEUTS SEG NFR BLD AUTO: 92 % (ref 43–75)
NITRITE UR QL STRIP: NEGATIVE
NITRITE UR QL STRIP: POSITIVE
NON-SQ EPI CELLS URNS QL MICRO: ABNORMAL /HPF
NON-SQ EPI CELLS URNS QL MICRO: ABNORMAL /HPF
NRBC BLD AUTO-RTO: 0 /100 WBCS
NRBC BLD AUTO-RTO: 1 /100 WBC (ref 0–2)
NRBC BLD AUTO-RTO: 5 /100 WBC (ref 0–2)
O2 CT BLDA-SCNC: 13.8 ML/DL (ref 16–23)
O2 CT BLDA-SCNC: 15 ML/DL (ref 16–23)
O2 CT BLDA-SCNC: 9.1 ML/DL (ref 16–23)
O2 CT BLDV-SCNC: 11.8 ML/DL
O2 CT BLDV-SCNC: 15.4 ML/DL
OVALOCYTES BLD QL SMEAR: PRESENT
OXYHGB MFR BLDA: 68.8 % (ref 94–97)
OXYHGB MFR BLDA: 85.4 % (ref 94–97)
OXYHGB MFR BLDA: 92.5 % (ref 94–97)
P AXIS: 33 DEGREES
P AXIS: 66 DEGREES
PCO2 BLD: 12 MMOL/L (ref 21–32)
PCO2 BLD: 27 MMOL/L (ref 21–32)
PCO2 BLD: 35.8 MM HG (ref 36–44)
PCO2 BLD: 37.8 MM HG (ref 42–50)
PCO2 BLD: 74.3 MM HG (ref 36–44)
PCO2 BLD: 9 MMOL/L (ref 21–32)
PCO2 BLDA: 37.9 MM HG (ref 36–44)
PCO2 BLDA: 54.7 MM HG (ref 36–44)
PCO2 BLDA: 67.3 MM HG (ref 36–44)
PCO2 BLDV: 29 MM HG (ref 42–50)
PCO2 BLDV: 35.1 MM HG (ref 42–50)
PCO2 TEMP ADJ BLDA: 50.3 MM HG (ref 36–44)
PEEP RESPIRATORY: 10 CM[H2O]
PEEP RESPIRATORY: 10 CM[H2O]
PH BLD: 6.85 [PH] (ref 7.35–7.45)
PH BLD: 6.92 [PH] (ref 7.3–7.4)
PH BLD: 7.1 [PH] (ref 7.35–7.45)
PH BLD: 7.13 [PH] (ref 7.35–7.45)
PH BLDA: 6.83 [PH] (ref 7.35–7.45)
PH BLDA: 6.95 [PH] (ref 7.35–7.45)
PH BLDA: 7.02 [PH] (ref 7.35–7.45)
PH BLDV: 6.85 [PH] (ref 7.3–7.4)
PH BLDV: 7.36 [PH] (ref 7.3–7.4)
PH UR STRIP.AUTO: 7 [PH]
PH UR STRIP.AUTO: 8 [PH]
PHOSPHATE SERPL-MCNC: 11.5 MG/DL (ref 2.3–4.1)
PHOSPHATE SERPL-MCNC: 12.8 MG/DL (ref 2.3–4.1)
PHOSPHATE SERPL-MCNC: 12.8 MG/DL (ref 2.3–4.1)
PHOSPHATE SERPL-MCNC: 3.6 MG/DL (ref 2.3–4.1)
PHOSPHATE SERPL-MCNC: 4.1 MG/DL (ref 2.3–4.1)
PHOSPHATE SERPL-MCNC: 9.8 MG/DL (ref 2.3–4.1)
PLATELET # BLD AUTO: 199 THOUSANDS/UL (ref 149–390)
PLATELET # BLD AUTO: 211 THOUSANDS/UL (ref 149–390)
PLATELET # BLD AUTO: 217 THOUSANDS/UL (ref 149–390)
PLATELET # BLD AUTO: 230 THOUSANDS/UL (ref 149–390)
PLATELET # BLD AUTO: 251 THOUSANDS/UL (ref 149–390)
PLATELET # BLD AUTO: 255 THOUSANDS/UL (ref 149–390)
PLATELET # BLD AUTO: 272 THOUSANDS/UL (ref 149–390)
PLATELET # BLD AUTO: 276 THOUSANDS/UL (ref 149–390)
PLATELET # BLD AUTO: 322 THOUSANDS/UL (ref 149–390)
PLATELET # BLD AUTO: 355 THOUSANDS/UL (ref 149–390)
PLATELET # BLD AUTO: 38 THOUSANDS/UL (ref 149–390)
PLATELET # BLD AUTO: 402 THOUSANDS/UL (ref 149–390)
PLATELET # BLD AUTO: 41 THOUSANDS/UL (ref 149–390)
PLATELET # BLD AUTO: 59 THOUSANDS/UL (ref 149–390)
PLATELET BLD QL SMEAR: ABNORMAL
PLATELET BLD QL SMEAR: ADEQUATE
PMV BLD AUTO: 10.5 FL (ref 8.9–12.7)
PMV BLD AUTO: 10.6 FL (ref 8.9–12.7)
PMV BLD AUTO: 10.6 FL (ref 8.9–12.7)
PMV BLD AUTO: 10.9 FL (ref 8.9–12.7)
PMV BLD AUTO: 10.9 FL (ref 8.9–12.7)
PMV BLD AUTO: 11.1 FL (ref 8.9–12.7)
PMV BLD AUTO: 11.4 FL (ref 8.9–12.7)
PMV BLD AUTO: 11.5 FL (ref 8.9–12.7)
PMV BLD AUTO: 11.5 FL (ref 8.9–12.7)
PMV BLD AUTO: 11.6 FL (ref 8.9–12.7)
PMV BLD AUTO: 11.7 FL (ref 8.9–12.7)
PMV BLD AUTO: 11.8 FL (ref 8.9–12.7)
PMV BLD AUTO: 9.7 FL (ref 8.9–12.7)
PMV BLD AUTO: 9.8 FL (ref 8.9–12.7)
PO2 BLD: 124 MM HG (ref 75–129)
PO2 BLD: 291 MM HG (ref 75–129)
PO2 BLD: 32 MM HG (ref 35–45)
PO2 BLD: 67 MM HG (ref 75–129)
PO2 BLDA: 49.6 MM HG (ref 75–129)
PO2 BLDA: 76 MM HG (ref 75–129)
PO2 BLDA: 95.1 MM HG (ref 75–129)
PO2 BLDV: 44.2 MM HG (ref 35–45)
PO2 BLDV: 65.8 MM HG (ref 35–45)
POIKILOCYTOSIS BLD QL SMEAR: PRESENT
POIKILOCYTOSIS BLD QL SMEAR: PRESENT
POLYCHROMASIA BLD QL SMEAR: PRESENT
POLYCHROMASIA BLD QL SMEAR: PRESENT
POTASSIUM BLD-SCNC: 7.7 MMOL/L (ref 3.5–5.3)
POTASSIUM BLD-SCNC: >8 MMOL/L (ref 3.5–5.3)
POTASSIUM BLD-SCNC: >8 MMOL/L (ref 3.5–5.3)
POTASSIUM SERPL-SCNC: 3.2 MMOL/L (ref 3.5–5.3)
POTASSIUM SERPL-SCNC: 3.3 MMOL/L (ref 3.5–5.3)
POTASSIUM SERPL-SCNC: 3.4 MMOL/L (ref 3.5–5.3)
POTASSIUM SERPL-SCNC: 3.4 MMOL/L (ref 3.5–5.3)
POTASSIUM SERPL-SCNC: 3.5 MMOL/L (ref 3.5–5.3)
POTASSIUM SERPL-SCNC: 3.7 MMOL/L (ref 3.5–5.3)
POTASSIUM SERPL-SCNC: 3.9 MMOL/L (ref 3.5–5.3)
POTASSIUM SERPL-SCNC: 4 MMOL/L (ref 3.5–5.3)
POTASSIUM SERPL-SCNC: 4.1 MMOL/L (ref 3.5–5.3)
POTASSIUM SERPL-SCNC: 4.4 MMOL/L (ref 3.5–5.3)
POTASSIUM SERPL-SCNC: 6.9 MMOL/L (ref 3.5–5.3)
POTASSIUM SERPL-SCNC: 7.4 MMOL/L (ref 3.5–5.3)
POTASSIUM SERPL-SCNC: 7.4 MMOL/L (ref 3.5–5.3)
POTASSIUM SERPL-SCNC: 7.6 MMOL/L (ref 3.5–5.3)
POTASSIUM SERPL-SCNC: 7.7 MMOL/L (ref 3.5–5.3)
POTASSIUM SERPL-SCNC: 7.8 MMOL/L (ref 3.5–5.3)
PR INTERVAL: 130 MS
PR INTERVAL: 86 MS
PROCALCITONIN SERPL-MCNC: 3.89 NG/ML
PROCALCITONIN SERPL-MCNC: 5.86 NG/ML
PROT SERPL-MCNC: 10.7 G/DL (ref 6.4–8.2)
PROT SERPL-MCNC: 4.3 G/DL (ref 6.4–8.2)
PROT SERPL-MCNC: 8.4 G/DL (ref 6.4–8.2)
PROT UR STRIP-MCNC: ABNORMAL MG/DL
PROT UR STRIP-MCNC: ABNORMAL MG/DL
PROTHROMBIN TIME: 16 SECONDS (ref 11.6–14.5)
PROTHROMBIN TIME: 23.6 SECONDS (ref 11.6–14.5)
PROTHROMBIN TIME: 25.4 SECONDS (ref 11.6–14.5)
PROTHROMBIN TIME: 39 SECONDS (ref 11.6–14.5)
PT 1H NP PPP: 15.5 SEC (ref 12–14.3)
PT IMM NP PPP: 15.5 SECONDS (ref 12–14.3)
PTH-INTACT SERPL-MCNC: 20.7 PG/ML (ref 18.4–80.1)
QRS AXIS: 29 DEGREES
QRS AXIS: 40 DEGREES
QRSD INTERVAL: 72 MS
QRSD INTERVAL: 80 MS
QT INTERVAL: 368 MS
QT INTERVAL: 400 MS
QTC INTERVAL: 450 MS
QTC INTERVAL: 461 MS
RBC # BLD AUTO: 3.13 MILLION/UL (ref 3.88–5.62)
RBC # BLD AUTO: 3.59 MILLION/UL (ref 3.88–5.62)
RBC # BLD AUTO: 3.92 MILLION/UL (ref 3.88–5.62)
RBC # BLD AUTO: 3.99 MILLION/UL (ref 3.88–5.62)
RBC # BLD AUTO: 4.04 MILLION/UL (ref 3.88–5.62)
RBC # BLD AUTO: 4.08 MILLION/UL (ref 3.88–5.62)
RBC # BLD AUTO: 4.09 MILLION/UL (ref 3.88–5.62)
RBC # BLD AUTO: 4.18 MILLION/UL (ref 3.88–5.62)
RBC # BLD AUTO: 4.21 MILLION/UL (ref 3.88–5.62)
RBC # BLD AUTO: 4.49 MILLION/UL (ref 3.88–5.62)
RBC # BLD AUTO: 5.43 MILLION/UL (ref 3.88–5.62)
RBC #/AREA URNS AUTO: ABNORMAL /HPF
RBC #/AREA URNS AUTO: ABNORMAL /HPF
RBC MORPH BLD: PRESENT
RBC MORPH BLD: PRESENT
RH BLD: POSITIVE
RSV RNA RESP QL NAA+PROBE: NEGATIVE
RSV RNA RESP QL NAA+PROBE: NEGATIVE
S PNEUM AG UR QL: NEGATIVE
SAO2 % BLD FROM PO2: 100 % (ref 60–85)
SAO2 % BLD FROM PO2: 32 % (ref 60–85)
SAO2 % BLD FROM PO2: 97 % (ref 60–85)
SARS-COV-2 RNA RESP QL NAA+PROBE: NEGATIVE
SARS-COV-2 RNA RESP QL NAA+PROBE: NEGATIVE
SODIUM BLD-SCNC: 151 MMOL/L (ref 136–145)
SODIUM BLD-SCNC: 152 MMOL/L (ref 136–145)
SODIUM BLD-SCNC: 153 MMOL/L (ref 136–145)
SODIUM SERPL-SCNC: 135 MMOL/L (ref 136–145)
SODIUM SERPL-SCNC: 135 MMOL/L (ref 136–145)
SODIUM SERPL-SCNC: 136 MMOL/L (ref 136–145)
SODIUM SERPL-SCNC: 137 MMOL/L (ref 136–145)
SODIUM SERPL-SCNC: 138 MMOL/L (ref 136–145)
SODIUM SERPL-SCNC: 138 MMOL/L (ref 136–145)
SODIUM SERPL-SCNC: 139 MMOL/L (ref 136–145)
SODIUM SERPL-SCNC: 140 MMOL/L (ref 136–145)
SODIUM SERPL-SCNC: 143 MMOL/L (ref 136–145)
SODIUM SERPL-SCNC: 144 MMOL/L (ref 136–145)
SODIUM SERPL-SCNC: 145 MMOL/L (ref 136–145)
SODIUM SERPL-SCNC: 147 MMOL/L (ref 136–145)
SODIUM SERPL-SCNC: 147 MMOL/L (ref 136–145)
SODIUM SERPL-SCNC: 149 MMOL/L (ref 136–145)
SODIUM SERPL-SCNC: 151 MMOL/L (ref 136–145)
SODIUM SERPL-SCNC: 151 MMOL/L (ref 136–145)
SODIUM SERPL-SCNC: 152 MMOL/L (ref 136–145)
SODIUM SERPL-SCNC: 154 MMOL/L (ref 136–145)
SP GR UR STRIP.AUTO: 1.01 (ref 1–1.03)
SP GR UR STRIP.AUTO: 1.01 (ref 1–1.03)
SPECIMEN EXPIRATION DATE: NORMAL
SPECIMEN SOURCE: ABNORMAL
T WAVE AXIS: 194 DEGREES
T WAVE AXIS: 84 DEGREES
THROMBIN TIME: 14.6 SECONDS (ref 14.7–18.4)
TOXIC GRANULES BLD QL SMEAR: PRESENT
TPA PPP QL CHRO: 22 % OF NORMAL (ref 77–138)
TSH SERPL DL<=0.05 MIU/L-ACNC: 0.85 UIU/ML (ref 0.36–3.74)
TSH SERPL DL<=0.05 MIU/L-ACNC: 1.79 UIU/ML (ref 0.36–3.74)
UROBILINOGEN UR QL STRIP.AUTO: 0.2 E.U./DL
UROBILINOGEN UR QL STRIP.AUTO: 0.2 E.U./DL
VANCOMYCIN TROUGH SERPL-MCNC: 9.4 UG/ML (ref 10–20)
VARIANT LYMPHS # BLD AUTO: 10 %
VENT AC: 22
VENT AC: 32
VENT- AC: AC
VENT- AC: AC
VENTRICULAR RATE: 80 BPM
VENTRICULAR RATE: 90 BPM
VIT B12 SERPL-MCNC: 695 PG/ML (ref 100–900)
VT SETTING VENT: 450 ML
VT SETTING VENT: 450 ML
WBC # BLD AUTO: 1.48 THOUSAND/UL (ref 4.31–10.16)
WBC # BLD AUTO: 10.22 THOUSAND/UL (ref 4.31–10.16)
WBC # BLD AUTO: 10.69 THOUSAND/UL (ref 4.31–10.16)
WBC # BLD AUTO: 11.37 THOUSAND/UL (ref 4.31–10.16)
WBC # BLD AUTO: 11.91 THOUSAND/UL (ref 4.31–10.16)
WBC # BLD AUTO: 12.27 THOUSAND/UL (ref 4.31–10.16)
WBC # BLD AUTO: 21.41 THOUSAND/UL (ref 4.31–10.16)
WBC # BLD AUTO: 5.35 THOUSAND/UL (ref 4.31–10.16)
WBC # BLD AUTO: 6.57 THOUSAND/UL (ref 4.31–10.16)
WBC # BLD AUTO: 8.59 THOUSAND/UL (ref 4.31–10.16)
WBC # BLD AUTO: 9.63 THOUSAND/UL (ref 4.31–10.16)
WBC #/AREA URNS AUTO: ABNORMAL /HPF
WBC #/AREA URNS AUTO: ABNORMAL /HPF

## 2021-01-01 PROCEDURE — 82947 ASSAY GLUCOSE BLOOD QUANT: CPT

## 2021-01-01 PROCEDURE — 85007 BL SMEAR W/DIFF WBC COUNT: CPT | Performed by: PHYSICIAN ASSISTANT

## 2021-01-01 PROCEDURE — 85384 FIBRINOGEN ACTIVITY: CPT | Performed by: INTERNAL MEDICINE

## 2021-01-01 PROCEDURE — NC001 PR NO CHARGE: Performed by: INTERNAL MEDICINE

## 2021-01-01 PROCEDURE — 84132 ASSAY OF SERUM POTASSIUM: CPT

## 2021-01-01 PROCEDURE — 85025 COMPLETE CBC W/AUTO DIFF WBC: CPT | Performed by: FAMILY MEDICINE

## 2021-01-01 PROCEDURE — 86803 HEPATITIS C AB TEST: CPT | Performed by: STUDENT IN AN ORGANIZED HEALTH CARE EDUCATION/TRAINING PROGRAM

## 2021-01-01 PROCEDURE — 83605 ASSAY OF LACTIC ACID: CPT | Performed by: PHYSICIAN ASSISTANT

## 2021-01-01 PROCEDURE — 82306 VITAMIN D 25 HYDROXY: CPT | Performed by: FAMILY MEDICINE

## 2021-01-01 PROCEDURE — 82607 VITAMIN B-12: CPT | Performed by: FAMILY MEDICINE

## 2021-01-01 PROCEDURE — 80048 BASIC METABOLIC PNL TOTAL CA: CPT | Performed by: FAMILY MEDICINE

## 2021-01-01 PROCEDURE — 36600 WITHDRAWAL OF ARTERIAL BLOOD: CPT

## 2021-01-01 PROCEDURE — 93005 ELECTROCARDIOGRAM TRACING: CPT

## 2021-01-01 PROCEDURE — 85027 COMPLETE CBC AUTOMATED: CPT | Performed by: FAMILY MEDICINE

## 2021-01-01 PROCEDURE — 5A1935Z RESPIRATORY VENTILATION, LESS THAN 24 CONSECUTIVE HOURS: ICD-10-PCS | Performed by: INTERNAL MEDICINE

## 2021-01-01 PROCEDURE — 81001 URINALYSIS AUTO W/SCOPE: CPT | Performed by: PHYSICIAN ASSISTANT

## 2021-01-01 PROCEDURE — 83605 ASSAY OF LACTIC ACID: CPT | Performed by: INTERNAL MEDICINE

## 2021-01-01 PROCEDURE — 85362 FIBRIN DEGRADATION PRODUCTS: CPT | Performed by: PHYSICIAN ASSISTANT

## 2021-01-01 PROCEDURE — 5A2204Z RESTORATION OF CARDIAC RHYTHM, SINGLE: ICD-10-PCS | Performed by: INTERNAL MEDICINE

## 2021-01-01 PROCEDURE — 36415 COLL VENOUS BLD VENIPUNCTURE: CPT

## 2021-01-01 PROCEDURE — 85730 THROMBOPLASTIN TIME PARTIAL: CPT

## 2021-01-01 PROCEDURE — 85014 HEMATOCRIT: CPT

## 2021-01-01 PROCEDURE — 80053 COMPREHEN METABOLIC PANEL: CPT | Performed by: INTERNAL MEDICINE

## 2021-01-01 PROCEDURE — 85732 THROMBOPLASTIN TIME PARTIAL: CPT | Performed by: INTERNAL MEDICINE

## 2021-01-01 PROCEDURE — 87077 CULTURE AEROBIC IDENTIFY: CPT

## 2021-01-01 PROCEDURE — 84443 ASSAY THYROID STIM HORMONE: CPT

## 2021-01-01 PROCEDURE — 0BH18EZ INSERTION OF ENDOTRACHEAL AIRWAY INTO TRACHEA, VIA NATURAL OR ARTIFICIAL OPENING ENDOSCOPIC: ICD-10-PCS | Performed by: INTERNAL MEDICINE

## 2021-01-01 PROCEDURE — 80069 RENAL FUNCTION PANEL: CPT

## 2021-01-01 PROCEDURE — 80053 COMPREHEN METABOLIC PANEL: CPT

## 2021-01-01 PROCEDURE — 85300 ANTITHROMBIN III ACTIVITY: CPT | Performed by: PHYSICIAN ASSISTANT

## 2021-01-01 PROCEDURE — 99238 HOSP IP/OBS DSCHRG MGMT 30/<: CPT | Performed by: FAMILY MEDICINE

## 2021-01-01 PROCEDURE — 99223 1ST HOSP IP/OBS HIGH 75: CPT | Performed by: INTERNAL MEDICINE

## 2021-01-01 PROCEDURE — 87186 SC STD MICRODIL/AGAR DIL: CPT

## 2021-01-01 PROCEDURE — 82948 REAGENT STRIP/BLOOD GLUCOSE: CPT

## 2021-01-01 PROCEDURE — 84443 ASSAY THYROID STIM HORMONE: CPT | Performed by: FAMILY MEDICINE

## 2021-01-01 PROCEDURE — 97535 SELF CARE MNGMENT TRAINING: CPT

## 2021-01-01 PROCEDURE — 94760 N-INVAS EAR/PLS OXIMETRY 1: CPT

## 2021-01-01 PROCEDURE — 86923 COMPATIBILITY TEST ELECTRIC: CPT

## 2021-01-01 PROCEDURE — 87077 CULTURE AEROBIC IDENTIFY: CPT | Performed by: PHYSICIAN ASSISTANT

## 2021-01-01 PROCEDURE — 93010 ELECTROCARDIOGRAM REPORT: CPT | Performed by: INTERNAL MEDICINE

## 2021-01-01 PROCEDURE — 84100 ASSAY OF PHOSPHORUS: CPT | Performed by: INTERNAL MEDICINE

## 2021-01-01 PROCEDURE — 99305 1ST NF CARE MODERATE MDM 35: CPT | Performed by: FAMILY MEDICINE

## 2021-01-01 PROCEDURE — 85379 FIBRIN DEGRADATION QUANT: CPT | Performed by: PHYSICIAN ASSISTANT

## 2021-01-01 PROCEDURE — 96365 THER/PROPH/DIAG IV INF INIT: CPT

## 2021-01-01 PROCEDURE — 97110 THERAPEUTIC EXERCISES: CPT

## 2021-01-01 PROCEDURE — 82805 BLOOD GASES W/O2 SATURATION: CPT | Performed by: PHYSICIAN ASSISTANT

## 2021-01-01 PROCEDURE — 71045 X-RAY EXAM CHEST 1 VIEW: CPT

## 2021-01-01 PROCEDURE — 82803 BLOOD GASES ANY COMBINATION: CPT

## 2021-01-01 PROCEDURE — 84295 ASSAY OF SERUM SODIUM: CPT

## 2021-01-01 PROCEDURE — 85027 COMPLETE CBC AUTOMATED: CPT

## 2021-01-01 PROCEDURE — 84484 ASSAY OF TROPONIN QUANT: CPT | Performed by: INTERNAL MEDICINE

## 2021-01-01 PROCEDURE — 87040 BLOOD CULTURE FOR BACTERIA: CPT

## 2021-01-01 PROCEDURE — 87186 SC STD MICRODIL/AGAR DIL: CPT | Performed by: PHYSICIAN ASSISTANT

## 2021-01-01 PROCEDURE — P9040 RBC LEUKOREDUCED IRRADIATED: HCPCS

## 2021-01-01 PROCEDURE — 0241U HB NFCT DS VIR RESP RNA 4 TRGT: CPT

## 2021-01-01 PROCEDURE — 92950 HEART/LUNG RESUSCITATION CPR: CPT

## 2021-01-01 PROCEDURE — C9113 INJ PANTOPRAZOLE SODIUM, VIA: HCPCS | Performed by: INTERNAL MEDICINE

## 2021-01-01 PROCEDURE — 84484 ASSAY OF TROPONIN QUANT: CPT

## 2021-01-01 PROCEDURE — 30233N1 TRANSFUSION OF NONAUTOLOGOUS RED BLOOD CELLS INTO PERIPHERAL VEIN, PERCUTANEOUS APPROACH: ICD-10-PCS | Performed by: INTERNAL MEDICINE

## 2021-01-01 PROCEDURE — 84145 PROCALCITONIN (PCT): CPT

## 2021-01-01 PROCEDURE — 4A133B1 MONITORING OF ARTERIAL PRESSURE, PERIPHERAL, PERCUTANEOUS APPROACH: ICD-10-PCS | Performed by: INTERNAL MEDICINE

## 2021-01-01 PROCEDURE — 99291 CRITICAL CARE FIRST HOUR: CPT | Performed by: EMERGENCY MEDICINE

## 2021-01-01 PROCEDURE — 82805 BLOOD GASES W/O2 SATURATION: CPT

## 2021-01-01 PROCEDURE — 97530 THERAPEUTIC ACTIVITIES: CPT

## 2021-01-01 PROCEDURE — 94002 VENT MGMT INPAT INIT DAY: CPT

## 2021-01-01 PROCEDURE — P9012 CRYOPRECIPITATE EACH UNIT: HCPCS

## 2021-01-01 PROCEDURE — 82746 ASSAY OF FOLIC ACID SERUM: CPT | Performed by: FAMILY MEDICINE

## 2021-01-01 PROCEDURE — 85027 COMPLETE CBC AUTOMATED: CPT | Performed by: PHYSICIAN ASSISTANT

## 2021-01-01 PROCEDURE — 36620 INSERTION CATHETER ARTERY: CPT | Performed by: INTERNAL MEDICINE

## 2021-01-01 PROCEDURE — 83735 ASSAY OF MAGNESIUM: CPT | Performed by: FAMILY MEDICINE

## 2021-01-01 PROCEDURE — 71250 CT THORAX DX C-: CPT

## 2021-01-01 PROCEDURE — 85049 AUTOMATED PLATELET COUNT: CPT | Performed by: FAMILY MEDICINE

## 2021-01-01 PROCEDURE — 97163 PT EVAL HIGH COMPLEX 45 MIN: CPT

## 2021-01-01 PROCEDURE — 86900 BLOOD TYPING SEROLOGIC ABO: CPT | Performed by: STUDENT IN AN ORGANIZED HEALTH CARE EDUCATION/TRAINING PROGRAM

## 2021-01-01 PROCEDURE — 31500 INSERT EMERGENCY AIRWAY: CPT | Performed by: INTERNAL MEDICINE

## 2021-01-01 PROCEDURE — 31500 INSERT EMERGENCY AIRWAY: CPT

## 2021-01-01 PROCEDURE — 99232 SBSQ HOSP IP/OBS MODERATE 35: CPT | Performed by: FAMILY MEDICINE

## 2021-01-01 PROCEDURE — 85610 PROTHROMBIN TIME: CPT | Performed by: INTERNAL MEDICINE

## 2021-01-01 PROCEDURE — 96367 TX/PROPH/DG ADDL SEQ IV INF: CPT

## 2021-01-01 PROCEDURE — 99223 1ST HOSP IP/OBS HIGH 75: CPT | Performed by: UROLOGY

## 2021-01-01 PROCEDURE — 4A133J1 MONITORING OF ARTERIAL PULSE, PERIPHERAL, PERCUTANEOUS APPROACH: ICD-10-PCS | Performed by: INTERNAL MEDICINE

## 2021-01-01 PROCEDURE — 87081 CULTURE SCREEN ONLY: CPT | Performed by: STUDENT IN AN ORGANIZED HEALTH CARE EDUCATION/TRAINING PROGRAM

## 2021-01-01 PROCEDURE — 99285 EMERGENCY DEPT VISIT HI MDM: CPT

## 2021-01-01 PROCEDURE — 87340 HEPATITIS B SURFACE AG IA: CPT | Performed by: STUDENT IN AN ORGANIZED HEALTH CARE EDUCATION/TRAINING PROGRAM

## 2021-01-01 PROCEDURE — 87086 URINE CULTURE/COLONY COUNT: CPT | Performed by: PHYSICIAN ASSISTANT

## 2021-01-01 PROCEDURE — 87806 HIV AG W/HIV1&2 ANTB W/OPTIC: CPT | Performed by: STUDENT IN AN ORGANIZED HEALTH CARE EDUCATION/TRAINING PROGRAM

## 2021-01-01 PROCEDURE — P9016 RBC LEUKOCYTES REDUCED: HCPCS

## 2021-01-01 PROCEDURE — 94640 AIRWAY INHALATION TREATMENT: CPT

## 2021-01-01 PROCEDURE — 85007 BL SMEAR W/DIFF WBC COUNT: CPT

## 2021-01-01 PROCEDURE — 84484 ASSAY OF TROPONIN QUANT: CPT | Performed by: PHYSICIAN ASSISTANT

## 2021-01-01 PROCEDURE — 83735 ASSAY OF MAGNESIUM: CPT | Performed by: INTERNAL MEDICINE

## 2021-01-01 PROCEDURE — G1004 CDSM NDSC: HCPCS

## 2021-01-01 PROCEDURE — 99309 SBSQ NF CARE MODERATE MDM 30: CPT | Performed by: FAMILY MEDICINE

## 2021-01-01 PROCEDURE — 99214 OFFICE O/P EST MOD 30 MIN: CPT | Performed by: INTERNAL MEDICINE

## 2021-01-01 PROCEDURE — 82805 BLOOD GASES W/O2 SATURATION: CPT | Performed by: STUDENT IN AN ORGANIZED HEALTH CARE EDUCATION/TRAINING PROGRAM

## 2021-01-01 PROCEDURE — 83605 ASSAY OF LACTIC ACID: CPT

## 2021-01-01 PROCEDURE — 5A12012 PERFORMANCE OF CARDIAC OUTPUT, SINGLE, MANUAL: ICD-10-PCS | Performed by: INTERNAL MEDICINE

## 2021-01-01 PROCEDURE — 99292 CRITICAL CARE ADDL 30 MIN: CPT | Performed by: PHYSICIAN ASSISTANT

## 2021-01-01 PROCEDURE — 81001 URINALYSIS AUTO W/SCOPE: CPT | Performed by: EMERGENCY MEDICINE

## 2021-01-01 PROCEDURE — 36556 INSERT NON-TUNNEL CV CATH: CPT | Performed by: INTERNAL MEDICINE

## 2021-01-01 PROCEDURE — 99233 SBSQ HOSP IP/OBS HIGH 50: CPT | Performed by: INTERNAL MEDICINE

## 2021-01-01 PROCEDURE — 83735 ASSAY OF MAGNESIUM: CPT

## 2021-01-01 PROCEDURE — NC001 PR NO CHARGE: Performed by: PHYSICIAN ASSISTANT

## 2021-01-01 PROCEDURE — 99232 SBSQ HOSP IP/OBS MODERATE 35: CPT | Performed by: INTERNAL MEDICINE

## 2021-01-01 PROCEDURE — 82805 BLOOD GASES W/O2 SATURATION: CPT | Performed by: FAMILY MEDICINE

## 2021-01-01 PROCEDURE — 90945 DIALYSIS ONE EVALUATION: CPT

## 2021-01-01 PROCEDURE — 97167 OT EVAL HIGH COMPLEX 60 MIN: CPT

## 2021-01-01 PROCEDURE — 0241U HB NFCT DS VIR RESP RNA 4 TRGT: CPT | Performed by: FAMILY MEDICINE

## 2021-01-01 PROCEDURE — 74176 CT ABD & PELVIS W/O CONTRAST: CPT

## 2021-01-01 PROCEDURE — 70450 CT HEAD/BRAIN W/O DYE: CPT

## 2021-01-01 PROCEDURE — 85610 PROTHROMBIN TIME: CPT | Performed by: PHYSICIAN ASSISTANT

## 2021-01-01 PROCEDURE — 99291 CRITICAL CARE FIRST HOUR: CPT | Performed by: INTERNAL MEDICINE

## 2021-01-01 PROCEDURE — 85384 FIBRINOGEN ACTIVITY: CPT | Performed by: PHYSICIAN ASSISTANT

## 2021-01-01 PROCEDURE — 82553 CREATINE MB FRACTION: CPT

## 2021-01-01 PROCEDURE — 99291 CRITICAL CARE FIRST HOUR: CPT | Performed by: PHYSICIAN ASSISTANT

## 2021-01-01 PROCEDURE — 82140 ASSAY OF AMMONIA: CPT | Performed by: EMERGENCY MEDICINE

## 2021-01-01 PROCEDURE — 85730 THROMBOPLASTIN TIME PARTIAL: CPT | Performed by: INTERNAL MEDICINE

## 2021-01-01 PROCEDURE — 82550 ASSAY OF CK (CPK): CPT

## 2021-01-01 PROCEDURE — 80202 ASSAY OF VANCOMYCIN: CPT | Performed by: INTERNAL MEDICINE

## 2021-01-01 PROCEDURE — 80048 BASIC METABOLIC PNL TOTAL CA: CPT | Performed by: INTERNAL MEDICINE

## 2021-01-01 PROCEDURE — 86901 BLOOD TYPING SEROLOGIC RH(D): CPT | Performed by: STUDENT IN AN ORGANIZED HEALTH CARE EDUCATION/TRAINING PROGRAM

## 2021-01-01 PROCEDURE — 02HV33Z INSERTION OF INFUSION DEVICE INTO SUPERIOR VENA CAVA, PERCUTANEOUS APPROACH: ICD-10-PCS | Performed by: INTERNAL MEDICINE

## 2021-01-01 PROCEDURE — 96375 TX/PRO/DX INJ NEW DRUG ADDON: CPT

## 2021-01-01 PROCEDURE — 30233M1 TRANSFUSION OF NONAUTOLOGOUS PLASMA CRYOPRECIPITATE INTO PERIPHERAL VEIN, PERCUTANEOUS APPROACH: ICD-10-PCS | Performed by: INTERNAL MEDICINE

## 2021-01-01 PROCEDURE — 82330 ASSAY OF CALCIUM: CPT | Performed by: INTERNAL MEDICINE

## 2021-01-01 PROCEDURE — 04HY32Z INSERTION OF MONITORING DEVICE INTO LOWER ARTERY, PERCUTANEOUS APPROACH: ICD-10-PCS | Performed by: INTERNAL MEDICINE

## 2021-01-01 PROCEDURE — 84100 ASSAY OF PHOSPHORUS: CPT | Performed by: FAMILY MEDICINE

## 2021-01-01 PROCEDURE — 02H633Z INSERTION OF INFUSION DEVICE INTO RIGHT ATRIUM, PERCUTANEOUS APPROACH: ICD-10-PCS | Performed by: INTERNAL MEDICINE

## 2021-01-01 PROCEDURE — 85049 AUTOMATED PLATELET COUNT: CPT | Performed by: PHYSICIAN ASSISTANT

## 2021-01-01 PROCEDURE — 80053 COMPREHEN METABOLIC PANEL: CPT | Performed by: FAMILY MEDICINE

## 2021-01-01 PROCEDURE — 83970 ASSAY OF PARATHORMONE: CPT | Performed by: FAMILY MEDICINE

## 2021-01-01 PROCEDURE — 85611 PROTHROMBIN TEST: CPT | Performed by: INTERNAL MEDICINE

## 2021-01-01 PROCEDURE — 85049 AUTOMATED PLATELET COUNT: CPT | Performed by: STUDENT IN AN ORGANIZED HEALTH CARE EDUCATION/TRAINING PROGRAM

## 2021-01-01 PROCEDURE — 85670 THROMBIN TIME PLASMA: CPT | Performed by: INTERNAL MEDICINE

## 2021-01-01 PROCEDURE — 85420 FIBRINOLYTIC PLASMINOGEN: CPT | Performed by: PHYSICIAN ASSISTANT

## 2021-01-01 PROCEDURE — 87449 NOS EACH ORGANISM AG IA: CPT | Performed by: PHYSICIAN ASSISTANT

## 2021-01-01 PROCEDURE — 86850 RBC ANTIBODY SCREEN: CPT | Performed by: STUDENT IN AN ORGANIZED HEALTH CARE EDUCATION/TRAINING PROGRAM

## 2021-01-01 PROCEDURE — 85730 THROMBOPLASTIN TIME PARTIAL: CPT | Performed by: PHYSICIAN ASSISTANT

## 2021-01-01 PROCEDURE — 85610 PROTHROMBIN TIME: CPT

## 2021-01-01 RX ORDER — HEPARIN SODIUM 5000 [USP'U]/ML
5000 INJECTION, SOLUTION INTRAVENOUS; SUBCUTANEOUS EVERY 8 HOURS SCHEDULED
Status: DISCONTINUED | OUTPATIENT
Start: 2021-01-01 | End: 2021-01-01 | Stop reason: HOSPADM

## 2021-01-01 RX ORDER — CALCIUM CHLORIDE 100 MG/ML
SYRINGE (ML) INTRAVENOUS CODE/TRAUMA/SEDATION MEDICATION
Status: DISCONTINUED | OUTPATIENT
Start: 2021-01-01 | End: 2021-01-01 | Stop reason: HOSPADM

## 2021-01-01 RX ORDER — ALBUMIN, HUMAN INJ 5% 5 %
SOLUTION INTRAVENOUS
Status: COMPLETED
Start: 2021-01-01 | End: 2021-01-01

## 2021-01-01 RX ORDER — ATORVASTATIN CALCIUM 40 MG/1
40 TABLET, FILM COATED ORAL DAILY
Status: DISCONTINUED | OUTPATIENT
Start: 2021-01-01 | End: 2021-01-01 | Stop reason: HOSPADM

## 2021-01-01 RX ORDER — ALBUMIN, HUMAN INJ 5% 5 %
25 SOLUTION INTRAVENOUS ONCE
Status: COMPLETED | OUTPATIENT
Start: 2021-01-01 | End: 2021-01-01

## 2021-01-01 RX ORDER — POTASSIUM CHLORIDE 20 MEQ/1
40 TABLET, EXTENDED RELEASE ORAL ONCE
Status: DISCONTINUED | OUTPATIENT
Start: 2021-01-01 | End: 2021-01-01

## 2021-01-01 RX ORDER — SODIUM CHLORIDE 9 MG/ML
75 INJECTION, SOLUTION INTRAVENOUS CONTINUOUS
Status: DISCONTINUED | OUTPATIENT
Start: 2021-01-01 | End: 2021-01-01

## 2021-01-01 RX ORDER — NOREPINEPHRINE BITARTRATE 1 MG/ML
INJECTION, SOLUTION INTRAVENOUS
Status: COMPLETED
Start: 2021-01-01 | End: 2021-01-01

## 2021-01-01 RX ORDER — NOREPINEPHRINE BITARTRATE 1 MG/ML
INJECTION, SOLUTION INTRAVENOUS
Status: DISPENSED
Start: 2021-01-01 | End: 2021-01-01

## 2021-01-01 RX ORDER — NOREPINEPHRINE BITARTRATE 1 MG/ML
INJECTION, SOLUTION INTRAVENOUS
Status: DISCONTINUED
Start: 2021-01-01 | End: 2021-01-01 | Stop reason: HOSPADM

## 2021-01-01 RX ORDER — POTASSIUM CHLORIDE 20 MEQ/1
40 TABLET, EXTENDED RELEASE ORAL 2 TIMES DAILY
Status: COMPLETED | OUTPATIENT
Start: 2021-01-01 | End: 2021-01-01

## 2021-01-01 RX ORDER — POLYETHYLENE GLYCOL 3350 17 G/17G
17 POWDER, FOR SOLUTION ORAL DAILY
Status: DISCONTINUED | OUTPATIENT
Start: 2021-01-01 | End: 2021-01-01 | Stop reason: HOSPADM

## 2021-01-01 RX ORDER — DEXTROSE MONOHYDRATE 25 G/50ML
INJECTION, SOLUTION INTRAVENOUS
Status: DISCONTINUED
Start: 2021-01-01 | End: 2021-01-01 | Stop reason: HOSPADM

## 2021-01-01 RX ORDER — ATORVASTATIN CALCIUM 20 MG/1
40 TABLET, FILM COATED ORAL DAILY
Qty: 90 TABLET | Refills: 0
Start: 2021-01-01

## 2021-01-01 RX ORDER — CHLORHEXIDINE GLUCONATE 0.12 MG/ML
15 RINSE ORAL EVERY 12 HOURS SCHEDULED
Status: DISCONTINUED | OUTPATIENT
Start: 2021-01-01 | End: 2021-01-01 | Stop reason: HOSPADM

## 2021-01-01 RX ORDER — CALCIUM GLUCONATE 20 MG/ML
2 INJECTION, SOLUTION INTRAVENOUS ONCE
Status: COMPLETED | OUTPATIENT
Start: 2021-01-01 | End: 2021-01-01

## 2021-01-01 RX ORDER — POTASSIUM CHLORIDE 20 MEQ/1
40 TABLET, EXTENDED RELEASE ORAL ONCE
Status: COMPLETED | OUTPATIENT
Start: 2021-01-01 | End: 2021-01-01

## 2021-01-01 RX ORDER — SODIUM CHLORIDE, SODIUM GLUCONATE, SODIUM ACETATE, POTASSIUM CHLORIDE, MAGNESIUM CHLORIDE, SODIUM PHOSPHATE, DIBASIC, AND POTASSIUM PHOSPHATE .53; .5; .37; .037; .03; .012; .00082 G/100ML; G/100ML; G/100ML; G/100ML; G/100ML; G/100ML; G/100ML
1000 INJECTION, SOLUTION INTRAVENOUS ONCE
Status: COMPLETED | OUTPATIENT
Start: 2021-01-01 | End: 2021-01-01

## 2021-01-01 RX ORDER — PANTOPRAZOLE SODIUM 40 MG/1
40 INJECTION, POWDER, FOR SOLUTION INTRAVENOUS EVERY 12 HOURS SCHEDULED
Status: DISCONTINUED | OUTPATIENT
Start: 2021-01-01 | End: 2021-01-01

## 2021-01-01 RX ORDER — EPINEPHRINE 0.1 MG/ML
SYRINGE (ML) INJECTION CODE/TRAUMA/SEDATION MEDICATION
Status: DISCONTINUED | OUTPATIENT
Start: 2021-01-01 | End: 2021-01-01 | Stop reason: HOSPADM

## 2021-01-01 RX ORDER — CALCIUM GLUCONATE 20 MG/ML
1 INJECTION, SOLUTION INTRAVENOUS ONCE
Status: COMPLETED | OUTPATIENT
Start: 2021-01-01 | End: 2021-01-01

## 2021-01-01 RX ORDER — SODIUM CHLORIDE, SODIUM GLUCONATE, SODIUM ACETATE, POTASSIUM CHLORIDE, MAGNESIUM CHLORIDE, SODIUM PHOSPHATE, DIBASIC, AND POTASSIUM PHOSPHATE .53; .5; .37; .037; .03; .012; .00082 G/100ML; G/100ML; G/100ML; G/100ML; G/100ML; G/100ML; G/100ML
500 INJECTION, SOLUTION INTRAVENOUS ONCE
Status: COMPLETED | OUTPATIENT
Start: 2021-01-01 | End: 2021-01-01

## 2021-01-01 RX ORDER — BISACODYL 10 MG
10 SUPPOSITORY, RECTAL RECTAL DAILY PRN
Status: DISCONTINUED | OUTPATIENT
Start: 2021-01-01 | End: 2021-01-01 | Stop reason: HOSPADM

## 2021-01-01 RX ORDER — SODIUM CHLORIDE, SODIUM GLUCONATE, SODIUM ACETATE, POTASSIUM CHLORIDE, MAGNESIUM CHLORIDE, SODIUM PHOSPHATE, DIBASIC, AND POTASSIUM PHOSPHATE .53; .5; .37; .037; .03; .012; .00082 G/100ML; G/100ML; G/100ML; G/100ML; G/100ML; G/100ML; G/100ML
2000 INJECTION, SOLUTION INTRAVENOUS ONCE
Status: COMPLETED | OUTPATIENT
Start: 2021-01-01 | End: 2021-01-01

## 2021-01-01 RX ORDER — PHENYLEPHRINE HYDROCHLORIDE 10 MG/ML
INJECTION INTRAVENOUS
Status: DISPENSED
Start: 2021-01-01 | End: 2021-01-01

## 2021-01-01 RX ORDER — EPINEPHRINE 1 MG/ML
INJECTION, SOLUTION, CONCENTRATE INTRAVENOUS
Status: DISPENSED
Start: 2021-01-01 | End: 2021-01-01

## 2021-01-01 RX ORDER — DOBUTAMINE HYDROCHLORIDE 200 MG/100ML
INJECTION INTRAVENOUS
Status: DISCONTINUED | OUTPATIENT
Start: 2021-01-01 | End: 2021-01-01

## 2021-01-01 RX ORDER — DEXTROSE MONOHYDRATE 25 G/50ML
25 INJECTION, SOLUTION INTRAVENOUS ONCE
Status: COMPLETED | OUTPATIENT
Start: 2021-01-01 | End: 2021-01-01

## 2021-01-01 RX ADMIN — VASOPRESSIN 0.08 UNITS/MIN: 20 INJECTION INTRAVENOUS at 12:19

## 2021-01-01 RX ADMIN — ATORVASTATIN CALCIUM 10 MG: 10 TABLET, FILM COATED ORAL at 08:32

## 2021-01-01 RX ADMIN — HEPARIN SODIUM 5000 UNITS: 5000 INJECTION INTRAVENOUS; SUBCUTANEOUS at 05:36

## 2021-01-01 RX ADMIN — ASPIRIN 81 MG: 81 TABLET ORAL at 08:58

## 2021-01-01 RX ADMIN — INSULIN HUMAN 10 UNITS: 100 INJECTION, SOLUTION PARENTERAL at 06:54

## 2021-01-01 RX ADMIN — HEPARIN SODIUM 5000 UNITS: 5000 INJECTION INTRAVENOUS; SUBCUTANEOUS at 05:47

## 2021-01-01 RX ADMIN — NOREPINEPHRINE BITARTRATE 35 MCG/MIN: 1 INJECTION, SOLUTION, CONCENTRATE INTRAVENOUS at 06:56

## 2021-01-01 RX ADMIN — HEPARIN SODIUM 5000 UNITS: 5000 INJECTION INTRAVENOUS; SUBCUTANEOUS at 13:34

## 2021-01-01 RX ADMIN — AMLODIPINE BESYLATE 5 MG: 5 TABLET ORAL at 08:52

## 2021-01-01 RX ADMIN — HEPARIN SODIUM 5000 UNITS: 5000 INJECTION INTRAVENOUS; SUBCUTANEOUS at 13:18

## 2021-01-01 RX ADMIN — SODIUM CHLORIDE, SODIUM GLUCONATE, SODIUM ACETATE, POTASSIUM CHLORIDE, MAGNESIUM CHLORIDE, SODIUM PHOSPHATE, DIBASIC, AND POTASSIUM PHOSPHATE 500 ML: .53; .5; .37; .037; .03; .012; .00082 INJECTION, SOLUTION INTRAVENOUS at 03:05

## 2021-01-01 RX ADMIN — ASPIRIN 81 MG: 81 TABLET ORAL at 08:52

## 2021-01-01 RX ADMIN — PHENYLEPHRINE HYDROCHLORIDE 200 MCG/MIN: 10 INJECTION INTRAVENOUS at 12:44

## 2021-01-01 RX ADMIN — TAMSULOSIN HYDROCHLORIDE 0.4 MG: 0.4 CAPSULE ORAL at 17:05

## 2021-01-01 RX ADMIN — HEPARIN SODIUM 5000 UNITS: 5000 INJECTION INTRAVENOUS; SUBCUTANEOUS at 16:26

## 2021-01-01 RX ADMIN — SODIUM CHLORIDE 100 ML/HR: 0.9 INJECTION, SOLUTION INTRAVENOUS at 13:36

## 2021-01-01 RX ADMIN — HEPARIN SODIUM 5000 UNITS: 5000 INJECTION INTRAVENOUS; SUBCUTANEOUS at 14:24

## 2021-01-01 RX ADMIN — HEPARIN SODIUM 5000 UNITS: 5000 INJECTION INTRAVENOUS; SUBCUTANEOUS at 21:42

## 2021-01-01 RX ADMIN — HEPARIN SODIUM 5000 UNITS: 5000 INJECTION INTRAVENOUS; SUBCUTANEOUS at 05:28

## 2021-01-01 RX ADMIN — ENOXAPARIN SODIUM 30 MG: 30 INJECTION SUBCUTANEOUS at 08:59

## 2021-01-01 RX ADMIN — ASPIRIN 81 MG: 81 TABLET ORAL at 09:58

## 2021-01-01 RX ADMIN — CALCIUM GLUCONATE 1 G: 20 INJECTION, SOLUTION INTRAVENOUS at 20:21

## 2021-01-01 RX ADMIN — ASPIRIN 81 MG: 81 TABLET ORAL at 08:32

## 2021-01-01 RX ADMIN — Medication 20000 ML: at 03:50

## 2021-01-01 RX ADMIN — TAMSULOSIN HYDROCHLORIDE 0.4 MG: 0.4 CAPSULE ORAL at 16:21

## 2021-01-01 RX ADMIN — NOREPINEPHRINE BITARTRATE 30 MCG/MIN: 1 INJECTION, SOLUTION, CONCENTRATE INTRAVENOUS at 11:18

## 2021-01-01 RX ADMIN — SODIUM BICARBONATE 50 MEQ: 84 INJECTION INTRAVENOUS at 09:35

## 2021-01-01 RX ADMIN — CALCIUM CHLORIDE 1 G: 100 INJECTION PARENTERAL at 05:10

## 2021-01-01 RX ADMIN — HEPARIN SODIUM 5000 UNITS: 5000 INJECTION INTRAVENOUS; SUBCUTANEOUS at 05:43

## 2021-01-01 RX ADMIN — AMLODIPINE BESYLATE 5 MG: 5 TABLET ORAL at 09:03

## 2021-01-01 RX ADMIN — AMLODIPINE BESYLATE 5 MG: 5 TABLET ORAL at 08:59

## 2021-01-01 RX ADMIN — SODIUM CHLORIDE, SODIUM GLUCONATE, SODIUM ACETATE, POTASSIUM CHLORIDE, MAGNESIUM CHLORIDE, SODIUM PHOSPHATE, DIBASIC, AND POTASSIUM PHOSPHATE 2000 ML: .53; .5; .37; .037; .03; .012; .00082 INJECTION, SOLUTION INTRAVENOUS at 11:30

## 2021-01-01 RX ADMIN — CEFTRIAXONE SODIUM 1000 MG: 10 INJECTION, POWDER, FOR SOLUTION INTRAVENOUS at 17:19

## 2021-01-01 RX ADMIN — AMLODIPINE BESYLATE 5 MG: 5 TABLET ORAL at 08:47

## 2021-01-01 RX ADMIN — ENOXAPARIN SODIUM 30 MG: 30 INJECTION SUBCUTANEOUS at 12:09

## 2021-01-01 RX ADMIN — POTASSIUM CHLORIDE 40 MEQ: 1500 TABLET, EXTENDED RELEASE ORAL at 17:10

## 2021-01-01 RX ADMIN — INSULIN HUMAN 5 UNITS: 100 INJECTION, SOLUTION PARENTERAL at 05:12

## 2021-01-01 RX ADMIN — ATORVASTATIN CALCIUM 10 MG: 10 TABLET, FILM COATED ORAL at 08:58

## 2021-01-01 RX ADMIN — VANCOMYCIN HYDROCHLORIDE 1500 MG: 10 INJECTION, POWDER, LYOPHILIZED, FOR SOLUTION INTRAVENOUS at 13:12

## 2021-01-01 RX ADMIN — HEPARIN SODIUM 5000 UNITS: 5000 INJECTION INTRAVENOUS; SUBCUTANEOUS at 06:43

## 2021-01-01 RX ADMIN — ASPIRIN 81 MG: 81 TABLET ORAL at 08:54

## 2021-01-01 RX ADMIN — HEPARIN SODIUM 5000 UNITS: 5000 INJECTION INTRAVENOUS; SUBCUTANEOUS at 13:41

## 2021-01-01 RX ADMIN — POTASSIUM CHLORIDE 40 MEQ: 1500 TABLET, EXTENDED RELEASE ORAL at 09:29

## 2021-01-01 RX ADMIN — TAMSULOSIN HYDROCHLORIDE 0.4 MG: 0.4 CAPSULE ORAL at 16:56

## 2021-01-01 RX ADMIN — EPINEPHRINE 1 MG: 0.1 INJECTION INTRACARDIAC; INTRAVENOUS at 05:12

## 2021-01-01 RX ADMIN — CALCIUM GLUCONATE 2 G: 20 INJECTION, SOLUTION INTRAVENOUS at 13:42

## 2021-01-01 RX ADMIN — ATORVASTATIN CALCIUM 10 MG: 10 TABLET, FILM COATED ORAL at 08:54

## 2021-01-01 RX ADMIN — TAMSULOSIN HYDROCHLORIDE 0.4 MG: 0.4 CAPSULE ORAL at 16:27

## 2021-01-01 RX ADMIN — EPINEPHRINE 1 MG: 0.1 INJECTION INTRACARDIAC; INTRAVENOUS at 05:07

## 2021-01-01 RX ADMIN — ASPIRIN 81 MG: 81 TABLET ORAL at 09:03

## 2021-01-01 RX ADMIN — HEPARIN SODIUM 5000 UNITS: 5000 INJECTION INTRAVENOUS; SUBCUTANEOUS at 06:01

## 2021-01-01 RX ADMIN — ALBUMIN (HUMAN) 25 G: 12.5 INJECTION, SOLUTION INTRAVENOUS at 08:33

## 2021-01-01 RX ADMIN — SODIUM BICARBONATE 100 ML/HR: 84 INJECTION, SOLUTION INTRAVENOUS at 21:33

## 2021-01-01 RX ADMIN — AMIODARONE HYDROCHLORIDE 1 MG/MIN: 50 INJECTION, SOLUTION INTRAVENOUS at 11:53

## 2021-01-01 RX ADMIN — ASPIRIN 81 MG: 81 TABLET ORAL at 09:29

## 2021-01-01 RX ADMIN — CEFTRIAXONE SODIUM 1000 MG: 10 INJECTION, POWDER, FOR SOLUTION INTRAVENOUS at 17:41

## 2021-01-01 RX ADMIN — HEPARIN SODIUM 5000 UNITS: 5000 INJECTION INTRAVENOUS; SUBCUTANEOUS at 22:02

## 2021-01-01 RX ADMIN — ENOXAPARIN SODIUM 30 MG: 30 INJECTION SUBCUTANEOUS at 21:49

## 2021-01-01 RX ADMIN — ATORVASTATIN CALCIUM 10 MG: 10 TABLET, FILM COATED ORAL at 08:52

## 2021-01-01 RX ADMIN — EPINEPHRINE 1 MG: 0.1 INJECTION INTRACARDIAC; INTRAVENOUS at 05:05

## 2021-01-01 RX ADMIN — TAMSULOSIN HYDROCHLORIDE 0.4 MG: 0.4 CAPSULE ORAL at 17:10

## 2021-01-01 RX ADMIN — HEPARIN SODIUM 5000 UNITS: 5000 INJECTION INTRAVENOUS; SUBCUTANEOUS at 05:40

## 2021-01-01 RX ADMIN — AMLODIPINE BESYLATE 5 MG: 5 TABLET ORAL at 09:58

## 2021-01-01 RX ADMIN — SODIUM CHLORIDE 125 ML/HR: 0.9 INJECTION, SOLUTION INTRAVENOUS at 05:28

## 2021-01-01 RX ADMIN — ATORVASTATIN CALCIUM 10 MG: 10 TABLET, FILM COATED ORAL at 09:04

## 2021-01-01 RX ADMIN — NOREPINEPHRINE BITARTRATE: 1 INJECTION, SOLUTION, CONCENTRATE INTRAVENOUS at 09:16

## 2021-01-01 RX ADMIN — HEPARIN SODIUM 5000 UNITS: 5000 INJECTION INTRAVENOUS; SUBCUTANEOUS at 22:45

## 2021-01-01 RX ADMIN — DOBUTAMINE HYDROCHLORIDE 10 MCG/KG/MIN: 200 INJECTION INTRAVENOUS at 05:13

## 2021-01-01 RX ADMIN — HEPARIN SODIUM 5000 UNITS: 5000 INJECTION INTRAVENOUS; SUBCUTANEOUS at 21:25

## 2021-01-01 RX ADMIN — PHENYLEPHRINE HYDROCHLORIDE 100 MCG/MIN: 10 INJECTION INTRAVENOUS at 05:45

## 2021-01-01 RX ADMIN — EPINEPHRINE 1 MG: 0.1 INJECTION INTRACARDIAC; INTRAVENOUS at 05:15

## 2021-01-01 RX ADMIN — AMLODIPINE BESYLATE 5 MG: 5 TABLET ORAL at 09:28

## 2021-01-01 RX ADMIN — AMLODIPINE BESYLATE 5 MG: 5 TABLET ORAL at 08:05

## 2021-01-01 RX ADMIN — SODIUM CHLORIDE 75 ML/HR: 0.9 INJECTION, SOLUTION INTRAVENOUS at 10:00

## 2021-01-01 RX ADMIN — ASPIRIN 81 MG: 81 TABLET ORAL at 08:59

## 2021-01-01 RX ADMIN — POTASSIUM CHLORIDE 40 MEQ: 1500 TABLET, EXTENDED RELEASE ORAL at 09:04

## 2021-01-01 RX ADMIN — HEPARIN SODIUM 5000 UNITS: 5000 INJECTION INTRAVENOUS; SUBCUTANEOUS at 22:28

## 2021-01-01 RX ADMIN — ALBUMIN, HUMAN INJ 5% 25 G: 5 SOLUTION at 08:33

## 2021-01-01 RX ADMIN — ASPIRIN 81 MG: 81 TABLET ORAL at 08:05

## 2021-01-01 RX ADMIN — SODIUM CHLORIDE 125 ML/HR: 0.9 INJECTION, SOLUTION INTRAVENOUS at 06:31

## 2021-01-01 RX ADMIN — SODIUM CHLORIDE 100 ML/HR: 0.9 INJECTION, SOLUTION INTRAVENOUS at 03:09

## 2021-01-01 RX ADMIN — ASPIRIN 81 MG: 81 TABLET ORAL at 08:47

## 2021-01-01 RX ADMIN — TAMSULOSIN HYDROCHLORIDE 0.4 MG: 0.4 CAPSULE ORAL at 18:50

## 2021-01-01 RX ADMIN — ASPIRIN 81 MG: 81 TABLET ORAL at 08:57

## 2021-01-01 RX ADMIN — HEPARIN SODIUM 5000 UNITS: 5000 INJECTION INTRAVENOUS; SUBCUTANEOUS at 06:27

## 2021-01-01 RX ADMIN — TAMSULOSIN HYDROCHLORIDE 0.4 MG: 0.4 CAPSULE ORAL at 15:52

## 2021-01-01 RX ADMIN — CEFEPIME HYDROCHLORIDE 1000 MG: 1 INJECTION, POWDER, FOR SOLUTION INTRAMUSCULAR; INTRAVENOUS at 06:00

## 2021-01-01 RX ADMIN — SODIUM CHLORIDE 75 ML/HR: 0.9 INJECTION, SOLUTION INTRAVENOUS at 22:36

## 2021-01-01 RX ADMIN — ATORVASTATIN CALCIUM 10 MG: 10 TABLET, FILM COATED ORAL at 09:03

## 2021-01-01 RX ADMIN — INSULIN HUMAN 5 UNITS: 100 INJECTION, SOLUTION PARENTERAL at 20:29

## 2021-01-01 RX ADMIN — HEPARIN SODIUM 5000 UNITS: 5000 INJECTION INTRAVENOUS; SUBCUTANEOUS at 05:12

## 2021-01-01 RX ADMIN — HEPARIN SODIUM 5000 UNITS: 5000 INJECTION INTRAVENOUS; SUBCUTANEOUS at 14:39

## 2021-01-01 RX ADMIN — DEXTROSE 150 MG: 50 INJECTION, SOLUTION INTRAVENOUS at 11:25

## 2021-01-01 RX ADMIN — AMLODIPINE BESYLATE 5 MG: 5 TABLET ORAL at 08:57

## 2021-01-01 RX ADMIN — HEPARIN SODIUM 5000 UNITS: 5000 INJECTION INTRAVENOUS; SUBCUTANEOUS at 14:01

## 2021-01-01 RX ADMIN — ATORVASTATIN CALCIUM 10 MG: 10 TABLET, FILM COATED ORAL at 08:47

## 2021-01-01 RX ADMIN — TAMSULOSIN HYDROCHLORIDE 0.4 MG: 0.4 CAPSULE ORAL at 16:48

## 2021-01-01 RX ADMIN — ATORVASTATIN CALCIUM 10 MG: 10 TABLET, FILM COATED ORAL at 09:58

## 2021-01-01 RX ADMIN — AMLODIPINE BESYLATE 5 MG: 5 TABLET ORAL at 08:32

## 2021-01-01 RX ADMIN — HEPARIN SODIUM 5000 UNITS: 5000 INJECTION INTRAVENOUS; SUBCUTANEOUS at 23:06

## 2021-01-01 RX ADMIN — SODIUM BICARBONATE 50 MEQ: 84 INJECTION INTRAVENOUS at 20:29

## 2021-01-01 RX ADMIN — ASPIRIN 81 MG: 81 TABLET ORAL at 09:04

## 2021-01-01 RX ADMIN — VASOPRESSIN 0.08 UNITS/MIN: 20 INJECTION INTRAVENOUS at 08:40

## 2021-01-01 RX ADMIN — SODIUM BICARBONATE 200 ML/HR: 84 INJECTION, SOLUTION INTRAVENOUS at 08:59

## 2021-01-01 RX ADMIN — TAMSULOSIN HYDROCHLORIDE 0.4 MG: 0.4 CAPSULE ORAL at 17:15

## 2021-01-01 RX ADMIN — VANCOMYCIN HYDROCHLORIDE 750 MG: 750 INJECTION, SOLUTION INTRAVENOUS at 04:25

## 2021-01-01 RX ADMIN — HEPARIN SODIUM 5000 UNITS: 5000 INJECTION INTRAVENOUS; SUBCUTANEOUS at 13:49

## 2021-01-01 RX ADMIN — PANTOPRAZOLE SODIUM 40 MG: 40 INJECTION, POWDER, FOR SOLUTION INTRAVENOUS at 10:39

## 2021-01-01 RX ADMIN — ATORVASTATIN CALCIUM 10 MG: 10 TABLET, FILM COATED ORAL at 09:29

## 2021-01-01 RX ADMIN — CALCIUM GLUCONATE 2 G: 20 INJECTION, SOLUTION INTRAVENOUS at 10:39

## 2021-01-01 RX ADMIN — ATORVASTATIN CALCIUM 10 MG: 10 TABLET, FILM COATED ORAL at 08:59

## 2021-01-01 RX ADMIN — SODIUM CHLORIDE, SODIUM GLUCONATE, SODIUM ACETATE, POTASSIUM CHLORIDE, MAGNESIUM CHLORIDE, SODIUM PHOSPHATE, DIBASIC, AND POTASSIUM PHOSPHATE 1000 ML: .53; .5; .37; .037; .03; .012; .00082 INJECTION, SOLUTION INTRAVENOUS at 20:28

## 2021-01-01 RX ADMIN — POTASSIUM CHLORIDE 40 MEQ: 1500 TABLET, EXTENDED RELEASE ORAL at 16:21

## 2021-01-01 RX ADMIN — SODIUM CHLORIDE 125 ML/HR: 0.9 INJECTION, SOLUTION INTRAVENOUS at 22:11

## 2021-01-01 RX ADMIN — AMLODIPINE BESYLATE 5 MG: 5 TABLET ORAL at 09:04

## 2021-01-01 RX ADMIN — NOREPINEPHRINE BITARTRATE 5 MCG/MIN: 1 INJECTION, SOLUTION, CONCENTRATE INTRAVENOUS at 21:05

## 2021-01-01 RX ADMIN — CHLORHEXIDINE GLUCONATE 15 ML: 1.2 SOLUTION ORAL at 09:42

## 2021-01-01 RX ADMIN — CEFTRIAXONE 1000 MG: 1 INJECTION, POWDER, FOR SOLUTION INTRAMUSCULAR; INTRAVENOUS at 20:41

## 2021-01-01 RX ADMIN — HEPARIN SODIUM 5000 UNITS: 5000 INJECTION INTRAVENOUS; SUBCUTANEOUS at 22:05

## 2021-01-01 RX ADMIN — HEPARIN SODIUM 5000 UNITS: 5000 INJECTION INTRAVENOUS; SUBCUTANEOUS at 21:27

## 2021-01-01 RX ADMIN — DESMOPRESSIN ACETATE 22 MCG: 4 SOLUTION INTRAVENOUS at 11:59

## 2021-01-01 RX ADMIN — ALBUTEROL SULFATE 10 MG: 2.5 SOLUTION RESPIRATORY (INHALATION) at 20:33

## 2021-01-01 RX ADMIN — CEFTRIAXONE SODIUM 1000 MG: 10 INJECTION, POWDER, FOR SOLUTION INTRAVENOUS at 17:23

## 2021-01-01 RX ADMIN — ALBUMIN, HUMAN INJ 5% 25 G: 5 SOLUTION at 06:48

## 2021-01-01 RX ADMIN — EPINEPHRINE 2 MCG/MIN: 1 INJECTION, SOLUTION, CONCENTRATE INTRAVENOUS at 08:44

## 2021-01-01 RX ADMIN — HEPARIN SODIUM 5000 UNITS: 5000 INJECTION INTRAVENOUS; SUBCUTANEOUS at 05:31

## 2021-01-01 RX ADMIN — AMLODIPINE BESYLATE 5 MG: 5 TABLET ORAL at 08:58

## 2021-01-01 RX ADMIN — NOREPINEPHRINE BITARTRATE 7 MCG/MIN: 1 INJECTION, SOLUTION, CONCENTRATE INTRAVENOUS at 01:50

## 2021-01-01 RX ADMIN — NOREPINEPHRINE BITARTRATE: 1 INJECTION, SOLUTION, CONCENTRATE INTRAVENOUS at 13:30

## 2021-01-01 RX ADMIN — ALBUMIN (HUMAN) 25 G: 12.5 INJECTION, SOLUTION INTRAVENOUS at 06:48

## 2021-01-01 RX ADMIN — ATORVASTATIN CALCIUM 10 MG: 10 TABLET, FILM COATED ORAL at 08:05

## 2021-01-01 RX ADMIN — VASOPRESSIN 0.04 UNITS/MIN: 20 INJECTION INTRAVENOUS at 04:04

## 2021-01-01 RX ADMIN — HEPARIN SODIUM 5000 UNITS: 5000 INJECTION INTRAVENOUS; SUBCUTANEOUS at 21:29

## 2021-01-01 RX ADMIN — EPINEPHRINE 1 MG: 0.1 INJECTION INTRACARDIAC; INTRAVENOUS at 05:09

## 2021-01-01 RX ADMIN — DEXTROSE MONOHYDRATE 25 ML: 500 INJECTION PARENTERAL at 20:29

## 2021-01-01 RX ADMIN — HEPARIN SODIUM 5000 UNITS: 5000 INJECTION INTRAVENOUS; SUBCUTANEOUS at 14:38

## 2021-01-01 RX ADMIN — AMLODIPINE BESYLATE 5 MG: 5 TABLET ORAL at 08:54

## 2021-01-01 RX ADMIN — HEPARIN SODIUM 5000 UNITS: 5000 INJECTION INTRAVENOUS; SUBCUTANEOUS at 14:10

## 2021-01-01 RX ADMIN — ATORVASTATIN CALCIUM 10 MG: 10 TABLET, FILM COATED ORAL at 08:57

## 2021-01-01 NOTE — PLAN OF CARE
Problem: Potential for Falls  Goal: Patient will remain free of falls  Description: INTERVENTIONS:  - Assess patient frequently for physical needs  -  Identify cognitive and physical deficits and behaviors that affect risk of falls    -  [place] fall precautions as indicated by assessment   - Educate patient/family on patient safety including physical limitations  - Instruct patient to call for assistance with activity based on assessment  - Modify environment to reduce risk of injury  - Consider OT/PT consult to assist with strengthening/mobility  Outcome: Progressing

## 2021-01-01 NOTE — ASSESSMENT & PLAN NOTE
UA on admission (+) nitrite, large leukocytes, 2+ protein, small blood  CBC (+) WBC 11 35, ANC 8 3    S/p Ceftriaxone (Rocephin) 1g IV xOnce in ED   - continue Ceftriaxone (Rocephin) 1g IV q24hr pending UCX results  - f/u BCX results  - f/u UCX results  - trend daily CBC

## 2021-01-01 NOTE — ASSESSMENT & PLAN NOTE
Pt is awake, alert, and oriented to name and  but not to time or place  Pt unable to recall today's events leading to ED evaluation and does not know his reason for admission   - encephalopathy likely 2/2 UTI and obstructive uropathy  -  CT head (-) No acute intracranial abnormality

## 2021-01-01 NOTE — ASSESSMENT & PLAN NOTE
- encephalopathy likely 2/2 UTI and obstructive uropathy    - 12/31 CT head (-) No acute intracranial abnormality   - Will continue to monitor

## 2021-01-01 NOTE — PROGRESS NOTES
Progress Note - Lety Castillo 1955, 77 y o  male MRN: [id number]    Unit/Bed#: [facility] Encounter: [id number]    Primary Care Provider: JOHNSON Roger   Date and time admitted to hospital: 12/31/2020  4:24 PM        * Obstructive uropathy  Assessment & Plan  - 12/31 CTCAP (+) Marked distention of the bladder extending well into the mid abdomen, compatible with distal outlet obstruction  Possible small amount of debris or hemorrhage at the base of the bladder  Mild prostate enlargement  - continue PTA Tamsulosin (Flomax) 0 4 mg PO Daily  - daily weights  - strict I&O's  - avoid nephrotoxic agents  - urology consulted    Urinary tract infection  Assessment & Plan  - continue Ceftriaxone (Rocephin) 1g IV q24hr pending UCX results  - f/u UCX results  - trend daily CBC    Encephalopathy acute  Assessment & Plan  - encephalopathy likely 2/2 UTI and obstructive uropathy  - 12/31 CT head (-) No acute intracranial abnormality   - Will continue to monitor    REYNOLD (acute kidney injury) ([facility] [address] )  Assessment & Plan  REYNOLD likely 2/2 obstructive uropathy on top of CKD-3  Baseline Cr 1 3-1 8     - , Cr 5 92, EGFR 10 on admission, s/p 1 L NS bolus in ED  - mIVF 125 cc/hr NS  - trend BMP daily    Lab Results   Component Value Date    EGFR 18 01/01/2021    EGFR 10 12/31/2020    EGFR 42 10/27/2020     (H) 01/01/2021     (H) 12/31/2020    BUN 22 10/27/2020    CREATININE 3 74 (H) 01/01/2021    CREATININE 5 92 (H) 12/31/2020    CREATININE 1 88 (H) 10/27/2020         HLD (hyperlipidemia)  Assessment & Plan   - Lipid panel 12/31 wnl:  Chol 138, TG's 107, HDL 39, LDL 78  - Continue PTA Lipitor    Essential hypertension  Assessment & Plan  - Continue PTA Amlodipine          PPX:  Heparin/SCDs  Diet:  Regular  Code Status:  Full code  Dispo:  Mild improvement, continue current inpatient status    Plan D/W Dr Thomson and [facility] Team    Subjective:   No acute events overnight    Patient seen examined at bedside  Patient is somnolent upon entering the room and answers questions with a grunt  Unable to perform ROS at this time  Objective:     Vitals: Blood pressure 129/75, pulse 100, temperature 98 3 °F (36 8 °C), resp  rate 18, height 5' 7" (1 702 m), weight 66 1 kg (145 lb 11 2 oz), SpO2 98 %  ,Body mass index is 22 82 kg/m²  Intake/Output Summary (Last 24 hours) at 1/1/2021 0908  Last data filed at 1/1/2021 0526  Gross per 24 hour   Intake 2330 ml   Output 2750 ml   Net -420 ml       Physical Exam:   Physical Exam  Constitutional:       Appearance: Normal appearance  He is normal weight  He is not ill-appearing  HENT:      Head: Normocephalic and atraumatic  Nose: Nose normal    Eyes:      Conjunctiva/sclera: Conjunctivae normal    Cardiovascular:      Rate and Rhythm: Normal rate and regular rhythm  Pulses: Normal pulses  Heart sounds: Normal heart sounds  Pulmonary:      Effort: Pulmonary effort is normal       Breath sounds: Normal breath sounds  Abdominal:      General: Bowel sounds are normal       Palpations: Abdomen is soft  Tenderness: There is no abdominal tenderness  Genitourinary:     Comments: Ni in place  Skin:     General: Skin is warm  Neurological:      Comments: Oriented to self         Invasive Devices     Peripheral Intravenous Line            Peripheral IV 12/31/20 Left Antecubital less than 1 day          Drain            Urethral Catheter 18 Fr  less than 1 day                           Lab and other studies:  I have personally reviewed pertinent reports       Admission on 12/31/2020   Component Date Value    Ammonia 12/31/2020 18     WBC 12/31/2020 11 35*    RBC 12/31/2020 4 48     Hemoglobin 12/31/2020 12 3     Hematocrit 12/31/2020 38 9     MCV 12/31/2020 87     MCH 12/31/2020 27 5     MCHC 12/31/2020 31 6     RDW 12/31/2020 15 1     MPV 12/31/2020 11 0     Platelets 62/28/0160 247     nRBC 12/31/2020 0     Neutrophils Relative 12/31/2020 74     Immat GRANS % 12/31/2020 0     Lymphocytes Relative 12/31/2020 9*    Monocytes Relative 12/31/2020 11     Eosinophils Relative 12/31/2020 6     Basophils Relative 12/31/2020 0     Neutrophils Absolute 12/31/2020 8 30*    Immature Grans Absolute 12/31/2020 0 05     Lymphocytes Absolute 12/31/2020 1 03     Monocytes Absolute 12/31/2020 1 29*    Eosinophils Absolute 12/31/2020 0 64*    Basophils Absolute 12/31/2020 0 04     Troponin I 12/31/2020 <0 02     Sodium 12/31/2020 138     Potassium 12/31/2020 4 1     Chloride 12/31/2020 108     CO2 12/31/2020 16*    ANION GAP 12/31/2020 14*    BUN 12/31/2020 142*    Creatinine 12/31/2020 5 92*    Glucose 12/31/2020 175*    Calcium 12/31/2020 8 9     AST 12/31/2020 26     ALT 12/31/2020 30     Alkaline Phosphatase 12/31/2020 67     Total Protein 12/31/2020 9 3*    Albumin 12/31/2020 3 5     Total Bilirubin 12/31/2020 0 52     eGFR 12/31/2020 10     pH, Shekhar 12/31/2020 7 262*    pCO2, Shekhar 12/31/2020 33 8*    pO2, Shekhar 12/31/2020 45 8*    HCO3, Shekhar 12/31/2020 14 9*    Base Excess, Shekhar 12/31/2020 -11 1     O2 Content, Shekhar 12/31/2020 13 3     O2 HGB, VENOUS 12/31/2020 73 2     Ethanol Lvl 73/44/6680 <3     Salicylate Lvl 84/90/9566 <3*    Acetaminophen Level 12/31/2020 <2*    Lipase 12/31/2020 367     Color, UA 12/31/2020 -     Amph/Meth UR 12/31/2020 Negative     Barbiturate Ur 12/31/2020 Negative     Benzodiazepine Urine 12/31/2020 Negative     Cocaine Urine 12/31/2020 Negative     Methadone Urine 12/31/2020 Negative     Opiate Urine 12/31/2020 Negative     PCP Ur 12/31/2020 Negative     THC Urine 12/31/2020 Negative     Oxycodone Urine 12/31/2020 Negative     Total CK 12/31/2020 447*    SARS-CoV-2 12/31/2020 Negative     INFLUENZA A PCR 12/31/2020 Negative     INFLUENZA B PCR 12/31/2020 Negative     RSV PCR 12/31/2020 Negative     CK-MB Index 12/31/2020 1 1     CK-MB 12/31/2020 4 9     Color, UA 12/31/2020 Yellow     Clarity, UA 12/31/2020 Clear     pH, UA 12/31/2020 6 0     Leukocytes, UA 12/31/2020 Large*    Nitrite, UA 12/31/2020 Positive*    Protein, UA 12/31/2020 100 (2+)*    Glucose, UA 12/31/2020 Negative     Ketones, UA 12/31/2020 Negative     Urobilinogen, UA 12/31/2020 0 2     Bilirubin, UA 12/31/2020 Negative     Blood, UA 12/31/2020 Small*    Specific [place], UA 12/31/2020 1 020     RBC, UA 12/31/2020 2-4     WBC, UA 12/31/2020 Innumerable*    Epithelial Cells 12/31/2020 None Seen     Bacteria, UA 12/31/2020 Occasional     Hyaline Casts, UA 12/31/2020 None Seen     Platelets 25/37/2497 201     MPV 12/31/2020 11 0     Hemoglobin A1C 12/31/2020 6 7*    EAG 12/31/2020 146     Cholesterol 12/31/2020 138     Triglycerides 12/31/2020 107     HDL, Direct 12/31/2020 39*    LDL Calculated 12/31/2020 78     Non-HDL-Chol (CHOL-HDL) 12/31/2020 99     pH, Shekhar 01/01/2021 7 357     pCO2, Shekhar 01/01/2021 29 0*    pO2, Shekhar 01/01/2021 65 8*    HCO3, Shekhar 01/01/2021 15 9*    Base Excess, Shekhar 01/01/2021 -8 3     O2 Content, Shekhar 01/01/2021 15 4     O2 HGB, VENOUS 01/01/2021 90 8*    Sodium 01/01/2021 145     Potassium 01/01/2021 3 5     Chloride 01/01/2021 118*    CO2 01/01/2021 18*    ANION GAP 01/01/2021 9     BUN 01/01/2021 118*    Creatinine 01/01/2021 3 74*    Glucose 01/01/2021 137     Calcium 01/01/2021 8 7     eGFR 01/01/2021 18     WBC 01/01/2021 9 63     RBC 01/01/2021 3 99     Hemoglobin 01/01/2021 10 9*    Hematocrit 01/01/2021 34 0*    MCV 01/01/2021 85     MCH 01/01/2021 27 3     MCHC 01/01/2021 32 1     RDW 01/01/2021 14 8     MPV 01/01/2021 10 5     Platelets 84/16/0887 211     nRBC 01/01/2021 0     Neutrophils Relative 01/01/2021 70     Immat GRANS % 01/01/2021 1     Lymphocytes Relative 01/01/2021 11*    Monocytes Relative 01/01/2021 11     Eosinophils Relative 01/01/2021 7*    Basophils Relative 01/01/2021 0     Neutrophils Absolute 01/01/2021 6 77  Immature Grans Absolute 01/01/2021 0 06     Lymphocytes Absolute 01/01/2021 1 03     Monocytes Absolute 01/01/2021 1 04     Eosinophils Absolute 01/01/2021 0 69*    Basophils Absolute 01/01/2021 0 04     Ventricular Rate 12/31/2020 90     Atrial Rate 12/31/2020 90     GA Interval 12/31/2020 86     QRSD Interval 12/31/2020 72     QT Interval 12/31/2020 368     QTC Interval 12/31/2020 450     P Axis 12/31/2020 33     QRS Axis 12/31/2020 29     T Wave Axis 12/31/2020 194        Recent Results (from the past 24 hour(s))   ECG 12 lead    Collection Time: 12/31/20  4:57 PM   Result Value Ref Range    Ventricular Rate 90 BPM    Atrial Rate 90 BPM    GA Interval 86 ms    QRSD Interval 72 ms    QT Interval 368 ms    QTC Interval 450 ms    P Axis 33 degrees    QRS Axis 29 degrees    T Wave Axis 194 degrees   Ammonia    Collection Time: 12/31/20  4:58 PM   Result Value Ref Range    Ammonia 18 11 - 35 umol/L   CBC and differential    Collection Time: 12/31/20  4:58 PM   Result Value Ref Range    WBC 11 35 (H) 4 31 - 10 16 Thousand/uL    RBC 4 48 3 88 - 5 62 Million/uL    Hemoglobin 12 3 12 0 - 17 0 g/dL    Hematocrit 38 9 36 5 - 49 3 %    MCV 87 82 - 98 fL    MCH 27 5 26 8 - 34 3 pg    MCHC 31 6 31 4 - 37 4 g/dL    RDW 15 1 11 6 - 15 1 %    MPV 11 0 8 9 - 12 7 fL    Platelets 513 435 - 749 Thousands/uL    nRBC 0 /100 WBCs    Neutrophils Relative 74 43 - 75 %    Immat GRANS % 0 0 - 2 %    Lymphocytes Relative 9 (L) 14 - 44 %    Monocytes Relative 11 4 - 12 %    Eosinophils Relative 6 0 - 6 %    Basophils Relative 0 0 - 1 %    Neutrophils Absolute 8 30 (H) 1 85 - 7 62 Thousands/µL    Immature Grans Absolute 0 05 0 00 - 0 20 Thousand/uL    Lymphocytes Absolute 1 03 0 60 - 4 47 Thousands/µL    Monocytes Absolute 1 29 (H) 0 17 - 1 22 Thousand/µL    Eosinophils Absolute 0 64 (H) 0 00 - 0 61 Thousand/µL    Basophils Absolute 0 04 0 00 - 0 10 Thousands/µL   Troponin I    Collection Time: 12/31/20  4:58 PM   Result Value Ref Range    Troponin I <0 02 <=0 04 ng/mL   Comprehensive metabolic panel    Collection Time: 12/31/20  4:58 PM   Result Value Ref Range    Sodium 138 136 - 145 mmol/L    Potassium 4 1 3 5 - 5 3 mmol/L    Chloride 108 100 - 108 mmol/L    CO2 16 (L) 21 - 32 mmol/L    ANION GAP 14 (H) 4 - 13 mmol/L     (H) 5 - 25 mg/dL    Creatinine 5 92 (H) 0 60 - 1 30 mg/dL    Glucose 175 (H) 65 - 140 mg/dL    Calcium 8 9 8 3 - 10 1 mg/dL    AST 26 5 - 45 U/L    ALT 30 12 - 78 U/L    Alkaline Phosphatase 67 46 - 116 U/L    Total Protein 9 3 (H) 6 4 - 8 2 g/dL    Albumin 3 5 3 5 - 5 0 g/dL    Total Bilirubin 0 52 0 20 - 1 00 mg/dL    eGFR 10 ml/min/1 73sq m   Blood gas, venous    Collection Time: 12/31/20  4:58 PM   Result Value Ref Range    pH, Shekhar 7 262 (L) 7 300 - 7 400    pCO2, Shekhar 33 8 (L) 42 0 - 50 0 mm Hg    pO2, Shekhar 45 8 (H) 35 0 - 45 0 mm Hg    HCO3, Shekhar 14 9 (L) 24 - 30 mmol/L    Base Excess, Shekhar -11 1 mmol/L    O2 Content, Shekhar 13 3 ml/dL    O2 HGB, VENOUS 73 2 60 0 - 80 0 %   Ethanol    Collection Time: 12/31/20  4:58 PM   Result Value Ref Range    Ethanol Lvl <3 0 - 3 mg/dL   Salicylate level    Collection Time: 12/31/20  4:58 PM   Result Value Ref Range    Salicylate Lvl <3 (L) 3 - 20 mg/dL   Acetaminophen level-If concentration is detectable, please discuss with medical  on call  Collection Time: 12/31/20  4:58 PM   Result Value Ref Range    Acetaminophen Level <2 (L) 10 - 20 ug/mL   Lipase    Collection Time: 12/31/20  4:58 PM   Result Value Ref Range    Lipase 367 73 - 393 u/L   CK (with reflex to MB)    Collection Time: 12/31/20  4:58 PM   Result Value Ref Range    Total  (H) 39 - 308 U/L   CKMB    Collection Time: 12/31/20  4:58 PM   Result Value Ref Range    CK-MB Index 1 1 0 0 - 2 5 %    CK-MB 4 9 0 0 - 5 0 ng/mL   Hemoglobin A1C    Collection Time: 12/31/20  4:58 PM   Result Value Ref Range    Hemoglobin A1C 6 7 (H) Normal 3 8-5 6%; PreDiabetic 5 7-6 4%;  Diabetic >=6 5%; Glycemic control for adults with diabetes <7 0% %     mg/dl   Lipid panel    Collection Time: 12/31/20  4:58 PM   Result Value Ref Range    Cholesterol 138 50 - 200 mg/dL    Triglycerides 107 <=150 mg/dL    HDL, Direct 39 (L) >=40 mg/dL    LDL Calculated 78 0 - 100 mg/dL    Non-HDL-Chol (CHOL-HDL) 99 mg/dl   COVID19, Influenza A/B, RSV PCR, [facility]    Collection Time: 12/31/20  5:02 PM    Specimen: Nasopharyngeal Swab; Nares   Result Value Ref Range    SARS-CoV-2 Negative Negative    INFLUENZA A PCR Negative Negative    INFLUENZA B PCR Negative Negative    RSV PCR Negative Negative   Rapid drug screen, urine    Collection Time: 12/31/20  6:08 PM   Result Value Ref Range    Amph/Meth UR Negative Negative    Barbiturate Ur Negative Negative    Benzodiazepine Urine Negative Negative    Cocaine Urine Negative Negative    Methadone Urine Negative Negative    Opiate Urine Negative Negative    PCP Ur Negative Negative    THC Urine Negative Negative    Oxycodone Urine Negative Negative   Urine Macroscopic, POC    Collection Time: 12/31/20  6:10 PM   Result Value Ref Range    Color, UA Yellow     Clarity, UA Clear     pH, UA 6 0 4 5 - 8 0    Leukocytes, UA Large (A) Negative    Nitrite, UA Positive (A) Negative    Protein,  (2+) (A) Negative mg/dl    Glucose, UA Negative Negative mg/dl    Ketones, UA Negative Negative mg/dl    Urobilinogen, UA 0 2 0 2, 1 0 E U /dl E U /dl    Bilirubin, UA Negative Negative    Blood, UA Small (A) Negative    Specific Gravity, UA 1 020 1 003 - 1 030   Urine Microscopic    Collection Time: 12/31/20  6:10 PM   Result Value Ref Range    RBC, UA 2-4 None Seen, 2-4 /hpf    WBC, UA Innumerable (A) None Seen, 2-4 /hpf    Epithelial Cells None Seen None Seen, Occasional /hpf    Bacteria, UA Occasional None Seen, Occasional /hpf    Hyaline Casts, UA None Seen None Seen /lpf   POCT urinalysis dipstick    Collection Time: 12/31/20  6:12 PM   Result Value Ref Range    Color, UA -    Platelet count    Collection Time: 12/31/20 10:20 PM   Result Value Ref Range    Platelets 483 713 - 996 Thousands/uL    MPV 11 0 8 9 - 12 7 fL   Blood gas, venous    Collection Time: 01/01/21  4:30 AM   Result Value Ref Range    pH, Shekhar 7 357 7 300 - 7 400    pCO2, Shekhar 29 0 (L) 42 0 - 50 0 mm Hg    pO2, Shekhar 65 8 (H) 35 0 - 45 0 mm Hg    HCO3, Shekhar 15 9 (L) 24 - 30 mmol/L    Base Excess, Shekhar -8 3 mmol/L    O2 Content, Shekhar 15 4 ml/dL    O2 HGB, VENOUS 90 8 (H) 60 0 - 80 0 %   Basic metabolic panel    Collection Time: 01/01/21  4:30 AM   Result Value Ref Range    Sodium 145 136 - 145 mmol/L    Potassium 3 5 3 5 - 5 3 mmol/L    Chloride 118 (H) 100 - 108 mmol/L    CO2 18 (L) 21 - 32 mmol/L    ANION GAP 9 4 - 13 mmol/L     (H) 5 - 25 mg/dL    Creatinine 3 74 (H) 0 60 - 1 30 mg/dL    Glucose 137 65 - 140 mg/dL    Calcium 8 7 8 3 - 10 1 mg/dL    eGFR 18 ml/min/1 73sq m   CBC and differential    Collection Time: 01/01/21  4:30 AM   Result Value Ref Range    WBC 9 63 4 31 - 10 16 Thousand/uL    RBC 3 99 3 88 - 5 62 Million/uL    Hemoglobin 10 9 (L) 12 0 - 17 0 g/dL    Hematocrit 34 0 (L) 36 5 - 49 3 %    MCV 85 82 - 98 fL    MCH 27 3 26 8 - 34 3 pg    MCHC 32 1 31 4 - 37 4 g/dL    RDW 14 8 11 6 - 15 1 %    MPV 10 5 8 9 - 12 7 fL    Platelets 373 628 - 753 Thousands/uL    nRBC 0 /100 WBCs    Neutrophils Relative 70 43 - 75 %    Immat GRANS % 1 0 - 2 %    Lymphocytes Relative 11 (L) 14 - 44 %    Monocytes Relative 11 4 - 12 %    Eosinophils Relative 7 (H) 0 - 6 %    Basophils Relative 0 0 - 1 %    Neutrophils Absolute 6 77 1 85 - 7 62 Thousands/µL    Immature Grans Absolute 0 06 0 00 - 0 20 Thousand/uL    Lymphocytes Absolute 1 03 0 60 - 4 47 Thousands/µL    Monocytes Absolute 1 04 0 17 - 1 22 Thousand/µL    Eosinophils Absolute 0 69 (H) 0 00 - 0 61 Thousand/µL    Basophils Absolute 0 04 0 00 - 0 10 Thousands/µL       Urinalysis:   Lab Results   Component Value Date    COLORU - 12/31/2020    COLORU Yellow 12/31/2020    CLARITYU Clear 12/31/2020    SPECGRAV 1 020 12/31/2020    PHUR 6 0 12/31/2020    LEUKOCYTESUR Large (A) 12/31/2020    NITRITE Positive (A) 12/31/2020    GLUCOSEU Negative 12/31/2020    KETONESU Negative 12/31/2020    BILIRUBINUR Negative 12/31/2020    BLOODU Small (A) 12/31/2020   ,         Imaging:  No new pertinent imaging       VTE Pharmacologic Prophylaxis: Heparin  VTE Mechanical Prophylaxis: sequential compression device    Current Facility-Administered Medications   Medication Dose Route Frequency    amLODIPine (NORVASC) tablet 5 mg  5 mg Oral Daily    aspirin (ECOTRIN LOW STRENGTH) EC tablet 81 mg  81 mg Oral Daily    atorvastatin (LIPITOR) tablet 10 mg  10 mg Oral Daily    ceftriaxone (ROCEPHIN) 1 g/50 mL in dextrose IVPB  1,000 mg Intravenous Q24H    heparin (porcine) subcutaneous injection 5,000 Units  5,000 Units Subcutaneous Q8H [address]    sodium chloride (PF) 0 9 % injection 3 mL  3 mL Intravenous Q1H PRN    sodium chloride 0 9 % infusion  125 mL/hr Intravenous Continuous    tamsulosin (FLOMAX) capsule 0 4 mg  0 4 mg Oral Daily With Alyssa Mckee MD  Family Medicine Resident PGY2

## 2021-01-01 NOTE — OCCUPATIONAL THERAPY NOTE
Occupational Therapy Evaluation     Patient Name: Lisa Quinn  BFCVL'W Date: 1/1/2021  Problem List  Principal Problem:    Obstructive uropathy  Active Problems:    Essential hypertension    HLD (hyperlipidemia)    Ascending aortic aneurysm (HCC)    Urinary retention    CKD (chronic kidney disease), stage III    REYNOLD (acute kidney injury) (HCC)    Encephalopathy acute    Urinary tract infection    Metabolic acidosis, increased anion gap    Past Medical History  Past Medical History:   Diagnosis Date    Aortic aneurysm (HCC)     Arthritis     Atypical chest pain     Back pain     Chronic kidney disease     stg 3    Elevated ALT measurement     Elevated AST (SGOT)     Hyperlipidemia     Hypertension     Leg pain     Neurogenic claudication     Urinary retention     catherize self 4x/day    Urinary retention      Past Surgical History  Past Surgical History:   Procedure Laterality Date    EPIDURAL BLOCK INJECTION N/A 1/23/2017    Procedure: BLOCK / INJECTION EPIDURAL STEROID LUMBAR;  Surgeon: Sowmay Melara MD;  [facility];  Service:     MA COLONOSCOPY FLX DX W/COLLJ SPEC WHEN PFRMD N/A 1/8/2019    Procedure: COLONOSCOPY;  Surgeon: Parag Saravia MD;  [facility]; Service: Colorectal    MA LAMNOTMY INCL W/DCMPRSN NRV ROOT 1 INTRSPC LUMBR Bilateral 1/23/2017    Procedure: MIS L2-3 & L3-4 LAMINAL FORAMINOTOMIES AND MICRODISCECTOMY W LEFT SIDED APPROACH ;  Surgeon: Sowmya Melara MD;  [facility];  Service: Neurosurgery        01/01/21 1034   OT Last Visit   OT Visit Date 01/01/21   Note Type   Note type Evaluation   Restrictions/Precautions   Weight Bearing Precautions Per Order No   Other Precautions Fall Risk;Cognitive; Bed Alarm;Pain  (bed alarm on at end of session  )   Pain Assessment   Pain Assessment Tool FLACC   Pain Location/Orientation Orientation: Bilateral;Location: Leg   Hospital Pain Intervention(s) Repositioned; Ambulation/increased activity; Emotional support   Pain Rating: FLACC (Rest) - Face 1   Pain Rating: FLACC (Rest) - Legs 0   Pain Rating: FLACC (Rest) - Activity 0   Pain Rating: FLACC (Rest) - Cry 0   Pain Rating: FLACC (Rest) - Consolability 0   Score: FLACC (Rest) 1   Pain Rating: FLACC (Activity) - Face 2   Pain Rating: FLACC (Activity) - Legs 1   Pain Rating: FLACC (Activity) - Activity 2   Pain Rating: FLACC (Activity) - Cry 2   Pain Rating: FLACC (Activity) - Consolability 2   Score: FLACC (Activity) 9   Home Living   Type of Home Apartment   Home Layout One level;Stairs to enter with rails  ([address])   Bathroom Shower/Tub Tub/shower unit   Bathroom Toilet Standard   Bathroom Equipment Other (Comment)  (none)   Bathroom Accessibility Accessible   Home Equipment Other (Comment)  (none)   Additional Comments above information obtained from EMR 2017  Will need further clarification to assist w/ safe D/C planning  Prior Function   Level of [place] Independent with ADLs and functional mobility   Lives With Alone   Receives Help From Neighbor   ADL Assistance Independent   IADLs Independent   Falls in the last 6 months   (unable to report at this time )   Vocational Retired   Comments above information obtained per EMR 2017, need further clarification to assist w/ safe d/c planning  Lifestyle   Autonomy pt unable to answer at this time    Reciprocal Relationships neighbor who found him covered in urine/fecal matter  (per EMR)   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  (per previous OT note)   Intrinsic Gratification watching TV and cooking  (per previous OT note )   Psychosocial   Psychosocial (WDL) X   Patient Behaviors/Mood Flat affect; Withdrawn; Uncooperative   Subjective   Subjective "My legs hurt "   ADL   Where Assessed Supine, bed   Eating Assistance 5  Supervision/Setup   Grooming Assistance 5  Supervision/Setup   UB Bathing Assistance 3  Moderate Assistance   LB Bathing Assistance 2  Maximal [address] 3 Moderate Assistance   LB Dressing Assistance 2  Maximal [address]  2  Maximal Assistance   Additional Comments will require further evaluation and assessment when pt more willing to participate and cooperative w/ therapy  Bed Mobility   Additional Comments Attempted to sit at EOB, however pt in extreme pain in LE's and was self-limiting t/o session  OTR to see  Transfers   Additional Comments unable to assess at this time 2* decreased cognition and willingness to participate  OTR to see  Functional Mobility   Additional Comments unable to assess at this time 2* decreased cognition  OTR to see  Balance   Static Sitting   (attempted to sit EOB, however pt in extreme pain in LE's)   Activity Tolerance   Activity Tolerance Patient limited by fatigue;Patient limited by pain;Treatment limited secondary to agitation   Medical Staff Made Aware [address], PT [place]   Nurse Made Aware ok to see per RN   RUE Assessment   RUE Assessment WFL   LUE Assessment   LUE Assessment WFL   Hand Function   Gross Motor Coordination Functional   Fine Motor Coordination Functional   Cognition   Overall Cognitive Status Impaired   Arousal/Participation Uncooperative;Lethargic;Poorly responsive   Attention Difficulty attending to directions   Orientation Level Unable to assess  (pt did not answer any q's from therapist)   Memory Unable to assess  (pt not answering questions from therapist )   Following Commands Unable to follow one step commands  (pt not following any commands)   Comments pt w/ poor cognition  Pt did not follow any commands and did not verbally respond to any questions from therapist  Pt is not able to follow commands- pt does speak when he wants something, but does not answer any questions unless they are about something he asked for  Pt appears to be self limiting at this time  Difficult to assess if truly cognitive deficit or self-limiting  will continue to assess      Assessment   Limitation Decreased ADL status; Decreased Safe judgement during ADL;Decreased cognition;Decreased endurance;Decreased high-level ADLs; Decreased self-care trans   Prognosis Fair   Assessment Pt is a 77 y o  male admitted 12/31/20 after being found covered in urine and feces by neighbor  Pt found to have obstructive uropathy and encephalopathy  Head CT 12/31 negative for any acute intracranial abnormalities  PMH includes ascending aortic aneurysm, lumbar stenosis w/ neurogenic claudication, Ataxia, syncope and collapse, orthostatic hypotension, diffuse pain LLE, PAD, lumbar radiculopathy, lumbar discectomy  Pt w/ active OT orders for evaluation and up with assistance  Per EMR, pt lives alone in a 2nd floor apartment w/ [address]  Pt has tub shower and standard toilet  Pt does not have any DME in the home and uses public transportation  Per EMR, pt has poor medication management and does not take care of himself well  Pt unresponsive to therapists questions today, but was able to verbally ask for water/milk and able to use UE's to remove wrapper from straw  Pt non-compliant, reporting pain when attempting to move LE's  Unclear if this is true cognitive deficit or if it a behaviroal limitation at this time  Based on today's evaluation, pt is Mod A for UB ADL's, Max for LB ADL's and toileting  OTR to see for transfers and functional mobility, and to continue to assess cognition  From OT standpoint, pt should d/c to STR when medically stable  Pt will benefit from continued acute OT services 3-5x/wk for 7-10 days to meet goals  Goals   Patient Goals to have less LE pain   LTG Time Frame 7-10   Plan   Treatment Interventions ADL retraining;Functional transfer training; Endurance training;Cognitive reorientation;Patient/family training;Equipment evaluation/education; Compensatory technique education; Energy conservation; Activityengagement   Goal Expiration Date 01/11/21   OT Frequency 3-5x/wk   Recommendation   Recommendation Psych Consult   OT Discharge Recommendation Post-Acute Rehabilitation Services   OT - OK to Discharge Yes  (when medically stable )   Modified [place] Scale   Modified [place] Scale 5       Goals:     OTR to see for transfers  OTR to see for functional mobility  OTR to see for bed mobility  Pt will complete UB dressing and bathing with Min A using appropriate DME as needed  Pt will complete LB dressing and bathing with Mod A using appropriate DME as needed  Pt will complete toileting with Mod A using appropriate DME as needed  Pt will utilize energy conservation techniques throughout functional activity/ADL s/p skilled education  Pt will demonstrate increased safety awareness during functional tasks/ADL's s/p skilled education  Pt will increase activity tolerance to 30 minutes in order to complete ADL's/ functional tasks, using appropriate DME as needed  Pt will engage in ongoing cog assessment w/ G participation to assist with safe d/c planning       VINAYAK Samuels

## 2021-01-01 NOTE — ASSESSMENT & PLAN NOTE
- 12/31 CTCAP (+) Marked distention of the bladder extending well into the mid abdomen, compatible with distal outlet obstruction  Possible small amount of debris or hemorrhage at the base of the bladder  Mild prostate enlargement    - continue PTA Tamsulosin (Flomax) 0 4 mg PO Daily  - daily weights  - strict I&O's  - avoid nephrotoxic agents  - urology consulted

## 2021-01-01 NOTE — ASSESSMENT & PLAN NOTE
Pt w/ hx/o urinary retention 2/2 enlarged prostate  Last seen by outpatient Urology on 7/30/20 continue with chronic straight catheterization 4 times a day and remains on Tamsulosin (Flomax) 0 4 mg PO Daily  - CrCl 11 on admission, s/p urinary Ni Cath in ED   - 12/31 CTCAP (+) Marked distention of the bladder extending well into the mid abdomen, compatible with distal outlet obstruction  Possible small amount of debris or hemorrhage at the base of the bladder  Mild prostate enlargement    - continue PTA Tamsulosin (Flomax) 0 4 mg PO Daily  - daily weights  - strict I&O's  - avoid nephrotoxic agents  - urology consulted, appreciate REC

## 2021-01-01 NOTE — PHYSICAL THERAPY NOTE
Physical Therapy Evaluation    Patient Name: Lety Castillo    OAPPB'J Date: 1/1/2021     Problem List  Principal Problem:    Obstructive uropathy  Active Problems:    Essential hypertension    HLD (hyperlipidemia)    Ascending aortic aneurysm (HCC)    Urinary retention    CKD (chronic kidney disease), stage III    REYNOLD (acute kidney injury) (HCC)    Encephalopathy acute    Urinary tract infection    Metabolic acidosis, increased anion gap       Past Medical History  Past Medical History:   Diagnosis Date    Aortic aneurysm (HCC)     Arthritis     Atypical chest pain     Back pain     Chronic kidney disease     stg 3    Elevated ALT measurement     Elevated AST (SGOT)     Hyperlipidemia     Hypertension     Leg pain     Neurogenic claudication     Urinary retention     catherize self 4x/day    Urinary retention         Past Surgical History  Past Surgical History:   Procedure Laterality Date    EPIDURAL BLOCK INJECTION N/A 1/23/2017    Procedure: BLOCK / INJECTION EPIDURAL STEROID LUMBAR;  Surgeon: Halina Sloan MD;  [facility];  Service:     MA COLONOSCOPY FLX DX W/COLLJ SPEC WHEN PFRMD N/A 1/8/2019    Procedure: COLONOSCOPY;  Surgeon: Felix Ying MD;  [facility];   Service: Colorectal    MA LAMNOTMY INCL W/DCMPRSN NRV ROOT 1 INTRSPC LUMBR Bilateral 1/23/2017    Procedure: MIS L2-3 & L3-4 LAMINAL FORAMINOTOMIES AND MICRODISCECTOMY W LEFT SIDED APPROACH ;  Surgeon: Halina Sloan MD;  [facility];  Service: Neurosurgery             01/01/21 1035   PT Last Visit   PT Visit Date 01/01/21   Note Type   Note type Evaluation   Pain Assessment   Pain Assessment Tool FLACC   Pain Location/Orientation Orientation: Bilateral;Location: Leg   Hospital Pain Intervention(s) Ambulation/increased activity   Pain Rating: FLACC (Rest) - Face 1   Pain Rating: FLACC (Rest) - Legs 0   Pain Rating: FLACC (Rest) - Activity 0   Pain Rating: FLACC (Rest) - Cry 0   Pain Rating: FLACC (Rest) - Consolability 0   Score: FLACC (Rest) 1   Pain Rating: FLACC (Activity) - Face 2   Pain Rating: FLACC (Activity) - Legs 1   Pain Rating: FLACC (Activity) - Activity 2   Pain Rating: FLACC (Activity) - Cry 2   Pain Rating: FLACC (Activity) - Consolability 2   Score: FLACC (Activity) 9   Home Living   Type of Home Apartment   Home Layout One level;Stairs to enter with rails  ([address])   Bathroom Shower/Tub Tub/shower unit   Bathroom Toilet Standard   Additional Comments Pt is a poor historian, home living and PLOF obtained from previous notes in EMR, need to be clarified if they are still accurate   Prior Function   Level of [place] Independent with ADLs and functional mobility   Lives With Alone   Receives Help From [employer]   ADL Assistance Independent   IADLs Independent   Vocational Retired   Restrictions/Precautions   [place] Bearing Precautions Per Order No   Other Precautions Cognitive; Bed Alarm; Fall Risk;Pain   General   Family/Caregiver Present No   Cognition   Overall Cognitive Status Impaired   Attention Difficulty attending to directions   Orientation Level Unable to assess   Memory Unable to assess   Following Commands Unable to follow one step commands   Comments pt cognition difficult to assess, pt ignoring PT's questions to assess orientation and responding with desire to drink water and eat fruit, unable to redirect pt on evaluation   RLE Assessment   RLE Assessment X   Strength RLE   RLE Overall Strength 2+/5  (grossly)   LLE Assessment   LLE Assessment X   Strength LLE   LLE Overall Strength 2+/5  (grossly)   Coordination   Movements are Fluid and Coordinated 0   Coordination and Movement Description slow movements, guarded posture   Bed Mobility   Sit to Supine 1  Dependent   Additional items Assist x 2; Increased time required;LE management   Additional Comments Pt received in half sitting position at EOB; pt declining participation in any functional further mobility tasks   Transfers   Additional Comments PT to see to assess OOB mobility when appropriate   Balance   Static Sitting Poor   Dynamic Sitting Poor -   Endurance Deficit   Endurance Deficit Yes   Endurance Deficit Description weakness, fatigue   Activity Tolerance   Activity Tolerance Patient limited by fatigue;Treatment limited secondary to agitation   Medical Staff Made Aware OT student Jamshid Villegas [place] [address]   Nurse Made Aware RN cleared patient for PT evaluation   Assessment   Prognosis Guarded   Problem List Decreased strength;Decreased endurance; Impaired balance;Decreased mobility; Decreased coordination;Decreased cognition; Impaired judgement;Decreased safety awareness;Pain   Assessment Pt is a 77 y o  male seen for PT evaluation s/p admit to [facility] on 12/31/20  Two pt identifiers were used to confirm  Pt presented after being found by his neighbor in his house covered in urine and feces  Pt was admitted with a primary dx of: obstructive uropathy  PT now consulted for assessment of mobility and d/c needs  Pts current co morbidities effecting treatment include: UTI, weakness and personal factors including [address] home environment  Pt currently lives in an apartment alone per EMR  Pts current clinical presentation is Unstable/ Unpredictable (high complexity) due to Ongoing medical management for primary dx, decreased activity tolerance compared to baseline, fall risk, increased assistance needed from caregiver at current time, continuous pulse oximetry monitoring, multiple lines, decline in overall functional mobility status  Prior to admission, pt was I with ambulation without the use of an AD as per pt  Upon evaluation, pt currently is requiring dependent assist x 2 for bed mobility   Pt presents at PT eval functioning below baseline and currently w/ overall mobility deficits secondary to: decreased strength, decreased endurance, impaired balance, impaired coordination, impaired cognition  Pt currently at a fall risk secondary to impairments listed above  Based on PT evaluation, pt will continue to benefit from skilled acute PT interventions to address stated impairments; to maximize functional mobility; for ongoing pt/ family training; and DME needs  At conclusion of PT session pt was left supine in bed, all needs within reach  Provided pt education regarding PT plan to improve functional mobility status  PT is currently recommending post acute inpatient rehab  Pt agreeable to plan and goals as stated on evaluation  PT will continue to follow during hospital stay  Barriers to Discharge Inaccessible home environment;Decreased caregiver support   Goals   Patient Goals eat pears   STG Expiration Date 01/15/21   Short Term Goal #1 1  Pt will increased strength by 1 grade in order to increase functional independence with transfers  2  Pt will increase balance grade by 1 in order to improve safety  3  Pt will perform bed mobility independently to decrease caregiver burden  4  PT to see to assess OOB mobility when appropriate  PT Treatment Day 0   Plan   Treatment/Interventions Functional transfer training;LE strengthening/ROM; Elevations; Therapeutic exercise; Endurance training;Cognitive reorientation;Patient/family training;Equipment eval/education; Bed mobility;Gait training; Compensatory technique education;Spoke to nursing;OT;Family   PT Frequency 2-3x/wk   Recommendation   PT Discharge Recommendation Post-Acute Rehabilitation Services   PT - OK to Discharge Yes  (to rehab when medically stable)       Jazmin Sun PT, DPT

## 2021-01-01 NOTE — ASSESSMENT & PLAN NOTE
VBG on admission (+) metabolic acidosis, pH 7 59, pCO2 33 8, HC03 14 9   (+) AG 14    - likely 2/2 uremia given (-) ASA, (-) acetaminophen, and (-) ethanol level

## 2021-01-01 NOTE — PLAN OF CARE
Problem: PHYSICAL THERAPY ADULT  Goal: Performs mobility at highest level of function for planned discharge setting  See evaluation for individualized goals  Description: Treatment/Interventions: Functional transfer training, LE strengthening/ROM, Elevations, Therapeutic exercise, Endurance training, Cognitive reorientation, Patient/family training, Equipment eval/education, Bed mobility, Gait training, Compensatory technique education, Spoke to nursing, OT, Family          See flowsheet documentation for full assessment, interventions and recommendations  Note: Prognosis: Guarded  Problem List: Decreased strength, Decreased endurance, Impaired balance, Decreased mobility, Decreased coordination, Decreased cognition, Impaired judgement, Decreased safety awareness, Pain  Assessment: Pt is a 77 y o  male seen for PT evaluation s/p admit to [facility] on 12/31/20  Two pt identifiers were used to confirm  Pt presented after being found by his neighbor in his house covered in urine and feces  Pt was admitted with a primary dx of: obstructive uropathy  PT now consulted for assessment of mobility and d/c needs  Pts current co morbidities effecting treatment include: UTI, weakness and personal factors including [address] home environment  Pt currently lives in an apartment alone per EMR  Pts current clinical presentation is Unstable/ Unpredictable (high complexity) due to Ongoing medical management for primary dx, decreased activity tolerance compared to baseline, fall risk, increased assistance needed from caregiver at current time, continuous pulse oximetry monitoring, multiple lines, decline in overall functional mobility status  Prior to admission, pt was I with ambulation without the use of an AD as per pt  Upon evaluation, pt currently is requiring dependent assist x 2 for bed mobility   Pt presents at PT eval functioning below baseline and currently w/ overall mobility deficits secondary to: decreased strength, decreased endurance, impaired balance, impaired coordination, impaired cognition  Pt currently at a fall risk secondary to impairments listed above  Based on PT evaluation, pt will continue to benefit from skilled acute PT interventions to address stated impairments; to maximize functional mobility; for ongoing pt/ family training; and DME needs  At conclusion of PT session pt was left supine in bed, all needs within reach  Provided pt education regarding PT plan to improve functional mobility status  PT is currently recommending post acute inpatient rehab  Pt agreeable to plan and goals as stated on evaluation  PT will continue to follow during hospital stay  Barriers to Discharge: Inaccessible home environment, Decreased caregiver support     PT Discharge Recommendation: [phone]     PT - OK to Discharge: Yes(to rehab when medically stable)    See flowsheet documentation for full assessment

## 2021-01-01 NOTE — PROGRESS NOTES
SENIOR History and Physical Note - Brandon Carter 77 y o  male MRN: [id number]    Unit/Bed#: ED 14 Encounter: [id number]        Subjective:  Brandon Carter is a 77 y o  with past medical history of HTN, HLD, urinary retention, CKD3 who presented to [facility] for evaluation of altered mental status after being found down by his neighbor covered in urine and feces at his home earlier today  Due to acute encephalopathy, patient is unable to provide reliable information and is unaware of the events preceding his ED evaluation  He does state, however, that he does not take his home medications consistently  Objective:  Vitals:    12/31/20 1631   BP: 157/86   Pulse: 90   Resp: 18   Temp: 98 °F (36 7 °C)   SpO2: 100%       Physical Exam  Vitals signs reviewed  Constitutional:       General: He is not in acute distress  Appearance: He is not toxic-appearing  HENT:      Head: Normocephalic and atraumatic  Right Ear: External ear normal       Left Ear: External ear normal       Nose: Nose normal       Mouth/Throat:      Mouth: Mucous membranes are dry  Eyes:      Extraocular Movements: Extraocular movements intact  Conjunctiva/sclera: Conjunctivae normal       Pupils: Pupils are equal, round, and reactive to light  Neck:      Musculoskeletal: Normal range of motion  Cardiovascular:      Rate and Rhythm: Normal rate and regular rhythm  Pulses: Normal pulses  Heart sounds: Normal heart sounds  Pulmonary:      Effort: Pulmonary effort is normal       Breath sounds: Normal breath sounds  Abdominal:      General: Abdomen is flat  Bowel sounds are normal  There is no distension  Tenderness: There is no abdominal tenderness  Genitourinary:     Comments: Ni draining dark urine  Musculoskeletal: Normal range of motion  Comments: Bilateral lower extremity pitting edema, 2+ in feet, trace to knees   Skin:     General: Skin is warm and dry        Capillary Refill: Capillary refill takes less than 2 seconds  Neurological:      General: No focal deficit present  Mental Status: He is alert  Comments: Oriented to self and  only  Unable to recall today's events  Difficulty following directions     Psychiatric:         Mood and Affect: Mood normal          Behavior: Behavior normal          Pertinent Labs:  Recent Results (from the past 12 hour(s))   Ammonia    Collection Time: 20  4:58 PM   Result Value Ref Range    Ammonia 18 11 - 35 umol/L   CBC and differential    Collection Time: 20  4:58 PM   Result Value Ref Range    WBC 11 35 (H) 4 31 - 10 16 Thousand/uL    RBC 4 48 3 88 - 5 62 Million/uL    Hemoglobin 12 3 12 0 - 17 0 g/dL    Hematocrit 38 9 36 5 - 49 3 %    MCV 87 82 - 98 fL    MCH 27 5 26 8 - 34 3 pg    MCHC 31 6 31 4 - 37 4 g/dL    RDW 15 1 11 6 - 15 1 %    MPV 11 0 8 9 - 12 7 fL    Platelets 832 602 - 972 Thousands/uL    nRBC 0 /100 WBCs    Neutrophils Relative 74 43 - 75 %    Immat GRANS % 0 0 - 2 %    Lymphocytes Relative 9 (L) 14 - 44 %    Monocytes Relative 11 4 - 12 %    Eosinophils Relative 6 0 - 6 %    Basophils Relative 0 0 - 1 %    Neutrophils Absolute 8 30 (H) 1 85 - 7 62 Thousands/µL    Immature Grans Absolute 0 05 0 00 - 0 20 Thousand/uL    Lymphocytes Absolute 1 03 0 60 - 4 47 Thousands/µL    Monocytes Absolute 1 29 (H) 0 17 - 1 22 Thousand/µL    Eosinophils Absolute 0 64 (H) 0 00 - 0 61 Thousand/µL    Basophils Absolute 0 04 0 00 - 0 10 Thousands/µL   Troponin I    Collection Time: 20  4:58 PM   Result Value Ref Range    Troponin I <0 02 <=0 04 ng/mL   Comprehensive metabolic panel    Collection Time: 20  4:58 PM   Result Value Ref Range    Sodium 138 136 - 145 mmol/L    Potassium 4 1 3 5 - 5 3 mmol/L    Chloride 108 100 - 108 mmol/L    CO2 16 (L) 21 - 32 mmol/L    ANION GAP 14 (H) 4 - 13 mmol/L     (H) 5 - 25 mg/dL    Creatinine 5 92 (H) 0 60 - 1 30 mg/dL    Glucose 175 (H) 65 - 140 mg/dL    Calcium 8 9 8 3 - 10 1 mg/dL    AST 26 5 - 45 U/L    ALT 30 12 - 78 U/L    Alkaline Phosphatase 67 46 - 116 U/L    Total Protein 9 3 (H) 6 4 - 8 2 g/dL    Albumin 3 5 3 5 - 5 0 g/dL    Total Bilirubin 0 52 0 20 - 1 00 mg/dL    eGFR 10 ml/min/1 73sq m   Blood gas, venous    Collection Time: 12/31/20  4:58 PM   Result Value Ref Range    pH, Shekhar 7 262 (L) 7 300 - 7 400    pCO2, Shekhar 33 8 (L) 42 0 - 50 0 mm Hg    pO2, Shekhar 45 8 (H) 35 0 - 45 0 mm Hg    HCO3, Shekhar 14 9 (L) 24 - 30 mmol/L    Base Excess, Shekhar -11 1 mmol/L    O2 Content, Shekhar 13 3 ml/dL    O2 HGB, VENOUS 73 2 60 0 - 80 0 %   Ethanol    Collection Time: 12/31/20  4:58 PM   Result Value Ref Range    Ethanol Lvl <3 0 - 3 mg/dL   Salicylate level    Collection Time: 12/31/20  4:58 PM   Result Value Ref Range    Salicylate Lvl <3 (L) 3 - 20 mg/dL   Acetaminophen level-If concentration is detectable, please discuss with medical  on call      Collection Time: 12/31/20  4:58 PM   Result Value Ref Range    Acetaminophen Level <2 (L) 10 - 20 ug/mL   Lipase    Collection Time: 12/31/20  4:58 PM   Result Value Ref Range    Lipase 367 73 - 393 u/L   CK (with reflex to MB)    Collection Time: 12/31/20  4:58 PM   Result Value Ref Range    Total  (H) 39 - 308 U/L   CKMB    Collection Time: 12/31/20  4:58 PM   Result Value Ref Range    CK-MB Index 1 1 0 0 - 2 5 %    CK-MB 4 9 0 0 - 5 0 ng/mL   COVID19, Influenza A/B, RSV PCR, [facility]    Collection Time: 12/31/20  5:02 PM    Specimen: Nasopharyngeal Swab; Nares   Result Value Ref Range    SARS-CoV-2 Negative Negative    INFLUENZA A PCR Negative Negative    INFLUENZA B PCR Negative Negative    RSV PCR Negative Negative   Rapid drug screen, urine    Collection Time: 12/31/20  6:08 PM   Result Value Ref Range    Amph/Meth UR Negative Negative    Barbiturate Ur Negative Negative    Benzodiazepine Urine Negative Negative    Cocaine Urine Negative Negative    Methadone Urine Negative Negative    Opiate Urine Negative Negative    PCP Ur Negative Negative    THC Urine Negative Negative    Oxycodone Urine Negative Negative   Urine Macroscopic, POC    Collection Time: 12/31/20  6:10 PM   Result Value Ref Range    Color, UA Yellow     Clarity, UA Clear     pH, UA 6 0 4 5 - 8 0    Leukocytes, UA Large (A) Negative    Nitrite, UA Positive (A) Negative    Protein,  (2+) (A) Negative mg/dl    Glucose, UA Negative Negative mg/dl    Ketones, UA Negative Negative mg/dl    Urobilinogen, UA 0 2 0 2, 1 0 E U /dl E U /dl    Bilirubin, UA Negative Negative    Blood, UA Small (A) Negative    Specific Gravity, UA 1 020 1 003 - 1 030   Urine Microscopic    Collection Time: 12/31/20  6:10 PM   Result Value Ref Range    RBC, UA 2-4 None Seen, 2-4 /hpf    WBC, UA Innumerable (A) None Seen, 2-4 /hpf    Epithelial Cells None Seen None Seen, Occasional /hpf    Bacteria, UA Occasional None Seen, Occasional /hpf    Hyaline Casts, UA None Seen None Seen /lpf   POCT urinalysis dipstick    Collection Time: 12/31/20  6:12 PM   Result Value Ref Range    Color, UA -        Imaging:    CT Head 12/31:  No acute intracranial abnormality  CT C/A/P 12/31:  No evidence of acute trauma in the chest   Ascending aortic aneurysm measuring 4 3 cm  No acute intra abdominal or pelvic trauma  Marked distention of the bladder extending well into the mid abdomen, compatible with distal outlet obstruction  Possible small amount of debris or hemorrhage at the base of the bladder  Mild prostate enlargement  XR left tib/fib 12/31:  No acute osseous abnormality        A/P    Obstructive Uropathy with UTI and history of urinary retention  - history of urinary retention, follows with urology outpatient - currently straight caths 4 times daily and on Flomax  - CT C/A/P 12/31 showed marked bladder distension compatible with distal outlet obstruction and possible small amount of debris or hemorrhage at the base of the bladder and mild prostate enlargement  - Urine with positive leukocytes and nitrites, protein, blood  CBC with leukocytosis 11 35   - plan to continue home Flomax, escalante placed in ED, monitor I/O, repeat CBC and BMP in AM, follow up urine culture  - continue Ceftriaxone 1g IV q24h, first dose given in ED today  - Urology consulted    REYNOLD on CKD3B  - Cr 5 92 on admission, , CrCl 11 ml/min - baseline Cr 1 8  - follows with nephrology outpatient, will hold off on nephrology consult given likely cause being obstructive uropathy and urology is consulted, though may consider consult if REYNOLD not improved with IVF  - IVF maintenance @ 125 ml/hr  - Monitor BMP    Hyperglycemia  - Glucose on admission was 175, no history of DM, will check A1C  - Start SSI if A1C > 7, goal serum glucose 140-180    Hyperlipidemia  - On Atorvastatin 10 mg daily  - Last lipid panel in , will repeat now  - ASCVD based on previous lipid panel 18 8% and appropriate for moderate/high intensity statin  - Will re-calculate ASCVD based on repeat lipid panel and determine appropriate statin     Hypertension  - BP 140s-160/80s-90 on day of admission  - unclear if patient had been compliant with home medications  - continue home Amlodipine 5 mg daily and monitoring    Acute encephalopathy, possible fall at home  - alert, oriented to self and  only  - CT head  negative for acute intracranial abnormality  - suspect due to UTI  - found to have metabolic acidosis, will repeat VBG in AM  - CK mildly elevated, no rhabdo at this time   - PT/OT consulted     Social concerns  - Patient found down by neighbor covered in urine and feces  - Lives alone  - CM consulted     Ascending Aortic Aneurysm  - Known history, CT C/A/P measuring at 4 3 cm  - Adequate BP control  - outpatient follow up    Discussed with Dr Monique Galeano    I have personally seen and examined patient and agree with the intern's documentation  Please contact Nai Carrington at  or via orderTopiat with any questions      Yazmin Aguila DO  [facility] PGY2  12/31/2020  7:50 PM

## 2021-01-01 NOTE — H&P
H&P- Jerry Newton 1955, 77 y o  male MRN: [id number]    Unit/Bed#: -lFo Encounter: [id number]    Primary Care Provider: JOHNSON Madrid   Date and time admitted to hospital: 12/31/2020  4:24 PM        * Obstructive uropathy  Assessment & Plan  Pt w/ hx/o urinary retention 2/2 enlarged prostate  Last seen by outpatient Urology on 7/30/20 continue with chronic straight catheterization 4 times a day and remains on Tamsulosin (Flomax) 0 4 mg PO Daily  - CrCl 11 on admission, s/p urinary Ni Cath in ED   - 12/31 CTCAP (+) Marked distention of the bladder extending well into the mid abdomen, compatible with distal outlet obstruction  Possible small amount of debris or hemorrhage at the base of the bladder  Mild prostate enlargement  - continue PTA Tamsulosin (Flomax) 0 4 mg PO Daily  - daily weights  - strict I&O's  - avoid nephrotoxic agents  - urology consulted, appreciate REC    Urinary retention  Assessment & Plan  See A/P on obstructive uropathy  CKD (chronic kidney disease), stage III  Assessment & Plan  See A/P on REYNOLD    REYNOLD (acute kidney injury) ([facility] [address] )  Assessment & Plan  REYNOLD likely 2/2 obstructive uropathy on top of CKD-3, suspected secondary to hypertensive nephrosclerosis  Baseline Cr 1 3-1 8  Last seen by Nephrology outpatient Michael Lucero MD) on 11/2/20,   - , Cr 5 92, EGFR 10 on admission, s/p 1 L NS bolus in ED  - Bilateral +2 LE edema to ankle, trace edema from ankle to knee  - mIVF 125 cc/hr NS  - trend BMP daily    Lab Results   Component Value Date    EGFR 10 12/31/2020    EGFR 42 10/27/2020    EGFR 25 08/27/2020     (H) 12/31/2020    BUN 22 10/27/2020    BUN 43 (H) 08/27/2020    CREATININE 5 92 (H) 12/31/2020    CREATININE 1 88 (H) 10/27/2020    CREATININE 2 91 (H) 08/27/2020         Urinary tract infection  Assessment & Plan  UA on admission (+) nitrite, large leukocytes, 2+ protein, small blood  CBC (+) WBC 11 35, ANC 8 3    S/p Ceftriaxone (Rocephin) 1g IV xOnce in ED   - continue Ceftriaxone (Rocephin) 1g IV q24hr pending UCX results  - f/u BCX results  - f/u UCX results  - trend daily CBC    Encephalopathy acute  Assessment & Plan  Pt is awake, alert, and oriented to name and  but not to time or place  Pt unable to recall today's events leading to ED evaluation and does not know his reason for admission   - encephalopathy likely 2/2 UTI and obstructive uropathy  -  CT head (-) No acute intracranial abnormality  Essential hypertension  Assessment & Plan  BP on admission 157/86  Hx/o HTN, PTA regimen Amlodipine (Norvasc) 5mg PO daily  - continue PTA regimen  - monitor vital sign    Temp:  [98 °F (36 7 °C)-98 6 °F (37 °C)] 98 6 °F (37 °C)  HR:  [90-94] 94  Resp:  [16-18] 16  BP: (145-160)/(85-90) 145/85  SpO2:  [98 %-100 %] 100 %      HLD (hyperlipidemia)  Assessment & Plan  Stable  Hx/o HLD, PTA regimen Atorvastatin (Lipitor) 20mg PO qHS  - continue PTA regimen    Ascending aortic aneurysm ([address] )  Assessment & Plan  Hx/o AAA  CTCAP on admission (+) Ascending aortic aneurysm measuring 4 3 cm  Metabolic acidosis, increased anion gap  Assessment & Plan  VBG on admission (+) metabolic acidosis, pH 7 65, pCO2 33 8, HC03 14 9   (+) AG 14    - likely 2/2 uremia given (-) ASA, (-) acetaminophen, and (-) ethanol level  Diet:        Diet Orders   (From admission, onward)             Start     Ordered    20  Diet Regular; Regular House  Diet effective now     Question Answer Comment   Diet Type Regular    Regular Regular House    RD to adjust diet per protocol? No        20              DVT Prophylaxis: VTE Pharmacologic Prophylaxis: Heparin  VTE Mechanical Prophylaxis: sequential compression device  Code Status:  Level 1 - Full Code  Disposition: Discussed with Maryellen Moses (Attending) and [facility] team            SUBJECTIVE  HPI:    73-YO male admitted for AMS and urinary retention   Pt is awake, alert, and oriented to name and  but not to time or place  Pt unable to recall today's events leading to ED evaluation and does not know his reason for admission  Per ED note, Pt was  found by neighbor today in his house, covered in feces and urine  Pt lives at home by himself  Neighbor called EMS rupesh pt's home had feces and urine all over the floor and couch  Pt does not take prescribed medicines nor does he care for himself  PMHX/o urinary retention, enlarged prostate, HTN, HLD, CKD 3, and anemia  On evaluation, Pt denies any headache, vision changes, N/V, CP, SOB, abdo pain, diarrhea, constipation, myalgia, or excessive fatigue  ED Management:   - Labs:  CBC, CMP, Trop-I, ABG, ammonia, lipase, CK, CK-MB, UDS, UA,, panel, COVID screening   - Imaging:  CTCAP, CT head, XR left tibia/fibula  - treatment:  1 L NS bolus, Ceftriaxone (Rocephin) 1g IV xOnce      Review of Systems   Unable to perform ROS: Mental status change       Historical Information   Past Medical History:   Diagnosis Date    Aortic aneurysm (HCC)     Arthritis     Atypical chest pain     Back pain     Chronic kidney disease     stg 3    Elevated ALT measurement     Elevated AST (SGOT)     Hyperlipidemia     Hypertension     Leg pain     Neurogenic claudication     Urinary retention     catherize self 4x/day    Urinary retention      Past Surgical History:   Procedure Laterality Date    EPIDURAL BLOCK INJECTION N/A 2017    Procedure: BLOCK / INJECTION EPIDURAL STEROID LUMBAR;  Surgeon: Darell Ash MD;  Location: [facility];  Service:     MS COLONOSCOPY FLX DX W/COLLJ SPEC WHEN PFRMD N/A 2019    Procedure: COLONOSCOPY;  Surgeon: Gayle Douglsa MD;  Location: [facility];   Service: Colorectal    MS LAMNOTMY INCL W/DCMPRSN NRV ROOT 1 INTRSPC LUMBR Bilateral 2017    Procedure: MIS L2-3 & L3-4 LAMINAL FORAMINOTOMIES AND MICRODISCECTOMY W LEFT SIDED APPROACH ;  Surgeon: Darell Ash MD;  Location: [facility];  Service: Neurosurgery     Social History   Social History     Substance and Sexual Activity   Alcohol Use No     Social History     Substance and Sexual Activity   Drug Use No     Social History     Tobacco Use   Smoking Status Never Smoker   Smokeless Tobacco Never Used     Family History: non-contributory    Meds/Allergies   all medications and allergies reviewed  No Known Allergies    OBJECTIVE  Vitals: Blood pressure 145/85, pulse 94, temperature 98 6 °F (37 °C), resp  rate 16, height 5' 7" (1 702 m), weight 71 2 kg (157 lb), SpO2 100 %  Intake/Output Summary (Last 24 hours) at 12/31/2020 2137  Last data filed at 12/31/2020 1910  Gross per 24 hour   Intake 1053 33 ml   Output 1200 ml   Net -146 67 ml       Invasive Devices     Peripheral Intravenous Line            Peripheral IV 12/31/20 Left Antecubital less than 1 day          Drain            Urethral Catheter 18 Fr  less than 1 day                Physical Exam  Vitals signs and nursing note reviewed  Constitutional:       General: He is not in acute distress  Appearance: Normal appearance  He is well-developed and normal weight  He is not ill-appearing, toxic-appearing or diaphoretic  HENT:      Head: Normocephalic and atraumatic  Right Ear: External ear normal       Left Ear: External ear normal       Nose: Nose normal  No congestion or rhinorrhea  Mouth/Throat:      Mouth: Mucous membranes are dry  Pharynx: Oropharynx is clear  No oropharyngeal exudate or posterior oropharyngeal erythema  Eyes:      General: No scleral icterus  Right eye: No discharge  Left eye: No discharge  Extraocular Movements: Extraocular movements intact  Conjunctiva/sclera: Conjunctivae normal       Pupils: Pupils are equal, round, and reactive to light  Neck:      Musculoskeletal: Normal range of motion and neck supple  Cardiovascular:      Rate and Rhythm: Normal rate and regular rhythm  Pulses: Normal pulses        Heart sounds: Normal heart sounds  No murmur  No friction rub  No gallop  Pulmonary:      Effort: Pulmonary effort is normal  No respiratory distress  Breath sounds: Normal breath sounds  No stridor  No wheezing, rhonchi or rales  Chest:      Chest wall: No tenderness  Abdominal:      General: Bowel sounds are normal  There is no distension  Palpations: Abdomen is soft  There is no mass  Tenderness: There is no abdominal tenderness  There is no guarding or rebound  Hernia: No hernia is present  Musculoskeletal: Normal range of motion  General: No swelling, tenderness, deformity or signs of injury  Right lower leg: Edema present  Left lower leg: Edema (B/L +2 pitting edema up to ankle, trace edema up to knee ) present  Skin:     General: Skin is warm  Capillary Refill: Capillary refill takes less than 2 seconds  Coloration: Skin is not jaundiced  Neurological:      Mental Status: He is alert  He is disoriented  Comments: Pt appears lethargic and unable to follow direction for physical exam            Lab Results:   I have personally reviewed pertinent reports  Imaging:   I have personally reviewed pertinent reports  EKG, Pathology, and Other Studies:   I have personally reviewed pertinent reports  Code Status: Level 1 - Full Code  Advance Directive and Living Will:      Power of :    POLST:      The senior resident, DR Bran Holden, saw and examined the patient and agreed with the plan  CRUZ Guzman   PGY-1, Family Medicine  12/31/20  9:37 PM    Dear reader, please be aware that portions of my note contain dictated text  I have done my best to proof-read this note prior to signing  However, there may be occasional unnoticed errors pertaining to "sound-alike" words and/or grammar during my dictation process   If there is any words or information that is unclear or appears erroneous, please kindly let me know and I will clarify and/or addend my notes accordingly  Thank you for your understanding

## 2021-01-01 NOTE — ASSESSMENT & PLAN NOTE
REYNOLD likely 2/2 obstructive uropathy on top of CKD-3  Baseline Cr 1 3-1 8     - , Cr 5 92, EGFR 10 on admission, s/p 1 L NS bolus in ED  - mIVF 125 cc/hr NS  - trend BMP daily    Lab Results   Component Value Date    EGFR 18 01/01/2021    EGFR 10 12/31/2020    EGFR 42 10/27/2020     (H) 01/01/2021     (H) 12/31/2020    BUN 22 10/27/2020    CREATININE 3 74 (H) 01/01/2021    CREATININE 5 92 (H) 12/31/2020    CREATININE 1 88 (H) 10/27/2020

## 2021-01-01 NOTE — ASSESSMENT & PLAN NOTE
REYNOLD likely 2/2 obstructive uropathy on top of CKD-3, suspected secondary to hypertensive nephrosclerosis  Baseline Cr 1 3-1 8    Last seen by Nephrology outpatient Johnson Rodas MD) on 11/2/20,   - , Cr 5 92, EGFR 10 on admission, s/p 1 L NS bolus in ED  - Bilateral +2 LE edema to ankle, trace edema from ankle to knee  - mIVF 125 cc/hr NS  - trend BMP daily    Lab Results   Component Value Date    EGFR 10 12/31/2020    EGFR 42 10/27/2020    EGFR 25 08/27/2020     (H) 12/31/2020    BUN 22 10/27/2020    BUN 43 (H) 08/27/2020    CREATININE 5 92 (H) 12/31/2020    CREATININE 1 88 (H) 10/27/2020    CREATININE 2 91 (H) 08/27/2020

## 2021-01-01 NOTE — UTILIZATION REVIEW
Initial Clinical Review    Admission: Date/Time/Statement:   Admission Orders (From admission, onward)     Ordered        20 194  Inpatient Admission  Once                   Orders Placed This Encounter   Procedures    Inpatient Admission     Standing Status:   Standing     Number of Occurrences:   1     Order Specific Question:   Admitting Physician     Answer:   [place] [[id number]]     Order Specific Question:   Level of Care     Answer:   Med Surg [[id number]]     Order Specific Question:   Estimated length of stay     Answer:   More than 2 Midnights     Order Specific Question:   Certification     Answer:   I certify that inpatient services are medically necessary for this patient for a duration of greater than two midnights  See H&P and MD Progress Notes for additional information about the patient's course of treatment  ED Arrival Information     Expected Arrival Acuity Means of Arrival Escorted By Service Admission Type    - 2020 16:19 Urgent Ambulance [facility] Macey Sweet) General Medicine Urgent    Arrival Complaint    Weakness         Chief Complaint   Patient presents with    Failure To Thrive     Pt lives at home by himself  Neighbor called EMS becuase pt's home had feces and urine all over the floor and couch  Pt does not take prescribed medicines nor does he care for himself  Assessment/Plan:   72y Male to ED presents with AMS and urinary retention, oriented to name and  only  He was found by neighbor today covered in feces and urine in his home  Resides by himself  PMH for urinary retention, enlarged prostate, HTN, HLD, CKD 3, and anemia  Admit Inpatient level of care for Obstructive uropathy, Urinary retention, REYNOLD, UTI, Acute Encephalopathy and Metabolic acidosis, increase anion gap  Outpatient Urology on 20 continue with chronic straight catheterization 4 times a day ClCr 11  on admit and escalante cath placed in ED   CT CAP (+) Marked distention of the bladder extending well into the mid abdomen, compatible with distal outlet obstruction  Possible small amount of debris or hemorrhage at the base of the bladder  [facility] prostate enlargement  Continue Flomax  Daily wts  Strict 1&Os  Avoid nephrotoxic agents  Urology consult  Baseline Cr 1 3-1 8  Cr/Bun 5 92/ 142 on admit  S/p 1L NS bolus in ED and continue IVFs  Trend BMP daily  Bilateral +2 LE edema to ankle, trace edema from ankle to knee  F/u urine cultures  Trend daily Cbc  IV antibiotics  VGB on admit (+) metabolic acidosis, pH 7 72, pCO2 33 8, HC03 14 9   (+) AG 14    - likely 2/2 uremia given (-) ASA, (-) acetaminophen, and (-) ethanol level  Continue home meds  1/1 Progress notes; Continue Iv antibiotics  F/u cultures  Trend daily Cbc  Cr/ Bun trending down  Daily BMP  ED Triage Vitals   Temperature Pulse Respirations Blood Pressure SpO2   12/31/20 1631 12/31/20 1631 12/31/20 1631 12/31/20 1631 12/31/20 1631   98 °F (36 7 °C) 90 18 157/86 100 %      Temp Source Heart Rate Source Patient Position - Orthostatic VS BP Location FiO2 (%)   12/31/20 1631 12/31/20 1631 12/31/20 1631 12/31/20 1631 --   Oral Monitor Lying Right arm       Pain Score       12/31/20 2315       No Pain          Wt Readings from Last 1 Encounters:   01/01/21 66 1 kg (145 lb 11 2 oz)     Additional Vital Signs:   01/01/21 07:27:56  98 3 °F (36 8 °C)  100  18  129/75  93  98 %       12/31/20 20:55:26  98 6 °F (37 °C)  94  16  145/85  105  100 %       12/31/20 2000    92  17  160/90             Pertinent Labs/Diagnostic Test Results:   12/31 Xray Left Tibia/Fibula - No acute osseous abnormality  12/31 CT Chest Abd/Pelvis - 1  No evidence of acute trauma in the chest   Ascending aortic aneurysm measuring 4 3 cm   2   No acute intra-abdominal or pelvic trauma  3   Marked distention of the bladder extending well into the mid abdomen, compatible with distal outlet obstruction    Possible small amount of debris or hemorrhage at the base of the bladder  Mild prostate enlargement      12/31 CT Head - No acute intracranial abnormality      1/1 EKG - Sinus rhythm with short AK     Results from last 7 days   Lab Units 12/31/20  1702   SARS-COV-2  Negative     Results from last 7 days   Lab Units 01/01/21  0430 12/31/20  2220 12/31/20  1658   WBC Thousand/uL 9 63  --  11 35*   HEMOGLOBIN g/dL 10 9*  --  12 3   HEMATOCRIT % 34 0*  --  38 9   PLATELETS Thousands/uL 211 201 247   NEUTROS ABS Thousands/µL 6 77  --  8 30*         Results from last 7 days   Lab Units 01/01/21  0430 12/31/20  1658   SODIUM mmol/L 145 138   POTASSIUM mmol/L 3 5 4 1   CHLORIDE mmol/L 118* 108   CO2 mmol/L 18* 16*   ANION GAP mmol/L 9 14*   BUN mg/dL 118* 142*   CREATININE mg/dL 3 74* 5 92*   EGFR ml/min/1 73sq m 18 10   CALCIUM mg/dL 8 7 8 9     Results from last 7 days   Lab Units 12/31/20  1658   AST U/L 26   ALT U/L 30   ALK PHOS U/L 67   TOTAL PROTEIN g/dL 9 3*   ALBUMIN g/dL 3 5   TOTAL BILIRUBIN mg/dL 0 52   AMMONIA umol/L 18         Results from last 7 days   Lab Units 01/01/21  0430 12/31/20  1658   GLUCOSE RANDOM mg/dL 137 175*         Results from last 7 days   Lab Units 12/31/20  1658   HEMOGLOBIN A1C % 6 7*   EAG mg/dl 146       Results from last 7 days   Lab Units 01/01/21  0430 12/31/20  1658   PH FELIPE  7 357 7 262*   PCO2 FELIPE mm Hg 29 0* 33 8*   PO2 FELIPE mm Hg 65 8* 45 8*   HCO3 FELIPE mmol/L 15 9* 14 9*   BASE EXC FELIPE mmol/L -8 3 -11 1   O2 CONTENT FELIPE ml/dL 15 4 13 3   O2 HGB, VENOUS % 90 8* 73 2         Results from last 7 days   Lab Units 12/31/20  1658   CK TOTAL U/L 447*   CK MB INDEX % 1 1   CK MB ng/mL 4 9     Results from last 7 days   Lab Units 12/31/20  1658   TROPONIN I ng/mL <0 02     Results from last 7 days   Lab Units 12/31/20  1658   LIPASE u/L 367     Results from last 7 days   Lab Units 12/31/20 1812 12/31/20 1810   CLARITY UA   --  Clear   COLOR UA  - Yellow   SPEC GRAV UA   --  1 020   PH UA   --  6 0   GLUCOSE UA mg/dl  --  Negative   KETONES UA mg/dl --  Negative   BLOOD UA   --  Small*   PROTEIN UA mg/dl  --  100 (2+)*   NITRITE UA   --  Positive*   BILIRUBIN UA   --  Negative   UROBILINOGEN UA E U /dl  --  0 2   LEUKOCYTES UA   --  Large*   WBC UA /hpf  --  Innumerable*   RBC UA /hpf  --  2-4   BACTERIA UA /hpf  --  Occasional   EPITHELIAL CELLS WET PREP /hpf  --  None Seen     Results from last 7 days   Lab Units 12/31/20  1702   INFLUENZA A PCR  Negative   INFLUENZA B PCR  Negative   RSV PCR  Negative         Results from last 7 days   Lab Units 12/31/20  1808   AMPH/METH  Negative   BARBITURATE UR  Negative   BENZODIAZEPINE UR  Negative   COCAINE UR  Negative   METHADONE URINE  Negative   OPIATE UR  Negative   PCP UR  Negative   THC UR  Negative     Results from last 7 days   Lab Units 12/31/20  1658   ETHANOL LVL mg/dL <3   ACETAMINOPHEN LVL ug/mL <2*   SALICYLATE LVL mg/dL <3*     ED Treatment:   Medication Administration from 12/31/2020 1619 to 12/31/2020 2050       Date/Time Order Dose Route Action     12/31/2020 1813 sodium chloride 0 9 % bolus 1,000 mL 1,000 mL Intravenous New Bag     12/31/2020 1838 ceftriaxone (ROCEPHIN) 1 g/50 mL in dextrose IVPB 1,000 mg Intravenous New Bag        Past Medical History:   Diagnosis Date    Aortic aneurysm (HCC)     Arthritis     Atypical chest pain     Back pain     Chronic kidney disease     stg 3    Elevated ALT measurement     Elevated AST (SGOT)     Hyperlipidemia     Hypertension     Leg pain     Neurogenic claudication     Urinary retention     catherize self 4x/day    Urinary retention      Present on Admission:   Obstructive uropathy   HLD (hyperlipidemia)   Essential hypertension   Ascending aortic aneurysm (HCC)   Urinary retention   CKD (chronic kidney disease), stage III   REYNOLD (acute kidney injury) ([facility])      Admitting Diagnosis: UTI (urinary tract infection) [N39 0]  Obstructive nephropathy [N13 8]  Weakness [R53 1]  Age/Sex: 77 y o  male     Admission Orders:  Scheduled Medications:  amLODIPine, 5 mg, Oral, Daily  aspirin, 81 mg, Oral, Daily  atorvastatin, 10 mg, Oral, Daily  cefTRIAXone, 1,000 mg, Intravenous, Q24H  heparin (porcine), 5,000 Units, Subcutaneous, Q8H ASTON  tamsulosin, 0 4 mg, Oral, Daily With Dinner      Continuous IV Infusions:  sodium chloride, 125 mL/hr, Intravenous, Continuous      PRN Meds:  sodium chloride (PF), 3 mL, Intravenous, Q1H PRN      Urine culture   IP CONSULT TO UROLOGY    Network Utilization Review Department  ATTENTION: Please call with any questions or concerns to [phone] and carefully listen to the prompts so that you are directed to the right person  All voicemails are confidential   Mayra Griffin all requests for admission clinical reviews, approved or denied determinations and any other requests to dedicated fax number below belonging to the campus where the patient is receiving treatment   List of dedicated fax numbers for the Facilities:  [address] DENIALS (Administrative/Medical Necessity) [phone]   [address] (Maternity/NICU/Pediatrics) [phone]   [address] [place] [address] Dr Gian King 3865 (Raulito Craig) [address] [place] [address] [address] [place] [address] [phone]

## 2021-01-01 NOTE — PLAN OF CARE
Problem: OCCUPATIONAL THERAPY ADULT  Goal: Performs self-care activities at highest level of function for planned discharge setting  See evaluation for individualized goals  Description: Treatment Interventions: ADL retraining, Functional transfer training, Endurance training, Cognitive reorientation, Patient/family training, Equipment evaluation/education, Compensatory technique education, Energy conservation, Activityengagement          See flowsheet documentation for full assessment, interventions and recommendations  Note: Limitation: Decreased ADL status, Decreased Safe judgement during ADL, Decreased cognition, Decreased endurance, Decreased high-level ADLs, Decreased self-care trans  Prognosis: Fair  Assessment: Pt is a 77 y o  male admitted 12/31/20 after being found covered in urine and feces by neighbor  Pt found to have obstructive uropathy and encephalopathy  Head CT 12/31 negative for any acute intracranial abnormalities  PMH includes ascending aortic aneurysm, lumbar stenosis w/ neurogenic claudication, Ataxia, syncope and collapse, orthostatic hypotension, diffuse pain LLE, PAD, lumbar radiculopathy, lumbar discectomy  Pt w/ active OT orders for evaluation and up with assistance  Per EMR, pt lives alone in a 2nd floor apartment w/ [address]  Pt has tub shower and standard toilet  Pt does not have any DME in the home and uses public transportation  Per EMR, pt has poor medication management and does not take care of himself well  Pt unresponsive to therapists questions today, but was able to verbally ask for water/milk and able to use UE's to remove wrapper from straw  Pt non-compliant, reporting pain when attempting to move LE's  Unclear if this is true cognitive deficit or if it a behaviroal limitation at this time  Based on today's evaluation, pt is Mod A for UB ADL's, Max for LB ADL's and toileting  OTR to see for transfers and functional mobility, and to continue to assess cognition   From OT standpoint, pt should d/c to STR when medically stable  Pt will benefit from continued acute OT services 3-5x/wk for 7-10 days to meet goals     Recommendation: Psych Consult  OT Discharge Recommendation: Post-Acute Rehabilitation Services  OT - OK to Discharge: Yes(when medically stable )    Diana Morales, OTS

## 2021-01-01 NOTE — ASSESSMENT & PLAN NOTE
BP on admission 157/86  Hx/o HTN, PTA regimen Amlodipine (Norvasc) 5mg PO daily    - continue PTA regimen  - monitor vital sign    Temp:  [98 °F (36 7 °C)-98 6 °F (37 °C)] 98 6 °F (37 °C)  HR:  [90-94] 94  Resp:  [16-18] 16  BP: (145-160)/(85-90) 145/85  SpO2:  [98 %-100 %] 100 %

## 2021-01-01 NOTE — ASSESSMENT & PLAN NOTE
- continue Ceftriaxone (Rocephin) 1g IV q24hr pending UCX results  - f/u UCX results  - trend daily CBC

## 2021-01-02 NOTE — CONSULTS
UROLOGY CONSULTATION NOTE     Patient Identifiers: Flash Liao (MRN [id number])  Service Requesting Consultation:  [facility] Internal Medicine  Service Providing Consultation:  Urology, Amanda Simmons MD    Date of Service: 1/2/2021  Inpatient consult to Urology  Consult performed by: Amanda Simmons MD  Consult ordered by: Divya Holm DO          Reason for Consultation:  Obstructive uropathy, urinary tract infection    History of Present Illness:     Flash Liao is a 77 y o  old male admitted January 1st after being found in his home unkempt and with mental status change  He was brought to the emergency room where workup was undertaken  There was evidence of urinary tract infection and acute kidney injury  CT scan revealed mild bilateral renal collecting system fullness and a distended bladder  At baseline the patient has a long history of urinary retention secondary to bladder outlet obstruction from prostatic hyperplasia  Cystoscopy has been performed in the past and transurethral resection of the prostate or urolift recommended  He has declined prostate surgery and has  elected to perform clean intermittent catheterization which he states he does 3-4 times per day  He denies gross hematuria  At the present time of Ni catheter is in place  He notes he has no abdominal pain  He is comfortable the Ni catheter  ASSESSMENT & PLAN:     Urinary retention-chronic  Acute kidney injury-improving  Urinary tract infection-culture is being reincubated  Plan:  Agree with antibiotics and present therapy  The patient has urinary retention secondary to prostatic obstruction and has declined surgical intervention  Options at this point include resuming clean intermittent catheterization 3-4 times daily ,  chronic Ni or suprapubic tube  I discussed these options with the patient and he is willing to resume clean intermittent catheterization    The importance of being compliant with this was discussed with the patient  Would not remove Ni catheter until his creatinine nadirs  The patient can be discharged home once medically stable  He can be discharged home with Ni catheter and then he can follow up with Urology as an outpatient to remove the catheter and reinstruct him on clean intermittent catheterization and again discuss other options such as transurethral resection of the prostate or urolift  Urology will sign off  Please re consult should problems arise  Past Medical, Past Surgical History:     Past Medical History:   Diagnosis Date    Aortic aneurysm (HCC)     Arthritis     Atypical chest pain     Back pain     Chronic kidney disease     stg 3    Elevated ALT measurement     Elevated AST (SGOT)     Hyperlipidemia     Hypertension     Leg pain     Neurogenic claudication     Urinary retention     catherize self 4x/day    Urinary retention    :    Past Surgical History:   Procedure Laterality Date    EPIDURAL BLOCK INJECTION N/A 1/23/2017    Procedure: BLOCK / INJECTION EPIDURAL STEROID LUMBAR;  Surgeon: Marcelina Jimenez MD;  Location: [facility];  Service:     HI COLONOSCOPY FLX DX W/COLLJ SPEC WHEN PFRMD N/A 1/8/2019    Procedure: COLONOSCOPY;  Surgeon: Buddy Edward MD;  Location: [facility];   Service: Colorectal    HI LAMNOTMY INCL W/DCMPRSN NRV ROOT 1 INTRSPC LUMBR Bilateral 1/23/2017    Procedure: MIS L2-3 & L3-4 LAMINAL FORAMINOTOMIES AND MICRODISCECTOMY W LEFT SIDED APPROACH ;  Surgeon: Marcelina Jimenez MD;  Location[facility];  Service: Neurosurgery   :    Medications, Allergies:     Current Facility-Administered Medications   Medication Dose Route Frequency    amLODIPine (NORVASC) tablet 5 mg  5 mg Oral Daily    aspirin (ECOTRIN LOW STRENGTH) EC tablet 81 mg  81 mg Oral Daily    atorvastatin (LIPITOR) tablet 10 mg  10 mg Oral Daily    ceftriaxone (ROCEPHIN) 1 g/50 mL in dextrose IVPB  1,000 mg Intravenous Q24H    heparin (porcine) subcutaneous injection 5,000 Units  5,000 Units Subcutaneous Q8H [address]    sodium chloride (PF) 0 9 % injection 3 mL  3 mL Intravenous Q1H PRN    sodium chloride 0 9 % infusion  100 mL/hr Intravenous Continuous    tamsulosin (FLOMAX) capsule 0 4 mg  0 4 mg Oral Daily With Dinner       Allergies:  No Known Allergies:    Social and Family History:   Social History:   Social History     Tobacco Use    Smoking status: Never Smoker    Smokeless tobacco: Never Used   Substance Use Topics    Alcohol use: No    Drug use: No        Social History     Tobacco Use   Smoking Status Never Smoker   Smokeless Tobacco Never Used       Family History:  Family History   Problem Relation Age of Onset    Heart disease Sister     Stroke Sister    [facility] Sudden death Mother     Stroke Brother     Pancreatic cancer Sister     No Known Problems Father     No Known Problems Sister     No Known Problems Sister     No Known Problems Son    :     Review of Systems:     General: [place] Fever, chills, or night sweats  Cardiac: Negative for chest pain  Pulmonary: Negative for shortness of breath  Gastrointestinal: Denies Abdominal pain, no  nausea, vomiting, or diarrhea   Genitourinary: See HPI above  Neurologic: No focal signs   Musculo-skeletal: No complaints    All other systems queried were negative  Physical Exam:     /74   Pulse 76   Temp 98 4 °F (36 9 °C)   Resp 17   Ht 5' 7" (1 702 m)   Wt 65 5 kg (144 lb 6 4 oz)   SpO2 97%   BMI 22 62 kg/m²   General appearance: alert and oriented, in no acute distress  Head: Normocephalic, without obvious abnormality, atraumatic  Neck: supple, symmetrical, trachea midline  Back: symmetric, no curvature  ROM normal  No CVA tenderness  Lungs: Normal respiratory effort  Heart: regular rate and rhythm  Abdomen: soft, non-tender; bowel sounds normal; no masses,  no organomegaly and Bladder decompressed  Male genitalia: penis: no lesions or discharge  testes: no masses or tenderness   no hernias, Ni in place  Neurologic: Grossly normal  Urine is clear via Ni catheter      Labs:     Lab Results   Component Value Date    HGB 11 4 (L) 01/02/2021    HCT 36 7 01/02/2021    WBC 10 22 (H) 01/02/2021     01/02/2021   ]    Lab Results   Component Value Date    K 3 4 (L) 01/02/2021     (H) 01/02/2021    CO2 21 01/02/2021    BUN 53 (H) 01/02/2021    CREATININE 1 72 (H) 01/02/2021    CALCIUM 8 9 01/02/2021   ]    Imaging:   I personally reviewed the images and report of the following studies, and reviewed them with the patient: Old records, CT scans abdomen pelvis, labs and microbiology  Urine culture is still pending        Thank you for allowing me to participate in this patients care  Please do not hesitate to call with any additional questions    Deepak Terry MD

## 2021-01-02 NOTE — ASSESSMENT & PLAN NOTE
- 12/31 CTCAP (+) Marked distention of the bladder extending well into the mid abdomen, compatible with distal outlet obstruction  Possible small amount of debris or hemorrhage at the base of the bladder  Mild prostate enlargement  - continue PTA Tamsulosin (Flomax) 0 4 mg PO Daily  - daily weights  - strict I&O's  - avoid nephrotoxic agents  - urology consulted-for long term management of obstructive uropathy for possible need of chronic escalante or continuation of straight cath vs; UroLift vs  TURP

## 2021-01-02 NOTE — ASSESSMENT & PLAN NOTE
VBG on admission (+) metabolic acidosis, pH 0 49, pCO2 33 8, HC03 14 9   (+) AG 14    - likely 2/2 uremia given (-) ASA, (-) acetaminophen, and (-) ethanol level

## 2021-01-02 NOTE — PLAN OF CARE
Problem: Potential for Falls  Goal: Patient will remain free of falls  Description: INTERVENTIONS:  - Assess patient frequently for physical needs  -  Identify cognitive and physical deficits and behaviors that affect risk of falls    -  [place] fall precautions as indicated by assessment   - Educate patient/family on patient safety including physical limitations  - Instruct patient to call for assistance with activity based on assessment  - Modify environment to reduce risk of injury  - Consider OT/PT consult to assist with strengthening/mobility  Outcome: Progressing

## 2021-01-02 NOTE — ASSESSMENT & PLAN NOTE
- encephalopathy likely 2/2 UTI and obstructive uropathy    - 12/31 CT head (-) No acute intracranial abnormality   - Will continue to monitor  -geriatrics consult in place for mild cognitive impairment

## 2021-01-02 NOTE — ASSESSMENT & PLAN NOTE
REYNOLD likely 2/2 obstructive uropathy on top of CKD-3  Resolving  Cr most recent: 1 72, BUN 53  Baseline Cr 1 3-1 8     - , Cr 5 92, EGFR 10 on admission, s/p 1 L NS bolus in ED  - mIVF decreased to 100 cc/hr NS  - trend BMP daily      Lab Results   Component Value Date    EGFR 47 01/02/2021    EGFR 18 01/01/2021    EGFR 10 12/31/2020    BUN 53 (H) 01/02/2021     (H) 01/01/2021     (H) 12/31/2020    CREATININE 1 72 (H) 01/02/2021    CREATININE 3 74 (H) 01/01/2021    CREATININE 5 92 (H) 12/31/2020

## 2021-01-02 NOTE — ASSESSMENT & PLAN NOTE
VBG on admission (+) metabolic acidosis, pH 6 73, pCO2 33 8, HC03 14 9   (+) AG 14    - likely 2/2 uremia given (-) ASA, (-) acetaminophen, and (-) ethanol level

## 2021-01-02 NOTE — ASSESSMENT & PLAN NOTE
REYNOLD likely 2/2 obstructive uropathy on top of CKD-3  Resolved  Cr most recent: 1 16, BUN: 27  Baseline Cr 1 3-1 8     - , Cr 5 92, EGFR 10 on admission, s/p 1 L NS bolus in ED  - mIVF decreased to 100 cc/hr NS  - trend BMP daily      Lab Results   Component Value Date    EGFR 47 01/02/2021    EGFR 18 01/01/2021    EGFR 10 12/31/2020    BUN 53 (H) 01/02/2021     (H) 01/01/2021     (H) 12/31/2020    CREATININE 1 72 (H) 01/02/2021    CREATININE 3 74 (H) 01/01/2021    CREATININE 5 92 (H) 12/31/2020

## 2021-01-02 NOTE — ASSESSMENT & PLAN NOTE
- 12/31 CTCAP (+) Marked distention of the bladder extending well into the mid abdomen, compatible with distal outlet obstruction  Possible small amount of debris or hemorrhage at the base of the bladder  Mild prostate enlargement    - continue PTA Tamsulosin (Flomax) 0 4 mg PO Daily  - daily weights  - strict I&O's  - avoid nephrotoxic agents  - urology consulted-for long term management of obstructive uropathy for possible need of chronic escalante or continuation of straight cath

## 2021-01-02 NOTE — PROGRESS NOTES
Progress Note - Ramón Samson 1955, 77 y o  male MRN: [id number]    Unit/Bed#: -01 Encounter: [id number]    Primary Care Provider: JOHNSON Pascual   Date and time admitted to hospital: 12/31/2020  4:24 PM        Metabolic acidosis, increased anion gap  Assessment & Plan  VBG on admission (+) metabolic acidosis, pH 6 58, pCO2 33 8, HC03 14 9   (+) AG 14    - likely 2/2 uremia given (-) ASA, (-) acetaminophen, and (-) ethanol level  Urinary tract infection  Assessment & Plan  - continue Ceftriaxone (Rocephin) 1g IV q24hr pending U CX  - follow up Ucx  - trend daily CBC    Encephalopathy acute  Assessment & Plan  - encephalopathy likely 2/2 UTI and obstructive uropathy  - 12/31 CT head (-) No acute intracranial abnormality   - Will continue to monitor  -geriatrics consult in place for mild cognitive impairment     REYNOLD (acute kidney injury) ([facility] [address] )  Assessment & Plan  REYNOLD likely 2/2 obstructive uropathy on top of CKD-3  Resolving  Cr most recent: 1 72, BUN 53  Baseline Cr 1 3-1 8     - , Cr 5 92, EGFR 10 on admission, s/p 1 L NS bolus in ED  - mIVF decreased to 100 cc/hr NS  - trend BMP daily      Lab Results   Component Value Date    EGFR 47 01/02/2021    EGFR 18 01/01/2021    EGFR 10 12/31/2020    BUN 53 (H) 01/02/2021     (H) 01/01/2021     (H) 12/31/2020    CREATININE 1 72 (H) 01/02/2021    CREATININE 3 74 (H) 01/01/2021    CREATININE 5 92 (H) 12/31/2020         CKD (chronic kidney disease), stage III  Assessment & Plan  See A/P on REYNOLD    Urinary retention  Assessment & Plan  See A/P on obstructive uropathy  Ascending aortic aneurysm [facility])  Assessment & Plan  Hx/o AAA  CTCAP on admission (+) Ascending aortic aneurysm measuring 4 3 cm      HLD (hyperlipidemia)  Assessment & Plan   - Lipid panel 12/31 wnl:  Chol 138, TG's 107, HDL 39, LDL 78  - Continue PTA Lipitor 20 mg    Essential hypertension  Assessment & Plan  -controlled  - Continue PTA Amlodipine 5 mg * Obstructive uropathy  Assessment & Plan  - 12/31 CTCAP (+) Marked distention of the bladder extending well into the mid abdomen, compatible with distal outlet obstruction  Possible small amount of debris or hemorrhage at the base of the bladder  Mild prostate enlargement  - continue PTA Tamsulosin (Flomax) 0 4 mg PO Daily  - daily weights  - strict I&O's  - avoid nephrotoxic agents  - urology consulted-for long term management of obstructive uropathy for possible need of chronic escalante or continuation of straight cath           Diet:        Diet Orders   (From admission, onward)             Start     Ordered    12/31/20 2053  Diet Regular; Regular House  Diet effective now     Question Answer Comment   Diet Type Regular    Regular Regular House    RD to adjust diet per protocol? No        12/31/20 2053                Code status:  Full code    Dispo:   Plan discussed with Dr Thomson and [facility] Team    Subjective:   Pt seen and examined today at bedside  Pt responding well to quations but appears to be annoyed  Denies any concerns including abdo pain, nausea, vomitnig, chest pain , SOB       Objective:     Vitals:  Vitals:    01/01/21 2313 01/02/21 0542 01/02/21 0800 01/02/21 0930   BP: 160/91  143/74    Pulse: 86  76    Resp: 18  17    Temp: 97 9 °F (36 6 °C)  98 4 °F (36 9 °C)    TempSrc:       SpO2: 98%  98% 97%   Weight:  65 5 kg (144 lb 6 4 oz)     Height:             Intake/Output Summary (Last 24 hours) at 1/2/2021 1114  Last data filed at 1/2/2021 5382  Gross per 24 hour   Intake 2580 ml   Output 4300 ml   Net -1720 ml       Invasive Devices     Peripheral Intravenous Line            Peripheral IV 12/31/20 Left Antecubital 1 day          Drain            Urethral Catheter 18 Fr  1 day                  Labs:   Admission on 12/31/2020   Component Date Value    Ammonia 12/31/2020 18     WBC 12/31/2020 11 35*    RBC 12/31/2020 4 48     Hemoglobin 12/31/2020 12 3     Hematocrit 12/31/2020 38 9     MCV 12/31/2020 87     MCH 12/31/2020 27 5     MCHC 12/31/2020 31 6     RDW 12/31/2020 15 1     MPV 12/31/2020 11 0     Platelets 89/04/6140 247     nRBC 12/31/2020 0     Neutrophils Relative 12/31/2020 74     Immat GRANS % 12/31/2020 0     Lymphocytes Relative 12/31/2020 9*    Monocytes Relative 12/31/2020 11     Eosinophils Relative 12/31/2020 6     Basophils Relative 12/31/2020 0     Neutrophils Absolute 12/31/2020 8 30*    Immature Grans Absolute 12/31/2020 0 05     Lymphocytes Absolute 12/31/2020 1 03     Monocytes Absolute 12/31/2020 1 29*    Eosinophils Absolute 12/31/2020 0 64*    Basophils Absolute 12/31/2020 0 04     Troponin I 12/31/2020 <0 02     Sodium 12/31/2020 138     Potassium 12/31/2020 4 1     Chloride 12/31/2020 108     CO2 12/31/2020 16*    ANION GAP 12/31/2020 14*    BUN 12/31/2020 142*    Creatinine 12/31/2020 5 92*    Glucose 12/31/2020 175*    Calcium 12/31/2020 8 9     AST 12/31/2020 26     ALT 12/31/2020 30     Alkaline Phosphatase 12/31/2020 67     Total Protein 12/31/2020 9 3*    Albumin 12/31/2020 3 5     Total Bilirubin 12/31/2020 0 52     eGFR 12/31/2020 10     pH, [place] 12/31/2020 7 262*    pCO2, [place]khar 12/31/2020 33 8*    pO2, [place]har 12/31/2020 45 8*    HCO3, [place]khar 12/31/2020 14 9*    Base Excess, [place]har 12/31/2020 -11 1     O2 Content, [place]har 12/31/2020 13 3     O2 HGB, VENOUS 12/31/2020 73 2     Ethanol Lvl 08/19/2102 <3     Salicylate Lvl 93/95/0767 <3*    Acetaminophen Level 12/31/2020 <2*    Lipase 12/31/2020 367     Color, UA 12/31/2020 -     Amph/Meth UR 12/31/2020 Negative     Barbiturate Ur 12/31/2020 Negative     Benzodiazepine Urine 12/31/2020 Negative     Cocaine Urine 12/31/2020 Negative     Methadone Urine 12/31/2020 Negative     Opiate Urine 12/31/2020 Negative     PCP Ur 12/31/2020 Negative     THC Urine 12/31/2020 Negative     Oxycodone Urine 12/31/2020 Negative     Total CK 12/31/2020 447*    SARS-CoV-2 12/31/2020 Negative     INFLUENZA A PCR 12/31/2020 Negative     INFLUENZA B PCR 12/31/2020 Negative     RSV PCR 12/31/2020 Negative     CK-MB Index 12/31/2020 1 1     CK-MB 12/31/2020 4 9     Color, UA 12/31/2020 Yellow     Clarity, UA 12/31/2020 Clear     pH, UA 12/31/2020 6 0     Leukocytes, UA 12/31/2020 Large*    Nitrite, UA 12/31/2020 Positive*    Protein, UA 12/31/2020 100 (2+)*    Glucose, UA 12/31/2020 Negative     Ketones, UA 12/31/2020 Negative     Urobilinogen, UA 12/31/2020 0 2     Bilirubin, UA 12/31/2020 Negative     Blood, UA 12/31/2020 Small*    Specific [place], UA 12/31/2020 1 020     RBC, UA 12/31/2020 2-4     WBC, UA 12/31/2020 Innumerable*    Epithelial Cells 12/31/2020 None Seen     Bacteria, UA 12/31/2020 Occasional     Hyaline Casts, UA 12/31/2020 None Seen     Urine Culture 12/31/2020 Culture too young- will reincubate     Platelets 28/01/4333 201     MPV 12/31/2020 11 0     Hemoglobin A1C 12/31/2020 6 7*    EAG 12/31/2020 146     Cholesterol 12/31/2020 138     Triglycerides 12/31/2020 107     HDL, Direct 12/31/2020 39*    LDL Calculated 12/31/2020 78     Non-HDL-Chol (CHOL-HDL) 12/31/2020 99     pH, [place]khar 01/01/2021 7 357     pCO2, [place]har 01/01/2021 29 0*    pO2, [place]khar 01/01/2021 65 8*    HCO3, [place]har 01/01/2021 15 9*    Base Excess, [place]khar 01/01/2021 -8 3     O2 Content, [place]har 01/01/2021 15 4     O2 HGB, VENOUS 01/01/2021 90 8*    Sodium 01/01/2021 145     Potassium 01/01/2021 3 5     Chloride 01/01/2021 118*    CO2 01/01/2021 18*    ANION GAP 01/01/2021 9     BUN 01/01/2021 118*    Creatinine 01/01/2021 3 74*    Glucose 01/01/2021 137     Calcium 01/01/2021 8 7     eGFR 01/01/2021 18     WBC 01/01/2021 9 63     RBC 01/01/2021 3 99     Hemoglobin 01/01/2021 10 9*    Hematocrit 01/01/2021 34 0*    MCV 01/01/2021 85     MCH 01/01/2021 27 3     MCHC 01/01/2021 32 1     RDW 01/01/2021 14 8     MPV 01/01/2021 10 5     Platelets 37/57/9278 211     nRBC 01/01/2021 0     Neutrophils Relative 01/01/2021 70     Immat GRANS % 01/01/2021 1     Lymphocytes Relative 01/01/2021 11*    Monocytes Relative 01/01/2021 11     Eosinophils Relative 01/01/2021 7*    Basophils Relative 01/01/2021 0     Neutrophils Absolute 01/01/2021 6 77     Immature Grans Absolute 01/01/2021 0 06     Lymphocytes Absolute 01/01/2021 1 03     Monocytes Absolute 01/01/2021 1 04     Eosinophils Absolute 01/01/2021 0 69*    Basophils Absolute 01/01/2021 0 04     Ventricular Rate 12/31/2020 90     Atrial Rate 12/31/2020 90     VA Interval 12/31/2020 86     QRSD Interval 12/31/2020 72     QT Interval 12/31/2020 368     QTC Interval 12/31/2020 450     P Axis 12/31/2020 33     QRS Axis 12/31/2020 29     T Wave Axis 12/31/2020 194     Sodium 01/02/2021 144     Potassium 01/02/2021 3 4*    Chloride 01/02/2021 117*    CO2 01/02/2021 21     ANION GAP 01/02/2021 6     BUN 01/02/2021 53*    Creatinine 01/02/2021 1 72*    Glucose 01/02/2021 127     Calcium 01/02/2021 8 9     eGFR 01/02/2021 47     WBC 01/02/2021 10 22*    RBC 01/02/2021 4 18     Hemoglobin 01/02/2021 11 4*    Hematocrit 01/02/2021 36 7     MCV 01/02/2021 88     MCH 01/02/2021 27 3     MCHC 01/02/2021 31 1*    RDW 01/02/2021 14 8     MPV 01/02/2021 10 9     Platelets 65/39/8845 217     nRBC 01/02/2021 0     Neutrophils Relative 01/02/2021 70     Immat GRANS % 01/02/2021 1     Lymphocytes Relative 01/02/2021 15     Monocytes Relative 01/02/2021 10     Eosinophils Relative 01/02/2021 3     Basophils Relative 01/02/2021 1     Neutrophils Absolute 01/02/2021 7 28     Immature Grans Absolute 01/02/2021 0 06     Lymphocytes Absolute 01/02/2021 1 48     Monocytes Absolute 01/02/2021 1 01     Eosinophils Absolute 01/02/2021 0 34     Basophils Absolute 01/02/2021 0 05        Physical Exam   Constitutional: He appears well-developed and well-nourished  HENT:   Head: Normocephalic and atraumatic     Eyes: EOM are normal  Neck: Normal range of motion  Cardiovascular: Normal rate, regular rhythm and normal heart sounds  No murmur heard  Pulmonary/Chest: Effort normal and breath sounds normal  No respiratory distress  Abdominal: Soft  Bowel sounds are normal  He exhibits no distension  There is no abdominal tenderness  Musculoskeletal: Normal range of motion  General: No edema  Neurological: He is alert  Oriented to person, place, not time    Skin: Skin is warm     Psychiatric:   Pt annoyed but responding to questions       Current Facility-Administered Medications   Medication Dose Route Frequency    amLODIPine (NORVASC) tablet 5 mg  5 mg Oral Daily    aspirin (ECOTRIN LOW STRENGTH) EC tablet 81 mg  81 mg Oral Daily    atorvastatin (LIPITOR) tablet 10 mg  10 mg Oral Daily    ceftriaxone (ROCEPHIN) 1 g/50 mL in dextrose IVPB  1,000 mg Intravenous Q24H    heparin (porcine) subcutaneous injection 5,000 Units  5,000 Units Subcutaneous Q8H [address]    sodium chloride (PF) 0 9 % injection 3 mL  3 mL Intravenous Q1H PRN    sodium chloride 0 9 % infusion  125 mL/hr Intravenous Continuous    tamsulosin (FLOMAX) capsule 0 4 mg  0 4 mg Oral Daily With Dinner     No Known Allergies      Imaging/Other:      VTE Pharmacologic Prophylaxis: Heparin  VTE Mechanical Prophylaxis: sequential compression device      Tyesha Pennington MD  Family Medicine  PGY-1

## 2021-01-03 NOTE — PLAN OF CARE
Problem: Potential for Falls  Goal: Patient will remain free of falls  Description: INTERVENTIONS:  - Assess patient frequently for physical needs  -  Identify cognitive and physical deficits and behaviors that affect risk of falls    -  [place] fall precautions as indicated by assessment   - Educate patient/family on patient safety including physical limitations  - Instruct patient to call for assistance with activity based on assessment  - Modify environment to reduce risk of injury  - Consider OT/PT consult to assist with strengthening/mobility  Outcome: Progressing     Problem: Prexisting or High Potential for Compromised Skin Integrity  Goal: Skin integrity is maintained or improved  Description: INTERVENTIONS:  - Identify patients at risk for skin breakdown  - Assess and monitor skin integrity  - Assess and monitor nutrition and hydration status  - Monitor labs   - Assess for incontinence   - Turn and reposition patient  - Assist with mobility/ambulation  - Relieve pressure over bony prominences  - Avoid friction and shearing  - Provide appropriate hygiene as needed including keeping skin clean and dry  - Evaluate need for skin moisturizer/barrier cream  - Collaborate with interdisciplinary team   - Patient/family teaching  - Consider wound care consult   Outcome: Progressing     Problem: NEUROSENSORY - ADULT  Goal: Achieves stable or improved neurological status  Description: INTERVENTIONS  - Monitor and report changes in neurological status  - Monitor vital signs such as temperature, blood pressure, glucose, and any other labs ordered   - Initiate measures to prevent increased intracranial pressure  - Monitor for seizure activity and implement precautions if appropriate      Outcome: Progressing  Goal: Achieves maximal functionality and self care  Description: INTERVENTIONS  - Monitor swallowing and airway patency with patient fatigue and changes in neurological status  - Encourage and assist patient to increase activity and self care     - Encourage visually impaired, hearing impaired and aphasic patients to use assistive/communication devices  Outcome: Progressing     Problem: GENITOURINARY - ADULT  Goal: Maintains or returns to baseline urinary function  Description: INTERVENTIONS:  - Assess urinary function  - Encourage oral fluids to ensure adequate hydration if ordered  - Administer IV fluids as ordered to ensure adequate hydration  - Administer ordered medications as needed  - Offer frequent toileting  - Follow urinary retention protocol if ordered  Outcome: Progressing  Goal: Absence of urinary retention  Description: INTERVENTIONS:  - Assess patients ability to void and empty bladder  - Monitor I/O  - Bladder scan as needed  - Discuss with physician/AP medications to alleviate retention as needed  - Discuss catheterization for long term situations as appropriate  Outcome: Progressing  Goal: Urinary catheter remains patent  Description: INTERVENTIONS:  - Assess patency of urinary catheter  - If patient has a chronic escalante, consider changing catheter if non-functioning  - Follow guidelines for intermittent irrigation of non-functioning urinary catheter  Outcome: Progressing     Problem: MUSCULOSKELETAL - ADULT  Goal: Maintain or return mobility to safest level of function  Description: INTERVENTIONS:  - Assess patient's ability to carry out ADLs; assess patient's baseline for ADL function and identify physical deficits which impact ability to perform ADLs (bathing, care of mouth/teeth, toileting, grooming, dressing, etc )  - Assess/evaluate cause of self-care deficits   - Assess range of motion  - Assess patient's mobility  - Assess patient's need for assistive devices and provide as appropriate  - Encourage maximum independence but intervene and supervise when necessary  - Involve family in performance of ADLs  - Assess for home care needs following discharge   - Consider OT consult to assist with ADL evaluation and planning for discharge  - Provide patient education as appropriate  Outcome: Progressing     Problem: SAFETY ADULT  Goal: Patient will remain free of falls  Description: INTERVENTIONS:  - Assess patient frequently for physical needs  -  Identify cognitive and physical deficits and behaviors that affect risk of falls    -  [place] fall precautions as indicated by assessment   - Educate patient/family on patient safety including physical limitations  - Instruct patient to call for assistance with activity based on assessment  - Modify environment to reduce risk of injury  - Consider OT/PT consult to assist with strengthening/mobility  Outcome: Progressing  Goal: Maintain or return to baseline ADL function  Description: INTERVENTIONS:  -  Assess patient's ability to carry out ADLs; assess patient's baseline for ADL function and identify physical deficits which impact ability to perform ADLs (bathing, care of mouth/teeth, toileting, grooming, dressing, etc )  - Assess/evaluate cause of self-care deficits   - Assess range of motion  - Assess patient's mobility; develop plan if impaired  - Assess patient's need for assistive devices and provide as appropriate  - Encourage maximum independence but intervene and supervise when necessary  - Involve family in performance of ADLs  - Assess for home care needs following discharge   - Consider OT consult to assist with ADL evaluation and planning for discharge  - Provide patient education as appropriate  Outcome: Progressing  Goal: Maintain or return mobility status to optimal level  Description: INTERVENTIONS:  - Assess patient's baseline mobility status (ambulation, transfers, stairs, etc )    - Identify cognitive and physical deficits and behaviors that affect mobility  - Identify mobility aids required to assist with transfers and/or ambulation (gait belt, sit-to-stand, lift, walker, cane, etc )  - [place] fall precautions as indicated by assessment  - Record patient progress and toleration of activity level on Mobility SBAR; progress patient to next Phase/Stage  - Instruct patient to call for assistance with activity based on assessment  - Consider rehabilitation consult to assist with strengthening/weightbearing, etc   Outcome: Progressing

## 2021-01-03 NOTE — PROGRESS NOTES
Progress Note - Noe Baker 1955, 77 y o  male MRN: [id number]    Unit/Bed#: -Flo Encounter: [id number]    Primary Care Provider: JOHNSON Paul   Date and time admitted to hospital: 12/31/2020  4:24 PM        * Obstructive uropathy  Assessment & Plan  - 12/31 CTCAP (+) Marked distention of the bladder extending well into the mid abdomen, compatible with distal outlet obstruction  Possible small amount of debris or hemorrhage at the base of the bladder  Mild prostate enlargement  - continue PTA Tamsulosin (Flomax) 0 4 mg PO Daily  - daily weights  - strict I&O's  - avoid nephrotoxic agents  - urology consulted-for long term management of obstructive uropathy for possible need of chronic escalante or continuation of straight cath vs; UroLift vs  TURP  REYNOLD (acute kidney injury) ([facility] [address] )  Assessment & Plan  REYNOLD likely 2/2 obstructive uropathy on top of CKD-3  Resolved  Cr most recent: 1 16, BUN: 27  Baseline Cr 1 3-1 8     - , Cr 5 92, EGFR 10 on admission, s/p 1 L NS bolus in ED  - mIVF decreased to 100 cc/hr NS  - trend BMP daily      Lab Results   Component Value Date    EGFR 47 01/02/2021    EGFR 18 01/01/2021    EGFR 10 12/31/2020    BUN 53 (H) 01/02/2021     (H) 01/01/2021     (H) 12/31/2020    CREATININE 1 72 (H) 01/02/2021    CREATININE 3 74 (H) 01/01/2021    CREATININE 5 92 (H) 67/05/9324         Metabolic acidosis, increased anion gap  Assessment & Plan  VBG on admission (+) metabolic acidosis, pH 9 78, pCO2 33 8, HC03 14 9   (+) AG 14    - likely 2/2 uremia given (-) ASA, (-) acetaminophen, and (-) ethanol level  Urinary tract infection  Assessment & Plan  - continue Ceftriaxone (Rocephin) 1g IV q24hr pending U CX  - follow up Ucx  - trend daily CBC    Encephalopathy acute  Assessment & Plan  - encephalopathy likely 2/2 UTI and obstructive uropathy    - 12/31 CT head (-) No acute intracranial abnormality   - Will continue to monitor  -geriatrics consult in place for mild cognitive impairment     CKD (chronic kidney disease), stage III  Assessment & Plan  See A/P on REYNOLD    Urinary retention  Assessment & Plan  See A/P on obstructive uropathy  Ascending aortic aneurysm [facility])  Assessment & Plan  Hx/o AAA  CTCAP on admission (+) Ascending aortic aneurysm measuring 4 3 cm  HLD (hyperlipidemia)  Assessment & Plan   - Lipid panel 12/31 wnl:  Chol 138, TG's 107, HDL 39, LDL 78  - Continue PTA Lipitor 20 mg    Essential hypertension  Assessment & Plan  -controlled  - Continue PTA Amlodipine 5 mg             Subjective/Objective     Subjective:   Patient seen and examined at bedside  No overnight events  Denies any complains at this time  Very hesitant to answer questions, and says " We ask the same questions all the time:   Denies urinary or bowel complains, hematuria, chest pain, shortness of breath  Did not eat breakfast, when asked, reports he did not feel hungry  Objective:  Vitals: Blood pressure 148/75, pulse 76, temperature 97 9 °F (36 6 °C), resp  rate 18, height 5' 7" (1 702 m), weight 65 9 kg (145 lb 3 2 oz), SpO2 100 %  ,Body mass index is 22 74 kg/m²  Intake/Output Summary (Last 24 hours) at 1/3/2021 0901  Last data filed at 1/3/2021 0544  Gross per 24 hour   Intake 480 ml   Output 3400 ml   Net -2920 ml       Invasive Devices     Peripheral Intravenous Line            Peripheral IV 12/31/20 Left Antecubital 2 days          Drain            Urethral Catheter 18 Fr  2 days                  Physical Exam  Vitals signs reviewed  Constitutional:       General: He is not in acute distress  Appearance: Normal appearance  He is not ill-appearing or toxic-appearing  HENT:      Head: Normocephalic and atraumatic  Eyes:      Conjunctiva/sclera: Conjunctivae normal    Cardiovascular:      Rate and Rhythm: Normal rate  Pulses: Normal pulses  Heart sounds: Normal heart sounds  No murmur  Pulmonary:      Breath sounds: Normal breath sounds  Abdominal:      General: Bowel sounds are normal  There is no distension  Tenderness: There is no abdominal tenderness  There is no guarding  Musculoskeletal: Normal range of motion  Right lower leg: No edema  Left lower leg: No edema  Skin:     General: Skin is warm  Capillary Refill: Capillary refill takes less than 2 seconds  Neurological:      General: No focal deficit present  Mental Status: He is alert  Psychiatric:         Mood and Affect: Mood normal          Behavior: Behavior normal       Comments: Flat Affect, slowly responded to questions - but appropriately  Lab, Imaging and other studies: I have personally reviewed pertinent reports       Results from last 7 days   Lab Units 01/03/21  0503 01/02/21  0540 01/01/21  0430   WBC Thousand/uL 11 37* 10 22* 9 63   HEMOGLOBIN g/dL 11 5* 11 4* 10 9*   HEMATOCRIT % 37 1 36 7 34 0*   PLATELETS Thousands/uL 230 217 211   NEUTROS PCT % 71 70 70   MONOS PCT % 8 10 11     Results from last 7 days   Lab Units 01/03/21  0502 01/02/21  0540 01/01/21  0430 12/31/20  1658   POTASSIUM mmol/L 3 5 3 4* 3 5 4 1   CHLORIDE mmol/L 118* 117* 118* 108   CO2 mmol/L 20* 21 18* 16*   BUN mg/dL 26* 53* 118* 142*   CREATININE mg/dL 1 16 1 72* 3 74* 5 92*   CALCIUM mg/dL 8 0* 8 9 8 7 8 9   ALK PHOS U/L  --   --   --  67   ALT U/L  --   --   --  30   AST U/L  --   --   --  26         Lab Results   Component Value Date    PHOS 2 9 10/27/2020    PHOS 3 7 06/18/2020    PHOS 3 3 12/12/2019              0   Lab Value Date/Time    TROPONINI <0 02 12/31/2020 1658    TROPONINI <0 02 08/27/2020 1609    TROPONINI <0 02 12/20/2018 1350    TROPONINI <0 02 07/11/2018 1602    TROPONINI <0 02 07/05/2018 1449    TROPONINI <0 02 02/19/2017 0846     ABG:No results found for: PHART, YNA9NDB, PO2ART, NAK1WZL, [id number], BEART, SOURCE       VTE Pharmacologic Prophylaxis: Heparin     VTE Mechanical Prophylaxis: sequential compression device         Alexandr Hernandez MD PGY -1

## 2021-01-03 NOTE — PLAN OF CARE
Problem: NEUROSENSORY - ADULT  Goal: Achieves stable or improved neurological status  Description: INTERVENTIONS  - Monitor and report changes in neurological status  - Monitor vital signs such as temperature, blood pressure, glucose, and any other labs ordered   - Initiate measures to prevent increased intracranial pressure  - Monitor for seizure activity and implement precautions if appropriate      Outcome: Progressing  Goal: Achieves maximal functionality and self care  Description: INTERVENTIONS  - Monitor swallowing and airway patency with patient fatigue and changes in neurological status  - Encourage and assist patient to increase activity and self care     - Encourage visually impaired, hearing impaired and aphasic patients to use assistive/communication devices  Outcome: Progressing     Problem: GENITOURINARY - ADULT  Goal: Maintains or returns to baseline urinary function  Description: INTERVENTIONS:  - Assess urinary function  - Encourage oral fluids to ensure adequate hydration if ordered  - Administer IV fluids as ordered to ensure adequate hydration  - Administer ordered medications as needed  - Offer frequent toileting  - Follow urinary retention protocol if ordered  Outcome: Progressing  Goal: Absence of urinary retention  Description: INTERVENTIONS:  - Assess patients ability to void and empty bladder  - Monitor I/O  - Bladder scan as needed  - Discuss with physician/AP medications to alleviate retention as needed  - Discuss catheterization for long term situations as appropriate  Outcome: Progressing  Goal: Urinary catheter remains patent  Description: INTERVENTIONS:  - Assess patency of urinary catheter  - If patient has a chronic escalante, consider changing catheter if non-functioning  - Follow guidelines for intermittent irrigation of non-functioning urinary catheter  Outcome: Progressing     Problem: MUSCULOSKELETAL - ADULT  Goal: Maintain or return mobility to safest level of function  Description: INTERVENTIONS:  - Assess patient's ability to carry out ADLs; assess patient's baseline for ADL function and identify physical deficits which impact ability to perform ADLs (bathing, care of mouth/teeth, toileting, grooming, dressing, etc )  - Assess/evaluate cause of self-care deficits   - Assess range of motion  - Assess patient's mobility  - Assess patient's need for assistive devices and provide as appropriate  - Encourage maximum independence but intervene and supervise when necessary  - Involve family in performance of ADLs  - Assess for home care needs following discharge   - Consider OT consult to assist with ADL evaluation and planning for discharge  - Provide patient education as appropriate  Outcome: Progressing     Problem: SAFETY ADULT  Goal: Patient will remain free of falls  Description: INTERVENTIONS:  - Assess patient frequently for physical needs  -  Identify cognitive and physical deficits and behaviors that affect risk of falls    -  [place] fall precautions as indicated by assessment   - Educate patient/family on patient safety including physical limitations  - Instruct patient to call for assistance with activity based on assessment  - Modify environment to reduce risk of injury  - Consider OT/PT consult to assist with strengthening/mobility  Outcome: Progressing  Goal: Maintain or return to baseline ADL function  Description: INTERVENTIONS:  -  Assess patient's ability to carry out ADLs; assess patient's baseline for ADL function and identify physical deficits which impact ability to perform ADLs (bathing, care of mouth/teeth, toileting, grooming, dressing, etc )  - Assess/evaluate cause of self-care deficits   - Assess range of motion  - Assess patient's mobility; develop plan if impaired  - Assess patient's need for assistive devices and provide as appropriate  - Encourage maximum independence but intervene and supervise when necessary  - Involve family in performance of ADLs  - Assess for home care needs following discharge   - Consider OT consult to assist with ADL evaluation and planning for discharge  - Provide patient education as appropriate  Outcome: Progressing  Goal: Maintain or return mobility status to optimal level  Description: INTERVENTIONS:  - Assess patient's baseline mobility status (ambulation, transfers, stairs, etc )    - Identify cognitive and physical deficits and behaviors that affect mobility  - Identify mobility aids required to assist with transfers and/or ambulation (gait belt, sit-to-stand, lift, walker, cane, etc )  - [place] fall precautions as indicated by assessment  - Record patient progress and toleration of activity level on Mobility SBAR; progress patient to next Phase/Stage  - Instruct patient to call for assistance with activity based on assessment  - Consider rehabilitation consult to assist with strengthening/weightbearing, etc   Outcome: Progressing     Problem: Potential for Falls  Goal: Patient will remain free of falls  Description: INTERVENTIONS:  - Assess patient frequently for physical needs  -  Identify cognitive and physical deficits and behaviors that affect risk of falls    -  [place] fall precautions as indicated by assessment   - Educate patient/family on patient safety including physical limitations  - Instruct patient to call for assistance with activity based on assessment  - Modify environment to reduce risk of injury  - Consider OT/PT consult to assist with strengthening/mobility  Outcome: Progressing     Problem: Prexisting or High Potential for Compromised Skin Integrity  Goal: Skin integrity is maintained or improved  Description: INTERVENTIONS:  - Identify patients at risk for skin breakdown  - Assess and monitor skin integrity  - Assess and monitor nutrition and hydration status  - Monitor labs   - Assess for incontinence   - Turn and reposition patient  - Assist with mobility/ambulation  - Relieve pressure over bony prominences  - Avoid friction and shearing  - Provide appropriate hygiene as needed including keeping skin clean and dry  - Evaluate need for skin moisturizer/barrier cream  - Collaborate with interdisciplinary team   - Patient/family teaching  - Consider wound care consult   Outcome: Progressing     Problem: Knowledge Deficit  Goal: Patient/family/caregiver demonstrates understanding of disease process, treatment plan, medications, and discharge instructions  Description: Complete learning assessment and assess knowledge base    Interventions:  - Provide teaching at level of understanding  - Provide teaching via preferred learning methods  Outcome: Progressing

## 2021-01-03 NOTE — QUICK NOTE
Subjective: Patient is a 78-year-old admitted for obstructive uropathy  Patient was seen and examined at bedside, resting comfortably without any complaints or concerns  Patient states he is eating fine, urinating without issues and is hydrating sufficiently  Patient denies shortness of breath, chest pain, abdominal pain, changes in bowel movement, suprapubic pain, or swelling in the legs  Review of Systems   Constitutional: Negative for appetite change, chills and fever  Respiratory: Negative for cough, chest tightness and shortness of breath  Cardiovascular: Negative for chest pain and palpitations  Gastrointestinal: Negative for abdominal pain, constipation and diarrhea  Endocrine: Negative for polyuria  Genitourinary: Negative for dysuria and flank pain  Neurological: Negative for dizziness and headaches  Psychiatric/Behavioral: Negative for confusion  Objective:    Vitals:    01/02/21 1531   BP: 147/75   Pulse: 70   Resp: 20   Temp: 97 9 °F (36 6 °C)   SpO2: 98%     Physical Exam  Constitutional:       General: He is not in acute distress  Appearance: Normal appearance  He is not toxic-appearing or diaphoretic  HENT:      Head: Normocephalic and atraumatic  Nose: No congestion  Mouth/Throat:      Mouth: Mucous membranes are moist       Pharynx: Oropharynx is clear  Eyes:      General: No scleral icterus  Conjunctiva/sclera: Conjunctivae normal    Cardiovascular:      Rate and Rhythm: Normal rate and regular rhythm  Heart sounds: No murmur  No friction rub  No gallop  Pulmonary:      Effort: Pulmonary effort is normal  No respiratory distress  Breath sounds: No wheezing, rhonchi or rales  Abdominal:      General: Abdomen is flat  There is no distension  Palpations: Abdomen is soft  Tenderness: There is no abdominal tenderness  Musculoskeletal:         General: No swelling  Skin:     General: Skin is warm and dry        Capillary Refill: Capillary refill takes less than 2 seconds  Neurological:      Mental Status: He is alert  Psychiatric:         Mood and Affect: Affect is flat  Behavior: Behavior is slowed  Assessment/Plan:    Obstructive uropathy  · REYNOLD improving, AM BMP ordered  · [facility] discharge to [facility]   · Understands that urology are suggesting TURP or UroLift  · Currently patient declines procedures and wants to do self catheterization 3-4 times daily      Luke Novak, DO PGY3  Family Medicine

## 2021-01-03 NOTE — PLAN OF CARE
Problem: Potential for Falls  Goal: Patient will remain free of falls  Description: INTERVENTIONS:  - Assess patient frequently for physical needs  -  Identify cognitive and physical deficits and behaviors that affect risk of falls    -  [place] fall precautions as indicated by assessment   - Educate patient/family on patient safety including physical limitations  - Instruct patient to call for assistance with activity based on assessment  - Modify environment to reduce risk of injury  - Consider OT/PT consult to assist with strengthening/mobility  Outcome: Progressing

## 2021-01-04 NOTE — CASE MANAGEMENT
Pt is not a <30 day readmission or a bundle  Risk of unplanned readmission score is 16 (green)  Pt admitted due to obstructive uropathy  CM received voicemail from [employer] with [facility] ([phone])  CM attempted to call back however she did not answer  CM left voicemail requesting return call  Geriatrics consult placed due to mild cognitive impairment  CM met with pt at bedside to introduce CM role and begin discharge planning  Pt had flat affect and was unclear in some of his answer to CM's questions  Per pt, he lives alone in a 2nd story apartment that has steps to enter, no elevator access  Pt's emergency contact/ is his friend, Rona Conrad ([phone])  Pt reports being independent with ADLs PTA, no use of DME but has access to a cane  Pt reports history of [facility], no history of STR, inpatient MH treatment or D/A treatment  Pt does not drive but did not state how he gets to appointments, pt states that he is retired  PCP is Vallorie Curling ([phone]) and pt uses [facility] pharmacy in [place] for prescriptions  Pt recommended for STR  CM spoke with pt regarding same and provided STR list for pt to review  CM to follow  CM reviewed d/c planning process including the following: identifying help at home, patient preference for d/c planning needs, Discharge Lounge, [facility] Meds to Bed program, availability of treatment team to discuss questions or concerns patient and/or family may have regarding understanding medications and recognizing signs and symptoms once discharged  CM also encouraged patient to follow up with all recommended appointments after discharge  Patient advised of importance for patient and family to participate in managing patients medical well being

## 2021-01-04 NOTE — ASSESSMENT & PLAN NOTE
- encephalopathy likely 2/2 UTI and obstructive uropathy  - 12/31 CT head (-) No acute intracranial abnormality   - Will continue to monitor  -geriatrics consult in place for mild cognitive impairment, appreciate recs

## 2021-01-04 NOTE — ASSESSMENT & PLAN NOTE
VBG on admission (+) metabolic acidosis, pH 2 99, pCO2 33 8, HC03 14 9   (+) AG 14    - likely 2/2 uremia given (-) ASA, (-) acetaminophen, and (-) ethanol level

## 2021-01-04 NOTE — PROGRESS NOTES
Progress Note - Donta Rowell 1955, 77 y o  male MRN: [id number]    Unit/Bed#: MS Betty-Flo Encounter: [id number]    Primary Care Provider: JOHNSON Gaitan   Date and time admitted to hospital: 12/31/2020  4:24 PM        * Obstructive uropathy  Assessment & Plan  - 12/31 CTCAP (+) Marked distention of the bladder extending well into the mid abdomen, compatible with distal outlet obstruction  Possible small amount of debris or hemorrhage at the base of the bladder  Mild prostate enlargement  - continue PTA Tamsulosin (Flomax) 0 4 mg PO Daily  - daily weights  - strict I&O's  - avoid nephrotoxic agents  - urology consulted-for long term management of obstructive uropathy for possible need of chronic escalante or continuation of straight cath vs; UroLift vs  TURP  Metabolic acidosis, increased anion gap  Assessment & Plan  VBG on admission (+) metabolic acidosis, pH 7 01, pCO2 33 8, HC03 14 9   (+) AG 14    - likely 2/2 uremia given (-) ASA, (-) acetaminophen, and (-) ethanol level  Urinary tract infection  Assessment & Plan  - continue Ceftriaxone (Rocephin) 1g IV q24hr pending U CX  - UCx:  50,000-59,000 CFU/mL oxidase positive GNR  - trend daily CBC     Encephalopathy acute  Assessment & Plan  - encephalopathy likely 2/2 UTI and obstructive uropathy  - 12/31 CT head (-) No acute intracranial abnormality   - Will continue to monitor  -geriatrics consult in place for mild cognitive impairment, appreciate recs        REYNOLD (acute kidney injury) ([facility] [address] )  Assessment & Plan  REYNOLD likely 2/2 obstructive uropathy on top of CKD-3  Resolved  Cr most recent: 1 15, BUN: 18  Baseline Cr 1 3-1 8     - , Cr 5 92, EGFR 10 on admission, s/p 1 L NS bolus in ED  - mIVF decreased to 100 cc/hr NS  - trend BMP daily      Lab Results   Component Value Date    EGFR 76 01/04/2021    EGFR 75 01/03/2021    EGFR 47 01/02/2021    BUN 18 01/04/2021    BUN 26 (H) 01/03/2021    BUN 53 (H) 01/02/2021    CREATININE 1 15 01/04/2021    CREATININE 1 16 01/03/2021    CREATININE 1 72 (H) 01/02/2021         Ascending aortic aneurysm [facility])  Assessment & Plan  Hx/o AAA  CTCAP on admission (+) Ascending aortic aneurysm measuring 4 3 cm  HLD (hyperlipidemia)  Assessment & Plan   - Lipid panel 12/31 wnl:  Chol 138, TG's 107, HDL 39, LDL 78  - Continue PTA Lipitor 20 mg     Essential hypertension  Assessment & Plan  Stable  - Continue PTA Amlodipine 5 mg            PPX:  Heparin  Diet:  Regular house  Code Status:  Level 1 full code  Dispo:  MEDICALLY CLEARED Short-term rehab/discharge home    Plan D/W Dr Thomson and [facility] Team    Subjective:   Patient was seen and examined at bedside  Patient reports no overnight complaints or concerns  Patient understands that he still awaiting geriatric consult and then will either go home or post-acute placement (short-term rehab)  Patient also understands all he will go home on a Ni catheter and will need to follow up outpatient for urology  Patient denies suprapubic pain, abdominal pain, diarrhea/constipation, fever/chills or headache/dizziness  Objective:     Vitals: Blood pressure 156/85, pulse 78, temperature 97 9 °F (36 6 °C), resp  rate 18, height 5' 7" (1 702 m), weight 65 7 kg (144 lb 14 5 oz), SpO2 99 %  ,Body mass index is 22 7 kg/m²  Intake/Output Summary (Last 24 hours) at 1/4/2021 0910  Last data filed at 1/4/2021 0713  Gross per 24 hour   Intake 600 ml   Output 2300 ml   Net -1700 ml       Physical Exam:   Physical Exam  Vitals signs reviewed  Constitutional:       General: He is not in acute distress  Appearance: Normal appearance  He is normal weight  He is not ill-appearing  HENT:      Head: Normocephalic and atraumatic  Nose: Nose normal  No congestion or rhinorrhea  Mouth/Throat:      Mouth: Mucous membranes are moist       Pharynx: Oropharynx is clear  No oropharyngeal exudate  Eyes:      General: No scleral icterus       Conjunctiva/sclera: Conjunctivae normal       Pupils: Pupils are equal, round, and reactive to light  Neck:      Musculoskeletal: Normal range of motion  Cardiovascular:      Rate and Rhythm: Normal rate and regular rhythm  Pulses: Normal pulses  Heart sounds: Normal heart sounds  No murmur  Pulmonary:      Effort: Pulmonary effort is normal       Breath sounds: Normal breath sounds  No wheezing  Chest:      Chest wall: No tenderness  Abdominal:      General: Abdomen is flat  Bowel sounds are normal       Palpations: There is no mass  Tenderness: There is no abdominal tenderness  There is no right CVA tenderness or left CVA tenderness  Genitourinary:     Comments: Ni catheter in place  Urinary bag looks like normal urine without any signs of blood or cloudiness/infection  Musculoskeletal: Normal range of motion  General: No swelling, tenderness or signs of injury  Right lower leg: No edema  Left lower leg: No edema  Skin:     General: Skin is dry  Capillary Refill: Capillary refill takes less than 2 seconds  Neurological:      Mental Status: He is alert and oriented to person, place, and time  Mental status is at baseline  Motor: No weakness  Psychiatric:         Mood and Affect: Mood normal  Affect is blunt and flat  Speech: Speech is delayed  Behavior: Behavior normal          Invasive Devices     Peripheral Intravenous Line            Peripheral IV 12/31/20 Left Antecubital 3 days          Drain            Urethral Catheter 18 Fr  3 days                           Lab and other studies:  I have personally reviewed pertinent reports  No results displayed because visit has over 200 results            Recent Results (from the past 24 hour(s))   Basic metabolic panel    Collection Time: 01/04/21  5:42 AM   Result Value Ref Range    Sodium 143 136 - 145 mmol/L    Potassium 3 3 (L) 3 5 - 5 3 mmol/L    Chloride 115 (H) 100 - 108 mmol/L    CO2 18 (L) 21 - 32 mmol/L    ANION GAP 10 4 - 13 mmol/L    BUN 18 5 - 25 mg/dL    Creatinine 1 15 0 60 - 1 30 mg/dL    Glucose 120 65 - 140 mg/dL    Calcium 7 8 (L) 8 3 - 10 1 mg/dL    eGFR 76 ml/min/1 73sq m   CBC and differential    Collection Time: 01/04/21  5:42 AM   Result Value Ref Range    WBC 11 91 (H) 4 31 - 10 16 Thousand/uL    RBC 4 04 3 88 - 5 62 Million/uL    Hemoglobin 11 7 (L) 12 0 - 17 0 g/dL    Hematocrit 34 4 (L) 36 5 - 49 3 %    MCV 85 82 - 98 fL    MCH 29 0 26 8 - 34 3 pg    MCHC 34 0 31 4 - 37 4 g/dL    RDW 14 6 11 6 - 15 1 %    MPV 10 6 8 9 - 12 7 fL    Platelets 635 452 - 200 Thousands/uL    nRBC 0 /100 WBCs    Neutrophils Relative 69 43 - 75 %    Immat GRANS % 1 0 - 2 %    Lymphocytes Relative 17 14 - 44 %    Monocytes Relative 8 4 - 12 %    Eosinophils Relative 4 0 - 6 %    Basophils Relative 1 0 - 1 %    Neutrophils Absolute 8 40 (H) 1 85 - 7 62 Thousands/µL    Immature Grans Absolute 0 08 0 00 - 0 20 Thousand/uL    Lymphocytes Absolute 1 98 0 60 - 4 47 Thousands/µL    Monocytes Absolute 0 92 0 17 - 1 22 Thousand/µL    Eosinophils Absolute 0 47 0 00 - 0 61 Thousand/µL    Basophils Absolute 0 06 0 00 - 0 10 Thousands/µL     Blood Culture: No results found for: BLOODCX,   Urinalysis:   Lab Results   Component Value Date    COLORU - 12/31/2020    COLORU Yellow 12/31/2020    CLARITYU Clear 12/31/2020    SPECGRAV 1 020 12/31/2020    PHUR 6 0 12/31/2020    LEUKOCYTESUR Large (A) 12/31/2020    NITRITE Positive (A) 12/31/2020    GLUCOSEU Negative 12/31/2020    KETONESU Negative 12/31/2020    BILIRUBINUR Negative 12/31/2020    BLOODU Small (A) 12/31/2020   ,   Urine Culture:   Lab Results   Component Value Date    URINECX 50,000-59,000 cfu/ml Pseudomonas aeruginosa (A) 12/31/2020   ,   Wound Culure: No results found for: WOUNDCULT      Imaging:  None       VTE Pharmacologic Prophylaxis: Heparin  VTE Mechanical Prophylaxis: sequential compression device and foot pump applied    Current Facility-Administered Medications Medication Dose Route Frequency    amLODIPine (NORVASC) tablet 5 mg  5 mg Oral Daily    aspirin (ECOTRIN LOW STRENGTH) EC tablet 81 mg  81 mg Oral Daily    atorvastatin (LIPITOR) tablet 10 mg  10 mg Oral Daily    ceftriaxone (ROCEPHIN) 1 g/50 mL in dextrose IVPB  1,000 mg Intravenous Q24H    heparin (porcine) subcutaneous injection 5,000 Units  5,000 Units Subcutaneous Q8H [address]    potassium chloride (K-DUR,KLOR-CON) CR tablet 40 mEq  40 mEq Oral Once    sodium chloride (PF) 0 9 % injection 3 mL  3 mL Intravenous Q1H PRN    sodium chloride 0 9 % infusion  50 mL/hr Intravenous Continuous    tamsulosin (FLOMAX) capsule 0 4 mg  0 4 mg Oral Daily With Stu Rubi DO  Family Medicine Resident PGY1

## 2021-01-04 NOTE — ASSESSMENT & PLAN NOTE
REYNOLD likely 2/2 obstructive uropathy on top of CKD-3  Resolved  Cr most recent: 1 15, BUN: 18  Baseline Cr 1 3-1 8     - , Cr 5 92, EGFR 10 on admission, s/p 1 L NS bolus in ED  - mIVF decreased to 100 cc/hr NS  - trend BMP daily      Lab Results   Component Value Date    EGFR 76 01/04/2021    EGFR 75 01/03/2021    EGFR 47 01/02/2021    BUN 18 01/04/2021    BUN 26 (H) 01/03/2021    BUN 53 (H) 01/02/2021    CREATININE 1 15 01/04/2021    CREATININE 1 16 01/03/2021    CREATININE 1 72 (H) 01/02/2021

## 2021-01-04 NOTE — CASE MANAGEMENT
MARTY spoke with Wolf Salomon from [facility] who informed that a case was opened due to pt being found by a neighbor on the toilet covered in feces and pt was found to be very confused  CM attempted to contact pt's primary emergency contact/friend, Nychristopher GoodMauro ([phone]) however she did not answer  CM left voicemail requesting return call  CM attempted to call pt's secondary contact/son, Kika Issar ([phone]) however the phone would not ring and there was no ability to leave a voicemail

## 2021-01-04 NOTE — CONSULTS
Consultation - Geriatric Medicine   Nicol Moriah 77 y o  male MRN: [id number]  Unit/Bed#: -01 Encounter: [id number]      Assessment/Plan     Acute encephalopathy vs Mild Cognitive Impairment  -possibly secondary to acute urinary retention with UTI with component of underlying MCI/dementia  Per prior documentation, patient AO x1 on presentation, confused about events leading up to hospitalization  Patient's orientation appears to have improved, however primary team was concerned about possible underlying mild cognitive impairment  -on assessment today, patient is alert and oriented and answering most questions appropriately, however he refuses MoCA and any further cognitive assessment  It was explained to patient that due to concerns about his presenting symptoms and ability to take care of himself, he will need to participate in cognitive assessment prior to discharge  Patient is refusing rehab and remains fixated on the idea that he will be able to leave the hospital tomorrow without any further assessment and wishes to be left alone  Patient appears to demonstrate lack of insight into cognitive eval as a barrier to discharge  He is initially able to repeat 3 words but cannot recall after several minutes  Patient appeared agreeable to drawing clockface stating "yeah" when prompted to do so, but simply stared off into space and did not even acknowledge pen or paper with further prompting  Patient with flat affect throughout interview  -CT of the head unremarkable this admission  -patient had MRI of the brain in 2017 which showed mild generalized cerebral volume loss  Plan:  -Patient does not appear to have capacity to make decisions for self at this time    There is a possibility patient does in fact have underlying dementia/MCI complicated by acute encephalopathy from obstructive uropathy/UTI this admission  -can consider additional workup with B12, TSH, vitamin-D  -would recommend formal neuro psychiatric evaluation for competency    Obstructive uropathy  -per chart review, has a longstanding history of urinary retention secondary to BPH  Patient has declined operative intervention  He has been managing with clean intermittent catheterization which she does 3 to 4 times a day at home  Presenting with acute urinary retention, UTI  -status post decompression with Ni catheter  Remains on ceftriaxone per primary team  -On flomax  -urology has evaluated the patient and will follow up outpatient    Urinary tract infection  -continue antibiotics per primary team    Acute kidney injury  -resolved    Ascending aortic aneurysm  -known history of ascending aortic aneurysm  Appears stable on CT this admission    Essential hypertension  -continue home amlodipine       History of Present Illness   Physician Requesting Consult: Guillermo Gonzalez MD  Reason for Consult / Principal Problem:  Mild cognitive impairment  Hx and PE limited by: N/A  Additional history obtained from: Patient      HPI: Laurent Aguirre is a 77y o  year old male with a past medical history of peripheral arterial disease, hypertension, hyperlipidemia, CKD stage 3, urinary retention secondary to BPH,  ascending aortic aneurysm, lumbar radiculopathy status post lumbar decompression initially admitted to the hospital on 12/31 for obstructive uropathy, UTI, achy eye, and acute metabolic encephalopathy  Patient reports that he lives at home by himself in an apartment on the 2nd floor  He apparently was found by a neighbor in his house covered in feces and urine  Patient was brought to the ER for further evaluation  Found to have marked distention of the bladder on CT consistent with distal out with obstruction  Urinalysis consistent with infection  Patient has Ni catheter placed and was started on ceftriaxone for UTI  Patient was evaluated by Urology who have signed off at this time    Patient initially AO x1 per chart review, mentation has improved somewhat however patient continues to exhibit odd behaviors, "sucking on pills" per nursing, demonstrating poor insight into hospital course  Geriatrics consult is requested for further evaluation of possible underlying mild cognitive impairment           Inpatient consult to Gerontology     Performed by  Dennie Ficks, DO     Authorized by Shannan Hall MD              Review of Systems   Constitutional: Negative for chills and fever  HENT: Negative for sore throat and trouble swallowing  Respiratory: Negative for shortness of breath and wheezing  Gastrointestinal: Negative for nausea and vomiting  Genitourinary: Negative for dysuria and frequency  Musculoskeletal: Negative for arthralgias and back pain  Skin: Negative for color change and rash  Neurological: Negative for dizziness and headaches  Psychiatric/Behavioral: Negative for agitation and behavioral problems         Memory/Cognitive screening:  See above  Mobility:  Patient ambulates independently, has cane at home which he does not use  Falls:  Denies  Assistive Devices:  As above  [facility]:  Clinical Frailty Scale score 4-5  Nutrition/weight loss/grocery shopping/meal preparation:  Patient states he prepares his own meals at home and does grocery shopping  Vision impairment:  Denies  Hearing impairment:  Denies  Incontinence:  History of urinary retention as above  Delirium:  No active delirium  Polypharmacy: No  Patients primary residence: [address] [place] [address]  Lives with:Self    Historical Information   Past Medical History:   Diagnosis Date    Aortic aneurysm ([address] )     Arthritis     Atypical chest pain     Back pain     Chronic kidney disease     stg 3    Elevated ALT measurement     Elevated AST (SGOT)     Hyperlipidemia     Hypertension     Leg pain     Neurogenic claudication     Urinary retention     catherize self 4x/day    Urinary retention      Past Surgical History:   Procedure Laterality Date    EPIDURAL BLOCK INJECTION N/A 1/23/2017    Procedure: BLOCK / INJECTION EPIDURAL STEROID LUMBAR;  Surgeon: Marilyn Weaver MD;  [facility];  Service:     RI COLONOSCOPY FLX DX W/COLLJ SPEC WHEN PFRMD N/A 1/8/2019    Procedure: COLONOSCOPY;  Surgeon: Waldemar Duffy MD;  Location: [facility]; Service: Colorectal    RI LAMNOTMY INCL W/DCMPRSN NRV ROOT 1 INTRSPC LUMBR Bilateral 1/23/2017    Procedure: MIS L2-3 & L3-4 LAMINAL FORAMINOTOMIES AND MICRODISCECTOMY W LEFT SIDED APPROACH ;  Surgeon: Marilyn Weaver MD;  [facility];  Service: Neurosurgery     Social History   Social History     Substance and Sexual Activity   Alcohol Use No     Social History     Substance and Sexual Activity   Drug Use No     Social History     Tobacco Use   Smoking Status Never Smoker   Smokeless Tobacco Never Used     Family History:   Family History   Problem Relation Age of Onset    Heart disease Sister     Stroke Sister    [facility] Sudden death Mother     Stroke Brother     Pancreatic cancer Sister     No Known Problems Father     No Known Problems Sister     No Known Problems Sister     No Known Problems Son        Meds/Allergies   current meds:   Current Facility-Administered Medications   Medication Dose Route Frequency    amLODIPine (NORVASC) tablet 5 mg  5 mg Oral Daily    aspirin (ECOTRIN LOW STRENGTH) EC tablet 81 mg  81 mg Oral Daily    atorvastatin (LIPITOR) tablet 10 mg  10 mg Oral Daily    ceftriaxone (ROCEPHIN) 1 g/50 mL in dextrose IVPB  1,000 mg Intravenous Q24H    heparin (porcine) subcutaneous injection 5,000 Units  5,000 Units Subcutaneous Q8H [facility]    potassium chloride (K-DUR,KLOR-CON) CR tablet 40 mEq  40 mEq Oral Once    sodium chloride (PF) 0 9 % injection 3 mL  3 mL Intravenous Q1H PRN    tamsulosin (FLOMAX) capsule 0 4 mg  0 4 mg Oral Daily With Dinner    and PTA meds:   Prior to Admission Medications   Prescriptions Last Dose Informant Patient Reported? Taking?    amLODIPine (NORVASC) 5 mg tablet   No No   Sig: Take 1 tablet (5 mg total) by mouth daily   aspirin (ECOTRIN LOW STRENGTH) 81 mg EC tablet  Self No No   Sig: Take 1 tablet (81 mg total) by mouth daily   atorvastatin (LIPITOR) 20 mg tablet  Self Yes No   Sig: 10 mg daily    lidocaine (LIDODERM) 5 %   No No   Sig: Apply 1 patch topically daily Remove & Discard patch within 12 hours or as directed by MD   pregabalin (LYRICA) 300 MG capsule  Self Yes No   Sig: Take 300 mg by mouth 2 (two) times a day    tamsulosin (FLOMAX) 0 4 mg  Self Yes No   Sig: Take 0 4 mg by mouth daily with dinner        Facility-Administered Medications: None       No Known Allergies    Objective     Intake/Output Summary (Last 24 hours) at 1/4/2021 1144  Last data filed at 1/4/2021 1003  Gross per 24 hour   Intake 3703 33 ml   Output 2300 ml   Net 1403 33 ml     Invasive Devices     Peripheral Intravenous Line            Peripheral IV 12/31/20 Left Antecubital 3 days          Drain            Urethral Catheter 18 Fr  3 days                Physical Exam  Constitutional:       Appearance: Normal appearance  He is not ill-appearing  HENT:      Head: Normocephalic and atraumatic  Eyes:      General: No scleral icterus  Right eye: No discharge  Left eye: No discharge  Cardiovascular:      Rate and Rhythm: Normal rate and regular rhythm  Heart sounds: No murmur  No friction rub  Pulmonary:      Effort: Pulmonary effort is normal       Breath sounds: Normal breath sounds  No wheezing or rales  Abdominal:      General: Abdomen is flat  There is no distension  Palpations: Abdomen is soft  Tenderness: There is no abdominal tenderness  Genitourinary:     Comments: Ni catheter in place draining light yellow urine  Musculoskeletal:         General: No swelling or tenderness  Skin:     General: Skin is warm and dry  Findings: No erythema  Neurological:      Mental Status: He is alert        Comments: AOx3   Psychiatric:      Comments: Flat affect         Therapies:   PT/OT following  Recommending post acute rehab    VTE Prophylaxis: Heparin    Code Status: Level 1 - Full Code  Advance Directive and Living Will:      Power of :    POLST:      Family and Social Support:   Living Arrangements: Alone  Support Systems: Friends/neighbors  Type of Current Residence: Private residence  Current [address] : No  Discharge planning discussed with[de-identified] Patient  Freedom of Choice: Yes  CM Handoff Comments: 1/4/20: D/c TBD  Dx: Obstructive uropathy  Dalia consult placed  Awaiting call back from [facility]   STR rec, list provided to pt  Follow for discharge and transportation needs  Goals of Care:  Continue current level of care    Level 1 full code

## 2021-01-04 NOTE — ASSESSMENT & PLAN NOTE
- continue Ceftriaxone (Rocephin) 1g IV q24hr pending U CX  - UCx:  50,000-59,000 CFU/mL oxidase positive GNR  - trend daily CBC

## 2021-01-05 NOTE — PLAN OF CARE
Problem: Potential for Falls  Goal: Patient will remain free of falls  Description: INTERVENTIONS:  - Assess patient frequently for physical needs  -  Identify cognitive and physical deficits and behaviors that affect risk of falls    -  [place] fall precautions as indicated by assessment   - Educate patient/family on patient safety including physical limitations  - Instruct patient to call for assistance with activity based on assessment  - Modify environment to reduce risk of injury  - Consider OT/PT consult to assist with strengthening/mobility  Outcome: Progressing     Problem: Prexisting or High Potential for Compromised Skin Integrity  Goal: Skin integrity is maintained or improved  Description: INTERVENTIONS:  - Identify patients at risk for skin breakdown  - Assess and monitor skin integrity  - Assess and monitor nutrition and hydration status  - Monitor labs   - Assess for incontinence   - Turn and reposition patient  - Assist with mobility/ambulation  - Relieve pressure over bony prominences  - Avoid friction and shearing  - Provide appropriate hygiene as needed including keeping skin clean and dry  - Evaluate need for skin moisturizer/barrier cream  - Collaborate with interdisciplinary team   - Patient/family teaching  - Consider wound care consult   Outcome: Progressing     Problem: NEUROSENSORY - ADULT  Goal: Achieves stable or improved neurological status  Description: INTERVENTIONS  - Monitor and report changes in neurological status  - Monitor vital signs such as temperature, blood pressure, glucose, and any other labs ordered   - Initiate measures to prevent increased intracranial pressure  - Monitor for seizure activity and implement precautions if appropriate      Outcome: Progressing  Goal: Achieves maximal functionality and self care  Description: INTERVENTIONS  - Monitor swallowing and airway patency with patient fatigue and changes in neurological status  - Encourage and assist patient to increase activity and self care     - Encourage visually impaired, hearing impaired and aphasic patients to use assistive/communication devices  Outcome: Progressing     Problem: GENITOURINARY - ADULT  Goal: Maintains or returns to baseline urinary function  Description: INTERVENTIONS:  - Assess urinary function  - Encourage oral fluids to ensure adequate hydration if ordered  - Administer IV fluids as ordered to ensure adequate hydration  - Administer ordered medications as needed  - Offer frequent toileting  - Follow urinary retention protocol if ordered  Outcome: Progressing  Goal: Absence of urinary retention  Description: INTERVENTIONS:  - Assess patients ability to void and empty bladder  - Monitor I/O  - Bladder scan as needed  - Discuss with physician/AP medications to alleviate retention as needed  - Discuss catheterization for long term situations as appropriate  Outcome: Progressing  Goal: Urinary catheter remains patent  Description: INTERVENTIONS:  - Assess patency of urinary catheter  - If patient has a chronic escalante, consider changing catheter if non-functioning  - Follow guidelines for intermittent irrigation of non-functioning urinary catheter  Outcome: Progressing     Problem: MUSCULOSKELETAL - ADULT  Goal: Maintain or return mobility to safest level of function  Description: INTERVENTIONS:  - Assess patient's ability to carry out ADLs; assess patient's baseline for ADL function and identify physical deficits which impact ability to perform ADLs (bathing, care of mouth/teeth, toileting, grooming, dressing, etc )  - Assess/evaluate cause of self-care deficits   - Assess range of motion  - Assess patient's mobility  - Assess patient's need for assistive devices and provide as appropriate  - Encourage maximum independence but intervene and supervise when necessary  - Involve family in performance of ADLs  - Assess for home care needs following discharge   - Consider OT consult to assist with ADL evaluation and planning for discharge  - Provide patient education as appropriate  Outcome: Progressing     Problem: SAFETY ADULT  Goal: Patient will remain free of falls  Description: INTERVENTIONS:  - Assess patient frequently for physical needs  -  Identify cognitive and physical deficits and behaviors that affect risk of falls    -  [place] fall precautions as indicated by assessment   - Educate patient/family on patient safety including physical limitations  - Instruct patient to call for assistance with activity based on assessment  - Modify environment to reduce risk of injury  - Consider OT/PT consult to assist with strengthening/mobility  Outcome: Progressing  Goal: Maintain or return to baseline ADL function  Description: INTERVENTIONS:  -  Assess patient's ability to carry out ADLs; assess patient's baseline for ADL function and identify physical deficits which impact ability to perform ADLs (bathing, care of mouth/teeth, toileting, grooming, dressing, etc )  - Assess/evaluate cause of self-care deficits   - Assess range of motion  - Assess patient's mobility; develop plan if impaired  - Assess patient's need for assistive devices and provide as appropriate  - Encourage maximum independence but intervene and supervise when necessary  - Involve family in performance of ADLs  - Assess for home care needs following discharge   - Consider OT consult to assist with ADL evaluation and planning for discharge  - Provide patient education as appropriate  Outcome: Progressing  Goal: Maintain or return mobility status to optimal level  Description: INTERVENTIONS:  - Assess patient's baseline mobility status (ambulation, transfers, stairs, etc )    - Identify cognitive and physical deficits and behaviors that affect mobility  - Identify mobility aids required to assist with transfers and/or ambulation (gait belt, sit-to-stand, lift, walker, cane, etc )  - [place] fall precautions as indicated by assessment  - Record patient progress and toleration of activity level on Mobility SBAR; progress patient to next Phase/Stage  - Instruct patient to call for assistance with activity based on assessment  - Consider rehabilitation consult to assist with strengthening/weightbearing, etc   Outcome: Progressing     Problem: Knowledge Deficit  Goal: Patient/family/caregiver demonstrates understanding of disease process, treatment plan, medications, and discharge instructions  Description: Complete learning assessment and assess knowledge base    Interventions:  - Provide teaching at level of understanding  - Provide teaching via preferred learning methods  Outcome: Progressing

## 2021-01-05 NOTE — UTILIZATION REVIEW
Continued Stay Review    Date: 01/05/2021                          Current Patient Class: Inpatient  Current Level of Care: Med/Surg    HPI:65 y o  male initially admitted on 12/31/2020  Obstructive Uropathy  Assessment/Plan: Consult NeuroPsych  Daily weight / Strict I&O  Trend CBC daily  01/05/2021  Consult NeuroPsych: Results of Neuropsychological Exam revealed diffuse cognitive dysfunction (Major Neurocognitive disorder) and on a measure assessing awareness of personal health status and ability to evaluate health problems, handle medical emergencies and take safety precautions, patient performed in the IMPAIRED range of functioning  At this time, patient does not appear to have capacity to make fully informed medical decisions       Pertinent Labs/Diagnostic Results:   Results from last 7 days   Lab Units 12/31/20  1702   SARS-COV-2  Negative     Results from last 7 days   Lab Units 01/05/21  0509 01/04/21  0542 01/03/21  0503 01/02/21  0540 01/01/21  0430   WBC Thousand/uL 12 27* 11 91* 11 37* 10 22* 9 63   HEMOGLOBIN g/dL 11 1* 11 7* 11 5* 11 4* 10 9*   HEMATOCRIT % 34 7* 34 4* 37 1 36 7 34 0*   PLATELETS Thousands/uL 255 251 230 217 211   NEUTROS ABS Thousands/µL 8 46* 8 40* 8 08* 7 28 6 77     Results from last 7 days   Lab Units 01/05/21  0509 01/04/21  0542 01/03/21  0502 01/02/21  0540 01/01/21  0430   SODIUM mmol/L 136 143 147* 144 145   POTASSIUM mmol/L 3 2* 3 3* 3 5 3 4* 3 5   CHLORIDE mmol/L 109* 115* 118* 117* 118*   CO2 mmol/L 22 18* 20* 21 18*   ANION GAP mmol/L 5 10 9 6 9   BUN mg/dL 15 18 26* 53* 118*   CREATININE mg/dL 1 09 1 15 1 16 1 72* 3 74*   EGFR ml/min/1 73sq m 81 76 75 47 18   CALCIUM mg/dL 8 0* 7 8* 8 0* 8 9 8 7     Results from last 7 days   Lab Units 12/31/20  1658   AST U/L 26   ALT U/L 30   ALK PHOS U/L 67   TOTAL PROTEIN g/dL 9 3*   ALBUMIN g/dL 3 5   TOTAL BILIRUBIN mg/dL 0 52   AMMONIA umol/L 18     Results from last 7 days   Lab Units 01/05/21  0509 01/04/21  0542 01/03/21  0502 01/02/21  0540 01/01/21  0430 12/31/20  1658   GLUCOSE RANDOM mg/dL 117 120 108 127 137 175*     Results from last 7 days   Lab Units 12/31/20  1658   HEMOGLOBIN A1C % 6 7*   EAG mg/dl 146     Results from last 7 days   Lab Units 01/01/21  0430 12/31/20  1658   PH FELIPE  7 357 7 262*   PCO2 FELIPE mm Hg 29 0* 33 8*   PO2 FELIPE mm Hg 65 8* 45 8*   HCO3 FELIPE mmol/L 15 9* 14 9*   BASE EXC FELIPE mmol/L -8 3 -11 1   O2 CONTENT FELIPE ml/dL 15 4 13 3   O2 HGB, VENOUS % 90 8* 73 2     Results from last 7 days   Lab Units 12/31/20  1658   CK TOTAL U/L 447*   CK MB INDEX % 1 1   CK MB ng/mL 4 9     Results from last 7 days   Lab Units 12/31/20  1658   TROPONIN I ng/mL <0 02     Results from last 7 days   Lab Units 01/04/21  0542   TSH 3RD GENERATON uIU/mL 1 790     Results from last 7 days   Lab Units 12/31/20  1658   LIPASE u/L 367     Results from last 7 days   Lab Units 12/31/20  1812 12/31/20  1810   CLARITY UA   --  Clear   COLOR UA  - Yellow   SPEC GRAV UA   --  1 020   PH UA   --  6 0   GLUCOSE UA mg/dl  --  Negative   KETONES UA mg/dl  --  Negative   BLOOD UA   --  Small*   PROTEIN UA mg/dl  --  100 (2+)*   NITRITE UA   --  Positive*   BILIRUBIN UA   --  Negative   UROBILINOGEN UA E U /dl  --  0 2   LEUKOCYTES UA   --  Large*   WBC UA /hpf  --  Innumerable*   RBC UA /hpf  --  2-4   BACTERIA UA /hpf  --  Occasional   EPITHELIAL CELLS WET PREP /hpf  --  None Seen     Results from last 7 days   Lab Units 12/31/20  1702   INFLUENZA A PCR  Negative   INFLUENZA B PCR  Negative   RSV PCR  Negative     Results from last 7 days   Lab Units 12/31/20  1808   AMPH/METH  Negative   BARBITURATE UR  Negative   BENZODIAZEPINE UR  Negative   COCAINE UR  Negative   METHADONE URINE  Negative   OPIATE UR  Negative   PCP UR  Negative   THC UR  Negative     Results from last 7 days   Lab Units 12/31/20  1658   ETHANOL LVL mg/dL <3   ACETAMINOPHEN LVL ug/mL <2*   SALICYLATE LVL mg/dL <3*     Results from last 7 days   Lab Units 12/31/20  1810   URINE CULTURE  50,000-59,000 cfu/ml Pseudomonas aeruginosa*  50,000-59,000 cfu/ml Pseudomonas aeruginosa*  50,000-59,000 cfu/ml Staphylococcus lugdunensis*     Vital Signs: /74 (BP Location: Right arm)   Pulse 77   Temp 98 9 °F (37 2 °C) (Oral)   Resp 17   Ht 5' 7" (1 702 m)   Wt 66 3 kg (146 lb 2 6 oz)   SpO2 99%   BMI 22 89 kg/m²       Medications:   Scheduled Medications:  amLODIPine, 5 mg, Oral, Daily  aspirin, 81 mg, Oral, Daily  atorvastatin, 10 mg, Oral, Daily  heparin (porcine), 5,000 Units, Subcutaneous, Q8H ASTON  potassium chloride, 40 mEq, Oral, BID  tamsulosin, 0 4 mg, Oral, Daily With Dinner      Continuous IV Infusions:     PRN Meds:  sodium chloride (PF), 3 mL, Intravenous, Q1H PRN        Discharge Plan: TBD    Network Utilization Review Department  ATTENTION: Please call with any questions or concerns to [phone] and carefully listen to the prompts so that you are directed to the right person  All voicemails are confidential   Tammy Calloway all requests for admission clinical reviews, approved or denied determinations and any other requests to dedicated fax number below belonging to the campus where the patient is receiving treatment   List of dedicated fax numbers for the Facilities:  [address] [place] [address] DENIALS (Administrative/Medical Necessity) [phone]   [address] (Maternity/NICU/Pediatrics) [phone] [address] [phone]   [address] [address] [facility] Sandeep King 477[address] (  Patricia Lundberg "Thao" 103) [address] [address] [place] [address] [phone]

## 2021-01-05 NOTE — PROGRESS NOTES
Progress Note - Lisa Quinn 1955, 77 y o  male MRN: [id number]    Unit/Bed#: [facility] 457-01 Encounter: [id number]    Primary Care Provider: JOHNSON Nicolas   Date and time admitted to hospital: 12/31/2020  4:24 PM        * Obstructive uropathy  Assessment & Plan  - 12/31 CTCAP (+) Marked distention of the bladder extending well into the mid abdomen, compatible with distal outlet obstruction  Possible small amount of debris or hemorrhage at the base of the bladder  Mild prostate enlargement  - continue PTA Tamsulosin (Flomax) 0 4 mg PO Daily  - daily weights  - strict I&O's  - avoid nephrotoxic agents  - urology consulted-for long term management of obstructive uropathy for possible need of chronic escalante or continuation of straight cath vs; UroLift vs  TURP  Metabolic acidosis, increased anion gap  Assessment & Plan  VBG on admission (+) metabolic acidosis, pH 1 44, pCO2 33 8, HC03 14 9   (+) AG 14    - likely 2/2 uremia given (-) ASA, (-) acetaminophen, and (-) ethanol level  Urinary tract infection  Assessment & Plan  - continue Ceftriaxone (Rocephin) 1g IV q24hr pending U CX  - UCx:  50,000-59,000 CFU/mL oxidase positive GNR  - trend daily CBC      Encephalopathy acute  Assessment & Plan  - encephalopathy likely 2/2 UTI and obstructive uropathy  - 12/31 CT head (-) No acute intracranial abnormality   - Will continue to monitor  -Geriatrics:  Mild cognitive impairment  Recommend neuropsych for capacity/competency  - neuropsych consulted, appreciate their recommendations      REYNOLD (acute kidney injury) [facility])  Assessment & Plan  REYNOLD likely 2/2 obstructive uropathy on top of CKD-3  Resolved  - trend BMP daily       Lab Results   Component Value Date    EGFR 81 01/05/2021    EGFR 76 01/04/2021    EGFR 75 01/03/2021    BUN 15 01/05/2021    BUN 18 01/04/2021    BUN 26 (H) 01/03/2021    CREATININE 1 09 01/05/2021    CREATININE 1 15 01/04/2021    CREATININE 1 16 01/03/2021         Ascending aortic aneurysm [facility])  Assessment & Plan  Hx/o AAA  CTCAP on admission (+) Ascending aortic aneurysm measuring 4 3 cm  HLD (hyperlipidemia)  Assessment & Plan   - Lipid panel 12/31 wnl:  Chol 138, TG's 107, HDL 39, LDL 78  - Continue PTA Lipitor 20 mg      Essential hypertension  Assessment & Plan  Stable  - Continue PTA Amlodipine 5 mg             PPX:  Heparin  Diet:  Regular house  Code Status:  Level 1 full code  Dispo:  Medically cleared, short-term rehab  Appreciate neuropsych for competency/capacity    Plan D/W Dr Thomson and [facility] Team    Subjective:   Patient was seen and examined at bedside  Patient reports no overnight complaints or concerns  Patient understands that he is awaiting neuropsych to cm before going to short-term rehab  Patient denies suprapubic pain, abdominal pain, diarrhea/constipation, fever/chills or headache/dizziness  Objective:     Vitals: Blood pressure 140/74, pulse 77, temperature 98 9 °F (37 2 °C), temperature source Oral, resp  rate 17, height 5' 7" (1 702 m), weight 66 3 kg (146 lb 2 6 oz), SpO2 99 %  ,Body mass index is 22 89 kg/m²  Intake/Output Summary (Last 24 hours) at 1/5/2021 1239  Last data filed at 1/5/2021 0945  Gross per 24 hour   Intake 340 ml   Output 1650 ml   Net -1310 ml       Physical Exam:   Physical Exam  Vitals signs reviewed  Constitutional:       General: He is not in acute distress  Appearance: Normal appearance  He is normal weight  He is not ill-appearing  HENT:      Head: Normocephalic and atraumatic  Nose: Nose normal  No congestion or rhinorrhea  Mouth/Throat:      Mouth: Mucous membranes are moist       Pharynx: Oropharynx is clear  No oropharyngeal exudate  Eyes:      General: No scleral icterus  Conjunctiva/sclera: Conjunctivae normal       Pupils: Pupils are equal, round, and reactive to light  Neck:      Musculoskeletal: Normal range of motion     Cardiovascular:      Rate and Rhythm: Normal rate and regular rhythm  Pulses: Normal pulses  Heart sounds: Normal heart sounds  No murmur  Pulmonary:      Effort: Pulmonary effort is normal       Breath sounds: Normal breath sounds  No wheezing  Chest:      Chest wall: No tenderness  Abdominal:      General: Abdomen is flat  Bowel sounds are normal       Palpations: There is no mass  Tenderness: There is no abdominal tenderness  There is no right CVA tenderness or left CVA tenderness  Genitourinary:     Comments: Ni catheter in place  Urinary bag looks like normal urine without any signs of blood or cloudiness/infection  Musculoskeletal: Normal range of motion  General: No swelling, tenderness or signs of injury  Right lower leg: No edema  Left lower leg: No edema  Skin:     General: Skin is dry  Capillary Refill: Capillary refill takes less than 2 seconds  Neurological:      Mental Status: He is alert and oriented to person, place, and time  Mental status is at baseline  Motor: No weakness  Psychiatric:         Mood and Affect: Mood normal  Affect is blunt and flat  Speech: Speech is delayed  Behavior: Behavior normal         Invasive Devices     Peripheral Intravenous Line            Peripheral IV 01/04/21 Dorsal (posterior); Left Forearm less than 1 day          Drain            Urethral Catheter 18 Fr  4 days                           Lab and other studies:  I have personally reviewed pertinent reports  No results displayed because visit has over 200 results            Recent Results (from the past 24 hour(s))   CBC and differential    Collection Time: 01/05/21  5:09 AM   Result Value Ref Range    WBC 12 27 (H) 4 31 - 10 16 Thousand/uL    RBC 4 08 3 88 - 5 62 Million/uL    Hemoglobin 11 1 (L) 12 0 - 17 0 g/dL    Hematocrit 34 7 (L) 36 5 - 49 3 %    MCV 85 82 - 98 fL    MCH 27 2 26 8 - 34 3 pg    MCHC 32 0 31 4 - 37 4 g/dL    RDW 14 6 11 6 - 15 1 %    MPV 11 1 8 9 - 12 7 fL    Platelets 055 968 - 390 Thousands/uL    nRBC 0 /100 WBCs    Neutrophils Relative 68 43 - 75 %    Immat GRANS % 1 0 - 2 %    Lymphocytes Relative 18 14 - 44 %    Monocytes Relative 9 4 - 12 %    Eosinophils Relative 3 0 - 6 %    Basophils Relative 1 0 - 1 %    Neutrophils Absolute 8 46 (H) 1 85 - 7 62 Thousands/µL    Immature Grans Absolute 0 09 0 00 - 0 20 Thousand/uL    Lymphocytes Absolute 2 15 0 60 - 4 47 Thousands/µL    Monocytes Absolute 1 07 0 17 - 1 22 Thousand/µL    Eosinophils Absolute 0 41 0 00 - 0 61 Thousand/µL    Basophils Absolute 0 09 0 00 - 0 10 Thousands/µL   Basic metabolic panel    Collection Time: 01/05/21  5:09 AM   Result Value Ref Range    Sodium 136 136 - 145 mmol/L    Potassium 3 2 (L) 3 5 - 5 3 mmol/L    Chloride 109 (H) 100 - 108 mmol/L    CO2 22 21 - 32 mmol/L    ANION GAP 5 4 - 13 mmol/L    BUN 15 5 - 25 mg/dL    Creatinine 1 09 0 60 - 1 30 mg/dL    Glucose 117 65 - 140 mg/dL    Calcium 8 0 (L) 8 3 - 10 1 mg/dL    eGFR 81 ml/min/1 73sq m     Blood Culture: No results found for: BLOODCX,   Urinalysis:   Lab Results   Component Value Date    COLORU - 12/31/2020    COLORU Yellow 12/31/2020    CLARITYU Clear 12/31/2020    SPECGRAV 1 020 12/31/2020    PHUR 6 0 12/31/2020    LEUKOCYTESUR Large (A) 12/31/2020    NITRITE Positive (A) 12/31/2020    GLUCOSEU Negative 12/31/2020    KETONESU Negative 12/31/2020    BILIRUBINUR Negative 12/31/2020    BLOODU Small (A) 12/31/2020   ,   Urine Culture:   Lab Results   Component Value Date    URINECX 50,000-59,000 cfu/ml Pseudomonas aeruginosa (A) 12/31/2020    URINECX 50,000-59,000 cfu/ml Pseudomonas aeruginosa (A) 12/31/2020    URINECX 50,000-59,000 cfu/ml Staphylococcus lugdunensis (A) 12/31/2020   ,   Wound Culure: No results found for: WOUNDCULT      Imaging:  None       VTE Pharmacologic Prophylaxis: Heparin  VTE Mechanical Prophylaxis: sequential compression device and foot pump applied    Current Facility-Administered Medications   Medication Dose Route Frequency  amLODIPine (NORVASC) tablet 5 mg  5 mg Oral Daily    aspirin (ECOTRIN LOW STRENGTH) EC tablet 81 mg  81 mg Oral Daily    atorvastatin (LIPITOR) tablet 10 mg  10 mg Oral Daily    heparin (porcine) subcutaneous injection 5,000 Units  5,000 Units Subcutaneous Q8H [address]    potassium chloride (K-DUR,KLOR-CON) CR tablet 40 mEq  40 mEq Oral BID    sodium chloride (PF) 0 9 % injection 3 mL  3 mL Intravenous Q1H PRN    tamsulosin (FLOMAX) capsule 0 4 mg  0 4 mg Oral Daily With Stu Rubi DO  Family Medicine Resident PGY1

## 2021-01-05 NOTE — PLAN OF CARE
Problem: Potential for Falls  Goal: Patient will remain free of falls  Description: INTERVENTIONS:  - Assess patient frequently for physical needs  -  Identify cognitive and physical deficits and behaviors that affect risk of falls    -  [place] fall precautions as indicated by assessment   - Educate patient/family on patient safety including physical limitations  - Instruct patient to call for assistance with activity based on assessment  - Modify environment to reduce risk of injury  - Consider OT/PT consult to assist with strengthening/mobility  Outcome: Progressing     Problem: Prexisting or High Potential for Compromised Skin Integrity  Goal: Skin integrity is maintained or improved  Description: INTERVENTIONS:  - Identify patients at risk for skin breakdown  - Assess and monitor skin integrity  - Assess and monitor nutrition and hydration status  - Monitor labs   - Assess for incontinence   - Turn and reposition patient  - Assist with mobility/ambulation  - Relieve pressure over bony prominences  - Avoid friction and shearing  - Provide appropriate hygiene as needed including keeping skin clean and dry  - Evaluate need for skin moisturizer/barrier cream  - Collaborate with interdisciplinary team   - Patient/family teaching  - Consider wound care consult   Outcome: Progressing     Problem: NEUROSENSORY - ADULT  Goal: Achieves stable or improved neurological status  Description: INTERVENTIONS  - Monitor and report changes in neurological status  - Monitor vital signs such as temperature, blood pressure, glucose, and any other labs ordered   - Initiate measures to prevent increased intracranial pressure  - Monitor for seizure activity and implement precautions if appropriate      Outcome: Progressing  Goal: Achieves maximal functionality and self care  Description: INTERVENTIONS  - Monitor swallowing and airway patency with patient fatigue and changes in neurological status  - Encourage and assist patient to increase activity and self care     - Encourage visually impaired, hearing impaired and aphasic patients to use assistive/communication devices  Outcome: Progressing     Problem: GENITOURINARY - ADULT  Goal: Maintains or returns to baseline urinary function  Description: INTERVENTIONS:  - Assess urinary function  - Encourage oral fluids to ensure adequate hydration if ordered  - Administer IV fluids as ordered to ensure adequate hydration  - Administer ordered medications as needed  - Offer frequent toileting  - Follow urinary retention protocol if ordered  Outcome: Progressing  Goal: Absence of urinary retention  Description: INTERVENTIONS:  - Assess patients ability to void and empty bladder  - Monitor I/O  - Bladder scan as needed  - Discuss with physician/AP medications to alleviate retention as needed  - Discuss catheterization for long term situations as appropriate  Outcome: Progressing  Goal: Urinary catheter remains patent  Description: INTERVENTIONS:  - Assess patency of urinary catheter  - If patient has a chronic escalante, consider changing catheter if non-functioning  - Follow guidelines for intermittent irrigation of non-functioning urinary catheter  Outcome: Progressing     Problem: MUSCULOSKELETAL - ADULT  Goal: Maintain or return mobility to safest level of function  Description: INTERVENTIONS:  - Assess patient's ability to carry out ADLs; assess patient's baseline for ADL function and identify physical deficits which impact ability to perform ADLs (bathing, care of mouth/teeth, toileting, grooming, dressing, etc )  - Assess/evaluate cause of self-care deficits   - Assess range of motion  - Assess patient's mobility  - Assess patient's need for assistive devices and provide as appropriate  - Encourage maximum independence but intervene and supervise when necessary  - Involve family in performance of ADLs  - Assess for home care needs following discharge   - Consider OT consult to assist with ADL evaluation and planning for discharge  - Provide patient education as appropriate  Outcome: Progressing     Problem: SAFETY ADULT  Goal: Patient will remain free of falls  Description: INTERVENTIONS:  - Assess patient frequently for physical needs  -  Identify cognitive and physical deficits and behaviors that affect risk of falls  -  [place] fall precautions as indicated by assessment   - Educate patient/family on patient safety including physical limitations  - Instruct patient to call for assistance with activity based on assessment  - Modify environment to reduce risk of injury  - Consider OT/PT consult to assist with strengthening/mobility  Outcome: Progressing  Goal: Maintain or return to baseline ADL function  Description: INTERVENTIONS:  - Assess patient frequently for physical needs  -  Identify cognitive and physical deficits and behaviors that affect risk of falls    -  [place] fall precautions as indicated by assessment   - Educate patient/family on patient safety including physical limitations  - Instruct patient to call for assistance with activity based on assessment  - Modify environment to reduce risk of injury  - Consider OT/PT consult to assist with strengthening/mobility  Outcome: Progressing  Goal: Maintain or return mobility status to optimal level  Description: INTERVENTIONS:  -  Assess patient's ability to carry out ADLs; assess patient's baseline for ADL function and identify physical deficits which impact ability to perform ADLs (bathing, care of mouth/teeth, toileting, grooming, dressing, etc )  - Assess/evaluate cause of self-care deficits   - Assess range of motion  - Assess patient's mobility; develop plan if impaired  - Assess patient's need for assistive devices and provide as appropriate  - Encourage maximum independence but intervene and supervise when necessary  - Involve family in performance of ADLs  - Assess for home care needs following discharge   - Consider OT consult to assist with ADL evaluation and planning for discharge  - Provide patient education as appropriate  Outcome: Progressing     Problem: Knowledge Deficit  Goal: Patient/family/caregiver demonstrates understanding of disease process, treatment plan, medications, and discharge instructions  Description: Complete learning assessment and assess knowledge base    Interventions:  - Provide teaching at level of understanding  - Provide teaching via preferred learning methods  Outcome: Progressing

## 2021-01-05 NOTE — CASE MANAGEMENT
CM received call from pt's son, Yvette Cooper ([phone]) who also provided his work phone number: [phone] ext [phone] as he is unable to answer his cell phone during the work day  Pt's son reports pt does not have a designated POA  He informed that he lives down the street from pt and is able to assist with care as necessary  Pt's son reports that pt does not use an assistive device as he is "stubborn"  CM received call from pt's friend, Rubin Goode who confirmed that pt lives alone and that she and pt's son check on pt almost daily

## 2021-01-05 NOTE — ASSESSMENT & PLAN NOTE
- encephalopathy likely 2/2 UTI and obstructive uropathy  - 12/31 CT head (-) No acute intracranial abnormality   - Will continue to monitor  -Geriatrics:  Mild cognitive impairment  Recommend neuropsych for capacity/competency  - neuropsych consulted, appreciate their recommendations

## 2021-01-05 NOTE — QUICK NOTE
Call patient's son, Flor Redmond at his work phone [phone] ext [phone]  Provided him with update in regards to patient's conditions  Informed Levi that based on evaluation by geriatric medicine as well as neuropsychiatric consult, patient does not appear to have capacity to make fully inform medical decision at this time  Levi states he is patient's only next of kin  Explained to [address] [facility] that if that is the case, he will be patient's POA and help make decision in regards to his medical care  Levi verbalizes understanding and agrees  Explained to Levi or plan to discharge patient to a short-term rehab with a Ni catheter with follow-up with Urology  Levi verbalizes understanding and agrees with the plan  CRUZ Ford  Family Medicine, PGY-3    Please excuse any "sound-alike" errors that may have ocurred during the process of dictation  Parts of this note have been dictated and there may be errors present in the transcription process  Thank you

## 2021-01-05 NOTE — ASSESSMENT & PLAN NOTE
REYNOLD likely 2/2 obstructive uropathy on top of CKD-3  Resolved  - trend BMP daily       Lab Results   Component Value Date    EGFR 81 01/05/2021    EGFR 76 01/04/2021    EGFR 75 01/03/2021    BUN 15 01/05/2021    BUN 18 01/04/2021    BUN 26 (H) 01/03/2021    CREATININE 1 09 01/05/2021    CREATININE 1 15 01/04/2021    CREATININE 1 16 01/03/2021

## 2021-01-05 NOTE — PROGRESS NOTES
Progress Note - Geriatric Medicine   Monalisa Herrera 77 y o  male MRN: [id number]  Unit/Bed#: -01 Encounter: [id number]      Assessment/Plan:    Acute metabolic encephalopathy  -multifactorial including UTI and obstructive uropathy superimposed on underlying cognitive impairment suspicious for dementia  -appears to be back at or near baseline which per history obtained from family and admission findings is consistent with dementia  -escalante in place as tx obstructive uropathy  -currently on Rocephin for UTI   -continue delirium precautions were occurrence including ensure acute pain is well controlled, maintain normal circadian rhythm, and provide frequent reorientation redirection    Cognitive impairment  -suspect at least mild cognitive impairment versus early dementia  -at time of evaluation patient does not have capacity to make informed medical decisions, seen by Neuropsychiatry today who have agreed with the same  -pt will likely require placement to STR on d/c, his son will be working with the primary team for these arrangements   -would benefit from follow-up cognitive evaluation as o/p following complete resolution of acute metabolic derangements     Obstructive uropathy  -secondary to BPH, escalante catheter placed by Urology on admission  -continue escalante as ordered by Urology    UTI  -currently on Rocephin as managed by primary team   -continue to monitor WBC and fever curves    Deconditioning/debility/frailty  -multifactorial including age, BPH with obstructive uropathy, and underlying cognitive impairment with UTI superimposed   -continue treatment underlying metabolic derangements   -continue supportive cares    Care coordination: Rounded with nursing on unit and discussed with Dr Johanne Dobbs via tiger connect messaging     Subjective:     Patient seen examined at bedside where is lying resting comfortably, watching television appears in no acute distress    Patient has been seen by neuropsychiatry who is in agreement that at this time patient does not have capacity to make fully informed medical decisions  Review of Systems   Unable to perform ROS: Other (not reliable as pt does not engage and states he is fine to all questinos asked)     Objective:     Vitals: Blood pressure 149/76, pulse 81, temperature 98 9 °F (37 2 °C), temperature source Oral, resp  rate 17, height 5' 7" (1 702 m), weight 66 3 kg (146 lb 2 6 oz), SpO2 99 %  ,Body mass index is 22 89 kg/m²  Intake/Output Summary (Last 24 hours) at 1/5/2021 1603  Last data filed at 1/5/2021 1406  Gross per 24 hour   Intake 340 ml   Output 1175 ml   Net -835 ml       Current Medications: Reviewed    Physical Exam:   Physical Exam  Vitals signs and nursing note reviewed  Constitutional:       Comments: Appears stated age, no acute distress, lying in bed resting comfortably watching television   HENT:      Head: Normocephalic  Nose: Nose normal       Mouth/Throat:      Mouth: Mucous membranes are dry  Eyes:      General: No scleral icterus  Right eye: No discharge  Left eye: No discharge  Neck:      Musculoskeletal: Neck supple  Cardiovascular:      Rate and Rhythm: Normal rate  Pulses: Normal pulses  Pulmonary:      Effort: No respiratory distress  Breath sounds: No wheezing  Abdominal:      General: Bowel sounds are normal  There is no distension  Palpations: Abdomen is soft  Tenderness: There is no abdominal tenderness  Musculoskeletal:      Comments: Mildly reduced overall muscle mass  Moves all 4 extremities spontaneously   Skin:     General: Skin is warm and dry  Neurological:      Mental Status: He is alert  Comments: Oriented to person, states place as "clinic"   Psychiatric:      Comments: Remains withdrawn        Invasive Devices     Peripheral Intravenous Line            Peripheral IV 01/04/21 Dorsal (posterior); Left Forearm less than 1 day          Drain            Urethral Catheter 18 Fr  4 days              Lab, Imaging and other studies: I have personally reviewed pertinent reports

## 2021-01-05 NOTE — OCCUPATIONAL THERAPY NOTE
OT CANCEL NOTE:    Attempted to see patient 2x this date  First attempt, pt in with other provider  At second attempt, pt in bed, flat affect, not very interested in participating in OT treatment even with encouragement  OT will follow and treat as able

## 2021-01-05 NOTE — CASE MANAGEMENT
CM made a TC to pt's son Levi to inform that pt is recommended for rehab upon dc  Levi reported that he is in agreement and does not have a preference on a placement  CM to send out referrals w/in a 20-30mi radius for accepting facilities via [facility]

## 2021-01-05 NOTE — CONSULTS
Consultation - Neuropsychology/Psychology Department  Amaury Lopez 77 y o  male MRN: [id number]  Unit/Bed#: -01 Encounter: [id number]        Reason for Consultation:  Amaury Lopez is a 77y o  year old male who was referred for a Neuropsychological Exam to assess cognitive functioning and comment on capacity to make informed medical decisions  History of Present Illness  Admitted for AMS and urinary retention; found in house covered in feces and urine; notes indicate he does not take medication      Physician Requesting Consult: Robb Scott MD    PROBLEM LIST:  Patient Active Problem List   Diagnosis    Lumbar stenosis with neurogenic claudication    Pseudomonas aeruginosa infection    Ataxia    Essential hypertension    Syncope and collapse    Orthostatic hypotension    HLD (hyperlipidemia)    Ascending aortic aneurysm (HCC)    Urinary retention    CKD (chronic kidney disease), stage III    Hyponatremia    Diffuse pain in left lower extremity    PAD (peripheral artery disease) (HCC)    Lumbar radiculopathy    Status post lumbar discectomy    Anemia, unspecified    Displaced fracture of lateral end of left clavicle, initial encounter for closed fracture    Radiculopathy    REYNOLD (acute kidney injury) ([facility] [address] )    Obstructive uropathy    Encephalopathy acute    Urinary tract infection    Metabolic acidosis, increased anion gap         Historical Information   Past Medical History:   Diagnosis Date    Aortic aneurysm (HCC)     Arthritis     Atypical chest pain     Back pain     Chronic kidney disease     stg 3    Elevated ALT measurement     Elevated AST (SGOT)     Hyperlipidemia     Hypertension     Leg pain     Neurogenic claudication     Urinary retention     catherize self 4x/day    Urinary retention      Past Surgical History:   Procedure Laterality Date    EPIDURAL BLOCK INJECTION N/A 1/23/2017    Procedure: BLOCK / INJECTION EPIDURAL STEROID LUMBAR;  Surgeon: Angella Cedeno MD;  [facility];  Service:    Becka Matamoros LA COLONOSCOPY FLX DX W/COLLJ SPEC WHEN PFRMD N/A 1/8/2019    Procedure: COLONOSCOPY;  Surgeon: Surjit Vidales MD;  Location: [facility]; Service: Colorectal    LA LAMNOTMY INCL W/DCMPRSN NRV ROOT 1 INTRSPC LUMBR Bilateral 1/23/2017    Procedure: MIS L2-3 & L3-4 LAMINAL FORAMINOTOMIES AND MICRODISCECTOMY W LEFT SIDED APPROACH ;  Surgeon: Angella Cedeno MD;  [facility];  Service: Neurosurgery     Social History   Social History     Substance and Sexual Activity   Alcohol Use No     Social History     Substance and Sexual Activity   Drug Use No     Social History     Tobacco Use   Smoking Status Never Smoker   Smokeless Tobacco Never Used     Family History:   Family History   Problem Relation Age of Onset    Heart disease Sister     Stroke Sister    Becka Matamoros Sudden death Mother     Stroke Brother     Pancreatic cancer Sister     No Known Problems Father     No Known Problems Sister     No Known Problems Sister     No Known Problems Son        Meds/Allergies   current meds:   Current Facility-Administered Medications   Medication Dose Route Frequency    amLODIPine (NORVASC) tablet 5 mg  5 mg Oral Daily    aspirin (ECOTRIN LOW STRENGTH) EC tablet 81 mg  81 mg Oral Daily    atorvastatin (LIPITOR) tablet 10 mg  10 mg Oral Daily    heparin (porcine) subcutaneous injection 5,000 Units  5,000 Units Subcutaneous Q8H [address]    potassium chloride (K-DUR,KLOR-CON) CR tablet 40 mEq  40 mEq Oral BID    sodium chloride (PF) 0 9 % injection 3 mL  3 mL Intravenous Q1H PRN    tamsulosin (FLOMAX) capsule 0 4 mg  0 4 mg Oral Daily With Dinner       No Known Allergies      Family and Social Support:   Living Arrangements: Alone  Support Systems: Friends/neighbors  Type of Current Residence: Private residence  Current Home Care Services: No  Discharge planning discussed with[de-identified] Patient  Freedom of Choice: Yes  CM Handoff Comments: 1/4/20: D/c TBD   Dx: Obstructive uropathy  Dalia consult placed  Awaiting call back from [facility]   [facility], list provided to pt  Follow for discharge and transportation needs  Behavioral Observations: Alert, UNABLE to accurately state the year, season, day/week, day/month; affect appeared constricted and patient admitted to anxiety and denied depression; thoughts appeared illogical and confused; patient reported he is unaware of what he is being treated for and denied medical history; patient reported that there is nothing wrong with him medically and he has no cognitive difficulty/changes  Cognitive Examination    General Cognitive Functioning MMSE = Impaired 16/28; General Fund of Information = Impaired    Attention/Concentration Auditory Selective Attention = Impaired; Auditory Vigilance = Impaired; Information Processing Speed = Impaired    Frontal Systems/Executive Functioning Mental Flexibility/Cognitive Control = Impaired; Working Memory = Impaired Abstract Reasoning = Impaired;  Generative Ability = Impaired, Commonsense Reasoning and Judgement = Impaired    Language Functioning Confrontation naming = Average, Phonemic Fluency = Impaired; Semantic Retrieval = Impaired; Comprehension of Complex Ideational Material = Impaired;  Praxis = Within Normal Limits; Repetition = Within Normal Limits; Basic Reading = Within Normal Limits; Following Commands = Impaired    Memory Functioning Narrative Recall - Short Delay = Impaired;  Long Delay Narrative Recall = Impaired; Visual Recognition = Impaired    Visuo-Spatial Abilities Not Assessed    Functional Knowledge  Health & Safety Knowledge = Impaired;     Summary/Impression:  Results of Neuropsychological Exam revealed diffuse cognitive dysfunction (Major Neurocognitive disorder) and on a measure assessing awareness of personal health status and ability to evaluate health problems, handle medical emergencies and take safety precautions, patient performed in the IMPAIRED range of functioning  At this time, patient does not appear to have capacity to make fully informed medical decisions

## 2021-01-05 NOTE — ASSESSMENT & PLAN NOTE
VBG on admission (+) metabolic acidosis, pH 2 34, pCO2 33 8, HC03 14 9   (+) AG 14    - likely 2/2 uremia given (-) ASA, (-) acetaminophen, and (-) ethanol level

## 2021-01-06 NOTE — ASSESSMENT & PLAN NOTE
REYNOLD likely 2/2 obstructive uropathy on top of CKD-3  Resolved   - trend BMP daily       Lab Results   Component Value Date    EGFR 82 01/06/2021    EGFR 81 01/05/2021    EGFR 76 01/04/2021    BUN 14 01/06/2021    BUN 15 01/05/2021    BUN 18 01/04/2021    CREATININE 1 08 01/06/2021    CREATININE 1 09 01/05/2021    CREATININE 1 15 01/04/2021

## 2021-01-06 NOTE — OCCUPATIONAL THERAPY NOTE
Occupational Therapy Treatment Note      Jerry Newton    1/6/2021    Principal Problem:    Obstructive uropathy  Active Problems:    Essential hypertension    HLD (hyperlipidemia)    Ascending aortic aneurysm (HCC)    REYNOLD (acute kidney injury) ([facility] [address] )    Encephalopathy acute    Urinary tract infection    Metabolic acidosis, increased anion gap      Past Medical History:   Diagnosis Date    Aortic aneurysm (HCC)     Arthritis     Atypical chest pain     Back pain     Chronic kidney disease     stg 3    Elevated ALT measurement     Elevated AST (SGOT)     Hyperlipidemia     Hypertension     Leg pain     Neurogenic claudication     Urinary retention     catherize self 4x/day    Urinary retention        Past Surgical History:   Procedure Laterality Date    EPIDURAL BLOCK INJECTION N/A 1/23/2017    Procedure: BLOCK / INJECTION EPIDURAL STEROID LUMBAR;  Surgeon: Mei Corona MD;  [facility];  Service:     MN COLONOSCOPY FLX DX W/COLLJ SPEC WHEN PFRMD N/A 1/8/2019    Procedure: COLONOSCOPY;  Surgeon: Talisha Che MD;  [facility]: [facility]; Service: Colorectal    MN LAMNOTMY INCL W/DCMPRSN NRV ROOT 1 INTRSPC LUMBR Bilateral 1/23/2017    Procedure: MIS L2-3 & L3-4 LAMINAL FORAMINOTOMIES AND MICRODISCECTOMY W LEFT SIDED APPROACH ;  Surgeon: Mei Corona MD;  [facility];  Service: Neurosurgery        01/06/21 1050   OT Last Visit   OT Visit Date 01/06/21   Note Type   Note Type Treatment   Restrictions/Precautions   Weight Bearing Precautions Per Order No   Other Precautions Cognitive; Bed Alarm   General   Response to Previous Treatment Patient with no complaints from previous session   Lifestyle   Autonomy pt unable to answer at this time    Reciprocal Relationships neighbor who found him covered in urine/fecal matter   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   Intrinsic Gratification watching TV and cooking   Pain Assessment   Pain Assessment Tool 0-10   Pain Score No Pain   ADL   Eating Assistance 7  Independent   Eating Deficit Setup  (needs encouragement)   Grooming Comments can wash own face  Need encouragment   Bed Mobility   Additional Comments pt attemted to sit up, only agreable to FPC, then got himself back in bed   Cognition   Overall Cognitive Status Impaired   Arousal/Participation Uncooperative;Lethargic;Poorly responsive   Attention Difficulty attending to directions   Orientation Level Oriented to person;Disoriented to place; Disoriented to time;Disoriented to situation   Memory Unable to assess   Following Commands Follows one step commands inconsistently   Comments pt seem disinterested in answering questions and disinterested in particiapting in activites  He states he is at the clinic when asked  states it is January (danny guess?), states the year is 2001 and that Divya Nolan is the president  refused to answer any other questions  Assessment   Assessment attempted to see patient for skilled OT treatment  Pt was initially agreable to participate, attempting to get to EOB, however, gave up half way and refused to continue  He was able to wash his own face, needing max encouragement to do so  able to answer some questions although not correcctly  Pt with flat affect, seems to be annoyed, decreased insight into own self care, decreased initiation etc  Unable to more with therapy due to low motivation to particiapte  OT will continue to follow and try to encourage increased particiaption   Plan   Treatment Interventions ADL retraining;Functional transfer training;Cognitive reorientation;Patient/family training; Compensatory technique education; Energy conservation; Activityengagement   Goal Expiration Date 01/11/21   OT Treatment Day 1   OT Frequency 3-5x/wk   Recommendation   OT Discharge Recommendation Post-Acute Rehabilitation Services   OT - OK to Discharge Yes

## 2021-01-06 NOTE — PROGRESS NOTES
Progress Note - Brandon Carter 1955, 77 y o  male MRN: [id number]    Unit/Bed#: -01 Encounter: [id number]    Primary Care Provider: JOHNSON Ramirez   Date and time admitted to hospital: 12/31/2020  4:24 PM        * Obstructive uropathy  Assessment & Plan  - 12/31 CTCAP (+) Marked distention of the bladder extending well into the mid abdomen, compatible with distal outlet obstruction  Possible small amount of debris or hemorrhage at the base of the bladder  Mild prostate enlargement  - continue PTA Tamsulosin (Flomax) 0 4 mg PO Daily  - daily weights  - strict I&O's   - avoid nephrotoxic agents  - urology consulted-for long term management of obstructive uropathy for possible need of chronic escalante or continuation of straight cath vs; UroLift vs  TURP  Metabolic acidosis, increased anion gap  Assessment & Plan  VBG on admission (+) metabolic acidosis, pH 2 69, pCO2 33 8, HC03 14 9   (+) AG 14    - likely 2/2 uremia given (-) ASA, (-) acetaminophen, and (-) ethanol level  Urinary tract infection  Assessment & Plan  - Resolved  - trend daily CBC       Encephalopathy acute  Assessment & Plan  - encephalopathy likely 2/2 UTI and obstructive uropathy  - 12/31 CT head (-) No acute intracranial abnormality    - Will continue to monitor  -Geriatrics:  Mild cognitive impairment  Recommend neuropsych for capacity/competency  - neuropsych consulted, appreciate their recommendations  REYNOLD (acute kidney injury) [facility])  Assessment & Plan  REYNOLD likely 2/2 obstructive uropathy on top of CKD-3  Resolved   - trend BMP daily       Lab Results   Component Value Date    EGFR 82 01/06/2021    EGFR 81 01/05/2021    EGFR 76 01/04/2021    BUN 14 01/06/2021    BUN 15 01/05/2021    BUN 18 01/04/2021    CREATININE 1 08 01/06/2021    CREATININE 1 09 01/05/2021    CREATININE 1 15 01/04/2021         Ascending aortic aneurysm [facility])  Assessment & Plan  Hx/o AAA    CTCAP on admission (+) Ascending aortic aneurysm measuring 4 3 cm  HLD (hyperlipidemia)  Assessment & Plan   - Lipid panel 12/31 wnl:  Chol 138, TG's 107, HDL 39, LDL 78  - Continue PTA Lipitor 20 mg       Essential hypertension  Assessment & Plan  Stable  - Continue PTA Amlodipine 5 mg              PPX: Lovenox  Diet:  Regular house  Code Status:  Level 1 full code  Dispo:  Spoke with sonLevi, any short-term rehab is okay  Pending placement    Plan D/W Dr Thomson and [facility] Team    Subjective:   Patient was seen and examined at bedside  Patient reports no overnight events or complaints or concerns  Patient states he has no chest pain, abdominal pain, diarrhea/constipation, fever/chills or suprapubic pain  Objective:     Vitals: Blood pressure 150/82, pulse 80, temperature 98 4 °F (36 9 °C), temperature source Oral, resp  rate 18, height 5' 7" (1 702 m), weight 68 6 kg (151 lb 3 8 oz), SpO2 100 %  ,Body mass index is 23 69 kg/m²  Intake/Output Summary (Last 24 hours) at 1/6/2021 0917  Last data filed at 1/6/2021 0339  Gross per 24 hour   Intake 120 ml   Output 1075 ml   Net -955 ml       Physical Exam:   Physical Exam  Vitals signs reviewed  Constitutional:       General: He is not in acute distress  Appearance: Normal appearance  He is normal weight  He is not ill-appearing  HENT:      Head: Normocephalic and atraumatic  Nose: Nose normal  No congestion  Eyes:      General: No scleral icterus  Conjunctiva/sclera: Conjunctivae normal       Pupils: Pupils are equal, round, and reactive to light  Neck:      Musculoskeletal: Normal range of motion  Cardiovascular:      Rate and Rhythm: Normal rate and regular rhythm  Pulses: Normal pulses  Heart sounds: Normal heart sounds  No murmur  Pulmonary:      Effort: Pulmonary effort is normal       Breath sounds: Normal breath sounds  No wheezing  Chest:      Chest wall: No tenderness  Abdominal:      General: Abdomen is flat   Bowel sounds are normal  Palpations: There is no mass  Tenderness: There is no abdominal tenderness  There is no right CVA tenderness or left CVA tenderness  Genitourinary:     Comments: Ni catheter in place  Musculoskeletal: Normal range of motion  General: No tenderness  Right lower leg: No edema  Left lower leg: No edema  Skin:     General: Skin is warm  Capillary Refill: Capillary refill takes less than 2 seconds  Findings: No bruising or rash  Neurological:      Mental Status: He is alert and oriented to person, place, and time  Psychiatric:         Mood and Affect: Mood normal          Behavior: Behavior normal          Invasive Devices     Peripheral Intravenous Line            Peripheral IV 01/04/21 Dorsal (posterior); Left Forearm 1 day          Drain            Urethral Catheter 18 Fr  5 days                           Lab and other studies:  I have personally reviewed pertinent reports  No results displayed because visit has over 200 results            Recent Results (from the past 24 hour(s))   CBC and differential    Collection Time: 01/06/21  4:59 AM   Result Value Ref Range    WBC 10 69 (H) 4 31 - 10 16 Thousand/uL    RBC 4 09 3 88 - 5 62 Million/uL    Hemoglobin 11 3 (L) 12 0 - 17 0 g/dL    Hematocrit 35 1 (L) 36 5 - 49 3 %    MCV 86 82 - 98 fL    MCH 27 6 26 8 - 34 3 pg    MCHC 32 2 31 4 - 37 4 g/dL    RDW 14 5 11 6 - 15 1 %    MPV 11 5 8 9 - 12 7 fL    Platelets 392 969 - 567 Thousands/uL    nRBC 0 /100 WBCs    Neutrophils Relative 68 43 - 75 %    Immat GRANS % 1 0 - 2 %    Lymphocytes Relative 17 14 - 44 %    Monocytes Relative 9 4 - 12 %    Eosinophils Relative 4 0 - 6 %    Basophils Relative 1 0 - 1 %    Neutrophils Absolute 7 29 1 85 - 7 62 Thousands/µL    Immature Grans Absolute 0 06 0 00 - 0 20 Thousand/uL    Lymphocytes Absolute 1 82 0 60 - 4 47 Thousands/µL    Monocytes Absolute 0 97 0 17 - 1 22 Thousand/µL    Eosinophils Absolute 0 43 0 00 - 0 61 Thousand/µL Basophils Absolute 0 12 (H) 0 00 - 0 10 Thousands/µL   Basic metabolic panel    Collection Time: 01/06/21  4:59 AM   Result Value Ref Range    Sodium 138 136 - 145 mmol/L    Potassium 3 7 3 5 - 5 3 mmol/L    Chloride 110 (H) 100 - 108 mmol/L    CO2 23 21 - 32 mmol/L    ANION GAP 5 4 - 13 mmol/L    BUN 14 5 - 25 mg/dL    Creatinine 1 08 0 60 - 1 30 mg/dL    Glucose 112 65 - 140 mg/dL    Calcium 8 4 8 3 - 10 1 mg/dL    eGFR 82 ml/min/1 73sq m     Blood Culture: No results found for: BLOODCX,   Urinalysis:   Lab Results   Component Value Date    COLORU - 12/31/2020    COLORU Yellow 12/31/2020    CLARITYU Clear 12/31/2020    SPECGRAV 1 020 12/31/2020    PHUR 6 0 12/31/2020    LEUKOCYTESUR Large (A) 12/31/2020    NITRITE Positive (A) 12/31/2020    GLUCOSEU Negative 12/31/2020    KETONESU Negative 12/31/2020    BILIRUBINUR Negative 12/31/2020    BLOODU Small (A) 12/31/2020   ,   Urine Culture:   Lab Results   Component Value Date    URINECX 50,000-59,000 cfu/ml Pseudomonas aeruginosa (A) 12/31/2020    URINECX 50,000-59,000 cfu/ml Pseudomonas aeruginosa (A) 12/31/2020    URINECX 50,000-59,000 cfu/ml Staphylococcus lugdunensis (A) 12/31/2020   ,   Wound Culure: No results found for: WOUNDCULT      Imaging:  None       VTE Pharmacologic Prophylaxis: Enoxaparin (Lovenox)  VTE Mechanical Prophylaxis: sequential compression device and foot pump applied    Current Facility-Administered Medications   Medication Dose Route Frequency    amLODIPine (NORVASC) tablet 5 mg  5 mg Oral Daily    aspirin (ECOTRIN LOW STRENGTH) EC tablet 81 mg  81 mg Oral Daily    atorvastatin (LIPITOR) tablet 10 mg  10 mg Oral Daily    heparin (porcine) subcutaneous injection 5,000 Units  5,000 Units Subcutaneous Q8H [address]    sodium chloride (PF) 0 9 % injection 3 mL  3 mL Intravenous Q1H PRN    tamsulosin (FLOMAX) capsule 0 4 mg  0 4 mg Oral Daily With Stu Rubi DO  Family Medicine Resident PGY1

## 2021-01-06 NOTE — PLAN OF CARE
Problem: OCCUPATIONAL THERAPY ADULT  Goal: Performs self-care activities at highest level of function for planned discharge setting  See evaluation for individualized goals  Description: Treatment Interventions: ADL retraining, Functional transfer training, Endurance training, Cognitive reorientation, Patient/family training, Equipment evaluation/education, Compensatory technique education, Energy conservation, Activityengagement          See flowsheet documentation for full assessment, interventions and recommendations  Note: Limitation: Decreased ADL status, Decreased Safe judgement during ADL, Decreased cognition, Decreased endurance, Decreased high-level ADLs, Decreased self-care trans  Prognosis: Fair  Assessment: attempted to see patient for skilled OT treatment  Pt was initially agreable to participate, attempting to get to EOB, however, gave up half way and refused to continue  He was able to wash his own face, needing max encouragement to do so  able to answer some questions although not correcctly  Pt with flat affect, seems to be annoyed, decreased insight into own self care, decreased initiation etc  Unable to more with therapy due to low motivation to particiapte  OT will continue to follow and try to encourage increased particiaption  Recommendation: Psych Consult  OT Discharge Recommendation: Post-Acute Rehabilitation Services  OT - OK to Discharge:  Yes

## 2021-01-06 NOTE — PROGRESS NOTES
Geriatrics Progress Note - Norma Love 77 y o  male MRN: [id number]    Unit/Bed#: -01 Encounter: [id number]      Assessment/Plan    Principal Problem:    Obstructive uropathy  Active Problems:    Essential hypertension    HLD (hyperlipidemia)    Ascending aortic aneurysm (HCC)    REYNOLD (acute kidney injury) (HCC)    Encephalopathy acute    Urinary tract infection    Metabolic acidosis, increased anion gap    Patient is a 47-year-old male with a past medical history of peripheral arterial disease, hypertension, CKD stage 3, urinary retention secondary to BPH, ascending aortic aneurysm, lumbar radiculopathy status post decompression admitted for obstructive uropathy, UTI, and acute metabolic encephalopathy  Geriatric service following for encephalopathy and evaluation for underlying cognitive impairment    Acute encephalopathy  -likely related to acute urinary retention with UTI with component of underlying MCI/dementia  -patient previously refused MoCA and mini cog testing  0/3 on word recall  -patient evaluated by neuro psych revealing diffuse cognitive dysfunction  At this time, patient does not have capacity to make fully informed medical decisions for himself  -CT of the head unremarkable this admission  -patient had MRI of the brain in 2017 which showed mild generalized cerebral volume loss  -B12/TSH within normal limits  Vitamin-D low at 22  Plan:  -continue delirium precautions, pain control, UTI appears to be resolved at this point  -frequent reorientation, maintain good sleep hygiene, avoid frequent nighttime awakenings  -would recommend vitamin-D supplementation    Cognitive impairment  -see above  -consider outpatient baseline cognitive testing     Obstructive uropathy  -per chart review, has a longstanding history of urinary retention secondary to BPH  Patient has declined operative intervention    He has been managing with clean intermittent catheterization which he does 3 to 4 times a day at home  Presenting with acute urinary retention, UTI this admission    -status post decompression with Ni catheter  Antibiotics completed for UTI  -On flomax  -urology has evaluated the patient and will follow up outpatient     Urinary tract infection  -has completed antibiotics per primary team     Acute kidney injury  -resolved     Ascending aortic aneurysm  -known history of ascending aortic aneurysm  Appears stable on CT this admission     Essential hypertension  -continue home amlodipine    Vitamin D deficiency  -replete as above    Dispo:  PT/OT recommending post-acute Rehab  Patient's son, Mike Ty, is assisting with placement    Subjective:   Patient seen and examined  Resting comfortably in bed watching TV  He has no acute concerns this morning  Denies any fever, chills, suprapubic pain, nausea, vomiting    Objective:     Vitals: Blood pressure 150/82, pulse 80, temperature 98 4 °F (36 9 °C), temperature source Oral, resp  rate 18, height 5' 7" (1 702 m), weight 68 6 kg (151 lb 3 8 oz), SpO2 100 %  ,Body mass index is 23 69 kg/m²  Intake/Output Summary (Last 24 hours) at 1/6/2021 0837  Last data filed at 1/6/2021 0339  Gross per 24 hour   Intake 120 ml   Output 1075 ml   Net -955 ml     Physical Exam  Constitutional:       Appearance: Normal appearance  He is not ill-appearing  HENT:      Head: Normocephalic and atraumatic  Eyes:      General: No scleral icterus  Right eye: No discharge  Left eye: No discharge  Cardiovascular:      Rate and Rhythm: Normal rate and regular rhythm  Heart sounds: No murmur  No friction rub  Pulmonary:      Effort: Pulmonary effort is normal       Breath sounds: Normal breath sounds  No wheezing or rales  Abdominal:      General: Abdomen is flat  There is no distension  Palpations: Abdomen is soft  Tenderness: There is no abdominal tenderness     Genitourinary:     Comments: Ni catheter in place  Musculoskeletal: General: No swelling, tenderness or deformity  Skin:     General: Skin is warm and dry  Findings: No erythema  Neurological:      Mental Status: He is alert  Mental status is at baseline  Motor: No weakness  Psychiatric:      Comments: Flat affect         Invasive Devices     Peripheral Intravenous Line            Peripheral IV 01/04/21 Dorsal (posterior); Left Forearm 1 day          Drain            Urethral Catheter 18 Fr  5 days                Lab, Imaging and other studies: I have personally reviewed pertinent reports      VTE Pharmacologic Prophylaxis: Heparin  VTE Mechanical Prophylaxis: sequential compression device

## 2021-01-06 NOTE — CASE MANAGEMENT
CM in contact with [facility], [employer], and [facility] for [facility] referrals in [place]  CM will continue to follow

## 2021-01-06 NOTE — ASSESSMENT & PLAN NOTE
VBG on admission (+) metabolic acidosis, pH 2 50, pCO2 33 8, HC03 14 9   (+) AG 14    - likely 2/2 uremia given (-) ASA, (-) acetaminophen, and (-) ethanol level

## 2021-01-07 NOTE — PLAN OF CARE
Problem: Potential for Falls  Goal: Patient will remain free of falls  Description: INTERVENTIONS:  - Assess patient frequently for physical needs  -  Identify cognitive and physical deficits and behaviors that affect risk of falls    -  [place] fall precautions as indicated by assessment   - Educate patient/family on patient safety including physical limitations  - Instruct patient to call for assistance with activity based on assessment  - Modify environment to reduce risk of injury  - Consider OT/PT consult to assist with strengthening/mobility  Outcome: Progressing     Problem: Prexisting or High Potential for Compromised Skin Integrity  Goal: Skin integrity is maintained or improved  Description: INTERVENTIONS:  - Identify patients at risk for skin breakdown  - Assess and monitor skin integrity  - Assess and monitor nutrition and hydration status  - Monitor labs   - Assess for incontinence   - Turn and reposition patient  - Assist with mobility/ambulation  - Relieve pressure over bony prominences  - Avoid friction and shearing  - Provide appropriate hygiene as needed including keeping skin clean and dry  - Evaluate need for skin moisturizer/barrier cream  - Collaborate with interdisciplinary team   - Patient/family teaching  - Consider wound care consult   Outcome: Progressing     Problem: NEUROSENSORY - ADULT  Goal: Achieves stable or improved neurological status  Description: INTERVENTIONS  - Monitor and report changes in neurological status  - Monitor vital signs such as temperature, blood pressure, glucose, and any other labs ordered   - Initiate measures to prevent increased intracranial pressure  - Monitor for seizure activity and implement precautions if appropriate      Outcome: Progressing  Goal: Achieves maximal functionality and self care  Description: INTERVENTIONS  - Monitor swallowing and airway patency with patient fatigue and changes in neurological status  - Encourage and assist patient to increase activity and self care     - Encourage visually impaired, hearing impaired and aphasic patients to use assistive/communication devices  Outcome: Progressing     Problem: GENITOURINARY - ADULT  Goal: Maintains or returns to baseline urinary function  Description: INTERVENTIONS:  - Assess urinary function  - Encourage oral fluids to ensure adequate hydration if ordered  - Administer IV fluids as ordered to ensure adequate hydration  - Administer ordered medications as needed  - Offer frequent toileting  - Follow urinary retention protocol if ordered  Outcome: Progressing  Goal: Absence of urinary retention  Description: INTERVENTIONS:  - Assess patients ability to void and empty bladder  - Monitor I/O  - Bladder scan as needed  - Discuss with physician/AP medications to alleviate retention as needed  - Discuss catheterization for long term situations as appropriate  Outcome: Progressing  Goal: Urinary catheter remains patent  Description: INTERVENTIONS:  - Assess patency of urinary catheter  - If patient has a chronic escalante, consider changing catheter if non-functioning  - Follow guidelines for intermittent irrigation of non-functioning urinary catheter  Outcome: Progressing     Problem: MUSCULOSKELETAL - ADULT  Goal: Maintain or return mobility to safest level of function  Description: INTERVENTIONS:  - Assess patient's ability to carry out ADLs; assess patient's baseline for ADL function and identify physical deficits which impact ability to perform ADLs (bathing, care of mouth/teeth, toileting, grooming, dressing, etc )  - Assess/evaluate cause of self-care deficits   - Assess range of motion  - Assess patient's mobility  - Assess patient's need for assistive devices and provide as appropriate  - Encourage maximum independence but intervene and supervise when necessary  - Involve family in performance of ADLs  - Assess for home care needs following discharge   - Consider OT consult to assist with ADL evaluation and planning for discharge  - Provide patient education as appropriate  Outcome: Progressing     Problem: SAFETY ADULT  Goal: Patient will remain free of falls  Description: INTERVENTIONS:  - Assess patient frequently for physical needs  -  Identify cognitive and physical deficits and behaviors that affect risk of falls    -  [place] fall precautions as indicated by assessment   - Educate patient/family on patient safety including physical limitations  - Instruct patient to call for assistance with activity based on assessment  - Modify environment to reduce risk of injury  - Consider OT/PT consult to assist with strengthening/mobility  Outcome: Progressing  Goal: Maintain or return to baseline ADL function  Description: INTERVENTIONS:  -  Assess patient's ability to carry out ADLs; assess patient's baseline for ADL function and identify physical deficits which impact ability to perform ADLs (bathing, care of mouth/teeth, toileting, grooming, dressing, etc )  - Assess/evaluate cause of self-care deficits   - Assess range of motion  - Assess patient's mobility; develop plan if impaired  - Assess patient's need for assistive devices and provide as appropriate  - Encourage maximum independence but intervene and supervise when necessary  - Involve family in performance of ADLs  - Assess for home care needs following discharge   - Consider OT consult to assist with ADL evaluation and planning for discharge  - Provide patient education as appropriate  Outcome: Progressing  Goal: Maintain or return mobility status to optimal level  Description: INTERVENTIONS:  - Assess patient's baseline mobility status (ambulation, transfers, stairs, etc )    - Identify cognitive and physical deficits and behaviors that affect mobility  - Identify mobility aids required to assist with transfers and/or ambulation (gait belt, sit-to-stand, lift, walker, cane, etc )  - [place] fall precautions as indicated by assessment  - Record patient progress and toleration of activity level on Mobility SBAR; progress patient to next Phase/Stage  - Instruct patient to call for assistance with activity based on assessment  - Consider rehabilitation consult to assist with strengthening/weightbearing, etc   Outcome: Progressing     Problem: Knowledge Deficit  Goal: Patient/family/caregiver demonstrates understanding of disease process, treatment plan, medications, and discharge instructions  Description: Complete learning assessment and assess knowledge base  Interventions:  - Provide teaching at level of understanding  - Provide teaching via preferred learning methods  Outcome: Progressing     Problem: Nutrition/Hydration-ADULT  Goal: Nutrient/Hydration intake appropriate for improving, restoring or maintaining nutritional needs  Description: Monitor and assess patient's nutrition/hydration status for malnutrition  Collaborate with interdisciplinary team and initiate plan and interventions as ordered  Monitor patient's weight and dietary intake as ordered or per policy  Utilize nutrition screening tool and intervene as necessary  Determine patient's food preferences and provide high-protein, high-caloric foods as appropriate       INTERVENTIONS:  - Monitor oral intake, urinary output, labs, and treatment plans  - Assess nutrition and hydration status and recommend course of action  - Evaluate amount of meals eaten  - Assist patient with eating if necessary   - Allow adequate time for meals  - Recommend/ encourage appropriate diets, oral nutritional supplements, and vitamin/mineral supplements  - Order, calculate, and assess calorie counts as needed  - Recommend, monitor, and adjust tube feedings and TPN/PPN based on assessed needs  - Assess need for intravenous fluids  - Provide specific nutrition/hydration education as appropriate  - Include patient/family/caregiver in decisions related to nutrition  Outcome: Progressing

## 2021-01-07 NOTE — ASSESSMENT & PLAN NOTE
REYNOLD likely 2/2 obstructive uropathy on top of CKD-3   Resolved   - trend BMP daily       Lab Results   Component Value Date    EGFR 79 01/07/2021    EGFR 82 01/06/2021    EGFR 81 01/05/2021    BUN 17 01/07/2021    BUN 14 01/06/2021    BUN 15 01/05/2021    CREATININE 1 11 01/07/2021    CREATININE 1 08 01/06/2021    CREATININE 1 09 01/05/2021

## 2021-01-07 NOTE — ASSESSMENT & PLAN NOTE
VBG on admission (+) metabolic acidosis, pH 0 35, pCO2 33 8, HC03 14 9   (+) AG 14    - likely 2/2 uremia given (-) ASA, (-) acetaminophen, and (-) ethanol level

## 2021-01-07 NOTE — PLAN OF CARE
IP Medical Nutrition Therapy  Suggest trial Enlive TID, vanilla      Problem: Nutrition/Hydration-ADULT  Goal: Nutrient/Hydration intake appropriate for improving, restoring or maintaining nutritional needs  Description: Monitor and assess patient's nutrition/hydration status for malnutrition  Collaborate with interdisciplinary team and initiate plan and interventions as ordered  Monitor patient's weight and dietary intake as ordered or per policy  Utilize nutrition screening tool and intervene as necessary  Determine patient's food preferences and provide high-protein, high-caloric foods as appropriate       INTERVENTIONS:  - Monitor oral intake, urinary output, labs, and treatment plans  - Assess nutrition and hydration status and recommend course of action  - Evaluate amount of meals eaten  - Assist patient with eating if necessary   - Allow adequate time for meals  - Recommend/ encourage appropriate diets, oral nutritional supplements, and vitamin/mineral supplements  - Order, calculate, and assess calorie counts as needed  - Recommend, monitor, and adjust tube feedings and TPN/PPN based on assessed needs  - Assess need for intravenous fluids  - Provide specific nutrition/hydration education as appropriate  - Include patient/family/caregiver in decisions related to nutrition  Outcome: Progressing

## 2021-01-07 NOTE — ASSESSMENT & PLAN NOTE
Medically cleared, awaiting long-term care placement  - 12/31 CTCAP (+) Marked distention of the bladder extending well into the mid abdomen, compatible with distal outlet obstruction  Possible small amount of debris or hemorrhage at the base of the bladder  Mild prostate enlargement  - continue PTA Tamsulosin (Flomax) 0 4 mg PO Daily  - daily weights  - strict I&O's   - avoid nephrotoxic agents  - urology consulted-for long term management of obstructive uropathy for possible need of chronic escalante or continuation of straight cath vs; UroLift vs  TURP

## 2021-01-07 NOTE — PROGRESS NOTES
Progress Note - Flash Liao 1955, 77 y o  male MRN: [id number]    Unit/Bed#: -01 Encounter: [id number]    Primary Care Provider: JOHNSON Hameed   Date and time admitted to hospital: 12/31/2020  4:24 PM        * Obstructive uropathy  Assessment & Plan  Medically cleared, awaiting long-term care placement  - 12/31 CTCAP (+) Marked distention of the bladder extending well into the mid abdomen, compatible with distal outlet obstruction  Possible small amount of debris or hemorrhage at the base of the bladder  Mild prostate enlargement  - continue PTA Tamsulosin (Flomax) 0 4 mg PO Daily  - daily weights  - strict I&O's   - avoid nephrotoxic agents  - urology consulted-for long term management of obstructive uropathy for possible need of chronic escalante or continuation of straight cath vs; UroLift vs  TURP  Metabolic acidosis, increased anion gap  Assessment & Plan  VBG on admission (+) metabolic acidosis, pH 9 86, pCO2 33 8, HC03 14 9   (+) AG 14    - likely 2/2 uremia given (-) ASA, (-) acetaminophen, and (-) ethanol level  Urinary tract infection  Assessment & Plan  - Resolved   - trend daily CBC       Encephalopathy acute  Assessment & Plan  - encephalopathy likely 2/2 UTI and obstructive uropathy  - 12/31 CT head (-) No acute intracranial abnormality     - Will continue to monitor  -Geriatrics:  Mild cognitive impairment  Recommend neuropsych for capacity/competency  - neuropsych consulted, appreciate their recommendations      REYNOLD (acute kidney injury) [facility])  Assessment & Plan  REYNOLD likely 2/2 obstructive uropathy on top of CKD-3   Resolved   - trend BMP daily       Lab Results   Component Value Date    EGFR 79 01/07/2021    EGFR 82 01/06/2021    EGFR 81 01/05/2021    BUN 17 01/07/2021    BUN 14 01/06/2021    BUN 15 01/05/2021    CREATININE 1 11 01/07/2021    CREATININE 1 08 01/06/2021    CREATININE 1 09 01/05/2021         Ascending aortic aneurysm [facility])  Assessment & Plan  Hx/o AAA  CTCAP on admission (+) Ascending aortic aneurysm measuring 4 3 cm  HLD (hyperlipidemia)  Assessment & Plan   - Lipid panel 12/31 wnl:  Chol 138, TG's 107, HDL 39, LDL 78  - Continue PTA Lipitor 20 mg         Essential hypertension  Assessment & Plan  Stable  - Continue PTA Amlodipine 5 mg               PPX: Lovenox  Diet:  Regular house  Code Status:  Level 1 full code  Dispo:  Spoke with son, Shanti Speak  Pending placement, medically cleared     Plan D/W Dr Thomson and [facility] Team     Subjective:   Patient was seen and examined at bedside  Patient reports no overnight events or complaints or concerns  Patient states he has no chest pain, abdominal pain, diarrhea/constipation, fever/chills or suprapubic pain  Patient states no pain with his Ni catheter or discomfort  Objective:     Vitals: Blood pressure 116/71, pulse 78, temperature 97 9 °F (36 6 °C), temperature source Oral, resp  rate 17, height 5' 7" (1 702 m), weight 68 6 kg (151 lb 3 8 oz), SpO2 97 %  ,Body mass index is 23 69 kg/m²  Intake/Output Summary (Last 24 hours) at 1/7/2021 0927  Last data filed at 1/6/2021 2147  Gross per 24 hour   Intake 720 ml   Output 825 ml   Net -105 ml       Physical Exam:   Physical Exam  Vitals signs reviewed  Constitutional:       General: He is not in acute distress  Appearance: Normal appearance  He is normal weight  He is not ill-appearing  HENT:      Head: Normocephalic and atraumatic  Nose: Nose normal  No congestion  Eyes:      General: No scleral icterus  Conjunctiva/sclera: Conjunctivae normal       Pupils: Pupils are equal, round, and reactive to light  Neck:      Musculoskeletal: Normal range of motion  Cardiovascular:      Rate and Rhythm: Normal rate and regular rhythm  Pulses: Normal pulses  Heart sounds: Normal heart sounds  No murmur  Pulmonary:      Effort: Pulmonary effort is normal       Breath sounds: Normal breath sounds  No wheezing     Chest: Chest wall: No tenderness  Abdominal:      General: Abdomen is flat  Bowel sounds are normal       Palpations: There is no mass  Tenderness: There is no abdominal tenderness  There is no right CVA tenderness or left CVA tenderness  Genitourinary:     Comments: Ni catheter in place  Musculoskeletal: Normal range of motion  General: No tenderness  Right lower leg: No edema  Left lower leg: No edema  Skin:     General: Skin is warm  Capillary Refill: Capillary refill takes less than 2 seconds  Findings: No bruising or rash  Neurological:      Mental Status: He is alert and oriented to person, place, and time  Psychiatric:         Mood and Affect: Mood normal          Behavior: Behavior normal        Invasive Devices     Peripheral Intravenous Line            Peripheral IV 01/04/21 Dorsal (posterior); Left Forearm 2 days          Drain            Urethral Catheter 18 Fr  6 days                           Lab and other studies:  I have personally reviewed pertinent reports  No results displayed because visit has over 200 results            Recent Results (from the past 24 hour(s))   CBC    Collection Time: 01/07/21  7:25 AM   Result Value Ref Range    WBC 8 59 4 31 - 10 16 Thousand/uL    RBC 3 92 3 88 - 5 62 Million/uL    Hemoglobin 10 8 (L) 12 0 - 17 0 g/dL    Hematocrit 34 4 (L) 36 5 - 49 3 %    MCV 88 82 - 98 fL    MCH 27 6 26 8 - 34 3 pg    MCHC 31 4 31 4 - 37 4 g/dL    RDW 14 9 11 6 - 15 1 %    Platelets 010 628 - 527 Thousands/uL    MPV 10 9 8 9 - 12 7 fL   Basic metabolic panel    Collection Time: 01/07/21  7:25 AM   Result Value Ref Range    Sodium 140 136 - 145 mmol/L    Potassium 3 4 (L) 3 5 - 5 3 mmol/L    Chloride 114 (H) 100 - 108 mmol/L    CO2 20 (L) 21 - 32 mmol/L    ANION GAP 6 4 - 13 mmol/L    BUN 17 5 - 25 mg/dL    Creatinine 1 11 0 60 - 1 30 mg/dL    Glucose 122 65 - 140 mg/dL    Calcium 8 4 8 3 - 10 1 mg/dL    eGFR 79 ml/min/1 73sq m     Blood Culture: No results found for: BLOODCX,   Urinalysis:   Lab Results   Component Value Date    COLORU - 12/31/2020    COLORU Yellow 12/31/2020    CLARITYU Clear 12/31/2020    SPECGRAV 1 020 12/31/2020    PHUR 6 0 12/31/2020    LEUKOCYTESUR Large (A) 12/31/2020    NITRITE Positive (A) 12/31/2020    GLUCOSEU Negative 12/31/2020    KETONESU Negative 12/31/2020    BILIRUBINUR Negative 12/31/2020    BLOODU Small (A) 12/31/2020   ,   Urine Culture:   Lab Results   Component Value Date    URINECX 50,000-59,000 cfu/ml Pseudomonas aeruginosa (A) 12/31/2020    URINECX 50,000-59,000 cfu/ml Pseudomonas aeruginosa (A) 12/31/2020    URINECX 50,000-59,000 cfu/ml Staphylococcus lugdunensis (A) 12/31/2020   ,   Wound Culure: No results found for: WOUNDCULT      Imaging:  None       VTE Pharmacologic Prophylaxis: Enoxaparin (Lovenox)  VTE Mechanical Prophylaxis: sequential compression device and foot pump applied    Current Facility-Administered Medications   Medication Dose Route Frequency    amLODIPine (NORVASC) tablet 5 mg  5 mg Oral Daily    aspirin (ECOTRIN LOW STRENGTH) EC tablet 81 mg  81 mg Oral Daily    atorvastatin (LIPITOR) tablet 10 mg  10 mg Oral Daily    heparin (porcine) subcutaneous injection 5,000 Units  5,000 Units Subcutaneous Q8H [address]    sodium chloride (PF) 0 9 % injection 3 mL  3 mL Intravenous Q1H PRN    sodium chloride 0 9 % infusion  75 mL/hr Intravenous Continuous    tamsulosin (FLOMAX) capsule 0 4 mg  0 4 mg Oral Daily With Stu Rubi DO  Family Medicine Resident PGY1

## 2021-01-08 NOTE — PROGRESS NOTES
Progress Note - January Strickland 1955, 77 y o  male MRN: [id number]    Unit/Bed#: [facility] (formerly [facility]) 457-01 Encounter: [id number]    Primary Care Provider: JOHNSON Cervantes   Date and time admitted to hospital: 12/31/2020  4:24 PM        * Obstructive uropathy  Assessment & Plan  Medically cleared, awaiting long-term care placement  - 12/31 CTCAP (+) Marked distention of the bladder extending well into the mid abdomen, compatible with distal outlet obstruction  Possible small amount of debris or hemorrhage at the base of the bladder  Mild prostate enlargement  - continue PTA Tamsulosin (Flomax) 0 4 mg PO Daily  - avoid nephrotoxic agents  - urology consulted-for long term management of obstructive uropathy for possible need of chronic escalante or continuation of straight cath vs; UroLift vs  TURP  Patient does not want to do any of the aforementioned procedures besides self cathing  Metabolic acidosis, increased anion gap  Assessment & Plan  VBG on admission (+) metabolic acidosis, pH 2 66, pCO2 33 8, HC03 14 9   (+) AG 14    - likely 2/2 uremia given (-) ASA, (-) acetaminophen, and (-) ethanol level  Urinary tract infection  Assessment & Plan  - Resolved   - trend daily CBC        REYNOLD (acute kidney injury) [facility])  Assessment & Plan  REYNOLD likely 2/2 obstructive uropathy on top of CKD-3   Resolved    - trend BMP daily       Lab Results   Component Value Date    EGFR 92 01/08/2021    EGFR 79 01/07/2021    EGFR 82 01/06/2021    BUN 11 01/08/2021    BUN 17 01/07/2021    BUN 14 01/06/2021    CREATININE 0 98 01/08/2021    CREATININE 1 11 01/07/2021    CREATININE 1 08 01/06/2021         Ascending aortic aneurysm [facility])  Assessment & Plan  Hx/o AAA  CTCAP on admission (+) Ascending aortic aneurysm measuring 4 3 cm           HLD (hyperlipidemia)  Assessment & Plan   - Lipid panel 12/31 wnl:  Chol 138, TG's 107, HDL 39, LDL 78  - Continue PTA Lipitor 20 mg          Essential hypertension  Assessment & Plan  Stable - Continue PTA Amlodipine 5 mg               PPX: Lovenox  Diet:  Regular house  Code Status:  Level 1 full code  Dispo:  Pending placement, medically cleared     Plan D/W Dr Dwyer and [facility] Team     Subjective:   Patient was seen and examined at bedside   Patient reports no overnight events or complaints or concerns  Sadia Saul states he has no chest pain, abdominal pain, diarrhea/constipation, fever/chills or suprapubic pain  Patient states no pain with his Ni catheter or discomfort  Patient is medically clear for discharge  Will reach out to Case Management today for more information  Objective:     Vitals: Blood pressure 128/57, pulse 78, temperature 98 2 °F (36 8 °C), resp  rate 18, height 5' 7" (1 702 m), weight 68 6 kg (151 lb 3 8 oz), SpO2 100 %  ,Body mass index is 23 69 kg/m²  Intake/Output Summary (Last 24 hours) at 1/8/2021 0918  Last data filed at 1/8/2021 0530  Gross per 24 hour   Intake 1751 25 ml   Output 2275 ml   Net -523 75 ml       Physical Exam:   Physical Exam  Vitals signs reviewed  Constitutional:       General: He is not in acute distress      Appearance: Normal appearance  He is normal weight  He is not ill-appearing  HENT:      Head: Normocephalic and atraumatic       Nose: Nose normal  No congestion  Eyes:      General: No scleral icterus      Conjunctiva/sclera: Conjunctivae normal       Pupils: Pupils are equal, round, and reactive to light  Neck:      Musculoskeletal: Normal range of motion  Cardiovascular:      Rate and Rhythm: Normal rate and regular rhythm       Pulses: Normal pulses       Heart sounds: Normal heart sounds  No murmur  Pulmonary:      Effort: Pulmonary effort is normal       Breath sounds: Normal breath sounds  No wheezing  Chest:      Chest wall: No tenderness  Abdominal:      General: Abdomen is flat  Bowel sounds are normal       Palpations: There is no mass       Tenderness: There is no abdominal tenderness   There is no right CVA tenderness or left CVA tenderness  Genitourinary:     Comments: Ni catheter in place  Musculoskeletal: Normal range of motion          General: No tenderness       Right lower leg: No edema       Left lower leg: No edema  Skin:     General: Skin is warm       Capillary Refill: Capillary refill takes less than 2 seconds       Findings: No bruising or rash  Neurological:      Mental Status: He is alert and oriented to person, place, and time  Psychiatric:         Mood and Affect: Mood normal   Blunt/flat affect      Behavior: Behavior normal      Invasive Devices     Peripheral Intravenous Line            Peripheral IV 01/08/21 Right Forearm less than 1 day          Drain            Urethral Catheter 18 Fr  7 days                           Lab and other studies:  I have personally reviewed pertinent reports  No results displayed because visit has over 200 results            Recent Results (from the past 24 hour(s))   Basic metabolic panel    Collection Time: 01/08/21  5:23 AM   Result Value Ref Range    Sodium 138 136 - 145 mmol/L    Potassium 3 5 3 5 - 5 3 mmol/L    Chloride 110 (H) 100 - 108 mmol/L    CO2 22 21 - 32 mmol/L    ANION GAP 6 4 - 13 mmol/L    BUN 11 5 - 25 mg/dL    Creatinine 0 98 0 60 - 1 30 mg/dL    Glucose 115 65 - 140 mg/dL    Calcium 8 8 8 3 - 10 1 mg/dL    eGFR 92 ml/min/1 73sq m     Blood Culture: No results found for: BLOODCX,   Urinalysis:   Lab Results   Component Value Date    COLORU - 12/31/2020    COLORU Yellow 12/31/2020    CLARITYU Clear 12/31/2020    SPECGRAV 1 020 12/31/2020    PHUR 6 0 12/31/2020    LEUKOCYTESUR Large (A) 12/31/2020    NITRITE Positive (A) 12/31/2020    GLUCOSEU Negative 12/31/2020    KETONESU Negative 12/31/2020    BILIRUBINUR Negative 12/31/2020    BLOODU Small (A) 12/31/2020   ,   Urine Culture:   Lab Results   Component Value Date    URINECX 50,000-59,000 cfu/ml Pseudomonas aeruginosa (A) 12/31/2020    URINECX 50,000-59,000 cfu/ml Pseudomonas aeruginosa (A) 12/31/2020    URINECX 50,000-59,000 cfu/ml Staphylococcus lugdunensis (A) 12/31/2020   ,   Wound Culure: No results found for: WOUNDCULT      Imaging:  None       VTE Pharmacologic Prophylaxis: Enoxaparin (Lovenox)  VTE Mechanical Prophylaxis: sequential compression device and foot pump applied    Current Facility-Administered Medications   Medication Dose Route Frequency    amLODIPine (NORVASC) tablet 5 mg  5 mg Oral Daily    aspirin (ECOTRIN LOW STRENGTH) EC tablet 81 mg  81 mg Oral Daily    atorvastatin (LIPITOR) tablet 10 mg  10 mg Oral Daily    heparin (porcine) subcutaneous injection 5,000 Units  5,000 Units Subcutaneous Q8H [address]    sodium chloride (PF) 0 9 % injection 3 mL  3 mL Intravenous Q1H PRN    tamsulosin (FLOMAX) capsule 0 4 mg  0 4 mg Oral Daily With Stu Rubi DO  Family Medicine Resident PGY1

## 2021-01-08 NOTE — ASSESSMENT & PLAN NOTE
VBG on admission (+) metabolic acidosis, pH 0 57, pCO2 33 8, HC03 14 9   (+) AG 14    - likely 2/2 uremia given (-) ASA, (-) acetaminophen, and (-) ethanol level

## 2021-01-08 NOTE — ASSESSMENT & PLAN NOTE
REYNOLD likely 2/2 obstructive uropathy on top of CKD-3   Resolved    - trend BMP daily       Lab Results   Component Value Date    EGFR 92 01/08/2021    EGFR 79 01/07/2021    EGFR 82 01/06/2021    BUN 11 01/08/2021    BUN 17 01/07/2021    BUN 14 01/06/2021    CREATININE 0 98 01/08/2021    CREATININE 1 11 01/07/2021    CREATININE 1 08 01/06/2021

## 2021-01-08 NOTE — PLAN OF CARE
Problem: Potential for Falls  Goal: Patient will remain free of falls  Description: INTERVENTIONS:  - Assess patient frequently for physical needs  -  Identify cognitive and physical deficits and behaviors that affect risk of falls    -  [place] fall precautions as indicated by assessment   - Educate patient/family on patient safety including physical limitations  - Instruct patient to call for assistance with activity based on assessment  - Modify environment to reduce risk of injury  - Consider OT/PT consult to assist with strengthening/mobility  Outcome: Progressing

## 2021-01-08 NOTE — CASE MANAGEMENT
Ambrocio Menendez, [facility] and [facility] following  WVM to have reached out to son regarding placement  CM will continue to follow

## 2021-01-08 NOTE — PHYSICAL THERAPY NOTE
Physical Therapy Progress Note        01/08/21 1350   PT Last Visit   PT Visit Date 01/08/21   Note Type   Note Type Treatment   Pain Assessment   Pain Assessment Tool Pain Assessment not indicated - pt denies pain   Pain Score No Pain   Hospital Pain Intervention(s) Repositioned   Restrictions/Precautions   Weight Bearing Precautions Per Order No   Other Precautions Cognitive; Bed Alarm   General   Chart Reviewed Yes   Family/Caregiver Present No   Cognition   Overall Cognitive Status Impaired   Arousal/Participation Alert   Comments Patient difficult to attend to session and refusing OOB   Bed Mobility   Sit to Supine 5  Supervision   Additional items Bedrails   Balance   Static Sitting Good   Dynamic Sitting Good   Endurance Deficit   Endurance Deficit Yes   Activity Tolerance   Activity Tolerance Treatment limited secondary to agitation   Nurse Made Aware Appropriate to see per RN   Assessment   Prognosis Guarded   Problem List Decreased mobility; Decreased coordination;Decreased cognition; Impaired judgement;Decreased safety awareness   Assessment Patient lying supine in bed, agreeable to sit EOB with therapy, following education  He was able to sit EOB with S and perform dynamic movements  He states, "I can move if I want to, I don't need help"  Patient refused OOB despite continued education  He sat EOB x10 minutes with no LOB  He would continue to benefit from skilled PT to maximize functional independence  Barriers to Discharge Inaccessible home environment;Decreased caregiver support   Goals   Patient Goals To get out of here   STG Expiration Date 01/15/21   PT Treatment Day 1   Plan   Treatment/Interventions Functional transfer training; Endurance training;Bed mobility;Spoke to nursing   Progress Slow progress, decreased activity tolerance   PT Frequency 2-3x/wk   Recommendation   PT Discharge Recommendation Post-Acute Rehabilitation Services   Equipment Recommended   (TBD)   PT - OK to Discharge Yes  (when medically stable)     Lio Kuldip, PTA

## 2021-01-08 NOTE — ASSESSMENT & PLAN NOTE
Medically cleared, awaiting long-term care placement  - 12/31 CTCAP (+) Marked distention of the bladder extending well into the mid abdomen, compatible with distal outlet obstruction  Possible small amount of debris or hemorrhage at the base of the bladder  Mild prostate enlargement  - continue PTA Tamsulosin (Flomax) 0 4 mg PO Daily  - avoid nephrotoxic agents  - urology consulted-for long term management of obstructive uropathy for possible need of chronic escalante or continuation of straight cath vs; UroLift vs  TURP  Patient does not want to do any of the aforementioned procedures besides self cathing

## 2021-01-08 NOTE — PLAN OF CARE
Problem: PHYSICAL THERAPY ADULT  Goal: Performs mobility at highest level of function for planned discharge setting  See evaluation for individualized goals  Description: Treatment/Interventions: Functional transfer training, LE strengthening/ROM, Elevations, Therapeutic exercise, Endurance training, Cognitive reorientation, Patient/family training, Equipment eval/education, Bed mobility, Gait training, Compensatory technique education, Spoke to nursing, OT, Family          See flowsheet documentation for full assessment, interventions and recommendations  Note: Prognosis: Guarded  Problem List: Decreased mobility, Decreased coordination, Decreased cognition, Impaired judgement, Decreased safety awareness  Assessment: Patient lying supine in bed, agreeable to sit EOB with therapy, following education  He was able to sit EOB with S and perform dynamic movements  He states, "I can move if I want to, I don't need help"  Patient refused OOB despite continued education  He sat EOB x10 minutes with no LOB  He would continue to benefit from skilled PT to maximize functional independence  Barriers to Discharge: Inaccessible home environment, Decreased caregiver support     PT Discharge Recommendation: [address] [place]     PT - OK to Discharge: Yes(when medically stable)    See flowsheet documentation for full assessment

## 2021-01-09 NOTE — PLAN OF CARE
Problem: Potential for Falls  Goal: Patient will remain free of falls  Description: INTERVENTIONS:  - Assess patient frequently for physical needs  -  Identify cognitive and physical deficits and behaviors that affect risk of falls    -  [place] fall precautions as indicated by assessment   - Educate patient/family on patient safety including physical limitations  - Instruct patient to call for assistance with activity based on assessment  - Modify environment to reduce risk of injury  - Consider OT/PT consult to assist with strengthening/mobility  Outcome: Progressing     Problem: Prexisting or High Potential for Compromised Skin Integrity  Goal: Skin integrity is maintained or improved  Description: INTERVENTIONS:  - Identify patients at risk for skin breakdown  - Assess and monitor skin integrity  - Assess and monitor nutrition and hydration status  - Monitor labs   - Assess for incontinence   - Turn and reposition patient  - Assist with mobility/ambulation  - Relieve pressure over bony prominences  - Avoid friction and shearing  - Provide appropriate hygiene as needed including keeping skin clean and dry  - Evaluate need for skin moisturizer/barrier cream  - Collaborate with interdisciplinary team   - Patient/family teaching  - Consider wound care consult   Outcome: Progressing     Problem: NEUROSENSORY - ADULT  Goal: Achieves stable or improved neurological status  Description: INTERVENTIONS  - Monitor and report changes in neurological status  - Monitor vital signs such as temperature, blood pressure, glucose, and any other labs ordered   - Initiate measures to prevent increased intracranial pressure  - Monitor for seizure activity and implement precautions if appropriate      Outcome: Progressing  Goal: Achieves maximal functionality and self care  Description: INTERVENTIONS  - Monitor swallowing and airway patency with patient fatigue and changes in neurological status  - Encourage and assist patient to increase activity and self care     - Encourage visually impaired, hearing impaired and aphasic patients to use assistive/communication devices  Outcome: Progressing     Problem: GENITOURINARY - ADULT  Goal: Maintains or returns to baseline urinary function  Description: INTERVENTIONS:  - Assess urinary function  - Encourage oral fluids to ensure adequate hydration if ordered  - Administer IV fluids as ordered to ensure adequate hydration  - Administer ordered medications as needed  - Offer frequent toileting  - Follow urinary retention protocol if ordered  Outcome: Progressing  Goal: Absence of urinary retention  Description: INTERVENTIONS:  - Assess patients ability to void and empty bladder  - Monitor I/O  - Bladder scan as needed  - Discuss with physician/AP medications to alleviate retention as needed  - Discuss catheterization for long term situations as appropriate  Outcome: Progressing  Goal: Urinary catheter remains patent  Description: INTERVENTIONS:  - Assess patency of urinary catheter  - If patient has a chronic escalante, consider changing catheter if non-functioning  - Follow guidelines for intermittent irrigation of non-functioning urinary catheter  Outcome: Progressing     Problem: MUSCULOSKELETAL - ADULT  Goal: Maintain or return mobility to safest level of function  Description: INTERVENTIONS:  - Assess patient's ability to carry out ADLs; assess patient's baseline for ADL function and identify physical deficits which impact ability to perform ADLs (bathing, care of mouth/teeth, toileting, grooming, dressing, etc )  - Assess/evaluate cause of self-care deficits   - Assess range of motion  - Assess patient's mobility  - Assess patient's need for assistive devices and provide as appropriate  - Encourage maximum independence but intervene and supervise when necessary  - Involve family in performance of ADLs  - Assess for home care needs following discharge   - Consider OT consult to assist with ADL evaluation and planning for discharge  - Provide patient education as appropriate  Outcome: Progressing     Problem: SAFETY ADULT  Goal: Patient will remain free of falls  Description: INTERVENTIONS:  - Assess patient frequently for physical needs  -  Identify cognitive and physical deficits and behaviors that affect risk of falls    -  [place] fall precautions as indicated by assessment   - Educate patient/family on patient safety including physical limitations  - Instruct patient to call for assistance with activity based on assessment  - Modify environment to reduce risk of injury  - Consider OT/PT consult to assist with strengthening/mobility  Outcome: Progressing  Goal: Maintain or return to baseline ADL function  Description: INTERVENTIONS:  -  Assess patient's ability to carry out ADLs; assess patient's baseline for ADL function and identify physical deficits which impact ability to perform ADLs (bathing, care of mouth/teeth, toileting, grooming, dressing, etc )  - Assess/evaluate cause of self-care deficits   - Assess range of motion  - Assess patient's mobility; develop plan if impaired  - Assess patient's need for assistive devices and provide as appropriate  - Encourage maximum independence but intervene and supervise when necessary  - Involve family in performance of ADLs  - Assess for home care needs following discharge   - Consider OT consult to assist with ADL evaluation and planning for discharge  - Provide patient education as appropriate  Outcome: Progressing  Goal: Maintain or return mobility status to optimal level  Description: INTERVENTIONS:  - Assess patient's baseline mobility status (ambulation, transfers, stairs, etc )    - Identify cognitive and physical deficits and behaviors that affect mobility  - Identify mobility aids required to assist with transfers and/or ambulation (gait belt, sit-to-stand, lift, walker, cane, etc )  - [place] fall precautions as indicated by assessment  - Record patient progress and toleration of activity level on Mobility SBAR; progress patient to next Phase/Stage  - Instruct patient to call for assistance with activity based on assessment  - Consider rehabilitation consult to assist with strengthening/weightbearing, etc   Outcome: Progressing     Problem: Knowledge Deficit  Goal: Patient/family/caregiver demonstrates understanding of disease process, treatment plan, medications, and discharge instructions  Description: Complete learning assessment and assess knowledge base  Interventions:  - Provide teaching at level of understanding  - Provide teaching via preferred learning methods  Outcome: Progressing     Problem: Nutrition/Hydration-ADULT  Goal: Nutrient/Hydration intake appropriate for improving, restoring or maintaining nutritional needs  Description: Monitor and assess patient's nutrition/hydration status for malnutrition  Collaborate with interdisciplinary team and initiate plan and interventions as ordered  Monitor patient's weight and dietary intake as ordered or per policy  Utilize nutrition screening tool and intervene as necessary  Determine patient's food preferences and provide high-protein, high-caloric foods as appropriate       INTERVENTIONS:  - Monitor oral intake, urinary output, labs, and treatment plans  - Assess nutrition and hydration status and recommend course of action  - Evaluate amount of meals eaten  - Assist patient with eating if necessary   - Allow adequate time for meals  - Recommend/ encourage appropriate diets, oral nutritional supplements, and vitamin/mineral supplements  - Order, calculate, and assess calorie counts as needed  - Recommend, monitor, and adjust tube feedings and TPN/PPN based on assessed needs  - Assess need for intravenous fluids  - Provide specific nutrition/hydration education as appropriate  - Include patient/family/caregiver in decisions related to nutrition  Outcome: Progressing

## 2021-01-09 NOTE — CASE MANAGEMENT
Cm rec'd message from medical team,per medical team, pt is medically stable for discharged pending placement  Pt to discharged to inpt rehab  Cm spoke to pt's son Francoise Payne, reviewed referral choices  Levi is agreeable to [facility] as first choice  Cm updated facility via [address]  Awaiting respond  Cm will continue to follow

## 2021-01-09 NOTE — ASSESSMENT & PLAN NOTE
- encephalopathy likely 2/2 UTI and obstructive uropathy  Now resolved  - 12/31 CT head (-) No acute intracranial abnormality     - Geriatrics:  Mild cognitive impairment  Recommend neuropsych for capacity/competency  - neuropsych consulted, appreciate their recommendations

## 2021-01-09 NOTE — ASSESSMENT & PLAN NOTE
- VBG on admission (+) metabolic acidosis, pH 2 80, pCO2 33 8, HC03 14 9   (+) AG 14    - likely 2/2 uremia given (-) ASA, (-) acetaminophen, and (-) ethanol level

## 2021-01-09 NOTE — PROGRESS NOTES
Progress Note - Noe Baker 1955, 77 y o  male MRN: [id number]    Unit/Bed#: -01 Encounter: [id number]    Primary Care Provider: JOHNSON Paul   Date and time admitted to hospital: 12/31/2020  4:24 PM        * Obstructive uropathy  Assessment & Plan  Medically cleared, awaiting long-term care placement  - 12/31 CTCAP (+) Marked distention of the bladder extending well into the mid abdomen, compatible with distal outlet obstruction  Possible small amount of debris or hemorrhage at the base of the bladder  Mild prostate enlargement  - continue PTA Tamsulosin (Flomax) 0 4 mg PO Daily  - avoid nephrotoxic agents  - urology consulted-for long term management of obstructive uropathy for possible need of chronic escalante or continuation of straight cath vs; UroLift vs  TURP  Patient does not want to do any of the aforementioned procedures besides self cathing  REYNOLD (acute kidney injury) [facility])  Assessment & Plan  REYNOLD likely 2/2 obstructive uropathy on top of CKD-3   Resolved    - trend [facility] daily       Lab Results   Component Value Date    EGFR 92 01/08/2021    EGFR 79 01/07/2021    EGFR 82 01/06/2021    BUN 11 01/08/2021    BUN 17 01/07/2021    BUN 14 01/06/2021    CREATININE 0 98 01/08/2021    CREATININE 1 11 01/07/2021    CREATININE 1 08 31/44/5802         Metabolic acidosis, increased anion gap  Assessment & Plan  - VBG on admission (+) metabolic acidosis, pH 8 09, pCO2 33 8, HC03 14 9   (+) AG 14    - likely 2/2 uremia given (-) ASA, (-) acetaminophen, and (-) ethanol level  Urinary tract infection  Assessment & Plan  - Resolved   - trend daily CBC        Encephalopathy acute  Assessment & Plan  - encephalopathy likely 2/2 UTI and obstructive uropathy  Now resolved  - 12/31 CT head (-) No acute intracranial abnormality     - Geriatrics:  Mild cognitive impairment  Recommend neuropsych for capacity/competency    - neuropsych consulted, appreciate their recommendations  Ascending aortic aneurysm [facility])  Assessment & Plan  Hx/o AAA  CTCAP on admission (+) Ascending aortic aneurysm measuring 4 3 cm  HLD (hyperlipidemia)  Assessment & Plan   - Lipid panel 12/31 wnl:  Chol 138, TG's 107, HDL 39, LDL 78  - Continue PTA Lipitor 20 mg          Essential hypertension  Assessment & Plan  Stable   - Continue PTA Amlodipine 5 mg                   Subjective/Objective     Subjective:   Patient seen and examined at bedside  Resting comfortable in bed  Denies any complains at this time  Objective:  Vitals: Blood pressure 156/73, pulse 76, temperature 98 1 °F (36 7 °C), temperature source Oral, resp  rate 18, height 5' 7" (1 702 m), weight 63 7 kg (140 lb 6 9 oz), SpO2 100 %  ,Body mass index is 21 99 kg/m²  Intake/Output Summary (Last 24 hours) at 1/9/2021 0927  Last data filed at 1/9/2021 0548  Gross per 24 hour   Intake 120 ml   Output 1225 ml   Net -1105 ml       Invasive Devices     Peripheral Intravenous Line            Peripheral IV 01/08/21 Right Forearm 1 day          Drain            Urethral Catheter 18 Fr  8 days                Physical Exam  Vitals signs reviewed  Constitutional:       General: He is not in acute distress  Appearance: Normal appearance  He is normal weight  He is not ill-appearing or diaphoretic  HENT:      Head: Normocephalic and atraumatic  Eyes:      Conjunctiva/sclera: Conjunctivae normal    Cardiovascular:      Rate and Rhythm: Normal rate and regular rhythm  Pulmonary:      Effort: Pulmonary effort is normal    Musculoskeletal: Normal range of motion  Skin:     General: Skin is warm and dry  Neurological:      General: No focal deficit present  Mental Status: He is alert and oriented to person, place, and time  Mental status is at baseline     Psychiatric:         Mood and Affect: Mood normal          Behavior: Behavior normal          Lab, Imaging and other studies: I have personally reviewed pertinent reports       Results from last 7 days   Lab Units 01/07/21  0725 01/06/21  0459 01/05/21  0509 01/04/21  0542   WBC Thousand/uL 8 59 10 69* 12 27* 11 91*   HEMOGLOBIN g/dL 10 8* 11 3* 11 1* 11 7*   HEMATOCRIT % 34 4* 35 1* 34 7* 34 4*   PLATELETS Thousands/uL 276 272 255 251   NEUTROS PCT %  --  68 68 69   MONOS PCT %  --  9 9 8     Results from last 7 days   Lab Units 01/08/21  0523 01/07/21  0725 01/06/21  0459   POTASSIUM mmol/L 3 5 3 4* 3 7   CHLORIDE mmol/L 110* 114* 110*   CO2 mmol/L 22 20* 23   BUN mg/dL 11 17 14   CREATININE mg/dL 0 98 1 11 1 08   CALCIUM mg/dL 8 8 8 4 8 4         Lab Results   Component Value Date    PHOS 2 9 10/27/2020    PHOS 3 7 06/18/2020    PHOS 3 3 12/12/2019              0   Lab Value Date/Time    TROPONINI <0 02 12/31/2020 1658    TROPONINI <0 02 08/27/2020 1609    TROPONINI <0 02 12/20/2018 1350    TROPONINI <0 02 07/11/2018 1602    TROPONINI <0 02 07/05/2018 1449    TROPONINI <0 02 02/19/2017 0846     ABG:No results found for: PHART, NCU9ZEI, PO2ART, ARH6QKT, A8IAPDWC, BEART, SOURCE     VTE Pharmacologic Prophylaxis: Heparin     VTE Mechanical Prophylaxis: sequential compression device         Kala Parsons MD  PGY-1

## 2021-01-10 NOTE — ASSESSMENT & PLAN NOTE
RESOLVED  REYNOLD likely 2/2 obstructive uropathy secondary to prostatic hyperplasia with history of CKD-3   - trend BMP daily       Lab Results   Component Value Date    EGFR 92 01/08/2021    EGFR 79 01/07/2021    EGFR 82 01/06/2021    BUN 11 01/08/2021    BUN 17 01/07/2021    BUN 14 01/06/2021    CREATININE 0 98 01/08/2021    CREATININE 1 11 01/07/2021    CREATININE 1 08 01/06/2021

## 2021-01-10 NOTE — ASSESSMENT & PLAN NOTE
- VBG on admission (+) metabolic acidosis, pH 7 23, pCO2 33 8, HC03 14 9   (+) AG 14    - likely 2/2 uremia given (-) ASA, (-) acetaminophen, and (-) ethanol level

## 2021-01-10 NOTE — ASSESSMENT & PLAN NOTE
- encephalopathy likely 2/2 UTI and obstructive uropathy  Now resolved  - 12/31 CT head (-) No acute intracranial abnormality     - Geriatrics:  Mild cognitive impairment  Unable to make medical decision  Recommend neuropsych for capacity/competency  - Neuropsych: Impaired informed medical decision making capabilities  - Next of kin is Levi, who is now POA  Team has updated POA in regards to patient's care, and is agreeable to STR and Ni cath follow up with urology

## 2021-01-10 NOTE — ED PROVIDER NOTES
Patient resting in bed  Patient answered all orientation questions correctly  Ni patent   Patient up and walked to BR with RW

## 2021-01-10 NOTE — QUICK NOTE
Attempted to call patient's son to update him on his father's plan of care, but he did not  after multiple tries  Patient is medically cleared for discharge and pending discharge to inpatient rehab facility  CM following  Will attempt to update patient's son tomorrow      Divine Lopez MD  01/10/21  2:16 PM

## 2021-01-10 NOTE — ASSESSMENT & PLAN NOTE
Medically cleared, awaiting long-term care placement  - 12/31 CTCAP (+) Marked distention of the bladder extending well into the mid abdomen, compatible with distal outlet obstruction  Mild prostate enlargement  - continue PTA Tamsulosin (Flomax) 0 4 mg PO Daily  - avoid nephrotoxic agents  - urology consulted-for long term management of obstructive uropathy for possible need of chronic escalante or continuation of straight cath vs; UroLift vs  TURP  Patient declined surgery, but willing to do intermittent self catheterization regularly    - recommended to follow up with urology outpatient

## 2021-01-10 NOTE — PROGRESS NOTES
Progress Note - Noe Baker 1955, 77 y o  male MRN: [id number]    Unit/Bed#: -01 Encounter: [id number]    Primary Care Provider: JOHNSON Paul   Date and time admitted to hospital: 12/31/2020  4:24 PM        * Obstructive uropathy  Assessment & Plan  Medically cleared, awaiting long-term care placement  - 12/31 CTCAP (+) Marked distention of the bladder extending well into the mid abdomen, compatible with distal outlet obstruction  Mild prostate enlargement  - continue PTA Tamsulosin (Flomax) 0 4 mg PO Daily  - avoid nephrotoxic agents  - urology consulted-for long term management of obstructive uropathy for possible need of chronic escalante or continuation of straight cath vs; UroLift vs  TURP  Patient declined surgery, but willing to do intermittent self catheterization regularly  - recommended to follow up with urology outpatient     Encephalopathy acute  Assessment & Plan  - encephalopathy likely 2/2 UTI and obstructive uropathy  Now resolved  - 12/31 CT head (-) No acute intracranial abnormality     - Geriatrics:  Mild cognitive impairment  Unable to make medical decision  Recommend neuropsych for capacity/competency  - Neuropsych: Impaired informed medical decision making capabilities  - Next of kin is Levi, who is now POA  Team has updated POA in regards to patient's care, and is agreeable to STR and Escalante cath follow up with urology  Metabolic acidosis, increased anion gap  Assessment & Plan  - VBG on admission (+) metabolic acidosis, pH 1 60, pCO2 33 8, HC03 14 9   (+) AG 14    - likely 2/2 uremia given (-) ASA, (-) acetaminophen, and (-) ethanol level          Urinary tract infection  Assessment & Plan  - Resolved   - completed abx course  - trend daily CBC        REYNOLD (acute kidney injury) ([facility] [place] [address] )  Assessment & Plan  RESOLVED  REYNOLD likely 2/2 obstructive uropathy secondary to prostatic hyperplasia with history of CKD-3   - trend BMP daily       Lab Results Component Value Date    EGFR 92 01/08/2021    EGFR 79 01/07/2021    EGFR 82 01/06/2021    BUN 11 01/08/2021    BUN 17 01/07/2021    BUN 14 01/06/2021    CREATININE 0 98 01/08/2021    CREATININE 1 11 01/07/2021    CREATININE 1 08 01/06/2021         Ascending aortic aneurysm [facility])  Assessment & Plan  Hx/o AAA  - CTCAP on admission (+) Ascending aortic aneurysm measuring 4 3 cm  HLD (hyperlipidemia)  Assessment & Plan   - Lipid panel 12/31 wnl:  Chol 138, TG's 107, HDL 39, LDL 78  - Continue PTA Lipitor 20 mg          Essential hypertension  Assessment & Plan  Stable   - Continue PTA Amlodipine 5 mg   Blood Pressure: 134/75                       Subjective/Objective     Subjective:   Patient was seen at bedside this morning and was willing to answer my questions  Patient denies chest pain, chest tightness, change in vision, shortness of breath, abdominal pain, nausea and vomiting  However patient does state that she he has tingling sensation of the for left fingertips but started yesterday  Patient stated that he informed the doctor that saw him last night but they said they would re-evaluate him today  Patient states that is not getting worse but it is not better either  Patient states it occurred after they removed a sticker from his finger yesterday  Patient also refuses to get the heparin injections in his belly stating that it is causing a lot of discomfort  He states he will get them in his arm instead  Does not have any further concerns or questions at this time and acknowledges that we are waiting for placement at short-term rehab for him  And patient is agreeable  No overnight events  Objective:  Vitals: Blood pressure 113/60, pulse 82, temperature 98 3 °F (36 8 °C), temperature source Oral, resp  rate 17, height 5' 7" (1 702 m), weight 63 7 kg (140 lb 6 9 oz), SpO2 100 %  ,Body mass index is 21 99 kg/m²        Intake/Output Summary (Last 24 hours) at 1/10/2021 0530  Last data filed at 1/10/2021 0534  Gross per 24 hour   Intake 360 ml   Output 1650 ml   Net -1290 ml       Invasive Devices     Peripheral Intravenous Line            Peripheral IV 01/08/21 Right Forearm 2 days          Drain            Urethral Catheter 18 Fr  9 days                Physical Exam  Constitutional:       General: He is not in acute distress  Appearance: Normal appearance  He is not ill-appearing, toxic-appearing or diaphoretic  HENT:      Head: Normocephalic and atraumatic  Nose: Nose normal  No congestion or rhinorrhea  Mouth/Throat:      Mouth: Mucous membranes are moist    Eyes:      General: No scleral icterus  Right eye: No discharge  Left eye: No discharge  Extraocular Movements: Extraocular movements intact  Conjunctiva/sclera: Conjunctivae normal       Pupils: Pupils are equal, round, and reactive to light  Neck:      Musculoskeletal: Normal range of motion and neck supple  No neck rigidity or muscular tenderness  Cardiovascular:      Rate and Rhythm: Normal rate and regular rhythm  Pulses: Normal pulses  Heart sounds: Normal heart sounds  No murmur  Pulmonary:      Effort: Pulmonary effort is normal  No respiratory distress  Breath sounds: Normal breath sounds  No stridor  No wheezing, rhonchi or rales  Chest:      Chest wall: No tenderness  Abdominal:      General: Bowel sounds are normal       Palpations: Abdomen is soft  Genitourinary:     Comments: Ni catheter in tact    Musculoskeletal: Normal range of motion  General: No swelling or signs of injury  Right lower leg: No edema  Left lower leg: No edema  Skin:     General: Skin is warm and dry  Capillary Refill: Capillary refill takes less than 2 seconds  Coloration: Skin is not jaundiced or pale  Findings: No erythema or lesion  Neurological:      General: No focal deficit present        Mental Status: He is alert and oriented to person, place, and time       Cranial Nerves: No cranial nerve deficit  Sensory: No sensory deficit  Motor: No weakness  Psychiatric:         Mood and Affect: Mood normal          Behavior: Behavior normal          Thought Content: Thought content normal            Lab, Imaging and other studies: I have personally reviewed pertinent reports      VTE Pharmacologic Prophylaxis: Heparin  VTE Mechanical Prophylaxis: sequential compression device  Disposition: STR placement, medically cleared

## 2021-01-11 NOTE — CASE MANAGEMENT
CM received a phone call from [place] at [facility]  Yumiko Stevens confirmed that pt has been accepted to their facility  Aleshia LH informed CM that pt will be able to be accepted tomorrow  CM to submit auth via availity  CM has informed pt's son, Yanely Goel and guardian Wilbur Belle from [facility] who are both in agreement  Pt is set for dc tomorrow at 3pm via [facility] with [facility]  CM has notified medial team, nursing, pt's family and facility

## 2021-01-11 NOTE — PROGRESS NOTES
Pt resting in bed, denies any pain or discomfort at this time, call bell within reach, will continue to monitor

## 2021-01-11 NOTE — QUICK NOTE
Spoke to Afshan, patient's son who is also the POA, this afternoon to update in regards to Mr Jovana Cavanaugh did not have any questions or concerns at this time and informed me that he was updated by the case management team earlier today in regards to the patient's short-term rehab placement  We will continue to work with the case management team in search of a short-term rehab placement

## 2021-01-11 NOTE — ASSESSMENT & PLAN NOTE
- VBG on admission (+) metabolic acidosis, pH 1 85, pCO2 33 8, HC03 14 9   (+) AG 14    - likely 2/2 uremia given (-) ASA, (-) acetaminophen, and (-) ethanol level

## 2021-01-11 NOTE — CASE MANAGEMENT
CM received a phone call from Tommy Faith from [address] [place] [address] on Aging  Tommy Faith wanted to f/u regarding pt care, placement, and to notify her once a facility has confirmed to accept the pt       Tommy Faith to be contacted at [phone]

## 2021-01-11 NOTE — CASE MANAGEMENT
[facility] can no longer accept pt  CM spoke with Ruslan from [facility] [place] who reported that pt's level of care is too great to keep pt past rehab needs are met  CM sent blanket referrals to facilities in [facility] within a 50mi radius  Cm will continue to follow

## 2021-01-11 NOTE — PLAN OF CARE
Problem: Potential for Falls  Goal: Patient will remain free of falls  Description: INTERVENTIONS:  - Assess patient frequently for physical needs  -  Identify cognitive and physical deficits and behaviors that affect risk of falls    -  [place] fall precautions as indicated by assessment   - Educate patient/family on patient safety including physical limitations  - Instruct patient to call for assistance with activity based on assessment  - Modify environment to reduce risk of injury  - Consider OT/PT consult to assist with strengthening/mobility  Outcome: Progressing     Problem: Prexisting or High Potential for Compromised Skin Integrity  Goal: Skin integrity is maintained or improved  Description: INTERVENTIONS:  - Identify patients at risk for skin breakdown  - Assess and monitor skin integrity  - Assess and monitor nutrition and hydration status  - Monitor labs   - Assess for incontinence   - Turn and reposition patient  - Assist with mobility/ambulation  - Relieve pressure over bony prominences  - Avoid friction and shearing  - Provide appropriate hygiene as needed including keeping skin clean and dry  - Evaluate need for skin moisturizer/barrier cream  - Collaborate with interdisciplinary team   - Patient/family teaching  - Consider wound care consult   Outcome: Progressing     Problem: NEUROSENSORY - ADULT  Goal: Achieves stable or improved neurological status  Description: INTERVENTIONS  - Monitor and report changes in neurological status  - Monitor vital signs such as temperature, blood pressure, glucose, and any other labs ordered   - Initiate measures to prevent increased intracranial pressure  - Monitor for seizure activity and implement precautions if appropriate      Outcome: Progressing  Goal: Achieves maximal functionality and self care  Description: INTERVENTIONS  - Monitor swallowing and airway patency with patient fatigue and changes in neurological status  - Encourage and assist patient to increase activity and self care     - Encourage visually impaired, hearing impaired and aphasic patients to use assistive/communication devices  Outcome: Progressing     Problem: GENITOURINARY - ADULT  Goal: Maintains or returns to baseline urinary function  Description: INTERVENTIONS:  - Assess urinary function  - Encourage oral fluids to ensure adequate hydration if ordered  - Administer IV fluids as ordered to ensure adequate hydration  - Administer ordered medications as needed  - Offer frequent toileting  - Follow urinary retention protocol if ordered  Outcome: Progressing  Goal: Absence of urinary retention  Description: INTERVENTIONS:  - Assess patients ability to void and empty bladder  - Monitor I/O  - Bladder scan as needed  - Discuss with physician/AP medications to alleviate retention as needed  - Discuss catheterization for long term situations as appropriate  Outcome: Progressing  Goal: Urinary catheter remains patent  Description: INTERVENTIONS:  - Assess patency of urinary catheter  - If patient has a chronic escalante, consider changing catheter if non-functioning  - Follow guidelines for intermittent irrigation of non-functioning urinary catheter  Outcome: Progressing     Problem: MUSCULOSKELETAL - ADULT  Goal: Maintain or return mobility to safest level of function  Description: INTERVENTIONS:  - Assess patient's ability to carry out ADLs; assess patient's baseline for ADL function and identify physical deficits which impact ability to perform ADLs (bathing, care of mouth/teeth, toileting, grooming, dressing, etc )  - Assess/evaluate cause of self-care deficits   - Assess range of motion  - Assess patient's mobility  - Assess patient's need for assistive devices and provide as appropriate  - Encourage maximum independence but intervene and supervise when necessary  - Involve family in performance of ADLs  - Assess for home care needs following discharge   - Consider OT consult to assist with ADL evaluation and planning for discharge  - Provide patient education as appropriate  Outcome: Progressing     Problem: SAFETY ADULT  Goal: Patient will remain free of falls  Description: INTERVENTIONS:  - Assess patient frequently for physical needs  -  Identify cognitive and physical deficits and behaviors that affect risk of falls    -  [place] fall precautions as indicated by assessment   - Educate patient/family on patient safety including physical limitations  - Instruct patient to call for assistance with activity based on assessment  - Modify environment to reduce risk of injury  - Consider OT/PT consult to assist with strengthening/mobility  Outcome: Progressing  Goal: Maintain or return to baseline ADL function  Description: INTERVENTIONS:  -  Assess patient's ability to carry out ADLs; assess patient's baseline for ADL function and identify physical deficits which impact ability to perform ADLs (bathing, care of mouth/teeth, toileting, grooming, dressing, etc )  - Assess/evaluate cause of self-care deficits   - Assess range of motion  - Assess patient's mobility; develop plan if impaired  - Assess patient's need for assistive devices and provide as appropriate  - Encourage maximum independence but intervene and supervise when necessary  - Involve family in performance of ADLs  - Assess for home care needs following discharge   - Consider OT consult to assist with ADL evaluation and planning for discharge  - Provide patient education as appropriate  Outcome: Progressing  Goal: Maintain or return mobility status to optimal level  Description: INTERVENTIONS:  - Assess patient's baseline mobility status (ambulation, transfers, stairs, etc )    - Identify cognitive and physical deficits and behaviors that affect mobility  - Identify mobility aids required to assist with transfers and/or ambulation (gait belt, sit-to-stand, lift, walker, cane, etc )  - [place] fall precautions as indicated by assessment  - Record patient progress and toleration of activity level on Mobility SBAR; progress patient to next Phase/Stage  - Instruct patient to call for assistance with activity based on assessment  - Consider rehabilitation consult to assist with strengthening/weightbearing, etc   Outcome: Progressing     Problem: Knowledge Deficit  Goal: Patient/family/caregiver demonstrates understanding of disease process, treatment plan, medications, and discharge instructions  Description: Complete learning assessment and assess knowledge base  Interventions:  - Provide teaching at level of understanding  - Provide teaching via preferred learning methods  Outcome: Progressing     Problem: Nutrition/Hydration-ADULT  Goal: Nutrient/Hydration intake appropriate for improving, restoring or maintaining nutritional needs  Description: Monitor and assess patient's nutrition/hydration status for malnutrition  Collaborate with interdisciplinary team and initiate plan and interventions as ordered  Monitor patient's weight and dietary intake as ordered or per policy  Utilize nutrition screening tool and intervene as necessary  Determine patient's food preferences and provide high-protein, high-caloric foods as appropriate       INTERVENTIONS:  - Monitor oral intake, urinary output, labs, and treatment plans  - Assess nutrition and hydration status and recommend course of action  - Evaluate amount of meals eaten  - Assist patient with eating if necessary   - Allow adequate time for meals  - Recommend/ encourage appropriate diets, oral nutritional supplements, and vitamin/mineral supplements  - Order, calculate, and assess calorie counts as needed  - Recommend, monitor, and adjust tube feedings and TPN/PPN based on assessed needs  - Assess need for intravenous fluids  - Provide specific nutrition/hydration education as appropriate  - Include patient/family/caregiver in decisions related to nutrition  Outcome: Progressing

## 2021-01-11 NOTE — UTILIZATION REVIEW
Continued Stay Review    Date: 01/10/2021                         Current Patient Class: Inpatient  Current Level of Care: Med/Surg    HPI:65 y o  male initially admitted on 12/31/2020  Obstructive Uropathy  Assessment/Plan: Medically cleared  Ni  Geriatrics:  Mild cognitive impairment  Unable to make medical decision  Recommend neuropsych for capacity/competency      VTE Pharmacologic Prophylaxis: Heparin  VTE Mechanical Prophylaxis: sequential compression device  Pertinent Labs/Diagnostic Results:     Results from last 7 days   Lab Units 01/11/21  1107 01/07/21  0725 01/06/21  0459 01/05/21  0509   WBC Thousand/uL  --  8 59 10 69* 12 27*   HEMOGLOBIN g/dL  --  10 8* 11 3* 11 1*   HEMATOCRIT %  --  34 4* 35 1* 34 7*   PLATELETS Thousands/uL 355 276 272 255   NEUTROS ABS Thousands/µL  --   --  7 29 8 46*     Results from last 7 days   Lab Units 01/10/21  0529 01/08/21  0523 01/07/21  0725 01/06/21  0459 01/05/21  0509   SODIUM mmol/L 135* 138 140 138 136   POTASSIUM mmol/L 3 9 3 5 3 4* 3 7 3 2*   CHLORIDE mmol/L 107 110* 114* 110* 109*   CO2 mmol/L 23 22 20* 23 22   ANION GAP mmol/L 5 6 6 5 5   BUN mg/dL 12 11 17 14 15   CREATININE mg/dL 1 05 0 98 1 11 1 08 1 09   EGFR ml/min/1 73sq m 84 92 79 82 81   CALCIUM mg/dL 9 0 8 8 8 4 8 4 8 0*     Results from last 7 days   Lab Units 01/10/21  0529 01/08/21  0523 01/07/21  0725 01/06/21  0459 01/05/21  0509   GLUCOSE RANDOM mg/dL 110 115 122 112 117     Vital Signs: /72 (BP Location: Left arm)   Pulse 80   Temp 98 °F (36 7 °C) (Oral)   Resp 16   Ht 5' 7" (1 702 m)   Wt 63 5 kg (139 lb 15 9 oz)   SpO2 100%   BMI 21 93 kg/m²       Medications:   Scheduled Medications:  amLODIPine, 5 mg, Oral, Daily  aspirin, 81 mg, Oral, Daily  atorvastatin, 10 mg, Oral, Daily  enoxaparin, 30 mg, Subcutaneous, Q12H ASTON  tamsulosin, 0 4 mg, Oral, Daily With Dinner      Continuous IV Infusions:     PRN Meds:  sodium chloride (PF), 3 mL, Intravenous, Q1H PRN        Discharge Plan: TBD - waiting long term placement  Network Utilization Review Department  ATTENTION: Please call with any questions or concerns to [phone] and carefully listen to the prompts so that you are directed to the right person  All voicemails are confidential   Nadiya Glass all requests for admission clinical reviews, approved or denied determinations and any other requests to dedicated fax number below belonging to the campus where the patient is receiving treatment   List of dedicated fax numbers for the Facilities:  [address] DENIALS (Administrative/Medical Necessity) [phone]   [address] (Maternity/NICU/Pediatrics) [phone] [address] [place] [address] Dr Gian King 2402 (  Patricia Lundberg "Thao" 103) [address] [place] [address] Alverto Sampson 14[address] [place] [address] [phone]

## 2021-01-11 NOTE — PROGRESS NOTES
Progress Note - Hilaria Linker 1955, 77 y o  male MRN: [id number]    Unit/Bed#: -01 Encounter: [id number]    Primary Care Provider: JOHNSON Malave   Date and time admitted to hospital: 12/31/2020  4:24 PM        * Obstructive uropathy  Assessment & Plan  Medically cleared, awaiting long-term care placement  - 12/31 CTCAP (+) Marked distention of the bladder extending well into the mid abdomen, compatible with distal outlet obstruction  Mild prostate enlargement  - continue PTA Tamsulosin (Flomax) 0 4 mg PO Daily  - avoid nephrotoxic agents  - urology consulted-for long term management of obstructive uropathy for possible need of chronic escalante or continuation of straight cath vs; UroLift vs  TURP  Patient declined surgery, but willing to do intermittent self catheterization regularly  - recommended to follow up with urology outpatient     Encephalopathy acute  Assessment & Plan  - encephalopathy likely 2/2 UTI and obstructive uropathy  Now resolved  - 12/31 CT head (-) No acute intracranial abnormality     - Geriatrics:  Mild cognitive impairment  Unable to make medical decision  Recommend neuropsych for capacity/competency  - Neuropsych: Impaired informed medical decision making capabilities  - Next of kin is Levi, who is now POA  Team has updated POA in regards to patient's care, and is agreeable to STR and Escalante cath follow up with urology  Metabolic acidosis, increased anion gap  Assessment & Plan  - VBG on admission (+) metabolic acidosis, pH 1 49, pCO2 33 8, HC03 14 9   (+) AG 14    - likely 2/2 uremia given (-) ASA, (-) acetaminophen, and (-) ethanol level          Urinary tract infection  Assessment & Plan  - Resolved   - completed abx course  - trend daily CBC        REYNOLD (acute kidney injury) ([facility] [address] )  Assessment & Plan  RESOLVED  REYNOLD likely 2/2 obstructive uropathy secondary to prostatic hyperplasia with history of CKD-3   - trend BMP daily       Lab Results Component Value Date    EGFR 84 01/10/2021    EGFR 92 01/08/2021    EGFR 79 01/07/2021    BUN 12 01/10/2021    BUN 11 01/08/2021    BUN 17 01/07/2021    CREATININE 1 05 01/10/2021    CREATININE 0 98 01/08/2021    CREATININE 1 11 01/07/2021         Ascending aortic aneurysm [facility])  Assessment & Plan  Hx/o AAA  - CTCAP on admission (+) Ascending aortic aneurysm measuring 4 3 cm  HLD (hyperlipidemia)  Assessment & Plan   - Lipid panel 12/31 wnl:  Chol 138, TG's 107, HDL 39, LDL 78  - Continue PTA Lipitor 20 mg          Essential hypertension  Assessment & Plan  Stable   - Continue PTA Amlodipine 5 mg   Blood Pressure: 163/74                       Subjective/Objective     Subjective:   Patient was seen at bedside  Patient continues to have numbness and tingling sensation of the 4 fingertips on the left hand  Patient denies headaches, dizziness, lightheadedness, change in vision, cp ct, sob, abdominal pain- except at the injection site of the heparin, changes in bowel movements, urinary symptoms at this time  Patient did not have any further questions or concerns at this time  Objective:  Vitals: Blood pressure 163/74, pulse 78, temperature 98 5 °F (36 9 °C), temperature source Oral, resp  rate 17, height 5' 7" (1 702 m), weight 59 6 kg (131 lb 8 oz), SpO2 99 %  ,Body mass index is 20 6 kg/m²  Intake/Output Summary (Last 24 hours) at 1/11/2021 0541  Last data filed at 1/10/2021 2221  Gross per 24 hour   Intake 170 ml   Output 650 ml   Net -480 ml       Invasive Devices     Peripheral Intravenous Line            Peripheral IV 01/08/21 Right Forearm 3 days          Drain            Urethral Catheter 18 Fr  10 days                Physical Exam  Constitutional:       General: He is not in acute distress  Appearance: Normal appearance  He is normal weight  He is not ill-appearing, toxic-appearing or diaphoretic  HENT:      Head: Normocephalic and atraumatic        Nose: Nose normal  No congestion or rhinorrhea  Mouth/Throat:      Mouth: Mucous membranes are moist    Eyes:      General: No scleral icterus  Right eye: No discharge  Left eye: No discharge  Extraocular Movements: Extraocular movements intact  Conjunctiva/sclera: Conjunctivae normal       Pupils: Pupils are equal, round, and reactive to light  Neck:      Musculoskeletal: Normal range of motion and neck supple  No neck rigidity or muscular tenderness  Cardiovascular:      Rate and Rhythm: Normal rate  Pulses: Normal pulses  Heart sounds: Normal heart sounds  No murmur  Pulmonary:      Effort: Pulmonary effort is normal  No respiratory distress  Breath sounds: Normal breath sounds  No wheezing or rales  Abdominal:      General: Bowel sounds are normal  There is no distension  Palpations: Abdomen is soft  There is no mass  Tenderness: There is no abdominal tenderness  There is no guarding or rebound  Hernia: No hernia is present  Musculoskeletal: Normal range of motion  General: No swelling, tenderness or signs of injury  Right lower leg: No edema  Left lower leg: No edema  Skin:     General: Skin is warm and dry  Capillary Refill: Capillary refill takes less than 2 seconds  Coloration: Skin is not jaundiced or pale  Findings: No bruising, erythema, lesion or rash  Neurological:      General: No focal deficit present  Mental Status: He is alert and oriented to person, place, and time  Psychiatric:         Mood and Affect: Mood normal          Behavior: Behavior normal          Thought Content: Thought content normal            Lab, Imaging and other studies: I have personally reviewed pertinent reports  VTE Pharmacologic Prophylaxis: Heparin  VTE Mechanical Prophylaxis: sequential compression device  Disposition: Medically cleared  Waiting for placement

## 2021-01-11 NOTE — ASSESSMENT & PLAN NOTE
RESOLVED  REYNOLD likely 2/2 obstructive uropathy secondary to prostatic hyperplasia with history of CKD-3   - trend BMP daily       Lab Results   Component Value Date    EGFR 84 01/10/2021    EGFR 92 01/08/2021    EGFR 79 01/07/2021    BUN 12 01/10/2021    BUN 11 01/08/2021    BUN 17 01/07/2021    CREATININE 1 05 01/10/2021    CREATININE 0 98 01/08/2021    CREATININE 1 11 01/07/2021

## 2021-01-12 NOTE — PHYSICAL THERAPY NOTE
01/12/21 1010   PT Last Visit   PT Visit Date 01/12/21   Note Type   Note Type Treatment   Pain Assessment   Pain Assessment Tool 0-10   Pain Score No Pain   Restrictions/Precautions   Weight Bearing Precautions Per Order No   Other Precautions Cognitive; Fall Risk  (left hand four fingers c/o numbness)   General   Chart Reviewed Yes   Family/Caregiver Present No   Cognition   Overall Cognitive Status Impaired   Arousal/Participation Alert; Cooperative   Attention Attends with cues to redirect   Orientation Level Disoriented to situation   Memory Unable to assess   Following Commands Follows one step commands without difficulty   Subjective   Subjective pt agrees to particpate in PT tx this am    Bed Mobility   Supine to Sit 5  Supervision   Additional items HOB elevated; Bedrails   Sit to Supine 5  Supervision   Additional items Assist x 1;Bedrails;HOB elevated   Balance   Static Sitting Good   Dynamic Sitting Good   Endurance Deficit   Endurance Deficit Yes   Endurance Deficit Description weakness ,fatigue   Activity Tolerance   Activity Tolerance Patient tolerated treatment well;Patient limited by fatigue   Medical Staff [address] Luis Enrique   Nurse Made Aware spoke with nurse prior to tx   Exercises   TKR Supine;Sitting;15 reps;AROM; Bilateral   Balance training  sat EOB x 15' , no LOB noted    Assessment   Prognosis Fair   Problem List Decreased strength;Decreased endurance; Impaired balance;Decreased mobility; Decreased cognition; Impaired judgement;Decreased safety awareness; Impaired sensation   Assessment Pt tolerated tx well today  He was cooperative  Pt was able to complete all exercises as directed this session  He sat EOB x 15' which is 5 more minutes than previous tx session  Pt would benefit from rehab to maximize functional mobility and safety  Pt is schedule to go to rehab later today  If pt stays in acute setting ,PT to evaluate gait training next visit     Barriers to Discharge Inaccessible home environment   Goals   Patient Goals to get stronger   STG Expiration Date 01/15/21   PT Treatment Day 2   Plan   Treatment/Interventions Functional transfer training;LE strengthening/ROM; Therapeutic exercise; Endurance training;Cognitive reorientation;Patient/family training;Equipment eval/education; Bed mobility;Spoke to nursing;OT   Progress Progressing toward goals   PT Frequency 2-3x/wk   Recommendation   PT Discharge Recommendation Post-Acute Rehabilitation Services   Equipment Recommended   (tbd)   PT - OK to Discharge Yes  (to rehab once medically cleared)   Marimar Marin, PTA

## 2021-01-12 NOTE — DISCHARGE SUMMARY
Discharge Summary - Alvaro Soliman 77 y o  male MRN: [id number]    Unit/Bed#: -01 Encounter: [id number]    Admission Date: 2020   Discharge Date: 2021    Diagnosis: UTI (urinary tract infection) [N39 0]  Obstructive nephropathy [N13 8]  Weakness [R53 1]    Important Physician Related Follow Up:   · Urology, Dr Karena Alejandro  · PCP,  Ms Clark Justin, NP  · Cardiology, Dr Roxana Brooke    Procedures Performed:       Significant Findings/Abnormal Results with this admission:  · COVID negative  · Vitamin B 12, TSH, folate within normal limits  · Vitamin-D deficiency  · CT chest abdomen pelvis:  Showing ascending aortic aneurysm measuring 4 3 cm  No acute intra-abdominal or pelvic trauma  Marked distention of bladder extending well into the mid abdomen, compatible with distal outlet obstruction  Mild prostate enlargement  · CT head without contrast showing no acute intracranial abnormalities  · X-ray of the left tibia and fibula showing no acute osseous abnormalities    Hospital Course:   HPI as per Dr Marcelina Mariano, "73-YO male admitted for AMS and urinary retention  Pt is awake, alert, and oriented to name and  but not to time or place  Pt unable to recall today's events leading to ED evaluation and does not know his reason for admission  Per ED note, Pt was  found by neighbor today in his house, covered in feces and urine  Pt lives at home by himself  Neighbor called EMS becuase pt's home had feces and urine all over the floor and couch  Pt does not take prescribed medicines nor does he care for himself  PMHX/o urinary retention, enlarged prostate, HTN, HLD, CKD 3, and anemia  On evaluation, Pt denies any headache, vision changes, N/V, CP, SOB, abdo pain, diarrhea, constipation, myalgia, or excessive fatigue  ED Management:   - Labs:  CBC, CMP, Trop-I, ABG, ammonia, lipase, CK, CK-MB, UDS, UA,, panel, COVID screening   - Imaging:  CTCAP, CT head, XR left tibia/fibula    - treatment:  1 L NS bolus, Ceftriaxone (Rocephin) 1g IV xOnce"      Patient was started on ceftriaxone 1 g IV Q 24 hour  The following day a KI was improving in urology was consulted for recommendations for the obstructive uropathy which was most likely cause of the REYNOLD  Patient was placed on a Ni catheter  Geriatric team was consulted for evaluation due to underlying cognitive impairment being suspected  Patient has a baseline long history of urinary retention secondary to 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  Cystoscopy has been performed in the past and trans urethral resection of the prostate or year old lift was recommended previously  The urology team recommended transurethral resection or year old left again during this admission  However patient declined prostate surgery and has elected to perform clean intermittent catheterization which he previously had been doing 3-4 times per day  Urology team recommended Ni catheter placement during hospital stay and until patient's creatinine levels go back to baseline  And was informed that once discharged home or short-term rehab he can go with the Ni catheter and then follow-up with Urology as an outpatient to remove the catheter and we instructed him on clean intermittent in catheterization again discussed other options such a trans urethral resection of prostate a or your left  After geriatric team evaluation, patient was further workup for vitamin B12 deficiency, thyroid function, vitamin D deficiency  Neuropsych was consulted for further evaluation and patient was deemed incapable of making his own medical decisions at this time  The team reached out to the [place] for disposition planning  Patient was deemed medically cleared for discharge to short-term rehab as PT/OT's recommendations  [facility] [place], was agreeable for short-term rehab placement  Patient is also agreeable to short-term rehab    Case management was consulted and [facility] accepted patient to theirs facility  Patient did complain of tingling and numbness of the left hand specifically the tip of the 4 fingers  Stating that it was after the pulse ox was removed from his hand  Patient still able to move his hand however states that he is unable to properly eat using his left hand due to the numbness and tingling  No other signs or symptoms present this time  OT evaluated patient and provided exercises for patient to do to help with the numbness and tingling sensation  Patient also was found to have irregular heart rate, with a previous history of PVCs  An EKG was done along with lab works which were negative  Patient should follow up with his cardiologist, Dr Darwin Primrose outpatient  Patient is medically cleared at this time and is stable to go to short-term rehab  Patient should also follow up with urologist for the removal of the Ni catheter  Complications:  None    Discharge Diagnosis:  UTI secondary to 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    Exam on Day of Discharge:   Physical Exam  Constitutional:       General: He is not in acute distress  Appearance: Normal appearance  He is not ill-appearing, toxic-appearing or diaphoretic  HENT:      Head: Normocephalic and atraumatic  Nose: Nose normal  No congestion or rhinorrhea  Mouth/Throat:      Mouth: Mucous membranes are moist    Eyes:      General: No scleral icterus  Right eye: No discharge  Left eye: No discharge  Extraocular Movements: Extraocular movements intact  Conjunctiva/sclera: Conjunctivae normal       Pupils: Pupils are equal, round, and reactive to light  Neck:      Musculoskeletal: Normal range of motion and neck supple  No neck rigidity or muscular tenderness  Cardiovascular:      Rate and Rhythm: Normal rate  Rhythm irregular  Pulses: Normal pulses  Heart sounds: Normal heart sounds  No murmur     Pulmonary:      Effort: Pulmonary effort is normal  No respiratory distress  Breath sounds: Normal breath sounds  No wheezing or rales  Abdominal:      General: Bowel sounds are normal  There is no distension  Palpations: Abdomen is soft  Tenderness: There is no abdominal tenderness  There is no guarding  Musculoskeletal: Normal range of motion  General: No swelling, tenderness, deformity or signs of injury  Right lower leg: No edema  Left lower leg: No edema  Skin:     General: Skin is warm and dry  Capillary Refill: Capillary refill takes less than 2 seconds  Coloration: Skin is not jaundiced or pale  Findings: No bruising, erythema, lesion or rash  Neurological:      Mental Status: He is alert  Mental status is at baseline  Psychiatric:         Mood and Affect: Mood normal          Thought Content: Thought content normal          Condition at Discharge: stable     Discharge instructions/Information to patient and family:   See after visit summary for information provided to patient and family  Provisions for Follow-Up Care:  See after visit summary for information related to follow-up care and any pertinent home health orders  Disposition: Skilled nursing facility at [facility]    Planned Readmission: No    Discharge Statement   I spent 30 minutes discharging the patient  This time was spent on the day of discharge  I had direct contact with the patient on the day of discharge  Additional documentation is required if more than 30 minutes were spent on discharge  Discharge Medications:  See after visit summary for reconciled discharge medications provided to patient and family    Medication changes made with this admission:  · Start:  · No new medications  · Stop:  · None  · Continue:  · Lidocaine 5% as needed  · Pregabalin 300 mg 3 times a day  · Amlodipine 5 mg daily  · Aspirin 81 mg daily  · Flomax 0 5 mg daily with dinner  · Atorvastatin 40 mg  daily    Ollie Brown MD  [facility] PGY1  1/12/2021  3:00 PM

## 2021-01-12 NOTE — CASE MANAGEMENT
Cm reviewed IMM document with pt's son Satish Sims confirmed that he is in agreement with dcp and does not wish to appeal

## 2021-01-12 NOTE — PLAN OF CARE
Problem: PHYSICAL THERAPY ADULT  Goal: Performs mobility at highest level of function for planned discharge setting  See evaluation for individualized goals  Description: Treatment/Interventions: Functional transfer training, LE strengthening/ROM, Elevations, Therapeutic exercise, Endurance training, Cognitive reorientation, Patient/family training, Equipment eval/education, Bed mobility, Gait training, Compensatory technique education, Spoke to nursing, OT, Family          See flowsheet documentation for full assessment, interventions and recommendations  Outcome: Progressing  Note: Prognosis: Fair  Problem List: Decreased strength, Decreased endurance, Impaired balance, Decreased mobility, Decreased cognition, Impaired judgement, Decreased safety awareness, Impaired sensation  Assessment: Pt tolerated tx well today  He was cooperative  Pt was able to complete all exercises as directed this session  He sat EOB x 15' which is 5 more minutes than previous tx session  Pt would benefit from rehab to maximize functional mobility and safety  Pt is schedule to go to rehab later today  If pt stays in acute setting ,PT to evaluate gait training next visit  Barriers to Discharge: Inaccessible home environment     PT Discharge Recommendation: [address] [place]     PT - OK to Discharge: Yes(to rehab once medically cleared)    See flowsheet documentation for full assessment

## 2021-01-12 NOTE — OCCUPATIONAL THERAPY NOTE
Occupational Therapy Treatment Note     Patient Name: Alvaro Soliman  [id number] Date: 1/12/2021  Problem List  Principal Problem:    Obstructive uropathy  Active Problems:    Essential hypertension    HLD (hyperlipidemia)    Ascending aortic aneurysm (HCC)    REYNOLD (acute kidney injury) (HCC)    Encephalopathy acute    Urinary tract infection    Metabolic acidosis, increased anion gap          01/12/21 1009   OT Last Visit   OT Visit Date 01/12/21   Note Type   Note Type Treatment   Restrictions/Precautions   Weight Bearing Precautions Per Order No   Other Precautions Cognitive;Pain   Pain Assessment   Pain Assessment Tool 0-10   Pain Score No Pain   ADL   Eating Assistance 5  Supervision/Setup   Eating Deficit Setup; Increased time to complete   LB Dressing Assistance 7  Independent   Bed Mobility   Supine to Sit 5  Supervision   Additional items HOB elevated; Bedrails   Sit to Supine 5  Supervision   Additional items HOB elevated; Bedrails   Transfers   Sit to Stand 5  Supervision   Stand to Sit 5  Supervision   Functional Mobility   Functional Mobility 5  Supervision   Additional items Rolling walker   Therapeutic Excerise-Strength   UE Strength Yes   Right Upper Extremity- Strength   R Shoulder Flexion   R Elbow Elbow flexion;Elbow extension   Equipment Theraband   RUE Strength Comment therex in all planes 3x/10 each    Left Upper Extremity-Strength   L Shoulder Flexion   L Elbow Elbow flexion;Elbow extension   Equipment Theraband   LUE Strength Comment therex in all planes 3x/10 each    Coordination   Dexterity decreased L hand    In Hand Manipulation decreased  strength    Cognition   Arousal/Participation Alert   Attention Attends with cues to redirect   Following Commands Follows one step commands with increased time or repetition   Comments Pt demonstrating flat affect t/o session   agreeable to tasks and participates    Activity Tolerance   Activity Tolerance Patient limited by fatigue   Medical Staff Made Aware CLAYTON Borden RN clearance to see pt    Assessment   Assessment Patient participated in Skilled OT session this date with interventions consisting of ADL re training with the use of correct body mechnaics, safety awareness and fall prevention techniques, therapeutic exercise to: increase functional use of BUEs, increase BUE muscle strength ,  therapeutic activities to: increase activity tolerance, increase standing tolerance time with unilateral UE support to complete sink level ADLs and increase OOB/ sitting tolerance  Upon arrival patient was found supine in bed  Pt demonstrated S for SUP <> sit, fnxl ambulation household distance using RW  Pt c/o numbness in weakness in L fingers from knuckles to finger tips  Demonstrates poor sharp/dull awareness and decreased  strength  Pt is able to utilize fork and knife but with increased energy required at this time  Pt completed therex with light resistance theraband in all planes 3x/10 and no complaints  Don/doff socks I in bed  Pt improving activity tolerance, ADL performance, and transfers compared to previous sessions  Pt continues to be limited by endurance, activity tolerance, strength, decreased sensation, and over ADL performance  Pt continues to be functioning below baseline level, occupational performance remains limited secondary to factors listed above and increased risk for falls and injury  From OT standpoint, recommendation at time of d/c would be Short Term Rehab  Patient to benefit from continued Occupational Therapy treatment while in the hospital to address deficits as defined above and maximize level of functional independence with ADLs and functional mobility  Pt left post session supine in room with all current needs met      Recommendation   OT Discharge Recommendation Post-Acute Rehabilitation Services   OT - OK to Discharge Yes   Modified Briscoe Scale   Modified Charlene Scale 2     Maris Cook MS, OTR/L

## 2021-01-15 NOTE — TELEPHONE ENCOUNTER
For now recommend maintain Ni with routine changes q4 weeks  If his cognition improves to the point he is able to safely resume CIC can visit that in the future, does not sound like he's ready yet   Can schedule with AP in 2 months

## 2021-01-15 NOTE — PROGRESS NOTES
History and Physical  [facility]      NAME: Tulio Hoffmann  AGE: 77 y o  SEX: male    DATE OF ENCOUNTER: 1/15/2021    Code status:    Assessment     Problem List Items Addressed This Visit        Cardiovascular and Mediastinum    Essential hypertension    PAD (peripheral artery disease) (HCC)       Nervous and Auditory    Encephalopathy acute - Primary       Genitourinary    REYNOLD (acute kidney injury) ([address] )    CKD (chronic kidney disease), stage III    Obstructive uropathy    Urinary tract infection       Other    Anemia, unspecified    Hyponatremia      Other Visit Diagnoses     Cognitive impairment                  Plan/Discussion   Patient admitted to [facility] for comprehensive rehabilitation program      Encephalopathy with likely underlying Cognitive impairment  Encephalopathy due to UTI and REYNOLD due to Obstructive Uropathy  Currently with a escalante  Plan for fu with Urology  Depending on availability may try a voiding trial at [facility]  Will recheck labs and monitor kidney function  Continue to monitor mentation  May need to transition to LTC but intention is short term rehabilitation and patient already wanting to go home and agreeble to therapes  All medications and routine orders were reviewed and updated as needed  Chief Complaint     Seen for admission at [address] [place] [address]    History of Present Illness     Pt seen for admission into [facility]  Patient at [facility] for actue rehabiliation following hospital stay  Patient was found to have encephalopathy  Baseline cognition is not clear  Found to have UTI and reynold secondary to obstructive uropathy  Known hx of BPH , s/p TURP  Review of records showing no interest in any further surgical proceudres  dc'd on escalante catheter  Previously was doing self catheters  Patient has no complaints other than wanting to go home              HISTORY:  Past Medical History:   Diagnosis Date    Aortic aneurysm ([address] )     Arthritis     Atypical chest pain     Back pain     Chronic kidney disease     stg 3    Elevated ALT measurement     Elevated AST (SGOT)     Hyperlipidemia     Hypertension     Leg pain     Neurogenic claudication     Urinary retention     catherize self 4x/day    Urinary retention      Past Surgical History:   Procedure Laterality Date    EPIDURAL BLOCK INJECTION N/A 1/23/2017    Procedure: BLOCK / INJECTION EPIDURAL STEROID LUMBAR;  Surgeon: Bonita Villaseñor MD;  Location: [facility];  Service:     UT COLONOSCOPY FLX DX W/COLLJ SPEC WHEN PFRMD N/A 1/8/2019    Procedure: COLONOSCOPY;  Surgeon: Oseas Latif MD;  Location: [facility];   Service: Colorectal    UT LAMNOTMY INCL W/DCMPRSN NRV ROOT 1 INTRSPC LUMBR Bilateral 1/23/2017    Procedure: MIS L2-3 & L3-4 LAMINAL FORAMINOTOMIES AND MICRODISCECTOMY W LEFT SIDED APPROACH ;  Surgeon: Bonita Villaseñor MD;  Location: [facility];  Service: Neurosurgery     Family History   Problem Relation Age of Onset    Heart disease Sister     Stroke Sister    Criselda Treviño Sudden death Mother     Stroke Brother     Pancreatic cancer Sister     No Known Problems Father     No Known Problems Sister     No Known Problems Sister     No Known Problems Son      Social History     Socioeconomic History    Marital status: Single     Spouse name: Not on file    Number of children: Not on file    Years of education: Not on file    Highest education level: Not on file   Occupational History    Not on file   Social Needs    Financial resource strain: Not on file    Food insecurity     Worry: Not on file     Inability: Not on file    Transportation needs     Medical: Not on file     Non-medical: Not on file   Tobacco Use    Smoking status: Never Smoker    Smokeless tobacco: Never Used   Substance and Sexual Activity    Alcohol use: Never     Frequency: Never    Drug use: Never    Sexual activity: Not on file   Lifestyle    Physical activity     Days per week: Not on file     Minutes per session: Not on file    Stress: Not on file   Relationships    Social connections     Talks on phone: Not on file     Gets together: Not on file     Attends Gnosticist service: Not on file     Active member of club or organization: Not on file     Attends meetings of clubs or organizations: Not on file     Relationship status: Not on file    Intimate partner violence     Fear of current or ex partner: Not on file     Emotionally abused: Not on file     Physically abused: Not on file     Forced sexual activity: Not on file   Other Topics Concern    Not on file   Social History Narrative    Not on file       Allergies:  No Known Allergies    Review of Systems     Review of Systems   Constitutional: Negative  Negative for activity change, appetite change, chills and diaphoresis  HENT: Negative for congestion and dental problem  Respiratory: Negative  Negative for apnea, chest tightness, shortness of breath and wheezing  Cardiovascular: Negative  Negative for chest pain, palpitations and leg swelling  Gastrointestinal: Negative  Negative for abdominal distention, abdominal pain, constipation, diarrhea and nausea  Genitourinary: Negative  Negative for difficulty urinating, dysuria and frequency  Medications and orders     All medications reviewed and updated in Nursing Home EMR  Objective     Vitals: reviewed at Nursing Home EMR    Physical Exam  Vitals signs and nursing note reviewed  Constitutional:       General: He is not in acute distress  Appearance: He is well-developed  He is not ill-appearing  HENT:      Head: Normocephalic and atraumatic  Right Ear: External ear normal       Left Ear: External ear normal       Nose: Nose normal  No congestion or rhinorrhea  Mouth/Throat:      Mouth: Mucous membranes are moist       Pharynx: No oropharyngeal exudate or posterior oropharyngeal erythema  Eyes:      Extraocular Movements: Extraocular movements intact  Conjunctiva/sclera: Conjunctivae normal       Pupils: Pupils are equal, round, and reactive to light  Neck:      Musculoskeletal: Normal range of motion and neck supple  Cardiovascular:      Rate and Rhythm: Normal rate and regular rhythm  Heart sounds: Normal heart sounds  No murmur  No friction rub  No gallop  Pulmonary:      Effort: Pulmonary effort is normal  No respiratory distress  Breath sounds: Normal breath sounds  No wheezing or rales  Chest:      Chest wall: No tenderness  Abdominal:      General: Bowel sounds are normal  There is no distension  Palpations: Abdomen is soft  There is no mass  Tenderness: There is no abdominal tenderness  There is no guarding or rebound  Genitourinary:     Comments: esaclante in place  Musculoskeletal: Normal range of motion  Skin:     General: Skin is warm  Capillary Refill: Capillary refill takes less than 2 seconds  Neurological:      General: No focal deficit present  Mental Status: He is alert     Psychiatric:         Mood and Affect: Mood normal          Behavior: Behavior normal                  CRUZ Scott

## 2021-01-15 NOTE — TELEPHONE ENCOUNTER
Darreld President from [facility] calling to schedule Hospital follow up  Facility doctor wants him seen soon  Please call [phone]   to arrange

## 2021-01-15 NOTE — TELEPHONE ENCOUNTER
Patient of Dr Nirmala Nation in the [place] office, known to practice for BPH with urinary retention, was previously performing CIC 4 times per day  Last office visit was 9/3/2019  Plan was originally for 1 year follow up  Then patient discovered to have a complex renal cyst, plan was for MRI and then follow up with Dr Nirmala Nation  Patient did not have MRI and a few follow ups were cancelled  Patient recently admitted 12/31/20-1/12/21, he was brought in by EMS, found by neighbor, confused, visibly not caring for himself  Per note from Dr Evens Green:  There was evidence of urinary tract infection and acute kidney injury  CT scan revealed mild bilateral renal collecting system fullness and a distended bladder  At baseline the patient has a long history of urinary retention secondary to bladder outlet obstruction from prostatic hyperplasia  Plan: The patient has urinary retention secondary to prostatic obstruction and has declined surgical intervention  Options at this point include resuming clean intermittent catheterization 3-4 times daily ,  chronic Escalante or suprapubic tube  I discussed these options with the patient and he is willing to resume clean intermittent catheterization  The importance of being compliant with this was discussed with the patient  Would not remove Escalante catheter until his creatinine nadirs  The patient can be discharged home once medically stable  He can be discharged home with Escalante catheter and then he can follow up with Urology as an outpatient to remove the catheter and reinstruct him on clean intermittent catheterization and again discuss other options such as transurethral resection of the prostate or urolift  Patient was discharged to [facility] rehab facility  Per notes he was deemed not capable of making his own decisions and his son is POA  Escalante still in place since 12/31/20  Please advise on follow up and if we are able to provide escalante orders

## 2021-01-18 NOTE — TELEPHONE ENCOUNTER
· Routine escalante catheter change, every 6 weeks, [id number], 10 mL balloon  · Routine catheter care  · May irrigate with 60 mL Normal strength saline for clog, sediment, hematuria  · May change catheter PRN for clog or dislodged catheter  · Call the office with any urological concerns     · Next routine catheter change due approximately 2/11/2021

## 2021-01-18 NOTE — TELEPHONE ENCOUNTER
Called and spoke to Damion Brooks per provider   Bhavin Ty PA-C    11:19 AM  Note     For now recommend maintain Ni with routine changes q4 weeks  If his cognition improves to the point he is able to safely resume CIC can visit that in the future, does not sound like he's ready yet  Can schedule with AP in 2 months        They advised they can maintain catheter at this time   Advised we can fax orders to [phone]    She states Matilda Dumont is the unit clerk, is currently in a meeting and would do the scheduling    Advised our office can attempt later on to call to schedule 2 month follow up     She verbalized understanding and is thankful for call

## 2021-01-18 NOTE — PROGRESS NOTES
Progress Notes        NAME: Gold Mcdowell  AGE: 77 y o  SEX: male    DATE OF ENCOUNTER: 1/18/2021    Code status:    Assessment     Problem List Items Addressed This Visit        Cardiovascular and Mediastinum    Essential hypertension       Nervous and Auditory    Encephalopathy acute       Genitourinary    Obstructive uropathy - Primary      Other Visit Diagnoses     Cognitive impairment                Plan/discussion       Patient's encephalopathy appears to be improving  With known obstructive uropathy  Currently with escalante in  Requesting that he return to self cath  Urology apt not yet available  As his mentation is improved will try a trial of going back to self catheters  Chief Complaint     Followup visit     History of Present Illness     Pt seen for fu  He has been doing well  He is requesting to go back to Self cathing himself  Reports he has been doing this for a long time and is quite capable  He has no other complaints  The following portions of the patient's history were reviewed and updated as appropriate: current medications, past family history, past medical history, past social history, past surgical history and problem list     Allergies:  No Known Allergies    Review of Systems     Review of Systems   Constitutional: Negative  Negative for activity change, appetite change, chills and diaphoresis  HENT: Negative for congestion and dental problem  Respiratory: Negative  Negative for apnea, chest tightness, shortness of breath and wheezing  Cardiovascular: Negative  Negative for chest pain, palpitations and leg swelling  Gastrointestinal: Negative  Negative for abdominal distention, abdominal pain, constipation, diarrhea and nausea  Genitourinary: Negative  Negative for difficulty urinating, dysuria and frequency  Medications and orders     All medications reviewed and updated in Nursing Home EMR        Objective     Vitals: reviewed at [facility] EHR      Physical Exam  Vitals signs reviewed  Constitutional:       Appearance: Normal appearance  HENT:      Head: Normocephalic  Cardiovascular:      Rate and Rhythm: Normal rate and regular rhythm  Genitourinary:     Penis: Normal        Comments: Ni in place  Neurological:      General: No focal deficit present  Mental Status: He is alert           Pertinent Laboratory/Diagnostic Studies:       Jay Mcclain MD    1/18/2021 6:33 PM

## 2021-01-19 NOTE — TELEPHONE ENCOUNTER
Called and spoke to nurse at [facility]  Informed her that as per Becka Matamoros PA-C "if the facility is able to assist with self cath 4x's a day that's fine to remove escalante " Nurse stated that the patient is familiar with self cath as he used to do this prior to escalante placement  They will be sure he is still able to self cath without difficulty  They will call with any further questions or concerns

## 2021-01-21 NOTE — PROGRESS NOTES
Progress Notes        NAME: Kaylan Saucedo  AGE: 77 y o  SEX: male    DATE OF ENCOUNTER: 1/21/2021    Code status:    Assessment     Problem List Items Addressed This Visit        Cardiovascular and Mediastinum    Essential hypertension       Nervous and Auditory    Encephalopathy acute - Primary       Genitourinary    [address]YNOLD (acute kidney [address]) ([facility] [address] )    CKD (chronic kidney disease), stage III    Obstructive uropathy    Urinary tract infection            Plan/discussion       Patient seen for followup  His enceophalopathy has resolved  This was due to UTI and REYNOLD associated with known obstructive uropathy and CKD  He appears to be competent and able to care for himself  We will plan for a safe discharge for him this week  He will need services to help fu  He will need fu with PCP within a week  Chief Complaint     Followup visit     History of Present Illness     Patient is seen for fu  He reports he is doing well  Since the last visit mentally he has continued to improved  I reviewed the hospital records  At the time he was deemed incapacitated and not able to make decision or care for himself  This was in the setting of Acute encephalopathy due to UTI and REYNOLD with known underlying obstructive uropathy and CKD  He has been doing well  Plan is for discharge this week if he is able to mentally  He is wanting and willing to leave QTC as soon as he can  He has no new complaints  We were able to dc his indwelling escalante and transition him back to Self catheters  Reports he has many of them at home and they are of better quality  He reports to the nursing staff every time he urinates              The following portions of the patient's history were reviewed and updated as appropriate: current medications, past family history, past medical history, past social history, past surgical history and problem list     Allergies:  No Known Allergies    Review of Systems Review of Systems   Constitutional: Negative  Negative for activity change, appetite change, chills and diaphoresis  HENT: Negative for congestion and dental problem  Respiratory: Negative  Negative for apnea, chest tightness, shortness of breath and wheezing  Cardiovascular: Negative  Negative for chest pain, palpitations and leg swelling  Gastrointestinal: Negative  Negative for abdominal distention, abdominal pain, constipation, diarrhea and nausea  Genitourinary: Negative  Negative for difficulty urinating, dysuria and frequency  Medications and orders     All medications reviewed and updated in Nursing Home EMR  Objective     Vitals: reviewed at [facility] EHR      Physical Exam  Vitals signs and nursing note reviewed  Constitutional:       General: He is not in acute distress  Appearance: Normal appearance  He is not ill-appearing, toxic-appearing or diaphoretic  HENT:      Head: Normocephalic  Right Ear: External ear normal       Left Ear: External ear normal       Mouth/Throat:      Mouth: Mucous membranes are moist    Eyes:      Extraocular Movements: Extraocular movements intact  Pupils: Pupils are equal, round, and reactive to light  Neck:      Musculoskeletal: Normal range of motion and neck supple  Cardiovascular:      Rate and Rhythm: Normal rate and regular rhythm  Pulmonary:      Effort: Pulmonary effort is normal       Breath sounds: Normal breath sounds  Abdominal:      General: Bowel sounds are normal    Neurological:      General: No focal deficit present  Mental Status: He is alert and oriented to person, place, and time  Cranial Nerves: Cranial nerves are intact  Motor: Motor function is intact  Comments: Oriented to name, place, person, country, county, time, season, month, day, date  He can do serial 7s  He can spell the word WORLD backwards  He is fluent and conversant  Pleasant and cooperative       Although he does not recall the events during or before his hospitalization he understands what is happening now and his medical conditions  Able to list all his current medications  When asked about the use of microwave he is able to tell me , including not putting anything with metal in this  Psychiatric:         Mood and Affect: Mood normal          Behavior: Behavior normal          Thought Content:  Thought content normal          Judgment: Judgment normal          Pertinent Laboratory/Diagnostic Studies:       aJy Mcclain MD    1/21/2021 4:52 PM

## 2021-04-21 NOTE — TELEPHONE ENCOUNTER
Attempted to contact patient to schedule appointment(s) listed below  Requested patient call [phone] option 3 to schedule appointment(s)  Patient's appointment(s) are past due, expected ASAP      Dopplers  [] Carotid (CV)   [] Abdominal Aorta Iliac (AOIL)  [] Lower Limb Arterial (ROSALINA)  [] Renal Artery  [] Upper Limb (UEA)  [] EVAR   [] Celiac and/or Mesenteric  [] Hemodialysis Access (HD)     Advanced Imaging   [] CT abdomen with/ without contrast  [] CT abdomen with contrast  [] CT abdomen without contrast    [] CT abdomen & pelvis with/ without contrast  [] CT abdomen & pelvis with contrast  [] CT abdomen & pelvis without contrast    [] CTA abdomen    [] CTA abdomen & pelvis    Office Visit   [] New patient, patient last seen over 3 years ago  [x] Risk factor modification       R/s no show appointment from 3/19/2021 with Edgardo Mcpherson

## 2021-04-30 NOTE — TELEPHONE ENCOUNTER
sw pt he has already 2 appointments scheduled in May so needs to schedule in June   Scheduled 6/8 with Cruz Hernandez

## 2021-05-18 NOTE — PROGRESS NOTES
OFFICE FOLLOW UP - Nephrology   Stuart Graff 77 y o  male MRN: [id number]       ASSESSMENT and PLAN:  Maxime Figueroa was seen today for chronic kidney disease and follow-up  Diagnoses and all orders for this visit:    Stage 3b chronic kidney disease ([facility] [address] )  -     CBC; Future  -     Renal function panel; Future  -     PTH, intact; Future  -     Protein / creatinine ratio, urine; Future    Urinary retention    Essential hypertension    Hyperlipidemia, unspecified hyperlipidemia type        This is a 26-year-old gentleman with known history of chronic kidney disease stage 3, hypertension, orthostatic hypotension, prior syncopal episodes, who returns to the office for follow-up  1  Chronic kidney disease stage 3, previous creatinine fluctuating around 1 3-1 8  If renal function remains stable, most recent creatinine 1 8  Recent episode of severe acute renal failure with creatinine up to 5 on end of 2020 in the setting of obstructive uropathy that improved with creatinine down to 1 29 with Ni catheter placement  Given that his kidney function is stable I would like to see him back in the office in 6 months with repeat blood and urine test   CKD suspected component of obstructive uropathy, hypertensive nephrosclerosis, no nephrotic range proteinuria  Avoid NSAIDs  Okay to take Tylenol or Lyrica for pain  2  Hypertension with prior episodes of orthostatic hypotension  Blood pressure currently well controlled on amlodipine  3  Urinary retention secondary to bladder outlet obstruction from prostatic hyperplasia, continue with chronic straight catheterization 4 times a day  Patient is also on Flomax  Continue follow-up with Urology, patient declined prostate surgery  4  Mild anemia, history of iron deficiency anemia  Had a urine electrophoresis that did not show any monoclonal bands  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    He was following with Hematology in the past  Most recent hemoglobin at 11 9  Status post colonoscopy on January 2019    5  Mineral bone disease, follow labs in 6 months    6  Hyperlipidemia, continue with statins          HPI: Monique Negroece is a 77 y o  male who is here for Chronic Kidney Disease and Follow-up      Patient with known history of hypertension, chronic kidney disease stage 3, orthostatic hypotension, multiple episodes of syncope episodes at work who returns to our office for follow-up  Previous episodes of syncope December 2018 and a 2nd syncopal episode on January 9, 2019 day after he had a colonoscopy  Previously worked on a hot environment, used to work in [employer] but he retired last year  Last time seen was in 11/2020  Since last office visit, he was hospitalized [facility] between 12/31-1/12/2021, hospital course complicated with mental status changes, severe acute kidney injury with creatinine 5 92 on admission in the setting of obstructive uropathy that improved with Ni catheter placement, creatinine on discharge 1 29  Patient was discharged to rehab, currently at home  Patient currently has no complaints at this time and is feeling well other than chronic bilateral leg pain as well lower back pain for what he takes Lyrica      Patient denies any chest pain, shortness of breath or leg swelling  No lightheadedness or dizziness  Continues with straight catheterization 4 times a day  No dysuria or gross hematuria  No abdominal pain, no nausea or vomiting, no diarrhea or constipation  Denies NSAID use  Most recent blood test reviewed, creatinine up to 1 8      ROS: All the systems were reviewed and were negative except as documented on the H&P  Allergies: Patient has no known allergies      Medications:   Current Outpatient Medications:     amLODIPine (NORVASC) 5 mg tablet, Take 1 tablet (5 mg total) by mouth daily, Disp: 30 tablet, Rfl: 5    aspirin (ECOTRIN LOW STRENGTH) 81 mg EC tablet, Take 1 tablet (81 mg total) by mouth daily, Disp: , Rfl:     atorvastatin (LIPITOR) 20 mg tablet, Take 2 tablets (40 mg total) by mouth daily, Disp: 90 tablet, Rfl: 0    pregabalin (LYRICA) 300 MG capsule, Take 300 mg by mouth 2 (two) times a day , Disp: , Rfl:     tamsulosin (FLOMAX) 0 4 mg, Take 0 4 mg by mouth daily with dinner  , Disp: , Rfl:     lidocaine (LIDODERM) 5 %, Apply 1 patch topically daily Remove & Discard patch within 12 hours or as directed by MD (Patient not taking: Reported on 5/18/2021), Disp: 15 patch, Rfl: 0    Past Medical History:   Diagnosis Date    Aortic aneurysm (HCC)     Arthritis     Atypical chest pain     Back pain     Chronic kidney disease     stg 3    Elevated ALT measurement     Elevated AST (SGOT)     Hyperlipidemia     Hypertension     Leg pain     Neurogenic claudication     Urinary retention     catherize self 4x/day    Urinary retention      Past Surgical History:   Procedure Laterality Date    EPIDURAL BLOCK INJECTION N/A 1/23/2017    Procedure: BLOCK / INJECTION EPIDURAL STEROID LUMBAR;  Surgeon: Mihir Haywood MD;  [facility];  Service:     AK COLONOSCOPY FLX DX W/COLLJ SPEC WHEN PFRMD N/A 1/8/2019    Procedure: COLONOSCOPY;  Surgeon: Alicia Crawford MD;  [facility]; Service: Colorectal    AK LAMNOTMY INCL W/DCMPRSN NRV ROOT 1 INTRSPC LUMBR Bilateral 1/23/2017    Procedure: MIS L2-3 & L3-4 LAMINAL FORAMINOTOMIES AND MICRODISCECTOMY W LEFT SIDED APPROACH ;  Surgeon: Mihir Haywood MD;  [facility];  Service: Neurosurgery     Family History   Problem Relation Age of Onset    Heart disease Sister     Stroke Sister    Martha Fields Sudden death Mother     Stroke Brother     Pancreatic cancer Sister     No Known Problems Father     No Known Problems Sister     No Known Problems Sister     No Known Problems Son       reports that he has never smoked   He has never used smokeless tobacco  He reports that he does not drink alcohol or use drugs  Physical Exam:   Vitals:    05/18/21 1409   BP: 140/70   BP Location: Left arm   Patient Position: Sitting   Cuff Size: Large   Pulse: 72   Weight: 66 1 kg (145 lb 12 8 oz)   Height: 5' 7" (1 702 m)     Body mass index is 22 84 kg/m²  General: conscious, cooperative, in not acute distress  Eyes: conjunctivae pink, anicteric sclerae  ENT: lips and mucous membranes moist  Neck: supple, no JVD  Chest: clear breath sounds bilateral, no crackles, ronchus or wheezings  CVS: distinct S1 & S2, normal rate, regular rhythm  Abdomen: soft, non-tender, non-distended, normoactive bowel sounds  Back: no CVA tenderness  Extremities: no edema of both legs  Skin: no rash  Neuro: awake, alert, oriented            Lab Results:  Results for orders placed or performed in visit on 05/17/21   Renal function panel   Result Value Ref Range    Albumin 3 4 (L) 3 5 - 5 0 g/dL    Calcium 10 0 8 3 - 10 1 mg/dL    Corrected Calcium 10 5 (H) 8 3 - 10 1 mg/dL    Phosphorus 4 1 2 3 - 4 1 mg/dL    BUN 27 (H) 5 - 25 mg/dL    Creatinine 1 88 (H) 0 60 - 1 30 mg/dL    Sodium 137 136 - 145 mmol/L    Potassium 4 4 3 5 - 5 3 mmol/L    Chloride 107 100 - 108 mmol/L    CO2 24 21 - 32 mmol/L    ANION GAP 6 4 - 13 mmol/L    eGFR 42 ml/min/1 73sq m    Glucose, Fasting 129 (H) 65 - 99 mg/dL   CBC   Result Value Ref Range    WBC 6 57 4 31 - 10 16 Thousand/uL    RBC 4 49 3 88 - 5 62 Million/uL    Hemoglobin 11 9 (L) 12 0 - 17 0 g/dL    Hematocrit 38 6 36 5 - 49 3 %    MCV 86 82 - 98 fL    MCH 26 5 (L) 26 8 - 34 3 pg    MCHC 30 8 (L) 31 4 - 37 4 g/dL    RDW 16 2 (H) 11 6 - 15 1 %    Platelets 192 (H) 288 - 390 Thousands/uL    MPV 9 8 8 9 - 12 7 fL         Portions of the record may have been created with voice recognition software  Occasional wrong word or "sound a like" substitutions may have occurred due to the inherent limitations of voice recognition software   Read the chart carefully and recognize, using context, where substitutions have occurred  If you have any questions, please contact the dictating provider

## 2021-05-18 NOTE — PATIENT INSTRUCTIONS
As we discussed in the office visit, based on the most recent blood test your kidney function remains stable  It is very important you continue with straight catheterization at least 4 times a day  Stay well-hydrated  No changes in medication at this moment  I would like to see you back in the office in 6 months with repeat blood and urine test   Continue regular follow-up with your primary care doctor      Avoid NSAIDs (no ibuprofen, Motrin, Advil, Aleve, naproxen)

## 2021-05-18 NOTE — LETTER
May 18, 2021     Luci Neumann DO  [facility] [address] [facility]    Patient: David Thakkar   YOB: 1955   Date of Visit: 5/18/2021       Dear Dr Gaurang Chatman: Thank you for referring David Thakkar to me for evaluation  Below are my notes for this consultation  If you have questions, please do not hesitate to call me  I look forward to following your patient along with you  Sincerely,        Joshua Soliz MD        CC: JOHNSON Pal MD  5/18/2021  2:39 PM  Incomplete  OFFICE FOLLOW UP - Nephrology   David Thakkar 77 y o  male MRN: [id number]       ASSESSMENT and PLAN:  Donnell Cole was seen today for chronic kidney disease and follow-up  Diagnoses and all orders for this visit:    Stage 3b chronic kidney disease ([facility] [address] )  -     CBC; Future  -     Renal function panel; Future  -     PTH, intact; Future  -     Protein / creatinine ratio, urine; Future    Urinary retention    Essential hypertension    Hyperlipidemia, unspecified hyperlipidemia type        This is a 64-year-old gentleman with known history of chronic kidney disease stage 3, hypertension, orthostatic hypotension, prior syncopal episodes, who returns to the office for follow-up  1  Chronic kidney disease stage 3, previous creatinine fluctuating around 1 3-1 8  If renal function remains stable, most recent creatinine 1 8  Recent episode of severe acute renal failure with creatinine up to 5 on end of 2020 in the setting of obstructive uropathy that improved with creatinine down to 1 29 with Ni catheter placement  Given that his kidney function is stable I would like to see him back in the office in 6 months with repeat blood and urine test   CKD suspected component of obstructive uropathy, hypertensive nephrosclerosis, no nephrotic range proteinuria  Avoid NSAIDs  Okay to take Tylenol or Lyrica for pain  2  Hypertension with prior episodes of orthostatic hypotension    Blood pressure currently well controlled on amlodipine  3  Urinary retention secondary to bladder outlet obstruction from prostatic hyperplasia, continue with chronic straight catheterization 4 times a day  Patient is also on Flomax  Continue follow-up with Urology, patient declined prostate surgery  4  Mild anemia, history of iron deficiency anemia  Had a urine electrophoresis that did not show any monoclonal bands  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  He was following with Hematology in the past  Most recent hemoglobin at 11 9  Status post colonoscopy on January 2019    5  Mineral bone disease, follow labs in 6 months    6  Hyperlipidemia, continue with statins          HPI: Mariam Sullivan is a 77 y o  male who is here for Chronic Kidney Disease and Follow-up      Patient with known history of hypertension, chronic kidney disease stage 3, orthostatic hypotension, multiple episodes of syncope episodes at work who returns to our office for follow-up  Previous episodes of syncope December 2018 and a 2nd syncopal episode on January 9, 2019 day after he had a colonoscopy  Previously worked on a hot environment, used to work in [employer] but he retired last year  Last time seen was in 11/2020  Since last office visit, he was hospitalized [facility] between 12/31-1/12/2021, hospital course complicated with mental status changes, severe acute kidney injury with creatinine 5 92 on admission in the setting of obstructive uropathy that improved with Ni catheter placement, creatinine on discharge 1 29  Patient was discharged to rehab, currently at home  Patient currently has no complaints at this time and is feeling well other than chronic bilateral leg pain as well lower back pain for what he takes Lyrica      Patient denies any chest pain, shortness of breath or leg swelling     No lightheadedness or dizziness  Continues with straight catheterization 4 times a day  No dysuria or gross hematuria  No abdominal pain, no nausea or vomiting, no diarrhea or constipation  Denies NSAID use  Most recent blood test reviewed, creatinine up to 1 8      ROS: All the systems were reviewed and were negative except as documented on the H&P  Allergies: Patient has no known allergies  Medications:   Current Outpatient Medications:     amLODIPine (NORVASC) 5 mg tablet, Take 1 tablet (5 mg total) by mouth daily, Disp: 30 tablet, Rfl: 5    aspirin (ECOTRIN LOW STRENGTH) 81 mg EC tablet, Take 1 tablet (81 mg total) by mouth daily, Disp: , Rfl:     atorvastatin (LIPITOR) 20 mg tablet, Take 2 tablets (40 mg total) by mouth daily, Disp: 90 tablet, Rfl: 0    pregabalin (LYRICA) 300 MG capsule, Take 300 mg by mouth 2 (two) times a day , Disp: , Rfl:     tamsulosin (FLOMAX) 0 4 mg, Take 0 4 mg by mouth daily with dinner  , Disp: , Rfl:     lidocaine (LIDODERM) 5 %, Apply 1 patch topically daily Remove & Discard patch within 12 hours or as directed by MD (Patient not taking: Reported on 5/18/2021), Disp: 15 patch, Rfl: 0    Past Medical History:   Diagnosis Date    Aortic aneurysm (HCC)     Arthritis     Atypical chest pain     Back pain     Chronic kidney disease     stg 3    Elevated ALT measurement     Elevated AST (SGOT)     Hyperlipidemia     Hypertension     Leg pain     Neurogenic claudication     Urinary retention     catherize self 4x/day    Urinary retention      Past Surgical History:   Procedure Laterality Date    EPIDURAL BLOCK INJECTION N/A 1/23/2017    Procedure: BLOCK / INJECTION EPIDURAL STEROID LUMBAR;  Surgeon: Krzysztof Dean MD;  [facility];  Service:     OK COLONOSCOPY FLX DX W/COLLJ SPEC WHEN PFRMD N/A 1/8/2019    Procedure: COLONOSCOPY;  Surgeon: Angelina Resendez MD;  [facility];   Service: Colorectal    OK LAMNOTMY INCL W/DCMPRSN NRV ROOT 1 INTRSPC LUMBR Bilateral 1/23/2017 Procedure: MIS L2-3 & L3-4 LAMINAL FORAMINOTOMIES AND MICRODISCECTOMY W LEFT SIDED APPROACH ;  Surgeon: Wojciech Patel MD;  [facility];  Service: Neurosurgery     Family History   Problem Relation Age of Onset    Heart disease Sister     Stroke Sister    Ama Betancourt Sudden death Mother     Stroke Brother     Pancreatic cancer Sister     No Known Problems Father     No Known Problems Sister     No Known Problems Sister     No Known Problems Son       reports that he has never smoked  He has never used smokeless tobacco  He reports that he does not drink alcohol or use drugs  Physical Exam:   Vitals:    05/18/21 1409   BP: 140/70   BP Location: Left arm   Patient Position: Sitting   Cuff Size: Large   Pulse: 72   Weight: 66 1 kg (145 lb 12 8 oz)   Height: 5' 7" (1 702 m)     Body mass index is 22 84 kg/m²      General: conscious, cooperative, in not acute distress  Eyes: conjunctivae pink, anicteric sclerae  ENT: lips and mucous membranes moist  Neck: supple, no JVD  Chest: clear breath sounds bilateral, no crackles, ronchus or wheezings  CVS: distinct S1 & S2, normal rate, regular rhythm  Abdomen: soft, non-tender, non-distended, normoactive bowel sounds  Back: no CVA tenderness  Extremities: no edema of both legs  Skin: no rash  Neuro: awake, alert, oriented            Lab Results:  Results for orders placed or performed in visit on 05/17/21   Renal function panel   Result Value Ref Range    Albumin 3 4 (L) 3 5 - 5 0 g/dL    Calcium 10 0 8 3 - 10 1 mg/dL    Corrected Calcium 10 5 (H) 8 3 - 10 1 mg/dL    Phosphorus 4 1 2 3 - 4 1 mg/dL    BUN 27 (H) 5 - 25 mg/dL    Creatinine 1 88 (H) 0 60 - 1 30 mg/dL    Sodium 137 136 - 145 mmol/L    Potassium 4 4 3 5 - 5 3 mmol/L    Chloride 107 100 - 108 mmol/L    CO2 24 21 - 32 mmol/L    ANION GAP 6 4 - 13 mmol/L    eGFR 42 ml/min/1 73sq m    Glucose, Fasting 129 (H) 65 - 99 mg/dL   CBC   Result Value Ref Range    WBC 6 57 4 31 - 10 16 Thousand/uL    RBC 4 49 3 88 - 5 62 Million/uL    Hemoglobin 11 9 (L) 12 0 - 17 0 g/dL    Hematocrit 38 6 36 5 - 49 3 %    MCV 86 82 - 98 fL    MCH 26 5 (L) 26 8 - 34 3 pg    MCHC 30 8 (L) 31 4 - 37 4 g/dL    RDW 16 2 (H) 11 6 - 15 1 %    Platelets 955 (H) 462 - 390 Thousands/uL    MPV 9 8 8 9 - 12 7 fL         Portions of the record may have been created with voice recognition software  Occasional wrong word or "sound a like" substitutions may have occurred due to the inherent limitations of voice recognition software  Read the chart carefully and recognize, using context, where substitutions have occurred  If you have any questions, please contact the dictating provider

## 2021-11-25 PROBLEM — E87.5 HYPERKALEMIA: Status: ACTIVE | Noted: 2021-01-01

## 2021-11-26 LAB
ABO GROUP BLD BPU: NORMAL
ATRIAL RATE: 103 BPM
ATRIAL RATE: 150 BPM
ATRIAL RATE: 312 BPM
ATRIAL RATE: 60 BPM
ATRIAL RATE: 73 BPM
ATRIAL RATE: 89 BPM
ATRIAL RATE: 96 BPM
BPU ID: NORMAL
CROSSMATCH: NORMAL
MRSA NOSE QL CULT: NORMAL
P AXIS: 62 DEGREES
P AXIS: 79 DEGREES
P AXIS: 88 DEGREES
PR INTERVAL: 129 MS
PR INTERVAL: 175 MS
PR INTERVAL: 649 MS
PR INTERVAL: 96 MS
QRS AXIS: -76 DEGREES
QRS AXIS: 137 DEGREES
QRS AXIS: 56 DEGREES
QRS AXIS: 65 DEGREES
QRS AXIS: 73 DEGREES
QRS AXIS: 77 DEGREES
QRS AXIS: 81 DEGREES
QRSD INTERVAL: 100 MS
QRSD INTERVAL: 113 MS
QRSD INTERVAL: 79 MS
QRSD INTERVAL: 80 MS
QRSD INTERVAL: 82 MS
QRSD INTERVAL: 88 MS
QRSD INTERVAL: 92 MS
QT INTERVAL: 300 MS
QT INTERVAL: 321 MS
QT INTERVAL: 333 MS
QT INTERVAL: 338 MS
QT INTERVAL: 358 MS
QT INTERVAL: 358 MS
QT INTERVAL: 529 MS
QTC INTERVAL: 395 MS
QTC INTERVAL: 406 MS
QTC INTERVAL: 411 MS
QTC INTERVAL: 436 MS
QTC INTERVAL: 437 MS
QTC INTERVAL: 474 MS
QTC INTERVAL: 529 MS
T WAVE AXIS: 110 DEGREES
T WAVE AXIS: 252 DEGREES
T WAVE AXIS: 258 DEGREES
T WAVE AXIS: 269 DEGREES
T WAVE AXIS: 78 DEGREES
T WAVE AXIS: 84 DEGREES
T WAVE AXIS: 97 DEGREES
UNIT DISPENSE STATUS: NORMAL
UNIT PRODUCT CODE: NORMAL
UNIT PRODUCT VOLUME: 125 ML
UNIT PRODUCT VOLUME: 350 ML
UNIT RH: NORMAL
VENTRICULAR RATE: 103 BPM
VENTRICULAR RATE: 150 BPM
VENTRICULAR RATE: 60 BPM
VENTRICULAR RATE: 73 BPM
VENTRICULAR RATE: 89 BPM
VENTRICULAR RATE: 90 BPM
VENTRICULAR RATE: 96 BPM

## 2021-11-27 LAB — BACTERIA UR CULT: ABNORMAL

## 2021-11-29 LAB
BACTERIA BLD CULT: ABNORMAL
GRAM STN SPEC: ABNORMAL
GRAM STN SPEC: ABNORMAL

## 2022-02-17 NOTE — TELEPHONE ENCOUNTER
Nurse from [facility] called in requesting to speak with nurse because patient and doctor would like to remove catheter because patient has discomfort and would like to go back to self cath   Contact phone is [phone] This is a 65 y/o male with a PMH  of a cardiac stent placed 3/2021, HTN, HLD, DM, PAD and BPH who reports he had hematuria and work up revealed bladder tumors. He is now scheduled for a cystoscopy and TURB. At present he denies any hematuria, dysuria, urgency frequency, SOB, chest pain, or palpations.

## 2024-01-09 NOTE — DISCHARGE INSTRUCTIONS
Sclerae : White without injection , Conjuntivae and eyelids appear normal Please stop taking your lisinopril 20mg  Take your blood pressure every morning sitting and standing, if it is higher than 140/90 standing, then please take the amlodipine 5mg  If it is lower than 140/90, please do not take any blood pressure medications  Syncope   WHAT YOU NEED TO KNOW:   Syncope is also called fainting or passing out  Syncope is a sudden, temporary loss of consciousness, followed by a fall from a standing or sitting position  Syncope ranges from not serious to a sign of a more serious condition that needs to be treated  You can control some health conditions that cause syncope  Your healthcare providers can help you create a plan to manage syncope and prevent episodes  DISCHARGE INSTRUCTIONS:   Seek care immediately if:   · You are bleeding because you hit your head when you fainted  · You suddenly have double vision, difficulty speaking, numbness, and cannot move your arms or legs  · You have chest pain and trouble breathing  · You vomit blood or material that looks like coffee grounds  · You see blood in your bowel movement  Contact your healthcare provider if:   · You have new or worsening symptoms  · You have another syncope episode  · You have a headache, fast heartbeat, or feel too dizzy to stand up  · You have questions or concerns about your condition or care  Follow up with your healthcare provider as directed:  Write down your questions so you remember to ask them during your visits  Manage syncope:   · Keep a record of your syncope episodes  Include your symptoms and your activity before and after the episode  The record can help your healthcare provider find the cause of your syncope and help you manage episodes  · Sit or lie down when needed  This includes when you feel dizzy, your throat is getting tight, and your vision changes  Raise your legs above the level of your heart      · Take slow, deep breaths if you start to breathe faster with anxiety or fear  This can help decrease dizziness and the feeling that you might faint  · Check your blood pressure often  This is important if you take medicine to lower your blood pressure  Check your blood pressure when you are lying down and when you are standing  Ask how often to check during the day  Keep a record of your blood pressure numbers  Your healthcare provider may use the record to help plan your treatment  Prevent a syncope episode:   · Move slowly and let yourself get used to one position before you move to another position  This is very important when you change from a lying or sitting position to a standing position  Take some deep breaths before you stand up from a lying position  Stand up slowly  Sudden movements may cause a fainting spell  Sit on the side of the bed or couch for a few minutes before you stand up  If you are on bedrest, try to be upright for about 2 hours each day, or as directed  Do not lock your legs if you are standing for a long period of time  Move your legs and bend your knees to keep blood flowing  · Follow your healthcare provider's recommendations  Your provider may  recommend that you drink more liquids to prevent dehydration  You may also need to have more salt to keep your blood pressure from dropping too low and causing syncope  Your provider will tell you how much liquid and sodium to have each day  · Watch for signs of low blood sugar  These include hunger, nervousness, sweating, and fast or fluttery heartbeats  Talk with your healthcare provider about ways to keep your blood sugar level steady  · Do not strain if you are constipated  You may faint if you strain to have a bowel movement  Walking is the best way to get your bowels moving  Eat foods high in fiber to make it easier to have a bowel movement  Good examples are high-fiber cereals, beans, vegetables, and whole-grain breads  Prune juice may help make bowel movements softer      · Be careful in hot weather  Heat can cause a syncope episode  Limit activity done outside on hot days  Physical activity in hot weather can lead to dehydration  This can cause an episode  © 2017 [address] [employer]  Information is for End User's use only and may not be sold, redistributed or otherwise used for commercial purposes  All illustrations and images included in CareNotes® are the copyrighted property of A [employer] Inc  or [employer]  The above information is an  only  It is not intended as medical advice for individual conditions or treatments  Talk to your doctor, nurse or pharmacist before following any medical regimen to see if it is safe and effective for you

## (undated) DEVICE — SNAP KOVER: Brand: UNBRANDED

## (undated) DEVICE — Device

## (undated) DEVICE — UNIVERSAL SPINE,KIT: Brand: CARDINAL HEALTH

## (undated) DEVICE — TOOL 15BA50 LEGEND 15CM 5MM BA: Brand: MIDAS REX™

## (undated) DEVICE — ANTIBACTERIAL VIOLET BRAIDED (POLYGLACTIN 910), SYNTHETIC ABSORBABLE SUTURE: Brand: COATED VICRYL

## (undated) DEVICE — DRAPE MICROSCOPE OPMI PENTERO

## (undated) DEVICE — NEEDLE 18 G X 1 1/2

## (undated) DEVICE — SYRINGE 10ML LL

## (undated) DEVICE — PREP SURGICAL PURPREP 26ML

## (undated) DEVICE — INSULATED BLADE ELECTRODE;CAUTION: FOR MANUFACTURING, PROCESSING, OR REPACKING.: Brand: EDGE

## (undated) DEVICE — INTENDED FOR TISSUE SEPARATION, AND OTHER PROCEDURES THAT REQUIRE A SHARP SURGICAL BLADE TO PUNCTURE OR CUT.: Brand: BARD-PARKER ® CARBON RIB-BACK BLADES

## (undated) DEVICE — FLOSEAL MATRIX IS INDICATED IN SURGICAL PROCEDURES (OTHER THAN IN OPHTHALMIC) AS AN ADJUNCT TO HEMOSTASIS WHEN CONTROL OF BLEEDING BY LIGATURE OR CONVENTIONAL PROCEDURES IS INEFFECTIVE OR IMPRACTICAL.: Brand: FLOSEAL HEMOSTATIC MATRIX

## (undated) DEVICE — SUT MONOCRYL 4-0 PS-2 27 IN Y426H

## (undated) DEVICE — GLOVE SRG BIOGEL 7.5

## (undated) DEVICE — THE FLOSEAL MALLEABLE TIP AND TRIMMABLE TIP ARE INTENDED FOR DELIVERY OF FLOSEAL HEMOSTATIC MATRIX.: Brand: FLOSEAL SPECIAL APPLICATOR TIPS

## (undated) DEVICE — DRESSING MEPILEX AG BORDER 4 X 4 IN

## (undated) DEVICE — GLOVE INDICATOR PI UNDERGLOVE SZ 7.5 BLUE

## (undated) DEVICE — SPONGE PVP SCRUB WING STERILE

## (undated) DEVICE — SUT VICRYL 0 UR-6 27 IN J603H

## (undated) DEVICE — INTENDED FOR TISSUE SEPARATION, AND OTHER PROCEDURES THAT REQUIRE A SHARP SURGICAL BLADE TO PUNCTURE OR CUT.: Brand: BARD-PARKER SAFETY BLADES SIZE 15, STERILE

## (undated) DEVICE — SYRINGE 3ML LL

## (undated) DEVICE — MINOR PROCEDURE DRAPE: Brand: CONVERTORS

## (undated) DEVICE — NEEDLE SPINAL 22G X 3.5IN  QUINCKE